# Patient Record
Sex: FEMALE | Race: ASIAN | NOT HISPANIC OR LATINO
[De-identification: names, ages, dates, MRNs, and addresses within clinical notes are randomized per-mention and may not be internally consistent; named-entity substitution may affect disease eponyms.]

---

## 2017-01-12 ENCOUNTER — APPOINTMENT (OUTPATIENT)
Dept: PULMONOLOGY | Facility: CLINIC | Age: 72
End: 2017-01-12

## 2017-01-12 VITALS
OXYGEN SATURATION: 100 % | HEART RATE: 82 BPM | DIASTOLIC BLOOD PRESSURE: 75 MMHG | SYSTOLIC BLOOD PRESSURE: 160 MMHG | WEIGHT: 173 LBS | BODY MASS INDEX: 34.94 KG/M2

## 2017-04-09 ENCOUNTER — RX RENEWAL (OUTPATIENT)
Age: 72
End: 2017-04-09

## 2017-04-17 ENCOUNTER — APPOINTMENT (OUTPATIENT)
Dept: MRI IMAGING | Facility: IMAGING CENTER | Age: 72
End: 2017-04-17

## 2017-05-11 ENCOUNTER — APPOINTMENT (OUTPATIENT)
Dept: PULMONOLOGY | Facility: CLINIC | Age: 72
End: 2017-05-11

## 2017-05-11 VITALS
WEIGHT: 170 LBS | RESPIRATION RATE: 17 BRPM | HEART RATE: 63 BPM | HEIGHT: 59 IN | BODY MASS INDEX: 34.27 KG/M2 | DIASTOLIC BLOOD PRESSURE: 80 MMHG | SYSTOLIC BLOOD PRESSURE: 110 MMHG | OXYGEN SATURATION: 98 %

## 2017-07-04 ENCOUNTER — RX RENEWAL (OUTPATIENT)
Age: 72
End: 2017-07-04

## 2017-08-10 ENCOUNTER — EMERGENCY (EMERGENCY)
Facility: HOSPITAL | Age: 72
LOS: 1 days | Discharge: HOME | End: 2017-08-10
Attending: EMERGENCY MEDICINE | Admitting: EMERGENCY MEDICINE
Payer: COMMERCIAL

## 2017-08-10 VITALS
RESPIRATION RATE: 16 BRPM | WEIGHT: 169.98 LBS | TEMPERATURE: 99 F | OXYGEN SATURATION: 95 % | DIASTOLIC BLOOD PRESSURE: 79 MMHG | HEART RATE: 82 BPM | SYSTOLIC BLOOD PRESSURE: 112 MMHG

## 2017-08-10 VITALS
SYSTOLIC BLOOD PRESSURE: 104 MMHG | OXYGEN SATURATION: 98 % | DIASTOLIC BLOOD PRESSURE: 57 MMHG | TEMPERATURE: 98 F | RESPIRATION RATE: 18 BRPM | HEART RATE: 70 BPM

## 2017-08-10 LAB
ALBUMIN SERPL ELPH-MCNC: 4.2 G/DL — SIGNIFICANT CHANGE UP (ref 3.3–5)
ALP SERPL-CCNC: 91 U/L — SIGNIFICANT CHANGE UP (ref 40–120)
ALT FLD-CCNC: 43 U/L RC — SIGNIFICANT CHANGE UP (ref 10–45)
ANION GAP SERPL CALC-SCNC: 14 MMOL/L — SIGNIFICANT CHANGE UP (ref 5–17)
APPEARANCE UR: CLEAR — SIGNIFICANT CHANGE UP
AST SERPL-CCNC: 35 U/L — SIGNIFICANT CHANGE UP (ref 10–40)
BASOPHILS # BLD AUTO: 0.1 K/UL — SIGNIFICANT CHANGE UP (ref 0–0.2)
BASOPHILS NFR BLD AUTO: 2.8 % — HIGH (ref 0–2)
BILIRUB SERPL-MCNC: 0.2 MG/DL — SIGNIFICANT CHANGE UP (ref 0.2–1.2)
BILIRUB UR-MCNC: NEGATIVE — SIGNIFICANT CHANGE UP
BUN SERPL-MCNC: 17 MG/DL — SIGNIFICANT CHANGE UP (ref 7–23)
CALCIUM SERPL-MCNC: 9.7 MG/DL — SIGNIFICANT CHANGE UP (ref 8.4–10.5)
CHLORIDE SERPL-SCNC: 99 MMOL/L — SIGNIFICANT CHANGE UP (ref 96–108)
CO2 SERPL-SCNC: 23 MMOL/L — SIGNIFICANT CHANGE UP (ref 22–31)
COLOR SPEC: YELLOW — SIGNIFICANT CHANGE UP
CREAT SERPL-MCNC: 0.92 MG/DL — SIGNIFICANT CHANGE UP (ref 0.5–1.3)
DIFF PNL FLD: NEGATIVE — SIGNIFICANT CHANGE UP
EOSINOPHIL # BLD AUTO: 0.3 K/UL — SIGNIFICANT CHANGE UP (ref 0–0.5)
EOSINOPHIL NFR BLD AUTO: 7.1 % — HIGH (ref 0–6)
GLUCOSE SERPL-MCNC: 85 MG/DL — SIGNIFICANT CHANGE UP (ref 70–99)
GLUCOSE UR QL: NEGATIVE — SIGNIFICANT CHANGE UP
HCT VFR BLD CALC: 45 % — SIGNIFICANT CHANGE UP (ref 34.5–45)
HGB BLD-MCNC: 15 G/DL — SIGNIFICANT CHANGE UP (ref 11.5–15.5)
KETONES UR-MCNC: NEGATIVE — SIGNIFICANT CHANGE UP
LEUKOCYTE ESTERASE UR-ACNC: NEGATIVE — SIGNIFICANT CHANGE UP
LYMPHOCYTES # BLD AUTO: 0.8 K/UL — LOW (ref 1–3.3)
LYMPHOCYTES # BLD AUTO: 18.3 % — SIGNIFICANT CHANGE UP (ref 13–44)
MCHC RBC-ENTMCNC: 31.6 PG — SIGNIFICANT CHANGE UP (ref 27–34)
MCHC RBC-ENTMCNC: 33.3 GM/DL — SIGNIFICANT CHANGE UP (ref 32–36)
MCV RBC AUTO: 95 FL — SIGNIFICANT CHANGE UP (ref 80–100)
MONOCYTES # BLD AUTO: 0.4 K/UL — SIGNIFICANT CHANGE UP (ref 0–0.9)
MONOCYTES NFR BLD AUTO: 8.4 % — SIGNIFICANT CHANGE UP (ref 2–14)
NEUTROPHILS # BLD AUTO: 2.8 K/UL — SIGNIFICANT CHANGE UP (ref 1.8–7.4)
NEUTROPHILS NFR BLD AUTO: 63.4 % — SIGNIFICANT CHANGE UP (ref 43–77)
NITRITE UR-MCNC: NEGATIVE — SIGNIFICANT CHANGE UP
PH UR: 6.5 — SIGNIFICANT CHANGE UP (ref 5–8)
PLATELET # BLD AUTO: 207 K/UL — SIGNIFICANT CHANGE UP (ref 150–400)
POTASSIUM SERPL-MCNC: 4.3 MMOL/L — SIGNIFICANT CHANGE UP (ref 3.5–5.3)
POTASSIUM SERPL-SCNC: 4.3 MMOL/L — SIGNIFICANT CHANGE UP (ref 3.5–5.3)
PROT SERPL-MCNC: 8.2 G/DL — SIGNIFICANT CHANGE UP (ref 6–8.3)
PROT UR-MCNC: SIGNIFICANT CHANGE UP
RBC # BLD: 4.74 M/UL — SIGNIFICANT CHANGE UP (ref 3.8–5.2)
RBC # FLD: 12.6 % — SIGNIFICANT CHANGE UP (ref 10.3–14.5)
SODIUM SERPL-SCNC: 136 MMOL/L — SIGNIFICANT CHANGE UP (ref 135–145)
SP GR SPEC: 1.02 — SIGNIFICANT CHANGE UP (ref 1.01–1.02)
UROBILINOGEN FLD QL: NEGATIVE — SIGNIFICANT CHANGE UP
WBC # BLD: 4.4 K/UL — SIGNIFICANT CHANGE UP (ref 3.8–10.5)
WBC # FLD AUTO: 4.4 K/UL — SIGNIFICANT CHANGE UP (ref 3.8–10.5)

## 2017-08-10 PROCEDURE — 96374 THER/PROPH/DIAG INJ IV PUSH: CPT

## 2017-08-10 PROCEDURE — 99284 EMERGENCY DEPT VISIT MOD MDM: CPT

## 2017-08-10 PROCEDURE — 99284 EMERGENCY DEPT VISIT MOD MDM: CPT | Mod: 25

## 2017-08-10 PROCEDURE — 85027 COMPLETE CBC AUTOMATED: CPT

## 2017-08-10 PROCEDURE — 87086 URINE CULTURE/COLONY COUNT: CPT

## 2017-08-10 PROCEDURE — 96375 TX/PRO/DX INJ NEW DRUG ADDON: CPT

## 2017-08-10 PROCEDURE — 81003 URINALYSIS AUTO W/O SCOPE: CPT

## 2017-08-10 PROCEDURE — 71046 X-RAY EXAM CHEST 2 VIEWS: CPT

## 2017-08-10 PROCEDURE — 71020: CPT | Mod: 26

## 2017-08-10 PROCEDURE — 87040 BLOOD CULTURE FOR BACTERIA: CPT

## 2017-08-10 PROCEDURE — 80053 COMPREHEN METABOLIC PANEL: CPT

## 2017-08-10 RX ORDER — CEFUROXIME AXETIL 250 MG
1 TABLET ORAL
Qty: 14 | Refills: 0
Start: 2017-08-10 | End: 2017-08-17

## 2017-08-10 RX ORDER — CEFTRIAXONE 500 MG/1
1 INJECTION, POWDER, FOR SOLUTION INTRAMUSCULAR; INTRAVENOUS ONCE
Qty: 0 | Refills: 0 | Status: COMPLETED | OUTPATIENT
Start: 2017-08-10 | End: 2017-08-10

## 2017-08-10 RX ORDER — METOCLOPRAMIDE HCL 10 MG
10 TABLET ORAL ONCE
Qty: 0 | Refills: 0 | Status: COMPLETED | OUTPATIENT
Start: 2017-08-10 | End: 2017-08-10

## 2017-08-10 RX ORDER — SODIUM CHLORIDE 9 MG/ML
1000 INJECTION INTRAMUSCULAR; INTRAVENOUS; SUBCUTANEOUS ONCE
Qty: 0 | Refills: 0 | Status: COMPLETED | OUTPATIENT
Start: 2017-08-10 | End: 2017-08-10

## 2017-08-10 RX ADMIN — CEFTRIAXONE 100 GRAM(S): 500 INJECTION, POWDER, FOR SOLUTION INTRAMUSCULAR; INTRAVENOUS at 15:26

## 2017-08-10 RX ADMIN — SODIUM CHLORIDE 1000 MILLILITER(S): 9 INJECTION INTRAMUSCULAR; INTRAVENOUS; SUBCUTANEOUS at 14:42

## 2017-08-10 RX ADMIN — Medication 10 MILLIGRAM(S): at 14:42

## 2017-08-10 NOTE — ED PROVIDER NOTE - MEDICAL DECISION MAKING DETAILS
Dysuria, chills, lower back pain, nausea, given bactrim but only took 3 doses, PE: CVA TTP bilat. Concern for pyelo. Plan: Urine, labs, abx, reassess. Dysuria, chills, lower back pain, nausea, given bactrim but only took 3 doses, PE: CVA TTP bilat. Concern for pyelo. Plan: Urine, labs, abx, reassess.  ATTD: concern for infection, will check labs, recheck urine, send culture, pain control. re jamie for dispo

## 2017-08-10 NOTE — ED PROVIDER NOTE - PMH
Asthma  h/o asthma in the past , not on meds  Depression    Diabetes  diet controlled, not on meds  Glaucoma    Lumbar Disc Disease  lumbar disc displacement  Migraine Headache    Ovarian Cancer  in 2005 had chemo  Pneumonia  h/o pneumonia in the past  Sleep Apnea  uses CPAP

## 2017-08-10 NOTE — ED ADULT NURSE NOTE - OBJECTIVE STATEMENT
Female 71 years old with history of DM came in for nausea/vomiting, headache and weakness. Pt stated  she had burning and frequency in urination a week ago, went [facility] and was started on Bactrim DS. Pt took 3 pills already and claimed that she's been vomiting, body aches and headaches after. Denies chest pain, sob. Denies blood in urine. Labs obtained. Comfort care rendered. Will monitor.

## 2017-08-10 NOTE — ED PROVIDER NOTE - PHYSICAL EXAMINATION
Gen: NAD  Eyes:  sclerae white, no icterus  ENT: Moist mucous membranes. No exudates  Neck: supple, no LAD, mass or goiter, trachea midline  CV: RRR. Audible S1 and S2. No murmurs, rubs, gallops, S3, nor S4  Pulm: Clear to auscultation bilaterally. No wheezes, rales, or rhonchi  Abd: BS+, nondistended, +CVA TTP bilaterally  Musculoskeletal:  No edema  Skin: no lesions or scars noted  Psych: mood good, affect full range and congruent with mood.  Neurologic: AAOx3

## 2017-08-10 NOTE — ED PROVIDER NOTE - ATTENDING CONTRIBUTION TO CARE
72 y/o F w/ hx of DM, HTN, asthma, presents for nausea, dysuria, and lower back pain. Last vomited last night at 11pm. Had dysuria 6 wks ago, given bactrim, only took 3 doses b/c began vomiting, now has chills and lower back pain bilaterally. Had fever 2 days ago.  No diarrhea, no rash.  primary care physician: Ade Coburn no acute distress, Non toxic   HEENT: EOMI, mmm,   Lungs: CTAB/L no C/ W /R   CVS: S1S2   Abd; Soft non tender, non distended   Ext: no edema CVA on my exam no tenderness to palpation.   Neuro AAOx3 non focal clear speech

## 2017-08-10 NOTE — ED PROVIDER NOTE - OBJECTIVE STATEMENT
72 y/o F w/ hx of DM, HTN, asthma, p/w nausea, dysuria, and lower back pain. Last vomited last night at 11pm. Had dysuria 6 wks ago, given bactrim, only took 3 doses b/c began vomiting, now has chills and lower back pain bilaterally. Had fever 2 days ago. Dysuria now improved. No diarrhea, no rash.  primary care physician: Ade

## 2017-08-11 LAB
CULTURE RESULTS: SIGNIFICANT CHANGE UP
SPECIMEN SOURCE: SIGNIFICANT CHANGE UP

## 2017-08-15 LAB
CULTURE RESULTS: SIGNIFICANT CHANGE UP
CULTURE RESULTS: SIGNIFICANT CHANGE UP
SPECIMEN SOURCE: SIGNIFICANT CHANGE UP
SPECIMEN SOURCE: SIGNIFICANT CHANGE UP

## 2017-08-25 ENCOUNTER — RX RENEWAL (OUTPATIENT)
Age: 72
End: 2017-08-25

## 2017-09-12 ENCOUNTER — APPOINTMENT (OUTPATIENT)
Dept: PULMONOLOGY | Facility: CLINIC | Age: 72
End: 2017-09-12
Payer: MEDICARE

## 2017-09-12 VITALS
BODY MASS INDEX: 34.27 KG/M2 | HEIGHT: 59 IN | DIASTOLIC BLOOD PRESSURE: 60 MMHG | OXYGEN SATURATION: 96 % | SYSTOLIC BLOOD PRESSURE: 110 MMHG | HEART RATE: 68 BPM | WEIGHT: 170 LBS | RESPIRATION RATE: 17 BRPM

## 2017-09-12 PROCEDURE — 71020: CPT

## 2017-09-12 PROCEDURE — 99214 OFFICE O/P EST MOD 30 MIN: CPT | Mod: 25

## 2017-09-12 RX ORDER — BLOOD-GLUCOSE METER
KIT MISCELLANEOUS
Qty: 1 | Refills: 0 | Status: ACTIVE | COMMUNITY
Start: 2017-03-08

## 2017-09-12 RX ORDER — BLOOD SUGAR DIAGNOSTIC
STRIP MISCELLANEOUS
Qty: 50 | Refills: 0 | Status: ACTIVE | COMMUNITY
Start: 2017-03-08

## 2017-09-15 ENCOUNTER — APPOINTMENT (OUTPATIENT)
Dept: ULTRASOUND IMAGING | Facility: IMAGING CENTER | Age: 72
End: 2017-09-15
Payer: MEDICARE

## 2017-09-15 ENCOUNTER — APPOINTMENT (OUTPATIENT)
Dept: MAMMOGRAPHY | Facility: IMAGING CENTER | Age: 72
End: 2017-09-15
Payer: MEDICARE

## 2017-09-15 ENCOUNTER — OUTPATIENT (OUTPATIENT)
Dept: OUTPATIENT SERVICES | Facility: HOSPITAL | Age: 72
LOS: 1 days | End: 2017-09-15
Payer: COMMERCIAL

## 2017-09-15 DIAGNOSIS — Z00.8 ENCOUNTER FOR OTHER GENERAL EXAMINATION: ICD-10-CM

## 2017-09-15 PROCEDURE — 77063 BREAST TOMOSYNTHESIS BI: CPT

## 2017-09-15 PROCEDURE — G0202: CPT | Mod: 26

## 2017-09-15 PROCEDURE — 77067 SCR MAMMO BI INCL CAD: CPT

## 2017-09-15 PROCEDURE — 77063 BREAST TOMOSYNTHESIS BI: CPT | Mod: 26

## 2017-10-05 ENCOUNTER — APPOINTMENT (OUTPATIENT)
Dept: NEUROLOGY | Facility: CLINIC | Age: 72
End: 2017-10-05
Payer: MEDICARE

## 2017-10-05 VITALS
HEART RATE: 69 BPM | DIASTOLIC BLOOD PRESSURE: 84 MMHG | HEIGHT: 59 IN | SYSTOLIC BLOOD PRESSURE: 166 MMHG | BODY MASS INDEX: 34.07 KG/M2 | WEIGHT: 169 LBS

## 2017-10-05 DIAGNOSIS — Z83.3 FAMILY HISTORY OF DIABETES MELLITUS: ICD-10-CM

## 2017-10-05 DIAGNOSIS — Z82.3 FAMILY HISTORY OF STROKE: ICD-10-CM

## 2017-10-05 PROCEDURE — 99205 OFFICE O/P NEW HI 60 MIN: CPT

## 2017-10-30 ENCOUNTER — APPOINTMENT (OUTPATIENT)
Dept: NEUROLOGY | Facility: CLINIC | Age: 72
End: 2017-10-30
Payer: MEDICARE

## 2017-10-30 PROCEDURE — 95886 MUSC TEST DONE W/N TEST COMP: CPT

## 2017-10-30 PROCEDURE — 95910 NRV CNDJ TEST 7-8 STUDIES: CPT

## 2017-10-30 PROCEDURE — 95937 NEUROMUSCULAR JUNCTION TEST: CPT

## 2017-11-14 ENCOUNTER — APPOINTMENT (OUTPATIENT)
Dept: NEUROSURGERY | Facility: CLINIC | Age: 72
End: 2017-11-14
Payer: MEDICARE

## 2017-11-14 VITALS
BODY MASS INDEX: 34.27 KG/M2 | TEMPERATURE: 97.8 F | SYSTOLIC BLOOD PRESSURE: 150 MMHG | DIASTOLIC BLOOD PRESSURE: 82 MMHG | WEIGHT: 170 LBS | HEART RATE: 69 BPM | OXYGEN SATURATION: 96 % | HEIGHT: 59 IN

## 2017-11-14 PROCEDURE — 99205 OFFICE O/P NEW HI 60 MIN: CPT | Mod: 57

## 2017-11-14 RX ORDER — CELECOXIB 200 MG/1
200 CAPSULE ORAL
Qty: 21 | Refills: 0 | Status: DISCONTINUED | COMMUNITY
Start: 2017-03-31 | End: 2017-11-14

## 2017-11-14 RX ORDER — OLOPATADINE HCL 1 MG/ML
0.1 SOLUTION/ DROPS OPHTHALMIC TWICE DAILY
Qty: 3 | Refills: 1 | Status: DISCONTINUED | COMMUNITY
Start: 2017-05-11 | End: 2017-11-14

## 2017-12-07 ENCOUNTER — RX RENEWAL (OUTPATIENT)
Age: 72
End: 2017-12-07

## 2018-01-10 ENCOUNTER — APPOINTMENT (OUTPATIENT)
Dept: PULMONOLOGY | Facility: CLINIC | Age: 73
End: 2018-01-10

## 2018-02-16 ENCOUNTER — RX RENEWAL (OUTPATIENT)
Age: 73
End: 2018-02-16

## 2018-03-09 ENCOUNTER — RX RENEWAL (OUTPATIENT)
Age: 73
End: 2018-03-09

## 2018-03-13 ENCOUNTER — RX RENEWAL (OUTPATIENT)
Age: 73
End: 2018-03-13

## 2018-04-09 ENCOUNTER — APPOINTMENT (OUTPATIENT)
Dept: PULMONOLOGY | Facility: CLINIC | Age: 73
End: 2018-04-09

## 2018-04-10 ENCOUNTER — APPOINTMENT (OUTPATIENT)
Dept: PULMONOLOGY | Facility: CLINIC | Age: 73
End: 2018-04-10
Payer: MEDICARE

## 2018-04-10 VITALS
OXYGEN SATURATION: 96 % | SYSTOLIC BLOOD PRESSURE: 120 MMHG | BODY MASS INDEX: 34.27 KG/M2 | DIASTOLIC BLOOD PRESSURE: 60 MMHG | HEART RATE: 71 BPM | WEIGHT: 170 LBS | HEIGHT: 59 IN

## 2018-04-10 PROCEDURE — 99214 OFFICE O/P EST MOD 30 MIN: CPT

## 2018-04-11 ENCOUNTER — MEDICATION RENEWAL (OUTPATIENT)
Age: 73
End: 2018-04-11

## 2018-04-11 RX ORDER — ALBUTEROL SULFATE 90 UG/1
108 (90 BASE) AEROSOL, METERED RESPIRATORY (INHALATION)
Qty: 1 | Refills: 3 | Status: DISCONTINUED | COMMUNITY
Start: 2018-04-10 | End: 2018-04-11

## 2018-04-19 RX ORDER — AZITHROMYCIN 250 MG/1
250 TABLET, FILM COATED ORAL
Qty: 6 | Refills: 0 | Status: COMPLETED | COMMUNITY
Start: 2017-12-22

## 2018-04-19 RX ORDER — FLUTICASONE PROPIONATE 50 UG/1
50 SPRAY, METERED NASAL
Qty: 16 | Refills: 0 | Status: COMPLETED | COMMUNITY
Start: 2018-02-14

## 2018-05-08 DIAGNOSIS — H10.10 ACUTE ATOPIC CONJUNCTIVITIS, UNSPECIFIED EYE: ICD-10-CM

## 2018-05-11 RX ORDER — OLOPATADINE HYDROCHLORIDE 2 MG/ML
0.2 SOLUTION OPHTHALMIC
Qty: 1 | Refills: 3 | Status: ACTIVE | COMMUNITY
Start: 2018-05-11 | End: 1900-01-01

## 2018-06-27 ENCOUNTER — APPOINTMENT (OUTPATIENT)
Dept: PULMONOLOGY | Facility: CLINIC | Age: 73
End: 2018-06-27
Payer: MEDICARE

## 2018-06-27 VITALS
SYSTOLIC BLOOD PRESSURE: 144 MMHG | HEIGHT: 59 IN | BODY MASS INDEX: 34.27 KG/M2 | HEART RATE: 88 BPM | WEIGHT: 170 LBS | OXYGEN SATURATION: 96 % | DIASTOLIC BLOOD PRESSURE: 74 MMHG

## 2018-06-27 PROCEDURE — 71046 X-RAY EXAM CHEST 2 VIEWS: CPT

## 2018-06-27 PROCEDURE — 99214 OFFICE O/P EST MOD 30 MIN: CPT | Mod: 25

## 2018-08-14 ENCOUNTER — APPOINTMENT (OUTPATIENT)
Dept: PULMONOLOGY | Facility: CLINIC | Age: 73
End: 2018-08-14

## 2018-08-27 ENCOUNTER — APPOINTMENT (OUTPATIENT)
Dept: NEUROLOGY | Facility: CLINIC | Age: 73
End: 2018-08-27
Payer: MEDICARE

## 2018-08-27 VITALS
DIASTOLIC BLOOD PRESSURE: 86 MMHG | HEIGHT: 59 IN | HEART RATE: 80 BPM | BODY MASS INDEX: 34.68 KG/M2 | WEIGHT: 172 LBS | SYSTOLIC BLOOD PRESSURE: 160 MMHG

## 2018-08-27 PROCEDURE — 99214 OFFICE O/P EST MOD 30 MIN: CPT

## 2018-10-25 ENCOUNTER — APPOINTMENT (OUTPATIENT)
Dept: NEUROLOGY | Facility: CLINIC | Age: 73
End: 2018-10-25

## 2018-11-07 ENCOUNTER — OUTPATIENT (OUTPATIENT)
Dept: OUTPATIENT SERVICES | Facility: HOSPITAL | Age: 73
LOS: 1 days | End: 2018-11-07
Payer: COMMERCIAL

## 2018-11-07 ENCOUNTER — APPOINTMENT (OUTPATIENT)
Dept: MAMMOGRAPHY | Facility: IMAGING CENTER | Age: 73
End: 2018-11-07
Payer: MEDICARE

## 2018-11-07 DIAGNOSIS — Z00.8 ENCOUNTER FOR OTHER GENERAL EXAMINATION: ICD-10-CM

## 2018-11-07 PROCEDURE — 77063 BREAST TOMOSYNTHESIS BI: CPT | Mod: 26

## 2018-11-07 PROCEDURE — 77067 SCR MAMMO BI INCL CAD: CPT

## 2018-11-07 PROCEDURE — 77063 BREAST TOMOSYNTHESIS BI: CPT

## 2018-11-07 PROCEDURE — 77067 SCR MAMMO BI INCL CAD: CPT | Mod: 26

## 2018-11-14 ENCOUNTER — APPOINTMENT (OUTPATIENT)
Dept: ULTRASOUND IMAGING | Facility: IMAGING CENTER | Age: 73
End: 2018-11-14
Payer: MEDICARE

## 2018-11-14 ENCOUNTER — OUTPATIENT (OUTPATIENT)
Dept: OUTPATIENT SERVICES | Facility: HOSPITAL | Age: 73
LOS: 1 days | End: 2018-11-14
Payer: COMMERCIAL

## 2018-11-14 DIAGNOSIS — Z00.8 ENCOUNTER FOR OTHER GENERAL EXAMINATION: ICD-10-CM

## 2018-11-14 PROCEDURE — 76642 ULTRASOUND BREAST LIMITED: CPT

## 2018-11-14 PROCEDURE — 76642 ULTRASOUND BREAST LIMITED: CPT | Mod: 26,LT

## 2018-11-30 ENCOUNTER — APPOINTMENT (OUTPATIENT)
Dept: ULTRASOUND IMAGING | Facility: IMAGING CENTER | Age: 73
End: 2018-11-30

## 2018-12-17 ENCOUNTER — OUTPATIENT (OUTPATIENT)
Dept: OUTPATIENT SERVICES | Facility: HOSPITAL | Age: 73
LOS: 1 days | End: 2018-12-17
Payer: COMMERCIAL

## 2018-12-17 ENCOUNTER — RESULT REVIEW (OUTPATIENT)
Age: 73
End: 2018-12-17

## 2018-12-17 ENCOUNTER — APPOINTMENT (OUTPATIENT)
Dept: ULTRASOUND IMAGING | Facility: IMAGING CENTER | Age: 73
End: 2018-12-17
Payer: MEDICARE

## 2018-12-17 DIAGNOSIS — N63.20 UNSPECIFIED LUMP IN THE LEFT BREAST, UNSPECIFIED QUADRANT: ICD-10-CM

## 2018-12-17 PROCEDURE — 88173 CYTOPATH EVAL FNA REPORT: CPT | Mod: 26

## 2018-12-17 PROCEDURE — 88173 CYTOPATH EVAL FNA REPORT: CPT

## 2018-12-17 PROCEDURE — 19083 BX BREAST 1ST LESION US IMAG: CPT

## 2018-12-17 PROCEDURE — 19083 BX BREAST 1ST LESION US IMAG: CPT | Mod: LT

## 2018-12-21 LAB — NON-GYNECOLOGICAL CYTOLOGY STUDY: SIGNIFICANT CHANGE UP

## 2019-02-28 ENCOUNTER — APPOINTMENT (OUTPATIENT)
Dept: PULMONOLOGY | Facility: CLINIC | Age: 74
End: 2019-02-28
Payer: MEDICARE

## 2019-02-28 VITALS
WEIGHT: 178 LBS | HEART RATE: 75 BPM | BODY MASS INDEX: 37.36 KG/M2 | RESPIRATION RATE: 16 BRPM | DIASTOLIC BLOOD PRESSURE: 70 MMHG | OXYGEN SATURATION: 96 % | SYSTOLIC BLOOD PRESSURE: 120 MMHG | HEIGHT: 58 IN

## 2019-02-28 PROCEDURE — 99214 OFFICE O/P EST MOD 30 MIN: CPT

## 2019-02-28 RX ORDER — AZELASTINE HYDROCHLORIDE 137 UG/1
0.1 SPRAY, METERED NASAL TWICE DAILY
Qty: 1 | Refills: 1 | Status: ACTIVE | COMMUNITY
Start: 2019-02-28 | End: 1900-01-01

## 2019-02-28 NOTE — ASSESSMENT
[FreeTextEntry1] : Ms. Gracia is in the midst of acute bronchitis and asthmatic bronchitis\par \par problem 1: asthmatic bronchitis with acute asthma flare\par -wants to avoid steroids\par -add ceftin 500 mg PO BID x 10 days\par -advised patient she will need to increase advair to BID dosing- rinse and gargle\par -add spiriva respimat- samples provided 2 puffs once daily rinse and gargle after use\par -add Albuterol nebulizer at least BID up to QiD (gargle and spit after use) \par -continue to use Singulair 10 mg before bed\par \par problem 2: allergic rhinitis with post nasal drip\par -restart azelastine nasal spray 1-2 sniff/nostril BID\par \par problem 3: GERD with flare exacerbating cough\par -continue to use omeprazole 40 mg before breakfast but add dose before dinner as well x 5-7 days\par -continue to use Zantac 300 mg before bed \par \par problem 4: OSAS\par -continue to use the CPAP machine, tolerating it well, and seeing the benefits\par \par F/U in 3 months or as scheduled with Dr. Gonzales\par She is encouraged to call with any changes, concerns, or questions

## 2019-02-28 NOTE — HISTORY OF PRESENT ILLNESS
[FreeTextEntry1] : Ms. Lemus is a 73 year old female presenting to the office for an acute visit. She has PMHx of CHF, allergic rhinitis, asthma, chronic cough, GERD, overweight, MARA and shortness of breath. Her chief complaint is cough and wheezing. \par \par She reports her cough has been going on for approx 1 month and has had waxing and waning symptoms. Over the last few days however she has become very fatigued, has had increased wheezing and has chest tightness.  Her reflux is also flaring despite being on medications. She cannot sleep at night due to cough. She admits to post nasal drip. \par \par She denies fever, chills, chest pain, sore throat, nasal congestion, headaches, rashes or muscle cramps. \par \par She has a planned stress test but is going to re-schedule. She states she had an echo and it was "normal."\par \par She is using her advair only once a daily and is using ventolin every 3-4 hours. \par She has a nebulizer but not using it. \par She wants to avoid steroids. \par

## 2019-02-28 NOTE — REVIEW OF SYSTEMS
[Fatigue] : fatigue [Ear Disturbance] : ear disturbance [Postnasal Drip] : postnasal drip [Cough] : cough [Dyspnea] : dyspnea [Chest Tightness] : chest tightness [Wheezing] : wheezing [Reflux] : reflux [Indigestion] : indigestion [As Noted in HPI] : as noted in HPI [Difficulty Initiating Sleep] : difficulty falling asleep [Difficulty Maintaining Sleep] : difficulty maintaining sleep [Snoring] : snoring [Nonrestorative Sleep] : nonrestorative sleep [Negative] : Pulmonary Hypertension [Fever] : no fever [Chills] : no chills [Poor Appetite] : normal appetite  [Dry Eyes] : no dryness of the eyes [Eye Irritation] : no ~T irritation of the eyes [Nasal Congestion] : no nasal congestion [Sore Throat] : no sore throat [Dry Mouth] : no dry mouth [Mouth Ulcers] : no mouth ulcers [Sputum] : not coughing up ~M sputum [Heartburn] : no heartburn [Dysphagia] : no dysphagia [Nausea] : no nausea [Vomiting] : no vomiting [Constipation] : no constipation [Diarrhea] : no diarrhea [Abdominal Pain] : no abdominal pain [de-identified] : due to cough

## 2019-02-28 NOTE — PHYSICAL EXAM
[General Appearance - Well Developed] : well developed [Normal Appearance] : normal appearance [Well Groomed] : well groomed [General Appearance - Well Nourished] : well nourished [No Deformities] : no deformities [General Appearance - In No Acute Distress] : no acute distress [Normal Conjunctiva] : the conjunctiva exhibited no abnormalities [Eyelids - No Xanthelasma] : the eyelids demonstrated no xanthelasmas [Normal Oropharynx] : normal oropharynx [III] : III [Neck Appearance] : the appearance of the neck was normal [Neck Cervical Mass (___cm)] : no neck mass was observed [Heart Rate And Rhythm] : heart rate and rhythm were normal [Heart Sounds] : normal S1 and S2 [Murmurs] : no murmurs present [Arterial Pulses Normal] : the arterial pulses were normal [Respiration, Rhythm And Depth] : normal respiratory rhythm and effort [Exaggerated Use Of Accessory Muscles For Inspiration] : no accessory muscle use [Abdomen Soft] : soft [Abdomen Tenderness] : non-tender [Abdomen Mass (___ Cm)] : no abdominal mass palpated [Abnormal Walk] : normal gait [Gait - Sufficient For Exercise Testing] : the gait was sufficient for exercise testing [Nail Clubbing] : no clubbing of the fingernails [Cyanosis, Localized] : no localized cyanosis [Petechial Hemorrhages (___cm)] : no petechial hemorrhages [Skin Color & Pigmentation] : normal skin color and pigmentation [] : no rash [No Venous Stasis] : no venous stasis [Skin Lesions] : no skin lesions [No Skin Ulcers] : no skin ulcer [No Xanthoma] : no  xanthoma was observed [Deep Tendon Reflexes (DTR)] : deep tendon reflexes were 2+ and symmetric [Sensation] : the sensory exam was normal to light touch and pinprick [No Focal Deficits] : no focal deficits [Oriented To Time, Place, And Person] : oriented to person, place, and time [Impaired Insight] : insight and judgment were intact [Affect] : the affect was normal [FreeTextEntry1] : scattered expiratory wheeze

## 2019-03-04 ENCOUNTER — RX CHANGE (OUTPATIENT)
Age: 74
End: 2019-03-04

## 2019-03-04 RX ORDER — CEFUROXIME AXETIL 500 MG/1
500 TABLET ORAL
Qty: 20 | Refills: 0 | Status: DISCONTINUED | COMMUNITY
Start: 2019-02-28 | End: 2019-03-04

## 2019-03-07 ENCOUNTER — APPOINTMENT (OUTPATIENT)
Dept: PULMONOLOGY | Facility: CLINIC | Age: 74
End: 2019-03-07
Payer: MEDICARE

## 2019-03-07 VITALS
SYSTOLIC BLOOD PRESSURE: 110 MMHG | OXYGEN SATURATION: 97 % | RESPIRATION RATE: 17 BRPM | HEIGHT: 58 IN | HEART RATE: 71 BPM | BODY MASS INDEX: 37.36 KG/M2 | TEMPERATURE: 97.6 F | WEIGHT: 178 LBS | DIASTOLIC BLOOD PRESSURE: 68 MMHG

## 2019-03-07 PROCEDURE — 99214 OFFICE O/P EST MOD 30 MIN: CPT

## 2019-03-07 NOTE — REVIEW OF SYSTEMS
[As Noted in HPI] : as noted in HPI [Negative] : Pulmonary Hypertension [Fever] : no fever [Chills] : no chills [Fatigue] : no fatigue [Poor Appetite] : normal appetite  [Dry Eyes] : no dryness of the eyes [Eye Irritation] : no ~T irritation of the eyes [Ear Disturbance] : no ear disturbance [Nasal Congestion] : no nasal congestion [Postnasal Drip] : no postnasal drip [Sore Throat] : no sore throat [Dry Mouth] : no dry mouth [Mouth Ulcers] : no mouth ulcers [Cough] : no cough [Sputum] : not coughing up ~M sputum [Dyspnea] : no dyspnea [Chest Tightness] : no chest tightness [Wheezing] : no wheezing [Heartburn] : no heartburn [Reflux] : no reflux [Indigestion] : no indigestion [Dysphagia] : no dysphagia [Nausea] : no nausea [Vomiting] : no vomiting [Constipation] : no constipation [Diarrhea] : no diarrhea [Abdominal Pain] : no abdominal pain [Difficulty Initiating Sleep] : no difficulty falling asleep [Difficulty Maintaining Sleep] : no difficulty maintaining sleep [Snoring] : no snoring [Nonrestorative Sleep] : restorative sleep

## 2019-03-07 NOTE — ASSESSMENT
[FreeTextEntry1] : The plan for the patient is as follows:\par \par problem 1: asthma- s/p acute asthmatic bronchitis\par -finish prednisone taper as directed \par -continue advair 250 to BID rinse and gargle\par -continue/finish sample of spiriva respimat- samples provided 2 puffs once daily rinse and gargle after use\par -continue Albuterol nebulizer at least BID for the next week\par -continue to use Singulair 10 mg before bed\par \par problem 2: allergic rhinitis with post nasal drip-resolved\par -restart azelastine nasal spray 1-2 sniff/nostril BID PRN\par \par problem 3: GERD -now stable\par -continue to use omeprazole 40 mg before breakfast \par -continue to use Zantac 300 mg before bed \par \par problem 4: OSAS\par -having issue with CPAP mask\par -discussed mask options: trial dreamwear full face mask, if not tolerated, trial dreamwear nasal mask next\par -her company is [employer]\par -continue to use the CPAP machine, tolerating it well, and seeing the benefits\par \par Problem 5: Obestity and weight gain\par -advised she needs to incorporate healthier food choices- possibly see nutritionist\par -swap out white rice for brown rice- needs more complex carbs to keep her chapman for longer\par -hydrate\par -exercise as able. \par \par F/U in 3- 4 months or as scheduled with Dr. Gonzales\par She is encouraged to call with any changes, concerns, or questions

## 2019-03-07 NOTE — HISTORY OF PRESENT ILLNESS
[FreeTextEntry1] : Ms. Lemus is a 73 year old female presenting to the office for an acute visit. She has PMHx of CHF, allergic rhinitis, asthma, chronic cough, GERD, overweight, MARA and shortness of breath. Her chief complaint is cough and wheezing. \par \par She reports her cough has been going on for approx 1 month and has had waxing and waning symptoms. Over the last few days however she has become very fatigued, has had increased wheezing and has chest tightness.  Her reflux is also flaring despite being on medications. She cannot sleep at night due to cough. She admits to post nasal drip. \par \par She denies fever, chills, chest pain, sore throat, nasal congestion, headaches, rashes or muscle cramps. \par \par She has a planned stress test but is going to re-schedule. She states she had an echo and it was "normal."\par \par She is using her Advair only once a daily and is using Ventolin every 3-4 hours. \par She has a nebulizer but not using it. \par She wants to avoid steroids.\par \par UPDATE 3/7/19:\par \par Patient in for follow up from her recent bronchitis. \par She states she is feeling much better- her cough is greatly improved- minimal cough now. She has no more SOB, wheezing or fatigue. \par \par She is compliant on meds and did not need to her cough suppressant. \par She is sleeping better. \par She has gone back to 1x a day PPI without issue. \par \par She reports her antidepressant med is causing her to eat much more and is concerned about gaining weight. She admits to a lot of carbohydrate intake including white rice. \par \par She states she dislikes her full face mask for her CPAP machine. She states its uncomfortable and ends up pulling it off nightly. She would like to discuss other choices. \par \par No new complaints.\par

## 2019-03-07 NOTE — PHYSICAL EXAM
[General Appearance - Well Developed] : well developed [Normal Appearance] : normal appearance [Well Groomed] : well groomed [General Appearance - Well Nourished] : well nourished [No Deformities] : no deformities [General Appearance - In No Acute Distress] : no acute distress [Normal Conjunctiva] : the conjunctiva exhibited no abnormalities [Eyelids - No Xanthelasma] : the eyelids demonstrated no xanthelasmas [Normal Oropharynx] : normal oropharynx [III] : III [Neck Appearance] : the appearance of the neck was normal [Neck Cervical Mass (___cm)] : no neck mass was observed [Heart Rate And Rhythm] : heart rate and rhythm were normal [Heart Sounds] : normal S1 and S2 [Murmurs] : no murmurs present [Arterial Pulses Normal] : the arterial pulses were normal [Respiration, Rhythm And Depth] : normal respiratory rhythm and effort [Exaggerated Use Of Accessory Muscles For Inspiration] : no accessory muscle use [Auscultation Breath Sounds / Voice Sounds] : lungs were clear to auscultation bilaterally [Abdomen Soft] : soft [Abdomen Tenderness] : non-tender [Abdomen Mass (___ Cm)] : no abdominal mass palpated [Abnormal Walk] : normal gait [Gait - Sufficient For Exercise Testing] : the gait was sufficient for exercise testing [Nail Clubbing] : no clubbing of the fingernails [Cyanosis, Localized] : no localized cyanosis [Petechial Hemorrhages (___cm)] : no petechial hemorrhages [Skin Color & Pigmentation] : normal skin color and pigmentation [] : no rash [No Venous Stasis] : no venous stasis [Skin Lesions] : no skin lesions [No Skin Ulcers] : no skin ulcer [No Xanthoma] : no  xanthoma was observed [Deep Tendon Reflexes (DTR)] : deep tendon reflexes were 2+ and symmetric [Sensation] : the sensory exam was normal to light touch and pinprick [No Focal Deficits] : no focal deficits [Oriented To Time, Place, And Person] : oriented to person, place, and time [Impaired Insight] : insight and judgment were intact [Affect] : the affect was normal

## 2019-03-25 ENCOUNTER — RX CHANGE (OUTPATIENT)
Age: 74
End: 2019-03-25

## 2019-03-25 ENCOUNTER — APPOINTMENT (OUTPATIENT)
Dept: CV DIAGNOSTICS | Facility: HOSPITAL | Age: 74
End: 2019-03-25

## 2019-06-20 ENCOUNTER — APPOINTMENT (OUTPATIENT)
Dept: PULMONOLOGY | Facility: CLINIC | Age: 74
End: 2019-06-20
Payer: MEDICARE

## 2019-06-20 VITALS
BODY MASS INDEX: 37.36 KG/M2 | DIASTOLIC BLOOD PRESSURE: 72 MMHG | OXYGEN SATURATION: 98 % | SYSTOLIC BLOOD PRESSURE: 140 MMHG | RESPIRATION RATE: 16 BRPM | WEIGHT: 178 LBS | HEIGHT: 58 IN | HEART RATE: 83 BPM

## 2019-06-20 PROCEDURE — 99214 OFFICE O/P EST MOD 30 MIN: CPT

## 2019-06-24 NOTE — ASSESSMENT
[FreeTextEntry1] : The plan for the patient is as follows:\par \par problem 1: asthma with exertional dyspnea\par -continue advair 250 1 puff BID rinse and gargle\par - spiriva respimat- samples provided 2 puffs once daily rinse and gargle after use (especially on days with more exertion or activity)\par - Albuterol nebulizer BID- QID PRN SOB\par -continue to use Singulair 10 mg before bed\par \par problem 2: allergic rhinitis with post nasal drip\par -azelastine nasal spray 1-2 sniff/nostril BID PRN (pt has at home)\par \par problem 3: GERD - stable\par -continue to use omeprazole 40 mg before breakfast \par -continue to use Zantac 300 mg before bed \par -monitor diet\par \par problem 4: OSAS\par -continue to use the CPAP machine, tolerating it well, and seeing the benefits\par \par Problem 5: Obestity and weight gain\par -advised she needs to incorporate healthier food choices- possibly see nutritionist\par -swap out white rice for brown rice- needs more complex carbs to keep her chapman for longer\par -hydrate\par -exercise as able. \par \par F/U in 3- 4 months or as scheduled with Dr. Gonzales with [facility]\par She is encouraged to call with any changes, concerns, or questions

## 2019-06-24 NOTE — HISTORY OF PRESENT ILLNESS
[FreeTextEntry1] : Ms. Lemus is a 73 year old female presenting to the office for a follow up. She has PMHx of CHF, allergic rhinitis, asthma, chronic cough, GERD, overweight, MARA and shortness of breath.\par \par Pt reports she has been feeling well and in her general state of health. \par She reports that despite her cough improving after her last visit, the cough returned. She was taking Lisinopril and was taken off of the drug due to the cough. She reports great improvement now. \par She reports she has leg pain (chronic) that causes her to do less activity and become more SOB when she tries to be more active.\par \par She had her meds refilled by her primary. \par She is compliant on her inhaler. She wishes she had less exertional dyspnea. She uses her rescue inhaler and it does help her. \par \par She reports her reflux has been under control and is mainly diet related. \par \par \par \par She denies fever, chills, chest pain, sore throat, nasal congestion, headaches, rashes or muscle cramps. \par

## 2019-06-24 NOTE — REVIEW OF SYSTEMS
[Dyspnea] : dyspnea [As Noted in HPI] : as noted in HPI [Negative] : Sleep Disorder [Chills] : no chills [Fatigue] : no fatigue [Fever] : no fever [Poor Appetite] : normal appetite  [Eye Irritation] : no ~T irritation of the eyes [Dry Eyes] : no dryness of the eyes [Nasal Congestion] : no nasal congestion [Ear Disturbance] : no ear disturbance [Postnasal Drip] : no postnasal drip [Dry Mouth] : no dry mouth [Sore Throat] : no sore throat [Mouth Ulcers] : no mouth ulcers [Sputum] : not coughing up ~M sputum [Cough] : no cough [Chest Tightness] : no chest tightness [Reflux] : no reflux [Wheezing] : no wheezing [Heartburn] : no heartburn [Dysphagia] : no dysphagia [Indigestion] : no indigestion [Nausea] : no nausea [Constipation] : no constipation [Vomiting] : no vomiting [Difficulty Initiating Sleep] : no difficulty falling asleep [Diarrhea] : no diarrhea [Abdominal Pain] : no abdominal pain [Nonrestorative Sleep] : restorative sleep [Snoring] : no snoring [Difficulty Maintaining Sleep] : no difficulty maintaining sleep

## 2019-06-24 NOTE — PHYSICAL EXAM
[General Appearance - Well Developed] : well developed [Well Groomed] : well groomed [Normal Appearance] : normal appearance [General Appearance - In No Acute Distress] : no acute distress [General Appearance - Well Nourished] : well nourished [No Deformities] : no deformities [Normal Conjunctiva] : the conjunctiva exhibited no abnormalities [Normal Oropharynx] : normal oropharynx [Eyelids - No Xanthelasma] : the eyelids demonstrated no xanthelasmas [III] : III [Neck Cervical Mass (___cm)] : no neck mass was observed [Neck Appearance] : the appearance of the neck was normal [Heart Rate And Rhythm] : heart rate and rhythm were normal [Murmurs] : no murmurs present [Heart Sounds] : normal S1 and S2 [Arterial Pulses Normal] : the arterial pulses were normal [Exaggerated Use Of Accessory Muscles For Inspiration] : no accessory muscle use [Respiration, Rhythm And Depth] : normal respiratory rhythm and effort [Auscultation Breath Sounds / Voice Sounds] : lungs were clear to auscultation bilaterally [Abdomen Soft] : soft [Abdomen Tenderness] : non-tender [Abdomen Mass (___ Cm)] : no abdominal mass palpated [Abnormal Walk] : normal gait [Nail Clubbing] : no clubbing of the fingernails [Gait - Sufficient For Exercise Testing] : the gait was sufficient for exercise testing [Cyanosis, Localized] : no localized cyanosis [Petechial Hemorrhages (___cm)] : no petechial hemorrhages [] : no rash [No Venous Stasis] : no venous stasis [Skin Color & Pigmentation] : normal skin color and pigmentation [Skin Lesions] : no skin lesions [No Xanthoma] : no  xanthoma was observed [No Skin Ulcers] : no skin ulcer [Sensation] : the sensory exam was normal to light touch and pinprick [Deep Tendon Reflexes (DTR)] : deep tendon reflexes were 2+ and symmetric [No Focal Deficits] : no focal deficits [Impaired Insight] : insight and judgment were intact [Affect] : the affect was normal [Oriented To Time, Place, And Person] : oriented to person, place, and time

## 2019-07-10 ENCOUNTER — APPOINTMENT (OUTPATIENT)
Dept: NEUROLOGY | Facility: CLINIC | Age: 74
End: 2019-07-10
Payer: MEDICARE

## 2019-07-10 VITALS
BODY MASS INDEX: 37.36 KG/M2 | WEIGHT: 178 LBS | HEIGHT: 58 IN | DIASTOLIC BLOOD PRESSURE: 83 MMHG | HEART RATE: 79 BPM | SYSTOLIC BLOOD PRESSURE: 155 MMHG

## 2019-07-10 DIAGNOSIS — M54.17 RADICULOPATHY, LUMBOSACRAL REGION: ICD-10-CM

## 2019-07-10 DIAGNOSIS — M21.371 FOOT DROP, RIGHT FOOT: ICD-10-CM

## 2019-07-10 DIAGNOSIS — E11.42 TYPE 2 DIABETES MELLITUS WITH DIABETIC POLYNEUROPATHY: ICD-10-CM

## 2019-07-10 DIAGNOSIS — M21.372 FOOT DROP, LEFT FOOT: ICD-10-CM

## 2019-07-10 PROCEDURE — 99214 OFFICE O/P EST MOD 30 MIN: CPT

## 2019-07-11 PROBLEM — E11.42 DIABETIC POLYNEUROPATHY ASSOCIATED WITH TYPE 2 DIABETES MELLITUS: Status: ACTIVE | Noted: 2017-10-05

## 2019-07-11 PROBLEM — M54.17 LUMBOSACRAL RADICULOPATHY AT L5: Status: ACTIVE | Noted: 2017-10-05

## 2019-07-11 PROBLEM — M21.372 FOOT DROP, LEFT: Status: ACTIVE | Noted: 2017-10-05

## 2019-07-11 NOTE — DISCUSSION/SUMMARY
[FreeTextEntry1] : Opinion\par \par The patient has 2 month history of L5 radiculopathy and I believe she has mild right foot drop secondary to L5 radiculopathy with a previous history of left foot drop which was also attributed to lumbar spine disease but she had deferred any surgical procedure. I advised her to obtain an MRI of the lumbar spine to be compared with the previous one and to return back for electrophysiologic testing as there is history of diabetes and I want to be certain that this is not common peroneal palsy . Extensive education and counseling was provided including physical therapy and a visit to her ENT physician for treatment of dizziness with vestibular therapy and fall precautions were discussed. She understands and will proceed with my advice. In the event of any sudden change she must call me immediately and she understands her responsibility. Good diabetic control was emphasized including proper nutrition and vitamin intake. Extensive education and counseling was undertaken to day.

## 2019-07-11 NOTE — REVIEW OF SYSTEMS
[Feeling Poorly] : feeling poorly [Leg Weakness] : leg weakness [Tingling] : tingling [Numbness] : numbness [As Noted in HPI] : as noted in HPI [Limping] : limping [Dizziness] : dizziness [Joint Pain] : joint pain [Negative] : Heme/Lymph

## 2019-07-11 NOTE — DATA REVIEWED
[de-identified] : I reviewed the neurosurgical consultation and evaluation by other physicians and discussed the previous MRI results including electrodiagnostic studies with the patient.

## 2019-07-11 NOTE — HISTORY OF PRESENT ILLNESS
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 and I suggested that it is most likely due to lumbar spine disease and not related to diabetes and was evaluated by  who suggested surgery but was reluctant and the patient also deferred any surgical procedure and uses a splint for the left foot drop and he is able to ambulate but that has not been any improvement.\par \par She returns back today for followup evaluation on an urgent basis and stated that she has right leg pain starting from the paralumbar gluteal area radiating to the right lateral part of the leg with mild right foot drop since approximately 2 months and was insidious and received physical therapy and no imaging studies were done.\par \par She has history of ovarian cancer and has no evidence of recurrence and never received radiation therapy to the pelvis and will be treated with chemotherapy and surgery.\par \par She is retired and her diabetes is slightly under good control with hemoglobin A1c being 6.6-7.1 and hypercholesterolemia has been treated with medications. Review of systems revealed that she has periodic dizziness and had a history of vestibular dysfunction which was treated symptomatically and had improved. Review of systems is otherwise unremarkable. She denied any headache diplopia dysarthria dysphagia or dyspnea and there is no urgency or incontinence and no proximal muscle weakness. I reviewed her medications and allergies.

## 2019-07-11 NOTE — PHYSICAL EXAM
[FreeTextEntry1] : General examination-vital signs were recorded and unremarkable. There is no carotid bruit, thyromegaly or lymphadenopathy. Neck is supple with full range of motion. Chest is clear heart sounds are normal. Straight leg raising test is slightly uncomfortable on the right at 60° and negative on the left. Pedal pulsations are normal. There are no meningeal signs. There is no spine tenderness but there is mild muscle spasm bilaterally.\par \par Neurological evaluation\par \par Many language and cognitive functions are normal. She has mild right horizontal nystagmus without ocular motor dysfunction and there is no evidence of diabetic retinopathy and visual fields are normal. Hearing is preserved and bulbar functions are intact.\par \par Upper extremity evaluation is unremarkable with normal muscle strength tone sensation and reflexes.\par \par In the lower extremity the right extensor hallucis longus, extensor digitorum longus, tibialis anterior and tibialis posterior muscles of 4/5 but as other muscles are normal and in the left lower extremity she has chronic foot drop with 1/5 strength. Her ankle reflexes are absent bilaterally knee reflexes are 2+ and there is decreased pin perception in the L5 dermatome worse on the left in comparison to the right with preserved posterior column sensations and there is no Babinski sign. She has a splint in the left foot but limps with a right foot due to minimal right foot drop. She walks with a cane.

## 2019-07-17 ENCOUNTER — FORM ENCOUNTER (OUTPATIENT)
Age: 74
End: 2019-07-17

## 2019-07-18 ENCOUNTER — APPOINTMENT (OUTPATIENT)
Dept: MRI IMAGING | Facility: CLINIC | Age: 74
End: 2019-07-18
Payer: MEDICARE

## 2019-07-18 ENCOUNTER — OUTPATIENT (OUTPATIENT)
Dept: OUTPATIENT SERVICES | Facility: HOSPITAL | Age: 74
LOS: 1 days | End: 2019-07-18
Payer: MEDICARE

## 2019-07-18 DIAGNOSIS — Z00.8 ENCOUNTER FOR OTHER GENERAL EXAMINATION: ICD-10-CM

## 2019-07-18 PROCEDURE — 72148 MRI LUMBAR SPINE W/O DYE: CPT | Mod: 26

## 2019-07-18 PROCEDURE — 72148 MRI LUMBAR SPINE W/O DYE: CPT

## 2019-09-04 ENCOUNTER — APPOINTMENT (OUTPATIENT)
Dept: NEUROLOGY | Facility: CLINIC | Age: 74
End: 2019-09-04

## 2019-09-05 ENCOUNTER — APPOINTMENT (OUTPATIENT)
Dept: NEUROSURGERY | Facility: CLINIC | Age: 74
End: 2019-09-05
Payer: MEDICARE

## 2019-09-05 VITALS
WEIGHT: 178 LBS | OXYGEN SATURATION: 94 % | RESPIRATION RATE: 17 BRPM | HEIGHT: 58 IN | TEMPERATURE: 98.1 F | HEART RATE: 73 BPM | DIASTOLIC BLOOD PRESSURE: 73 MMHG | BODY MASS INDEX: 37.36 KG/M2 | SYSTOLIC BLOOD PRESSURE: 168 MMHG

## 2019-09-05 DIAGNOSIS — M54.9 DORSALGIA, UNSPECIFIED: ICD-10-CM

## 2019-09-05 DIAGNOSIS — R29.2 ABNORMAL REFLEX: ICD-10-CM

## 2019-09-05 PROCEDURE — 99214 OFFICE O/P EST MOD 30 MIN: CPT

## 2019-09-05 RX ORDER — DILTIAZEM HCL 120 MG
120 CAPSULE, EXT RELEASE 24 HR ORAL
Refills: 0 | Status: DISCONTINUED | COMMUNITY
End: 2019-09-05

## 2019-09-05 RX ORDER — HYDROCODONE BITARTRATE AND HOMATROPINE METHYLBROMIDE 5; 1.5 MG/5ML; MG/5ML
5-1.5 SYRUP ORAL
Qty: 240 | Refills: 0 | Status: DISCONTINUED | COMMUNITY
Start: 2019-03-02 | End: 2019-09-05

## 2019-09-05 RX ORDER — AZITHROMYCIN 500 MG/1
500 TABLET, FILM COATED ORAL
Qty: 5 | Refills: 0 | Status: DISCONTINUED | COMMUNITY
Start: 2019-03-04 | End: 2019-09-05

## 2019-09-05 RX ORDER — PREDNISONE 10 MG/1
10 TABLET ORAL
Qty: 1 | Refills: 0 | Status: DISCONTINUED | COMMUNITY
Start: 2018-06-27 | End: 2019-09-05

## 2019-09-05 RX ORDER — PROMETHAZINE HYDROCHLORIDE AND CODEINE PHOSPHATE 6.25; 1 MG/5ML; MG/5ML
6.25-1 SOLUTION ORAL
Qty: 240 | Refills: 0 | Status: DISCONTINUED | COMMUNITY
Start: 2019-03-04 | End: 2019-09-05

## 2019-09-05 RX ORDER — PREDNISONE 10 MG/1
10 TABLET ORAL
Qty: 45 | Refills: 0 | Status: DISCONTINUED | COMMUNITY
Start: 2019-03-04 | End: 2019-09-05

## 2019-09-05 NOTE — ASSESSMENT
[FreeTextEntry1] : IMPRESSION:\par Primarily right sciatica and bilateral partial foot drop associated with L3-4 bilateral nerve root compression, L4-5 left disc protrusion. Previous 2 laminectomies elsewhere.\par Possible cervical myelopathy (hyperactive knee reflexes\par BMI 34 with weight gain\par Diabetes with HbA1c of 7.1\par Depression by history\par \par PLAN:\par 1. Explained that her surgery will be more complicated as she had previous surgery scar tissue and the anatomy is not normal anymore.  She also had chemotherapy about 15 years ago.\par 2. Explained that spinal fusion may be needed.\par 3. Surgical risks explained to include but not limited to CSF leak, infection, hemorrhage, and rarely even death, among others.\par 4. Plain x-rays of LS\par 5. CT lumbar spine\par 6. Cervical MRI\par 7. Discussed MRI with Armando Warner MD of [facility]\par 8. Discussed medical issues with Marcie Baca MD - her Internist\par 9. Will see her in follow-up after completion of the above to make a decision re surgery.\par \par Breezy Echeverria MD, FACS, FAANS\par Professor and \par Department of Neurosurgery\par [facility]\par [address], [address]\[place] [address]\par [phone] Clinical\par [phone] Academic\par

## 2019-09-05 NOTE — REVIEW OF SYSTEMS
[Depression] : depression [Negative] : Heme/Lymph [FreeTextEntry9] : right leg pain - wears left foot brace [de-identified] : because of limitation on activities due to foot pain [de-identified] : diabetic

## 2019-09-05 NOTE — HISTORY OF PRESENT ILLNESS
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 Pain is mostly in the right lower back and right gluteal area.   She also reports some numbness in right leg after standing for a short time.  Pt reports that right lower back pain started in October 2018. She reports that the pain is 8/10 and is relieved with Tylenol prn. She had an MEGHAN at [facility] on 7/25/19 and relief only lasted for about 2 weeks.  She had PT sessions for about 7 months prior to the MEGHAN.   She reports that the right leg weakness and pain has limited her social activities and she is scared that right leg weakness may result in a fall.  She thinks that her right foot is dropping as well.\par \par She  had a laminectomy at L4 - L5 and L5 - S1 in 2008 and 2009 by Dr. Alfonso. The details are not available, but she did well until May 2017  when the left foot problems started. She has also had surgery for ovarian cancer and chemotherapy thereafter. She is also a diabetic with most recent Hb A1C reported to be 7.1 July 2019.   She is also hypertensive with a history of recurrent bronchial infections and shortness of breath. \par \par PCP is Dr. Jamal Roman, [address] [place] [address], [id number], [phone].\par \par

## 2019-09-05 NOTE — CONSULT LETTER
[Dear  ___] : Dear  [unfilled], [Consult Letter:] : I had the pleasure of evaluating your patient, [unfilled]. [Please see my note below.] : Please see my note below. [Consult Closing:] : Thank you very much for allowing me to participate in the care of this patient.  If you have any questions, please do not hesitate to contact me. [Sincerely,] : Sincerely, [FreeTextEntry2] : Marcie Baca MD\par [email or web address] [FreeTextEntry3] : Breezy Echeverria MD, FACS, FAANS\par Professor and \par Department of Neurosurgery\par [facility]\par [address] [place] [address], [address] [place] [address]\[place] [address]\par [phone] Clinical\par [phone] Academic\par

## 2019-09-05 NOTE — DATA REVIEWED
[de-identified] : EXAM: MR SPINE LUMBAR from [facility] reviewed with Armando Warner MD. L3- shows bilateral nerve root compression, especially on the right. At L4-5 there is a left disc protrusion. Post-op changes and degenerative scoliosis make it more complicated. \par \par \par PROCEDURE DATE: 07/18/2019 \par \par \par \par INTERPRETATION: \par LUMBOSACRAL SPINE MRI \par \par CLINICAL INFORMATION: New right foot drop. Old left foot drop. Lower back \par pain radiating down the right lower extremity. History of surgery the back \par 10 years ago. \par TECHNIQUE: Multiplanar, multisequence MR imaging was obtained of the \par lumbosacral spine. \par \par COMPARISON: Lumbar spine MRI 5/5/2011. \par \par FINDINGS: \par \par DISC LEVEL EVALUATION: \par \par L1/L2: Small diffuse disc bulge resulting in indentation of the thecal sac \par and mild bilateral foraminal narrowing. New from prior exam. \par L2/L3: Diffuse disc bulge, asymmetric to the right posterior osseous ridging \par and bilateral facet arthrosis and thickening of the ligamentum flavum, \par resulting in mild spinal canal narrowing and mild left and mild to moderate \par right foraminal narrowing, similar in appearance to prior exam. \par L3/L4: Disc bulge with posterior osseous ridging and superimposed central \par protrusion and bilateral facet arthrosis and thickening of the ligamentum \par flavum, resulting in moderate to severe spinal canal narrowing and mild to \par moderate right and moderate to severe left foraminal narrowing. Disc narrows \par the bilateral lateral recesses. This is new when compared with prior exam. \par L4/L5: Likely right-sided hemilaminotomy defect. Diffuse disc bulge with \par posterior osseous ridging and bilateral facet arthrosis and thickening of \par the ligamentum flavum, resulting mild spinal canal narrowing, asymmetric to \par the left and mild right and severe left foraminal narrowing. Status post \par resection of right foraminal extruded disc. \par L5/S1: Severe bilateral facet arthrosis. No spinal canal or foraminal \par narrowing. \par \par SPINAL ALIGNMENT: Dextroconvexity of the lower lumbar spine centered at L4. \par There is preservation of the vertebral body heights. There is trace \par retrolisthesis of L3 on L4 and trace anterolisthesis of L4 and L5. There is \par multilevel loss of intravertebral disc height with endplate degenerative \par changes and osteophyte formation. There is asymmetric loss of intravertebral \par disc height on the left at L3/L4 and L4/L5. \par DISTAL CORD AND CONUS: Distal cord terminates at L1. Distal cord and conus \par are unremarkable in appearance. \par SI JOINTS: Sacroiliac joints are intact. \par MARROW: Degenerative endplate marrow edema on the left at L3/L4 and L4/L5. \par Schmorl's nodes are visualized within the endplates at L3/L4. \par PARASPINAL MUSCLE AND SOFT TISSUES: No significant paraspinal muscle atrophy. \par INTRAABDOMINAL/INTRAPELVIC SOFT TISSUES: Visualized abdominal pelvic viscera \par is unremarkable. \par \par IMPRESSION: \par Worsening multilevel lumbar spondylosis when compared from MRI from 2011, \par most notably at L3/L4, resulting in moderate to severe spinal canal \par narrowing and moderate to severe left and mild to moderate right foraminal \par narrowing. L4/L5, mild spinal canal narrowing, asymmetric to the left and \par mild right and severe left foraminal narrowing. \par \par \par \par \par \par \par \par \par KHANH SOSA M.D., ATTENDING RADIOLOGIST \par This document has been electronically signed. Jul 19 2019 2:13PM  [de-identified] : Report of Thoracic spine MRI done 8/18/17 at [facility] - no cord compression noted

## 2019-10-17 ENCOUNTER — APPOINTMENT (OUTPATIENT)
Dept: PULMONOLOGY | Facility: CLINIC | Age: 74
End: 2019-10-17

## 2019-10-22 ENCOUNTER — APPOINTMENT (OUTPATIENT)
Dept: NEUROLOGY | Facility: CLINIC | Age: 74
End: 2019-10-22

## 2020-05-20 ENCOUNTER — APPOINTMENT (OUTPATIENT)
Dept: PULMONOLOGY | Facility: CLINIC | Age: 75
End: 2020-05-20
Payer: MEDICARE

## 2020-05-20 VITALS
OXYGEN SATURATION: 97 % | HEIGHT: 58 IN | BODY MASS INDEX: 35.68 KG/M2 | RESPIRATION RATE: 17 BRPM | HEART RATE: 90 BPM | TEMPERATURE: 98.2 F | WEIGHT: 170 LBS | SYSTOLIC BLOOD PRESSURE: 150 MMHG | DIASTOLIC BLOOD PRESSURE: 80 MMHG

## 2020-05-20 DIAGNOSIS — Z78.9 OTHER SPECIFIED HEALTH STATUS: ICD-10-CM

## 2020-05-20 PROCEDURE — 99214 OFFICE O/P EST MOD 30 MIN: CPT

## 2020-05-20 NOTE — REVIEW OF SYSTEMS
[GERD] : gerd [Chronic Pain] : chronic pain [Myalgias] : myalgias [Back Pain] : back pain [Depression] : depression [TextBox_14] : persistent throat clearing [Chills] : no chills [Fever] : no fever [Fatigue] : no fatigue [Poor Appetite] : normal appetite  [Dry Eyes] : no dryness of the eyes [Eye Irritation] : no ~T irritation of the eyes [Postnasal Drip] : no postnasal drip [Ear Disturbance] : no ear disturbance [Nasal Congestion] : no nasal congestion [Sore Throat] : no sore throat [Dry Mouth] : no dry mouth [Mouth Ulcers] : no mouth ulcers [Sputum] : not coughing up ~M sputum [Cough] : no cough [Chest Tightness] : no chest tightness [Dyspnea] : dyspnea [Wheezing] : no wheezing [As Noted in HPI] : as noted in HPI [Indigestion] : no indigestion [Heartburn] : no heartburn [Reflux] : no reflux [Dysphagia] : no dysphagia [Nausea] : no nausea [Constipation] : no constipation [Vomiting] : no vomiting [Diarrhea] : no diarrhea [Abdominal Pain] : no abdominal pain [Difficulty Maintaining Sleep] : no difficulty maintaining sleep [Difficulty Initiating Sleep] : no difficulty falling asleep [Snoring] : no snoring [Nonrestorative Sleep] : restorative sleep [Negative] : Pulmonary Hypertension

## 2020-05-20 NOTE — HISTORY OF PRESENT ILLNESS
[FreeTextEntry1] : Ms. Lemus is a 74 year old female presenting to the office for a sick visit. She has PMHx of CHF, allergic rhinitis, asthma, chronic cough, GERD, overweight, MARA and shortness of breath.\par \par She was in her normal state of health until the end of April. She was given a zpak for bronchitis by her PCP. She did not improve much and via telephone consultation, I deemed steroids were necessary for her asthma exacerbation and changed her medication regimen around. \par Last week she reports feeling 100% improvement from the prior condition. \par As of yesterday- she reports after she had a stressful event and then shortly after she started to become symptomatic with cough, more wheezing and SOB. She reports the night time her symptoms worsen. She is unable to lie down due to fluid vs mucus in her throat. She has been nebulizing only 1x per day.\par She finished her pred taper yesterday.\par She admits she has not been using ranitidine for the last 4-5 days. She admits to coffee drinking at night. She admits to spicy food intake.\par She has been having lemon in her tea. \par She feels that this may be related to reflux. \par She needs another medication aside from ranitidine. \par \par Additionally she states that she has been indoors until today due to pandemic. She is taking necessary precautions to social distance and be safe. \par She denies fever, chills, chest pain, sore throat, nasal congestion, headaches, rashes or muscle cramps.\par She reports she was diagnosed with spinal stenosis and associated foot drop to both legs. She is in leg braces BL and uses a cane to ambulate. At home she uses a walker.  \par

## 2020-05-20 NOTE — PHYSICAL EXAM
[No Acute Distress] : no acute distress [Well Nourished] : well nourished [Well Groomed] : well groomed [No Deformities] : no deformities [Well Developed] : well developed [Normal Oropharynx] : normal oropharynx [Normal Appearance] : normal appearance [Supple] : supple [No Neck Mass] : no neck mass [Normal Rate/Rhythm] : normal rate/rhythm [No JVD] : no jvd [No Resp Distress] : no resp distress [Normal S1, S2] : normal s1, s2 [No Murmurs] : no murmurs [Normal Rhythm and Effort] : normal rhythm and effort [No Acc Muscle Use] : no acc muscle use [Gait - Sufficient For Exercise Testing] : gait sufficient for exercise testing [No Abnormalities] : no abnormalities [No Clubbing] : no clubbing [No Cyanosis] : no cyanosis [No Edema] : no edema [FROM] : FROM [Oriented x3] : oriented x3 [Normal Mood] : normal mood [Normal Color/ Pigmentation] : normal color/ pigmentation [Normal Affect] : normal affect [Remote Memory Normal] : remote memory normal [TextBox_68] : BL expiratory wheezes

## 2020-05-20 NOTE — REASON FOR VISIT
[Asthma] : asthma [Acute] : an acute visit [Shortness of Breath] : shortness of breath [Wheezing] : wheezing [Cough] : cough

## 2020-05-20 NOTE — ASSESSMENT
[FreeTextEntry1] : The plan for the patient is as follows:\par \par Problem 1: Cough fits/Difficulty laying down likely due to flare of GERD/LPR given recent discontinuation of H2 blocker. Will aggressively treat GERD/LPR\par -continue omeprazole 40 mg 30 min before breakfast and for 3 days- use 30 min before dinner as well. \par -add famotidine 40 mg PO QHS\par -discussed dietary modifications needed, advised to d/c coffee at night, d/c lemon, and spicy foods. \par \par problem 2: Asthma with current exacerbation\par -add prednisone 20 mg x 5-7 days, 10 mg 5-7 days (depending on response)- update next Tuesday- SE discussed\par -DuoNeb via nebulizer BID- QID PRN SOB\par -budesonide via neb .5mg BID rinse and gargle after use\par -continue Advair 250 1 puff BID rinse and gargle\par - continue Spiriva Respimat 2 puffs in am QD;  samples provided 2 puffs once daily rinse and gargle after use \par -continue to use Singulair 10 mg before bed\par \par problem 3: allergic rhinitis with post nasal drip\par -azelastine nasal spray 1-2 sniff/nostril BID PRN (pt has at home)\par \par problem 4: OSAS\par -continue to use the CPAP machine, tolerating it well, and seeing benefits however issues with mask\par -ordered a new Dream wear nasal pillows mask for patient last week\par -she was eligible for a new device from [facility] and an order was sent on Monday.\par \par Problem 5: Obesity and weight gain\par -advised she needs to incorporate healthier food choices- possibly see nutritionist\par -swap out white rice for brown rice- needs more complex carbs to keep her chapman for longer\par -hydrate\par -exercise as able\par -will help her with her balance and gait as well\par -will help with her pain/spinal stenosis\par \par F/U in 3- 4 months or as scheduled with Dr. Gonzales/myself with [facility]\par She is encouraged to call with any changes, concerns, or questions

## 2020-05-26 RX ORDER — BUDESONIDE 0.5 MG/2ML
0.5 INHALANT ORAL
Qty: 2 | Refills: 0 | Status: ACTIVE | COMMUNITY
Start: 2020-04-30 | End: 1900-01-01

## 2020-06-08 ENCOUNTER — APPOINTMENT (OUTPATIENT)
Dept: ULTRASOUND IMAGING | Facility: IMAGING CENTER | Age: 75
End: 2020-06-08
Payer: MEDICARE

## 2020-06-08 ENCOUNTER — OUTPATIENT (OUTPATIENT)
Dept: OUTPATIENT SERVICES | Facility: HOSPITAL | Age: 75
LOS: 1 days | End: 2020-06-08
Payer: MEDICARE

## 2020-06-08 DIAGNOSIS — Z00.8 ENCOUNTER FOR OTHER GENERAL EXAMINATION: ICD-10-CM

## 2020-06-08 PROCEDURE — 93970 EXTREMITY STUDY: CPT

## 2020-06-08 PROCEDURE — 93970 EXTREMITY STUDY: CPT | Mod: 26

## 2020-07-21 ENCOUNTER — APPOINTMENT (OUTPATIENT)
Dept: PULMONOLOGY | Facility: CLINIC | Age: 75
End: 2020-07-21
Payer: MEDICARE

## 2020-07-21 VITALS
RESPIRATION RATE: 16 BRPM | OXYGEN SATURATION: 96 % | HEIGHT: 58 IN | DIASTOLIC BLOOD PRESSURE: 80 MMHG | BODY MASS INDEX: 37.36 KG/M2 | WEIGHT: 178 LBS | TEMPERATURE: 97.3 F | HEART RATE: 88 BPM | SYSTOLIC BLOOD PRESSURE: 130 MMHG

## 2020-07-21 DIAGNOSIS — Z01.812 ENCOUNTER FOR PREPROCEDURAL LABORATORY EXAMINATION: ICD-10-CM

## 2020-07-21 DIAGNOSIS — Z71.89 OTHER SPECIFIED COUNSELING: ICD-10-CM

## 2020-07-21 DIAGNOSIS — R05 COUGH: ICD-10-CM

## 2020-07-21 PROCEDURE — 99215 OFFICE O/P EST HI 40 MIN: CPT

## 2020-07-21 PROCEDURE — 71046 X-RAY EXAM CHEST 2 VIEWS: CPT

## 2020-07-21 NOTE — HISTORY OF PRESENT ILLNESS
[FreeTextEntry1] : VISIT MAY 20 2020:\par Ms. Lemus is a 74 year old female presenting to the office for a sick visit. She has PMHx of CHF, allergic rhinitis, asthma, chronic cough, GERD, overweight, MARA and shortness of breath.\par \par She was in her normal state of health until the end of April. She was given a zpak for bronchitis by her PCP. She did not improve much and via telephone consultation, I deemed steroids were necessary for her asthma exacerbation and changed her medication regimen around. \par Last week she reports feeling 100% improvement from the prior condition. \par As of yesterday- she reports after she had a stressful event and then shortly after she started to become symptomatic with cough, more wheezing and SOB. She reports the night time her symptoms worsen. She is unable to lie down due to fluid vs mucus in her throat. She has been nebulizing only 1x per day.\par She finished her pred taper yesterday.\par She admits she has not been using ranitidine for the last 4-5 days. She admits to coffee drinking at night. She admits to spicy food intake.\par She has been having lemon in her tea. \par She feels that this may be related to reflux. \par She needs another medication aside from ranitidine. \par \par Additionally she states that she has been indoors until today due to pandemic. She is taking necessary precautions to social distance and be safe. \par She denies fever, chills, chest pain, sore throat, nasal congestion, headaches, rashes or muscle cramps.\par She reports she was diagnosed with spinal stenosis and associated foot drop to both legs. She is in leg braces BL and uses a cane to ambulate. At home she uses a walker.  \par \par VISIT 7/21/2020:\par \par Pt in for complaints of cough. \par She reports that although her cough is not constant, it never seems to resolve. \par She reports cough daily especially in the morning until 12 in the afternoon. \par It hurts her chest to cough as hard as she is. She also wakes up to cough.\par She admits she eats a lot of thomas and spicy foods that may be contributing. \par She states the cough is a dry cough- no mucus. She has no fever, chills, sore throat, ear pain or sinus issues. \par She notes severe allergies. She used to be on allergy shots but even started getting allergic to the shots- now she is only on Claritin (D) as directed by her allergist. \par She notes continued/chronic exertional SOB. \par She was previously diagnosed with spinal stenosis and associated foot drop to both legs but that is improving with some therapy and "prayer."\par She is compliant on her meds except for spiriva- she will restart.\par She feels her GERD is stable and does not feel symptomatic. \par She is using her PAP device nightly and likes the new mask.

## 2020-07-21 NOTE — ASSESSMENT
[FreeTextEntry1] : The plan for the patient is as follows:\par \par Problem 1: Cough fits\par -reports GERD under control \par -advised her it may be diet related, certain spices/foods and overeating triggering cough\par -CXR was WNL\par -Dynamic CT scan for completeness to assess for TBM\par -sensory neuropathic cough?- discussed amitriptyline in detail including SE, pt wants to hold off. \par -reports tessalon perles are not effective\par \par Problem 2: GERD/LPRD\par -continue omeprazole 40 mg 30 min before breakfast and for 3 days- use 30 min before dinner as well. \par -add famotidine 40 mg PO QHS\par -discussed dietary modifications needed, advised to d/c coffee at night, d/c lemon, and spicy foods.\par -consider formal GI eval\par \par problem 3: Asthma- severe persistent with cough and wheezing\par -multiple rounds of steroids\par -add bloodwork on to see if candidate for biologic\par -DuoNeb via nebulizer BID- QID PRN SOB\par -budesonide via neb .5mg BID rinse and gargle after use\par -continue Advair 250 1 puff BID rinse and gargle\par - restart Spiriva Respimat 2 puffs in am QD\par -continue to use Singulair 10 mg before bed\par -add Combivent as a rescue 1 puff upto QID\par \par problem 4: allergic rhinitis with hx of severe allergies\par -azelastine nasal spray 1-2 sniff/nostril BID PRN (pt has at home)\par -add samples of Ryclor and Ryvent- pt advised to use one or the other- went over with patient in detail the difference and that Ryvent can be very drowsing. \par  \par problem 5: OSAS\par -continue to use the CPAP machine, tolerating it well, and seeing benefits- doing well with new machine and mask\par \par Problem 6: Obesity and weight gain\par -advised she needs to incorporate healthier food choices- possibly see nutritionist\par -swap out white rice for brown rice- needs more complex carbs to keep her chapman for longer\par -hydrate\par -exercise as able\par -will help her with her balance and gait as well\par -will help with her pain/spinal stenosis\par \par Problem 7: LOUIS- multifactorial\par -needs to follow up with cardiologist, needs stress test\par \par F/U in 6-8 weeks with FULL PFTs and COVID19 swab prior\par She is encouraged to call with any changes, concerns, or questions

## 2020-07-21 NOTE — PROCEDURE
[FreeTextEntry1] : CXR: Interpreted by Dr. Gonzales\par -lungs clear\par -mild Cardiomegaly\par -scoliosis

## 2020-07-21 NOTE — PHYSICAL EXAM
[Well Nourished] : well nourished [No Acute Distress] : no acute distress [Well Groomed] : well groomed [No Deformities] : no deformities [Well Developed] : well developed [Normal Oropharynx] : normal oropharynx [Supple] : supple [Normal Appearance] : normal appearance [No Neck Mass] : no neck mass [No JVD] : no jvd [Normal S1, S2] : normal s1, s2 [Normal Rate/Rhythm] : normal rate/rhythm [No Murmurs] : no murmurs [No Resp Distress] : no resp distress [No Acc Muscle Use] : no acc muscle use [Normal Rhythm and Effort] : normal rhythm and effort [No Abnormalities] : no abnormalities [Gait - Sufficient For Exercise Testing] : gait sufficient for exercise testing [No Clubbing] : no clubbing [No Cyanosis] : no cyanosis [FROM] : FROM [Non-Pitting] : non-pitting [Normal Color/ Pigmentation] : normal color/ pigmentation [Normal Mood] : normal mood [Oriented x3] : oriented x3 [Normal Affect] : normal affect [Remote Memory Normal] : remote memory normal [TextBox_68] : faint BL expiratory wheezes

## 2020-07-21 NOTE — REVIEW OF SYSTEMS
[Back Pain] : back pain [Myalgias] : myalgias [Depression] : depression [Chronic Pain] : chronic pain [Dyspnea] : dyspnea [As Noted in HPI] : as noted in HPI [Negative] : Sleep Disorder [GERD] : no gerd [TextBox_14] : throat sensation causing cough [Fever] : no fever [Fatigue] : no fatigue [Chills] : no chills [Poor Appetite] : normal appetite  [Dry Eyes] : no dryness of the eyes [Eye Irritation] : no ~T irritation of the eyes [Nasal Congestion] : no nasal congestion [Ear Disturbance] : no ear disturbance [Sore Throat] : no sore throat [Postnasal Drip] : no postnasal drip [Dry Mouth] : no dry mouth [Mouth Ulcers] : no mouth ulcers [Cough] : no cough [Sputum] : not coughing up ~M sputum [Chest Tightness] : no chest tightness [Wheezing] : no wheezing [Heartburn] : no heartburn [Reflux] : no reflux [Indigestion] : no indigestion [Dysphagia] : no dysphagia [Vomiting] : no vomiting [Nausea] : no nausea [Constipation] : no constipation [Diarrhea] : no diarrhea [Abdominal Pain] : no abdominal pain [Difficulty Initiating Sleep] : no difficulty falling asleep [Snoring] : no snoring [Difficulty Maintaining Sleep] : no difficulty maintaining sleep [Nonrestorative Sleep] : restorative sleep

## 2020-07-21 NOTE — REASON FOR VISIT
[Acute] : an acute visit [Asthma] : asthma [Shortness of Breath] : shortness of breath [Wheezing] : wheezing [Cough] : cough

## 2020-07-22 ENCOUNTER — LABORATORY RESULT (OUTPATIENT)
Age: 75
End: 2020-07-22

## 2020-07-23 ENCOUNTER — INPATIENT (INPATIENT)
Facility: HOSPITAL | Age: 75
LOS: 3 days | Discharge: HOME | DRG: 197 | End: 2020-07-27
Attending: INTERNAL MEDICINE | Admitting: INTERNAL MEDICINE
Payer: MEDICARE

## 2020-07-23 VITALS
HEART RATE: 73 BPM | SYSTOLIC BLOOD PRESSURE: 144 MMHG | WEIGHT: 177.91 LBS | TEMPERATURE: 98 F | DIASTOLIC BLOOD PRESSURE: 89 MMHG | RESPIRATION RATE: 22 BRPM | HEIGHT: 59 IN | OXYGEN SATURATION: 96 %

## 2020-07-23 LAB
ALBUMIN SERPL ELPH-MCNC: 4.3 G/DL — SIGNIFICANT CHANGE UP (ref 3.3–5)
ALP SERPL-CCNC: 78 U/L — SIGNIFICANT CHANGE UP (ref 40–120)
ALT FLD-CCNC: 20 U/L — SIGNIFICANT CHANGE UP (ref 10–45)
ANION GAP SERPL CALC-SCNC: 8 MMOL/L — SIGNIFICANT CHANGE UP (ref 5–17)
AST SERPL-CCNC: 22 U/L — SIGNIFICANT CHANGE UP (ref 10–40)
BASOPHILS # BLD AUTO: 0.06 K/UL
BASOPHILS # BLD AUTO: 0.06 K/UL — SIGNIFICANT CHANGE UP (ref 0–0.2)
BASOPHILS NFR BLD AUTO: 0.8 %
BASOPHILS NFR BLD AUTO: 1 % — SIGNIFICANT CHANGE UP (ref 0–2)
BILIRUB SERPL-MCNC: 0.2 MG/DL — SIGNIFICANT CHANGE UP (ref 0.2–1.2)
BUN SERPL-MCNC: 18 MG/DL — SIGNIFICANT CHANGE UP (ref 7–23)
CALCIUM SERPL-MCNC: 9.7 MG/DL — SIGNIFICANT CHANGE UP (ref 8.4–10.5)
CHLORIDE SERPL-SCNC: 103 MMOL/L — SIGNIFICANT CHANGE UP (ref 96–108)
CO2 SERPL-SCNC: 27 MMOL/L — SIGNIFICANT CHANGE UP (ref 22–31)
CREAT SERPL-MCNC: 0.85 MG/DL — SIGNIFICANT CHANGE UP (ref 0.5–1.3)
EOSINOPHIL # BLD AUTO: 0.58 K/UL — HIGH (ref 0–0.5)
EOSINOPHIL # BLD AUTO: 0.68 K/UL
EOSINOPHIL NFR BLD AUTO: 9 %
EOSINOPHIL NFR BLD AUTO: 9.4 % — HIGH (ref 0–6)
GLUCOSE SERPL-MCNC: 132 MG/DL — HIGH (ref 70–99)
HCT VFR BLD CALC: 36.8 % — SIGNIFICANT CHANGE UP (ref 34.5–45)
HCT VFR BLD CALC: 38.2 %
HGB BLD-MCNC: 11.6 G/DL — SIGNIFICANT CHANGE UP (ref 11.5–15.5)
HGB BLD-MCNC: 12.1 G/DL
IMM GRANULOCYTES NFR BLD AUTO: 0.3 %
IMM GRANULOCYTES NFR BLD AUTO: 0.3 % — SIGNIFICANT CHANGE UP (ref 0–1.5)
LYMPHOCYTES # BLD AUTO: 2.15 K/UL — SIGNIFICANT CHANGE UP (ref 1–3.3)
LYMPHOCYTES # BLD AUTO: 2.56 K/UL
LYMPHOCYTES # BLD AUTO: 34.8 % — SIGNIFICANT CHANGE UP (ref 13–44)
LYMPHOCYTES NFR BLD AUTO: 34 %
MAN DIFF?: NORMAL
MCHC RBC-ENTMCNC: 29.5 PG — SIGNIFICANT CHANGE UP (ref 27–34)
MCHC RBC-ENTMCNC: 29.7 PG
MCHC RBC-ENTMCNC: 31.5 GM/DL — LOW (ref 32–36)
MCHC RBC-ENTMCNC: 31.7 GM/DL
MCV RBC AUTO: 93.6 FL — SIGNIFICANT CHANGE UP (ref 80–100)
MCV RBC AUTO: 93.9 FL
MONOCYTES # BLD AUTO: 0.51 K/UL — SIGNIFICANT CHANGE UP (ref 0–0.9)
MONOCYTES # BLD AUTO: 0.65 K/UL
MONOCYTES NFR BLD AUTO: 8.3 % — SIGNIFICANT CHANGE UP (ref 2–14)
MONOCYTES NFR BLD AUTO: 8.6 %
NEUTROPHILS # BLD AUTO: 2.85 K/UL — SIGNIFICANT CHANGE UP (ref 1.8–7.4)
NEUTROPHILS # BLD AUTO: 3.56 K/UL
NEUTROPHILS NFR BLD AUTO: 46.2 % — SIGNIFICANT CHANGE UP (ref 43–77)
NEUTROPHILS NFR BLD AUTO: 47.3 %
NRBC # BLD: 0 /100 WBCS — SIGNIFICANT CHANGE UP (ref 0–0)
PLATELET # BLD AUTO: 219 K/UL — SIGNIFICANT CHANGE UP (ref 150–400)
PLATELET # BLD AUTO: 226 K/UL
POTASSIUM SERPL-MCNC: 4.2 MMOL/L — SIGNIFICANT CHANGE UP (ref 3.5–5.3)
POTASSIUM SERPL-SCNC: 4.2 MMOL/L — SIGNIFICANT CHANGE UP (ref 3.5–5.3)
PROT SERPL-MCNC: 7.4 G/DL — SIGNIFICANT CHANGE UP (ref 6–8.3)
RBC # BLD: 3.93 M/UL — SIGNIFICANT CHANGE UP (ref 3.8–5.2)
RBC # BLD: 4.07 M/UL
RBC # FLD: 13.2 %
RBC # FLD: 13.2 % — SIGNIFICANT CHANGE UP (ref 10.3–14.5)
SARS-COV-2 RNA SPEC QL NAA+PROBE: SIGNIFICANT CHANGE UP
SODIUM SERPL-SCNC: 138 MMOL/L — SIGNIFICANT CHANGE UP (ref 135–145)
WBC # BLD: 6.17 K/UL — SIGNIFICANT CHANGE UP (ref 3.8–10.5)
WBC # FLD AUTO: 6.17 K/UL — SIGNIFICANT CHANGE UP (ref 3.8–10.5)
WBC # FLD AUTO: 7.53 K/UL

## 2020-07-23 PROCEDURE — 93010 ELECTROCARDIOGRAM REPORT: CPT

## 2020-07-23 PROCEDURE — 71046 X-RAY EXAM CHEST 2 VIEWS: CPT | Mod: 26

## 2020-07-23 PROCEDURE — 99220: CPT

## 2020-07-23 RX ORDER — IPRATROPIUM/ALBUTEROL SULFATE 18-103MCG
3 AEROSOL WITH ADAPTER (GRAM) INHALATION ONCE
Refills: 0 | Status: COMPLETED | OUTPATIENT
Start: 2020-07-23 | End: 2020-07-23

## 2020-07-23 RX ORDER — IPRATROPIUM/ALBUTEROL SULFATE 18-103MCG
3 AEROSOL WITH ADAPTER (GRAM) INHALATION
Refills: 0 | Status: DISCONTINUED | OUTPATIENT
Start: 2020-07-23 | End: 2020-07-23

## 2020-07-23 RX ADMIN — Medication 40 MILLIGRAM(S): at 18:25

## 2020-07-23 RX ADMIN — Medication 3 MILLILITER(S): at 18:26

## 2020-07-23 RX ADMIN — Medication 3 MILLILITER(S): at 18:25

## 2020-07-23 NOTE — ED PROVIDER NOTE - ATTENDING CONTRIBUTION TO CARE
------------ATTENDING NOTE------------   pt c/o several days of increased sob, unproductive cough, wheezing, no fevers, has copd, only mild constant central chest ache w/ coughing, overall very well appearing, awaiting imaging and close reassessments -->  - Dave Chapman MD   ---------------------------------------------- ------------ATTENDING NOTE------------   pt c/o several days of increased sob, unproductive cough, wheezing, no fevers, has copd, only mild constant central chest ache w/ coughing, overall very well appearing, awaiting imaging and close reassessments --> breathing improved, continued nml VS, tolerating PO, CXR clear, benign repeat exams -->  - Dave Chapman MD   ----------------------------------------------

## 2020-07-23 NOTE — ED PROVIDER NOTE - NS ED ROS FT
Gen: No fever, normal appetite  Eyes: No eye irritation or discharge  ENT: No ear pain, congestion, sore throat  Resp: + cough and trouble breathing  Cardiovascular: No chest pain or palpitation  Gastroenteric: No nausea/vomiting, diarrhea, constipation  :  No change in urine output; no dysuria  MS: No joint or muscle pain  Skin: No rashes  Neuro: No headache; no abnormal movements  Remainder negative, except as per the HPI

## 2020-07-23 NOTE — ED ADULT NURSE NOTE - NSIMPLEMENTINTERV_GEN_ALL_ED
Implemented All Fall Risk Interventions:  [place] to call system. Call bell, personal items and telephone within reach. Instruct patient to call for assistance. Room bathroom lighting operational. Non-slip footwear when patient is off stretcher. Physically safe environment: no spills, clutter or unnecessary equipment. Stretcher in lowest position, wheels locked, appropriate side rails in place. Provide visual cue, wrist band, yellow gown, etc. Monitor gait and stability. Monitor for mental status changes and reorient to person, place, and time. Review medications for side effects contributing to fall risk. Reinforce activity limits and safety measures with patient and family.

## 2020-07-23 NOTE — ED ADULT NURSE NOTE - NS ED NURSE REPORT GIVEN TO FT
"   10/10/19 3708   Quick Adds   Type of Visit Initial PT Evaluation   Living Environment   Lives With alone   Living Arrangements other (see comments)  (\"twin home\" all on one level)   Home Accessibility other (see comments)   Transportation Anticipated family or friend will provide;public transportation   Living Environment Comment small ramp in/out of home, walk-in shower w/ grab bars, has stool but does not use- \"leans\" in corner. Pt lives in a twin home in [place] with a ramp to enter home as pt uses U Step walker around her home. Pt reporting no stairs within the home and everything is on one level. Pt reporting consistent falls within the home due to Parkinson's. Pt shared video of two falls within home during session on Ipad. Pt reports her bathroom is right next to bedroom and that she has a walk in shower with grab bars in shower. Pt reports she also has grab bars next to toilet. Pt has raised toilet in bathroom. Pt has a queen sized bed and gets out of the bed on the right side.    Self-Care   Usual Activity Tolerance good   Current Activity Tolerance good   Regular Exercise Yes   Activity/Exercise Type walking   Exercise Amount/Frequency daily   Equipment Currently Used at Home walker, rolling   Activity/Exercise/Self-Care Comment walks 2 miles daily, was about to restart OP PT/OT/SLP at [facility]. [facility]sissubha programs online- [employer]   Functional Level Prior   Ambulation 1-->assistive equipment   Transferring 1-->assistive equipment   Toileting 1-->assistive equipment   Bathing 1-->assistive equipment   Communication 0-->understands/communicates without difficulty   Swallowing 0-->swallows foods/liquids without difficulty   Cognition 0 - no cognition issues reported   Fall history within last six months yes   Number of times patient has fallen within last six months 24  (about once a week per pt)   Which of the above functional risks had a recent onset or change? " ambulation;transferring;toileting;bathing;dressing;fall history   Prior Functional Level Comment uses U-step walker in home, 4WW in community   General Information   Onset of Illness/Injury or Date of Surgery - Date 10/05/19   Referring Physician Indira Hendrickson CNP (Dr. 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 Pleasant and agreeable to PT eval   Cognitive Status Examination   Level of Consciousness alert   Follows Commands and Answers Questions 100% of the time   Personal Safety and Judgment impaired   Cognitive Comment despite frequent falls with significant injury, continues to walk mod I in home & community   Posture    Posture Forward head position;Protracted shoulders   Range of Motion (ROM)   ROM Comment BLE WFL   Strength   Strength Comments BLE 5/5 via screen, gait with intermittent scissor pattern suggesting weak hip ABD   ARC Assessment Only   Acute Rehab FIM See FIM scores for Mobility/ADL Assessment   Balance   Balance Comments as observed with videos of pt fall, balance reaction ABSENT   Sensory Examination   Sensory Perception Comments P c/o numb L forearm. L hand & foot intact protective, proprioception, hot/cold   General Therapy Interventions   Planned Therapy Interventions ADL retraining;balance training;gait training;groups;stretching;transfer training;wheelchair management/propulsion training;risk factor  education;home program guidelines;progressive activity/exercise   Clinical Impression   Criteria for Skilled Therapeutic Intervention yes, treatment indicated   PT Diagnosis impaired balance posing significant safety concern   Influenced by the following impairments balance, postural control   Functional limitations due to impairments independence with functional mobility, household and community mobility   Clinical Presentation Evolving/Changing   Clinical Presentation Rationale pt with < 4 personal factors limiting functional mobility, near baseline   Clinical Decision Making (Complexity) Moderate complexity   Therapy Frequency Other (see comments)  ( minutes daily)   Predicted Duration of Therapy Intervention (days/wks) 6 days   Anticipated Equipment Needs at Discharge wheelchair   Anticipated Discharge Disposition Home with Assist;Home with Outpatient Therapy   Risk & Benefits of therapy have been explained Yes   Patient, Family & other staff in agreement with plan of care Yes   Clinical Impression Comments of note- pt with significant falls history with seriuos injury. strong recommendation that pt be wc based for mobility d/t safety concerns. also, please see care plan note for details   Total Evaluation Time   Total Evaluation Time (Minutes) 20      joey liu

## 2020-07-23 NOTE — ED PROVIDER NOTE - OBJECTIVE STATEMENT
74F chronic bronchial asthma c/o worsening sob x 2 weeks and chest tightness during dry coughs .Used 2 duonebs today no improvement. Saw pulmonologist outpatient (Dr. Gonzales) recently who did cxr (neg) and scheduled her for CT chest. Patient states she feels breathing problems due to humidity and felt better in past when treated with steroids. On albuterol and Spriva. Recently placed on budesonide inhaler. Non smoker. No chest pain when not coughing. No n/v/d. No fever chills.

## 2020-07-23 NOTE — CHART NOTE - NSCHARTNOTEFT_GEN_A_CORE
Pulmonary    72 y/o F w/ hx of DM, HTN, asthma, MARA on CPAP and ovarian CA in remission sent to ER for cough and SOB.   Pt reports 2 weeks of dry cough. No sputum. No fever. Having dyspnea and occ wheezing despite using BD tx.  Pt just transferred from triage. Pending ER w/u and will f/u covid pcr, cxr and blood work.  Will likely need BD tx, abx, cpap depending on initial w/u.  Will be followed by Dr. Gonzales in AM

## 2020-07-23 NOTE — ED PROVIDER NOTE - PSH
mediport insertion in     S/P  Section  in ,   S/P Lumbar Microdiscectomy  left side in   S/P KEVIN-BSO (Total Abdominal Hysterectomy and Bilateral Salpingo-Oophorectomy)  in  for ovarian cancer

## 2020-07-23 NOTE — ED PROVIDER NOTE - PHYSICAL EXAMINATION
Physical Exam:    I have reviewed the triage vital signs.    Gen: NAD, AOx3, non-toxic appearing, able to ambulate without assistance  Head and Neck: NCAT, Neck supple without meningismus   HEENT: EOMI, PEERLA, normal conjunctiva, tongue midline, oral mucosa moist  Lung: wheezing on right side, no respiratory distress, no rhonchi/rales B/L, speaking in short sentences  CV: RRR, no murmurs, rubs or gallops  Abd: soft, NT, ND, no guarding, no rigidity, no rebound tenderness, no CVA tenderness, no masses.   MSK: no gross deformities, ROM normal in UE/LE, no back tenderness  Neuro: CNs II-XII grossly intact. No focal sensory or motor deficits  Skin: Warm, well perfused, no rash, no leg swelling  Psych: Appropriate mood and affect

## 2020-07-23 NOTE — ED PROVIDER NOTE - NSFOLLOWUPINSTRUCTIONS_ED_ALL_ED_FT
See your Primary Doctor this week for follow up -- call to discuss.    Use Albuterol 4 puffs every 4 hours and Prednisone once a day as directed -- see medication warnings.    Continue all current medications / treatments as previously directed.    See COPD information and return instructions given to you.    Seek immediate medical care for new/worsening symptoms/concerns.

## 2020-07-23 NOTE — ED ADULT NURSE REASSESSMENT NOTE - NS ED NURSE REASSESS COMMENT FT1
Report received from KATALINA Salinas. pt in no acute distress, respirations even and unlabored. pt satting at 95%-97% on RA. Nasal cannula removed. water provided to pt . pt ambulating to bathroom with no assist. pt requesting to be admitted and moved to bed upstairs. MD made aware
Received pt from KATALINA Quijano , received pt alert and responsive, oriented x4, denies any respiratory distress, SOB, or difficulty breathing. Pt transferred to CDU for SOB. Pt place don o2 monitoring, WNL. Pending IV steroids and neb treatments.  IV in place, patent and free of signs of infiltration,  pt denies chest pain or palpitations, V/S stable, pt afebrile, pt denies pain at this time. Pt educated on unit and unit rules, instructed patient to notify RN of any needed assistance, Pt verbalizes understanding, Call bell placed within reach. Safety maintained. Will continue to monitor.

## 2020-07-23 NOTE — ED ADULT NURSE REASSESSMENT NOTE - COMFORT CARE
po fluids offered/repositioned/warm blanket provided/ambulated to bathroom/darkened lights/plan of care explained

## 2020-07-24 DIAGNOSIS — R06.02 SHORTNESS OF BREATH: ICD-10-CM

## 2020-07-24 DIAGNOSIS — G47.33 OBSTRUCTIVE SLEEP APNEA (ADULT) (PEDIATRIC): ICD-10-CM

## 2020-07-24 DIAGNOSIS — K21.9 GASTRO-ESOPHAGEAL REFLUX DISEASE WITHOUT ESOPHAGITIS: ICD-10-CM

## 2020-07-24 DIAGNOSIS — E11.9 TYPE 2 DIABETES MELLITUS WITHOUT COMPLICATIONS: ICD-10-CM

## 2020-07-24 DIAGNOSIS — Z29.9 ENCOUNTER FOR PROPHYLACTIC MEASURES, UNSPECIFIED: ICD-10-CM

## 2020-07-24 DIAGNOSIS — J30.9 ALLERGIC RHINITIS, UNSPECIFIED: ICD-10-CM

## 2020-07-24 DIAGNOSIS — J82 PULMONARY EOSINOPHILIA, NOT ELSEWHERE CLASSIFIED: ICD-10-CM

## 2020-07-24 DIAGNOSIS — J45.901 UNSPECIFIED ASTHMA WITH (ACUTE) EXACERBATION: ICD-10-CM

## 2020-07-24 DIAGNOSIS — J44.1 CHRONIC OBSTRUCTIVE PULMONARY DISEASE WITH (ACUTE) EXACERBATION: ICD-10-CM

## 2020-07-24 LAB
GLUCOSE BLDC GLUCOMTR-MCNC: 166 MG/DL — HIGH (ref 70–99)
NT-PROBNP SERPL-SCNC: 146 PG/ML — SIGNIFICANT CHANGE UP (ref 0–300)
TROPONIN T, HIGH SENSITIVITY RESULT: 12 NG/L — SIGNIFICANT CHANGE UP (ref 0–51)

## 2020-07-24 PROCEDURE — 71250 CT THORAX DX C-: CPT | Mod: 26

## 2020-07-24 PROCEDURE — 99217: CPT

## 2020-07-24 RX ORDER — DEXTROSE 50 % IN WATER 50 %
15 SYRINGE (ML) INTRAVENOUS ONCE
Refills: 0 | Status: DISCONTINUED | OUTPATIENT
Start: 2020-07-24 | End: 2020-07-27

## 2020-07-24 RX ORDER — DEXTROSE 50 % IN WATER 50 %
25 SYRINGE (ML) INTRAVENOUS ONCE
Refills: 0 | Status: DISCONTINUED | OUTPATIENT
Start: 2020-07-24 | End: 2020-07-27

## 2020-07-24 RX ORDER — INSULIN LISPRO 100/ML
VIAL (ML) SUBCUTANEOUS AT BEDTIME
Refills: 0 | Status: DISCONTINUED | OUTPATIENT
Start: 2020-07-24 | End: 2020-07-27

## 2020-07-24 RX ORDER — FUROSEMIDE 40 MG
20 TABLET ORAL ONCE
Refills: 0 | Status: COMPLETED | OUTPATIENT
Start: 2020-07-24 | End: 2020-07-24

## 2020-07-24 RX ORDER — GLUCAGON INJECTION, SOLUTION 0.5 MG/.1ML
1 INJECTION, SOLUTION SUBCUTANEOUS ONCE
Refills: 0 | Status: DISCONTINUED | OUTPATIENT
Start: 2020-07-24 | End: 2020-07-27

## 2020-07-24 RX ORDER — INSULIN LISPRO 100/ML
VIAL (ML) SUBCUTANEOUS
Refills: 0 | Status: DISCONTINUED | OUTPATIENT
Start: 2020-07-24 | End: 2020-07-27

## 2020-07-24 RX ORDER — MONTELUKAST 4 MG/1
10 TABLET, CHEWABLE ORAL ONCE
Refills: 0 | Status: COMPLETED | OUTPATIENT
Start: 2020-07-24 | End: 2020-07-24

## 2020-07-24 RX ORDER — SODIUM CHLORIDE 9 MG/ML
1000 INJECTION, SOLUTION INTRAVENOUS
Refills: 0 | Status: DISCONTINUED | OUTPATIENT
Start: 2020-07-24 | End: 2020-07-27

## 2020-07-24 RX ORDER — BUDESONIDE AND FORMOTEROL FUMARATE DIHYDRATE 160; 4.5 UG/1; UG/1
2 AEROSOL RESPIRATORY (INHALATION)
Refills: 0 | Status: DISCONTINUED | OUTPATIENT
Start: 2020-07-24 | End: 2020-07-27

## 2020-07-24 RX ORDER — ACETAMINOPHEN 500 MG
650 TABLET ORAL EVERY 6 HOURS
Refills: 0 | Status: DISCONTINUED | OUTPATIENT
Start: 2020-07-24 | End: 2020-07-27

## 2020-07-24 RX ORDER — RANITIDINE HYDROCHLORIDE 150 MG/1
0 TABLET, FILM COATED ORAL
Qty: 0 | Refills: 0 | DISCHARGE

## 2020-07-24 RX ORDER — IPRATROPIUM/ALBUTEROL SULFATE 18-103MCG
3 AEROSOL WITH ADAPTER (GRAM) INHALATION EVERY 6 HOURS
Refills: 0 | Status: DISCONTINUED | OUTPATIENT
Start: 2020-07-24 | End: 2020-07-27

## 2020-07-24 RX ORDER — TIOTROPIUM BROMIDE 18 UG/1
1 CAPSULE ORAL; RESPIRATORY (INHALATION) DAILY
Refills: 0 | Status: DISCONTINUED | OUTPATIENT
Start: 2020-07-24 | End: 2020-07-27

## 2020-07-24 RX ORDER — HEPARIN SODIUM 5000 [USP'U]/ML
5000 INJECTION INTRAVENOUS; SUBCUTANEOUS EVERY 12 HOURS
Refills: 0 | Status: DISCONTINUED | OUTPATIENT
Start: 2020-07-24 | End: 2020-07-27

## 2020-07-24 RX ORDER — IPRATROPIUM/ALBUTEROL SULFATE 18-103MCG
3 AEROSOL WITH ADAPTER (GRAM) INHALATION EVERY 4 HOURS
Refills: 0 | Status: DISCONTINUED | OUTPATIENT
Start: 2020-07-24 | End: 2020-07-25

## 2020-07-24 RX ORDER — DEXTROSE 50 % IN WATER 50 %
12.5 SYRINGE (ML) INTRAVENOUS ONCE
Refills: 0 | Status: DISCONTINUED | OUTPATIENT
Start: 2020-07-24 | End: 2020-07-27

## 2020-07-24 RX ADMIN — Medication 3 MILLILITER(S): at 18:58

## 2020-07-24 RX ADMIN — Medication 40 MILLIGRAM(S): at 06:12

## 2020-07-24 RX ADMIN — TIOTROPIUM BROMIDE 1 CAPSULE(S): 18 CAPSULE ORAL; RESPIRATORY (INHALATION) at 21:24

## 2020-07-24 RX ADMIN — HEPARIN SODIUM 5000 UNIT(S): 5000 INJECTION INTRAVENOUS; SUBCUTANEOUS at 18:59

## 2020-07-24 RX ADMIN — Medication 2: at 17:11

## 2020-07-24 RX ADMIN — BUDESONIDE AND FORMOTEROL FUMARATE DIHYDRATE 2 PUFF(S): 160; 4.5 AEROSOL RESPIRATORY (INHALATION) at 00:25

## 2020-07-24 RX ADMIN — BUDESONIDE AND FORMOTEROL FUMARATE DIHYDRATE 2 PUFF(S): 160; 4.5 AEROSOL RESPIRATORY (INHALATION) at 21:24

## 2020-07-24 RX ADMIN — Medication 20 MILLIGRAM(S): at 14:56

## 2020-07-24 RX ADMIN — MONTELUKAST 10 MILLIGRAM(S): 4 TABLET, CHEWABLE ORAL at 00:25

## 2020-07-24 NOTE — H&P ADULT - NSHPREVIEWOFSYSTEMS_GEN_ALL_CORE
REVIEW OF SYSTEMS:    CONSTITUTIONAL: No weakness, fevers or chills  EYES/ENT: No visual changes;  No vertigo or throat pain   NECK: No pain or stiffness  RESPIRATORY: No cough, wheezing, hemoptysis; + shortness of breath  CARDIOVASCULAR: No chest pain or palpitations + chest tightness  GASTROINTESTINAL: No abdominal or epigastric pain. No nausea, vomiting, or hematemesis; No diarrhea or constipation. No melena or hematochezia.  GENITOURINARY: No dysuria, frequency or hematuria  NEUROLOGICAL: No numbness or weakness  SKIN: No itching, burning, rashes, or lesions   All other review of systems is negative unless indicated above.

## 2020-07-24 NOTE — ED CDU PROVIDER DISPOSITION NOTE - ATTENDING CONTRIBUTION TO CARE
Patient in CDU for dyspnea/COPD exacerbation, reporting feeling unimproved this morning.  Her pulmonlogist contacted her cardiologist Dr. Abel, given no improvement in symptoms will admit for further treatment and workup with agreement of her specialists.  Angel Toussaint M.D.

## 2020-07-24 NOTE — ED CDU PROVIDER INITIAL DAY NOTE - OBJECTIVE STATEMENT
75 y/o F PMHx of chronic bronchial asthma c/o worsening sob x 2 weeks and chest tightness during dry coughs .Used 2 duonebs today no improvement. Saw pulmonologist outpatient (Dr. Gonzales) recently who did cxr (neg) and scheduled her for CT chest. Patient states she feels breathing problems due to humidity and felt better in past when treated with steroids. On albuterol and Spriva. Recently placed on budesonide inhaler. Non smoker. No chest pain when not coughing. No n/v/d. No fever chills. Pt denies recent illness, abd pain, melena, hematochezia, leg pain/swelling, hemoptysis.  ED Course: Labs non-actionable. VSS. CXR without acute pathology, no PNA. Pt sent to CDU for continued management of COPD exacerbation and frequent reevaluation. ED team d/w pulmonology, will see pt in CDU.

## 2020-07-24 NOTE — H&P ADULT - NSICDXPASTSURGICALHX_GEN_ALL_CORE_FT
PAST SURGICAL HISTORY:  mediport insertion in      S/P  Section in 1975,     S/P Lumbar Microdiscectomy left side in     S/P KEVIN-BSO (Total Abdominal Hysterectomy and Bilateral Salpingo-Oophorectomy) in  for ovarian cancer

## 2020-07-24 NOTE — CONSULT NOTE ADULT - PROBLEM SELECTOR RECOMMENDATION 2
prednisone 40mg per day-do not taper for 5 days  symbicort 160 2 bid, spiriva 1 q day, singulair 10 qhs, duoneb via n q6  acapella, incentive spirometry

## 2020-07-24 NOTE — CONSULT NOTE ADULT - ATTENDING COMMENTS
Patient seen and examined, agree with the above assessment and plan by MALLY Joyner.  75 yo h/o [place], asthma, allergy, gerd, migraines ovarian CA, DM, prior PNA presenting with SOB/LOUIS likely asthma exacerbation, and intermittent chest pain.  mild LE edema on exam  lasix x 1  ECHO  Nuclear stress  Pulm followup
as above-  multifactorial dyspnea--severe persistent asthma-flair, eosinophillic asthma, obesity, ?CAD--RA sat above 90%  asthmatic bronchitis-no need for ABX; prednisone 40mg per day-do not taper for 5 dayssymbicort 160 2 bid, spiriva 1 q day, singulair 10 qhs,                     duoneb via hhn q6      acapella, incentive spirometry                    ? TBM-doubtful--out pt dynamic CT to be considered  eosinophillic asthma-will consider out pt nucala/fasenra/dupixent                    DM-bs control  osas-cpap o/n                   Cards-Page et al-check BNP      GI-protonix am pepcid 40 qhs  DVT prophylaxis                   Would consider monitoring for another 24 hrs  Aldo Gonzales MD-Pulmonary   [phone]

## 2020-07-24 NOTE — H&P ADULT - NSICDXPASTMEDICALHX_GEN_ALL_CORE_FT
PAST MEDICAL HISTORY:  Asthma h/o asthma in the past , not on meds    Depression     Diabetes diet controlled, not on meds    Glaucoma     Lumbar Disc Disease lumbar disc displacement    Migraine Headache     Ovarian Cancer in 2005 had chemo    Pneumonia h/o pneumonia in the past    Sleep Apnea uses CPAP

## 2020-07-24 NOTE — ED CDU PROVIDER DISPOSITION NOTE - NSFOLLOWUPINSTRUCTIONS_ED_ALL_ED_FT
1) Follow-up with your PCP in 2-3 days.     Follow-up with your pulmonologist as directed.    2) Continue taking all medications as prescribed. Take prednisone as prescribed for COPD exacerbation. Use albuterol inhaler OR nebulizer every 4-6 hours for shortness of breath or wheezing.    3) Return to the ER for any new or worsening symptoms including but not limited to chest pain, worsening cough, chest pain, shortness of breath or wheezing despite using albuterol, leg pain or swelling, fevers, chills, or any other concerns.

## 2020-07-24 NOTE — ED CDU PROVIDER DISPOSITION NOTE - CLINICAL COURSE
75 y/o F PMHx of chronic bronchial asthma c/o worsening sob x 2 weeks and chest tightness during dry coughs .Used 2 duonebs today no improvement. Saw pulmonologist outpatient (Dr. Gonzales) recently who did cxr (neg) and scheduled her for CT chest. Patient states she feels breathing problems due to humidity and felt better in past when treated with steroids. On albuterol and Spriva. Recently placed on budesonide inhaler. Non smoker. No chest pain when not coughing. No n/v/d. No fever chills. Pt denies recent illness, abd pain, melena, hematochezia, leg pain/swelling, hemoptysis.  ED Course: Labs non-actionable. VSS. CXR without acute pathology, no PNA. Pt sent to CDU for continued management of COPD exacerbation and frequent reevaluation. ED team d/w pulmonology, will see pt in CDU.  CDU Course: Pulmonology recommended___. 75 y/o F PMHx of chronic bronchial asthma c/o worsening sob x 2 weeks and chest tightness during dry coughs .Used 2 duonebs today no improvement. Saw pulmonologist outpatient (Dr. Gonzales) recently who did cxr (neg) and scheduled her for CT chest. Patient states she feels breathing problems due to humidity and felt better in past when treated with steroids. On albuterol and Spriva. Recently placed on budesonide inhaler. Non smoker. No chest pain when not coughing. No n/v/d. No fever chills. Pt denies recent illness, abd pain, melena, hematochezia, leg pain/swelling, hemoptysis.  ED Course: Labs non-actionable. VSS. CXR without acute pathology, no PNA. Pt sent to CDU for continued management of COPD exacerbation and frequent reevaluation. Pt seen by pulmonary and cardiology. pts symptoms persisted. pt was admitted for further work up and management.

## 2020-07-24 NOTE — H&P ADULT - NSHPPHYSICALEXAM_GEN_ALL_CORE
PHYSICAL EXAMINATION:  Vital Signs Last 24 Hrs  T(C): 36.6 (24 Jul 2020 12:16), Max: 36.8 (23 Jul 2020 15:24)  T(F): 97.9 (24 Jul 2020 12:16), Max: 98.3 (23 Jul 2020 15:24)  HR: 90 (24 Jul 2020 15:02) (64 - 99)  BP: 147/77 (24 Jul 2020 15:02) (114/93 - 164/81)  BP(mean): --  RR: 17 (24 Jul 2020 15:02) (17 - 22)  SpO2: 95% (24 Jul 2020 15:02) (95% - 99%)  CAPILLARY BLOOD GLUCOSE          GENERAL: NAD, well-groomed, well-developed  HEAD:  atraumatic, normocephalic  EYES: sclera anicteric  ENMT: mucous membranes moist  NECK: supple, No JVD  CHEST/LUNG: clear to auscultation bilaterally; no rales, rhonchi, or wheezing b/l  HEART: normal S1, S2  ABDOMEN: BS+, soft, ND, NT   EXTREMITIES:  pulses palpable; no clubbing, cyanosis, or edema b/l LEs  NEURO: awake, alert, interactive; moves all extremities  SKIN: no rashes or lesions

## 2020-07-24 NOTE — H&P ADULT - NSHPLABSRESULTS_GEN_ALL_CORE
11.6   6.17  )-----------( 219      ( 23 Jul 2020 18:35 )             36.8       07-23    138  |  103  |  18  ----------------------------<  132<H>  4.2   |  27  |  0.85    Ca    9.7      23 Jul 2020 18:35    TPro  7.4  /  Alb  4.3  /  TBili  0.2  /  DBili  x   /  AST  22  /  ALT  20  /  AlkPhos  78  07-23              EKG SR NSST/T    < from: Xray Chest 2 Views PA/Lat (07.23.20 @ 18:39) >      IMPRESSION:    Clear lungs.    < end of copied text >

## 2020-07-24 NOTE — ED CDU PROVIDER SUBSEQUENT DAY NOTE - PROGRESS NOTE DETAILS
Pt sleeping. VS stable from last reading. SpO2 96% on RA. Will continue to monitor. Pt comfortable at rest.  still c/o SOB on exertion. Vital signs stable. Seen by pulmonary team, medication recommendations made. will call pts cardiologist Dr. Abel. discussed with Dr. Toussaint. -Mariah Cox PA-C Seen by cardiology, recommend stress test. spoke to pts pmd Dr. Horne, will admit pt for further work up and management. discussed with Dr. Toussaint. -Mariah Cox PA-C

## 2020-07-24 NOTE — H&P ADULT - ASSESSMENT
74 f with    COPD exacerbation  - nebs  - steroids  - CT chest  - Pulmonary evaluation    LOUIS/ r/o Cardiac etiology  - Echo  - Stress test  - gentle diuresis  - Cardiology evaluation    Diabetes Mellitus  - BS control  - ADA diet     Depression/ Anxiety  - stable    Glaucoma  - continue gtt    Further action as per clinical course     Cy Horne MD pager [phone]

## 2020-07-24 NOTE — CONSULT NOTE ADULT - SUBJECTIVE AND OBJECTIVE BOX
HPI: 75 yo h/o osas, asthma, allergy, gerd, migrains, ovarian CA, DM, prior PNA--    PAST MEDICAL & SURGICAL HISTORY:  Sleep Apnea: uses CPAP  Glaucoma  Depression  Ovarian Cancer: in  had chemo  Diabetes: diet controlled, not on meds  Migraine Headache  Pneumonia: h/o pneumonia in the past  Asthma: h/o asthma in the past , not on meds  Lumbar Disc Disease: lumbar disc displacement  mediport insertion in   S/P Lumbar Microdiscectomy: left side in   S/P  Section: in ,   S/P KEVIN-BSO (Total Abdominal Hysterectomy and Bilateral Salpingo-Oophorectomy): in  for ovarian cancer      FAMILY HISTORY:  Family history of asthma (Mother)/ F-CAD      SOCIAL HISTORY:  Smoking: neg  EtOH Use: neg  Marital Status: M- 2 kids  Occupation: retired  Exposures: neg  Recent Travel: neg    Allergies    aspirin (Swelling)  Ceftin (Angioedema)  cephalexin (Hives; Rash)  ciprofloxacin (Rash)  fresh friuts cause swelling; salads and carrots cause swelling a cough (Other)  Motrin (Swelling)    Intolerances        HOME MEDICATIONS:    REVIEW OF SYSTEMS:  Constitutional: No fevers or chills. No weight loss. No fatigue or generalized malaise.  Eyes: No itching or discharge from the eyes  ENT: No ear pain. No ear discharge. No nasal congestion. No post nasal drip. No epistaxis. No throat pain. No sore throat. No difficulty swallowing.   CV: No chest pain. No palpitations. No lightheadedness or dizziness.   Resp: No dyspnea at rest. No dyspnea on exertion. No orthopnea. No wheezing. No cough. No stridor. No sputum production. No chest pain with respiration.  GI: No nausea. No vomiting. No diarrhea.  MSK: No joint pain or pain in any extremities  Integumentary: No skin lesions. No pedal edema.  Neurological: No gross motor weakness. No sensory changes.    [ ] All other systems negative  [ ] Unable to assess ROS because ________    OBJECTIVE:  ICU Vital Signs Last 24 Hrs  T(C): 36.4 (2020 04:27), Max: 36.8 (2020 15:24)  T(F): 97.6 (2020 04:27), Max: 98.3 (2020 15:24)  HR: 99 (2020 04:27) (64 - 99)  BP: 122/77 (2020 04:27) (114/93 - 155/57)  BP(mean): --  ABP: --  ABP(mean): --  RR: 18 (2020 04:27) (18 - 22)  SpO2: 99% (2020 04:27) (95% - 99%)        CAPILLARY BLOOD GLUCOSE          PHYSICAL EXAM:  General: Awake, alert, oriented X 3.   HEENT: Atraumatic, normocephalic.                 Mallampatti Grade                 No nasal congestion.                No tonsillar or pharyngeal exudates.  Lymph Nodes: No palpable lymphadenopathy  Neck: No JVD. No carotid bruit.   Respiratory: Normal chest expansion                         Normal percussion                         Normal and equal air entry                         No wheeze, rhonchi or rales.  Cardiovascular: S1 S2 normal. No murmurs, rubs or gallops.   Abdomen: Soft, non-tender, non-distended. No organomegaly. Normoactive bowel sounds.  Extremities: Warm to touch. Peripheral pulse palpable. No pedal edema.   Skin: No rashes or skin lesions  Neurological: Motor and sensory examination equal and normal in all four extremities.  Psychiatry: Appropriate mood and affect.    HOSPITAL MEDICATIONS:  MEDICATIONS  (STANDING):  budesonide 160 MICROgram(s)/formoterol 4.5 MICROgram(s) Inhaler 2 Puff(s) Inhalation two times a day  predniSONE   Tablet 40 milliGRAM(s) Oral once    MEDICATIONS  (PRN):  albuterol/ipratropium for Nebulization 3 milliLiter(s) Nebulizer every 4 hours PRN Shortness of Breath and/or Wheezing      LABS:                        11.6   6.17  )-----------( 219      ( 2020 18:35 )             36.8     07-23    138  |  103  |  18  ----------------------------<  132<H>  4.2   |  27  |  0.85    Ca    9.7      2020 18:35    TPro  7.4  /  Alb  4.3  /  TBili  0.2  /  DBili  x   /  AST  22  /  ALT  20  /  AlkPhos  78  07-23              MICROBIOLOGY:     RADIOLOGY:  [ ] Reviewed and interpreted by me    Point of Care Ultrasound Findings;    PFT:    EKG: HPI: 75 yo h/o osas, asthma, allergy, gerd, migrains, ovarian CA, DM, prior PNA--recently multiple bouts of asthma--under stress recently--sob present at all times -no fever or chills or sweats but wheezing and coughing--these sx have been going on for 2 months. Have been using cpap o/n, mild sinus sx, and HB as well. No swallowing issues    PAST MEDICAL & SURGICAL HISTORY:  Sleep Apnea: uses CPAP  Glaucoma  Depression  Ovarian Cancer: in  had chemo  Diabetes: diet controlled, not on meds  Migraine Headache  Pneumonia: h/o pneumonia in the past  Asthma: h/o asthma in the past , not on meds  Lumbar Disc Disease: lumbar disc displacement  mediport insertion in   S/P Lumbar Microdiscectomy: left side in   S/P  Section: in ,   S/P KEVIN-BSO (Total Abdominal Hysterectomy and Bilateral Salpingo-Oophorectomy): in  for ovarian cancer      FAMILY HISTORY:  Family history of asthma (Mother)/ F-CAD      SOCIAL HISTORY:  Smoking: neg  EtOH Use: neg  Marital Status: M- 2 kids  Occupation: retired  Exposures: neg  Recent Travel: neg    Allergies    aspirin (Swelling)  Ceftin (Angioedema)  cephalexin (Hives; Rash)  ciprofloxacin (Rash)  fresh friuts cause swelling; salads and carrots cause swelling a cough (Other)  Motrin (Swelling)    Intolerances        HOME MEDICATIONS: Home Medications:  bactrim DS:    (10 Aug 2017 12:13)  Cardizem:  orally  (10 Aug 2017 12:13)  glympride:    (10 Aug 2017 12:13)  PriLOSEC:  orally  (10 Aug 2017 12:13)  zantac:    (10 Aug 2017 12:13)      REVIEW OF SYSTEMS:  Constitutional: No fevers or chills. No weight loss. + fatigue or generalized malaise.  Eyes: No itching or discharge from the eyes  ENT: No ear pain. No ear discharge. No nasal congestion. No post nasal drip. No epistaxis. No throat pain. No sore throat. No difficulty swallowing.   CV: No chest pain. No palpitations. No lightheadedness or dizziness.   Resp: No dyspnea at rest. + dyspnea on exertion. No orthopnea. + wheezing. + cough. No stridor. + sputum production. No chest pain with respiration.  GI: No nausea. No vomiting. No diarrhea.  MSK: No joint pain or pain in any extremities  Integumentary: No skin lesions. No pedal edema.  Neurological: No gross motor weakness. No sensory changes.    [+ ] All other systems negative  [ ] Unable to assess ROS because ________    OBJECTIVE:  ICU Vital Signs Last 24 Hrs  T(C): 36.4 (2020 04:27), Max: 36.8 (2020 15:24)  T(F): 97.6 (2020 04:27), Max: 98.3 (2020 15:24)  HR: 99 (2020 04:27) (64 - 99)  BP: 122/77 (2020 04:27) (114/93 - 155/57)  BP(mean): --  ABP: --  ABP(mean): --  RR: 18 (2020 04:27) (18 - 22)  SpO2: 99% (2020 04:27) (95% - 99%)        CAPILLARY BLOOD GLUCOSE          PHYSICAL EXAM: NAD in bed on RA  General: Awake, alert, oriented X 3.   HEENT: Atraumatic, normocephalic.                 Mallampatti Grade 3                No nasal congestion.                No tonsillar or pharyngeal exudates.  Lymph Nodes: No palpable lymphadenopathy  Neck: No JVD. No carotid bruit.   Respiratory: Normal chest expansion                         Normal percussion                         Normal and equal air entry                         + exp wheeze, no rhonchi or rales.  Cardiovascular: S1 S2 normal. No murmurs, rubs or gallops.   Abdomen: Soft, non-tender, non-distended. No organomegaly. Normoactive bowel sounds.  Extremities: Warm to touch. Peripheral pulse palpable. No pedal edema.   Skin: No rashes or skin lesions  Neurological: Motor and sensory examination equal and normal in all four extremities.  Psychiatry: Appropriate mood and affect.    HOSPITAL MEDICATIONS:  MEDICATIONS  (STANDING):  budesonide 160 MICROgram(s)/formoterol 4.5 MICROgram(s) Inhaler 2 Puff(s) Inhalation two times a day  predniSONE   Tablet 40 milliGRAM(s) Oral once    MEDICATIONS  (PRN):  albuterol/ipratropium for Nebulization 3 milliLiter(s) Nebulizer every 4 hours PRN Shortness of Breath and/or Wheezing      LABS:                        11.6   6.17  )-----------( 219      ( 2020 18:35 )             36.8     07-    138  |  103  |  18  ----------------------------<  132<H>  4.2   |  27  |  0.85    Ca    9.7      2020 18:35    TPro  7.4  /  Alb  4.3  /  TBili  0.2  /  DBili  x   /  AST  22  /  ALT  20  /  AlkPhos  78                MICROBIOLOGY:     RADIOLOGY:  [ ] Reviewed and interpreted by me    Point of Care Ultrasound Findings;    PFT:    EKG:

## 2020-07-24 NOTE — CONSULT NOTE ADULT - PROBLEM SELECTOR RECOMMENDATION 9
multifactorial-severe persistent asthma-flair, eosinophillic asthma, obesity, ?CAD--RA sat above 90%

## 2020-07-24 NOTE — CONSULT NOTE ADULT - SUBJECTIVE AND OBJECTIVE BOX
CARDIOLOGY CONSULT - Dr. Abel     CHIEF COMPLAINT: louis    HPI: 73 yo h/o savanna, asthma, allergy, gerd, migraines, ovarian CA, DM, prior PNA presenting with SOB/LOUIS x 2 months. Pt. has had multiple bouts of asthma recently, under stress recently. no fever, chills, +dry cough. Pt. states she has intermittent chest pain described as a pinching sensation. Was supposed to have a stress test in our office but was postponed due to other medical issues. Pt. was scheduled for f/u apt. in our office today to reschedule. Pt. currently resting comfortably. Also states she has some b/l le edema.       PAST MEDICAL & SURGICAL HISTORY:  Sleep Apnea: uses CPAP  Glaucoma  Depression  Ovarian Cancer: in  had chemo  Diabetes: diet controlled, not on meds  Migraine Headache  Pneumonia: h/o pneumonia in the past  Asthma: h/o asthma in the past , not on meds  Lumbar Disc Disease: lumbar disc displacement  mediport insertion in   S/P Lumbar Microdiscectomy: left side in   S/P  Section: in ,   S/P KEVIN-BSO (Total Abdominal Hysterectomy and Bilateral Salpingo-Oophorectomy): in  for ovarian cancer          PREVIOUS DIAGNOSTIC TESTING:    [ ] Echocardiogram: < from: Transthoracic Echocardiogram (13 @ 15:48) >  Conclusions:  1. Normal mitral valve. Minimal mitral regurgitation.  2. Normal trileaflet aortic valve. No aortic valve  regurgitation seen.  3. Normal left atrium.  LA volume index = 26 cc/m2.  4. Normal left ventricular internal dimensions and wall  thickness.  5. Hyperdynamic left ventricle.  6. Normal right atrium.  7. Normal right ventricular size and function.  8. Estimated right ventricular systolic pressure equals 30  mm Hg, assuming right atrial pressure equals 10 mm Hg,  consistent with normal pulmonary pressures.  9. Normal tricuspid valve. Mild tricuspid regurgitation.    < end of copied text >    [ ]  Catheterization:   [ ] Stress Test:  	< from: Nuclear Stress Test-Exercise (13 @ 00:00) >  IMPRESSIONS:Normal Study  * Exercise capacity: 10 METS, Excellent for age and  gender.  * Symptom: No Symptom.  * HR Response: Appropriate.  * BP Response: Hypertensive response (excessive increase  in systolic/diastolic BP).  * Heart Rhythm: Normal Sinus Rhythm - 69 BPM.  * ECG Abnormalities: None.  * Arrhythmia: Frequent VPD's.  * Dela Cruz Treadmill Score of 9 (low probability of cardiac  events)  * The left ventricle was normal in size. Normal myocardial  perfusion scan, with no evidence of infarction or  inducible ischemia.Very small, mild distal infero-lateral  perfusion defect that corrects with prone imaging and with  normal wall motion in the region suggestive of  diaphragmatic attenuation artifact.  * Post-stress gated wall motion analysis was performed  (LVEF > 70%;LVEDV = 50 ml.), revealing normal LV function.  * Compared with Nuclear/Stress test of 2012, no  significant changes noted.    < end of copied text >      MEDICATIONS:  MEDICATIONS  (STANDING):  budesonide 160 MICROgram(s)/formoterol 4.5 MICROgram(s) Inhaler 2 Puff(s) Inhalation two times a day  furosemide   Injectable 20 milliGRAM(s) IV Push once  tiotropium 18 MICROgram(s) Capsule 1 Capsule(s) Inhalation daily      FAMILY HISTORY:  Family history of asthma (Mother)      SOCIAL HISTORY:    [ x] Non-smoker  [ ] Smoker  [ ] Alcohol    Allergies    aspirin (Swelling)  Ceftin (Angioedema)  cephalexin (Hives; Rash)  ciprofloxacin (Rash)  fresh friuts cause swelling; salads and carrots cause swelling a cough (Other)  Motrin (Swelling)    Intolerances    	    REVIEW OF SYSTEMS:  CONSTITUTIONAL: No fever, weight loss, or fatigue  EYES: No eye pain, visual disturbances, or discharge  ENMT:  No difficulty hearing, tinnitus, vertigo; No sinus or throat pain  NECK: No pain or stiffness  RESPIRATORY: No cough, wheezing, chills or hemoptysis; +Shortness of Breath  CARDIOVASCULAR: No chest pain, palpitations, passing out, dizziness, or leg swelling  GASTROINTESTINAL: No abdominal or epigastric pain. No nausea, vomiting, or hematemesis; No diarrhea or constipation. No melena or hematochezia.  GENITOURINARY: No dysuria, frequency, hematuria, or incontinence  NEUROLOGICAL: No headaches, memory loss, loss of strength, numbness, or tremors  SKIN: No itching, burning, rashes, or lesions   	    [ x] All others negative	  [ ] Unable to obtain    PHYSICAL EXAM:  T(C): 36.3 (20 @ 07:22), Max: 36.8 (20 @ 15:24)  HR: 75 (20 @ 07:22) (64 - 99)  BP: 164/81 (20 @ 07:22) (114/93 - 164/81)  RR: 17 (20 @ 07:22) (17 - 22)  SpO2: 95% (20 @ 07:22) (95% - 99%)  Wt(kg): --  I&O's Summary      Appearance: Normal	  Psychiatry: A & O x 3, Mood & affect appropriate  HEENT:   Normal oral mucosa, PERRL, EOMI	  Lymphatic: No lymphadenopathy  Cardiovascular: Normal S1 S2,RRR, No JVD, No murmurs  Respiratory: +crackles   Gastrointestinal:  Soft, Non-tender, + BS	  Skin: No rashes, No ecchymoses, No cyanosis	  Neurologic: Non-focal  Extremities: Normal range of motion, No clubbing, +b/l le edema   Vascular: Peripheral pulses palpable 2+ bilaterally    TELEMETRY: NSR 	    ECG:  nsr w PACs 65bpm   RADIOLOGY:   < from: Xray Chest 2 Views PA/Lat (20 @ 18:39) >  IMPRESSION:    Clear lungs.        < end of copied text >  OTHER: 	  	  LABS:	 	    CARDIAC MARKERS:                                  11.6   6.17  )-----------( 219      ( 2020 18:35 )             36.8     07-    138  |  103  |  18  ----------------------------<  132<H>  4.2   |  27  |  0.85    Ca    9.7      2020 18:35    TPro  7.4  /  Alb  4.3  /  TBili  0.2  /  DBili  x   /  AST  22  /  ALT  20  /  AlkPhos  78  07-      proBNP:   Lipid Profile:   HgA1c:   TSH:

## 2020-07-24 NOTE — PATIENT PROFILE ADULT - PATIENT REPRESENTATIVE: ( YOU CAN CHOOSE ANY PERSON THAT CAN ASSIST YOU WITH YOUR HEALTH CARE PREFERENCES, DOES NOT HAVE TO BE A SPOUSE, IMMEDIATE FAMILY OR SIGNIFICANT OTHER/PARTNER)
12/20/18 0829   Giancarlo Fall Risk   Mobility 1.0   Mobility Interventions Patient to call before getting OOB   Mentation 1   Mentation Interventions Door open when patient unattended;Bed/chair exit alarm   Medication 1   Medication Interventions Patient to call before getting OOB   Elimination 1.0   Elimination Interventions Call light in reach   Prior Fall History 1   History of Falls Interventions Door open when patient unattended   Total Score 5   Standard Fall Precautions Yes   High Fall Risk Yes     Due to high fall risk, we won't be able to perform six minute walk test, please coordinate with PT. declines

## 2020-07-24 NOTE — CONSULT NOTE ADULT - ASSESSMENT
73 yo h/o savanna, asthma, allergy, gerd, migraines ovarian CA, DM, prior PNA presenting with SOB/LOUIS likely asthma exacerbation, and intermittent chest pain.     1. SOB/LOUIS   multifactorial, asthma exacerbation, ?CAD   also w/ some intermittent chest pain   cv stable, HST neg x 1, EKG with no acute ischemia   pulm eval noted - asthma management per pulm  +b/l le edema on exam - give lasix 20mg IVP x 1   r/o cardiac etiology - check echo to eval lvef, valve function  check stress test to r/o ischemia     2. Chest pain  cv stable, HST neg x 1, EKG with no acute ischemia  pt. w/ CAD risk factors   plan as above     3. Asthma exacerbation  pulm f/u     dvt ppx
73 yo h/o osas, asthma, allergy, gerd, migrains, ovarian CA, DM, prior PNA--recently multiple bouts of asthma- now here w/ exacerbation of asthma-likely eosinophillic in nature.

## 2020-07-24 NOTE — ED CDU PROVIDER INITIAL DAY NOTE - PHYSICAL EXAMINATION
GEN: Pt in NAD, A&O x3.  PSYCH: Affect appropriate.  EYES: Sclera white w/o injection.   ENT: Head NCAT. MMM. Neck supple FROM.  RESP: Pt speaking in full sentences, no evidence of respiratory distress, SpO2 97% on RA. Scattered b/l end expiratory wheeze, prolonged expiratory phase. No rales or rhonchi.   CARDIAC: RRR, clear distinct S1, S2, no appreciable murmurs.  ABD: Abdomen soft, non-tender. No CVAT b/l.  VASC: 2+ radial and dorsalis pedis pulses b/l. No edema or calf tenderness.  SKIN: No rashes on the trunk.

## 2020-07-24 NOTE — ED CDU PROVIDER SUBSEQUENT DAY NOTE - HISTORY
Pt sleeping. VS stable from last reading. SpO2 96% on room air, no obvious signs of increased WOB. Will continue to monitor.

## 2020-07-25 LAB
A1C WITH ESTIMATED AVERAGE GLUCOSE RESULT: 7.3 % — HIGH (ref 4–5.6)
ESTIMATED AVERAGE GLUCOSE: 163 MG/DL — HIGH (ref 68–114)
GLUCOSE BLDC GLUCOMTR-MCNC: 152 MG/DL — HIGH (ref 70–99)
GLUCOSE BLDC GLUCOMTR-MCNC: 153 MG/DL — HIGH (ref 70–99)
GLUCOSE BLDC GLUCOMTR-MCNC: 163 MG/DL — HIGH (ref 70–99)
GLUCOSE BLDC GLUCOMTR-MCNC: 251 MG/DL — HIGH (ref 70–99)
HCV AB S/CO SERPL IA: 0.07 S/CO — SIGNIFICANT CHANGE UP (ref 0–0.99)
HCV AB SERPL-IMP: SIGNIFICANT CHANGE UP

## 2020-07-25 PROCEDURE — 99222 1ST HOSP IP/OBS MODERATE 55: CPT | Mod: GC

## 2020-07-25 RX ORDER — CHLORHEXIDINE GLUCONATE 213 G/1000ML
1 SOLUTION TOPICAL DAILY
Refills: 0 | Status: DISCONTINUED | OUTPATIENT
Start: 2020-07-25 | End: 2020-07-27

## 2020-07-25 RX ORDER — METOPROLOL TARTRATE 50 MG
12.5 TABLET ORAL EVERY 12 HOURS
Refills: 0 | Status: DISCONTINUED | OUTPATIENT
Start: 2020-07-25 | End: 2020-07-27

## 2020-07-25 RX ADMIN — Medication 12.5 MILLIGRAM(S): at 17:33

## 2020-07-25 RX ADMIN — HEPARIN SODIUM 5000 UNIT(S): 5000 INJECTION INTRAVENOUS; SUBCUTANEOUS at 17:33

## 2020-07-25 RX ADMIN — Medication 3 MILLILITER(S): at 17:33

## 2020-07-25 RX ADMIN — BUDESONIDE AND FORMOTEROL FUMARATE DIHYDRATE 2 PUFF(S): 160; 4.5 AEROSOL RESPIRATORY (INHALATION) at 17:34

## 2020-07-25 RX ADMIN — Medication 2: at 17:33

## 2020-07-25 RX ADMIN — Medication 2: at 22:04

## 2020-07-25 RX ADMIN — HEPARIN SODIUM 5000 UNIT(S): 5000 INJECTION INTRAVENOUS; SUBCUTANEOUS at 05:06

## 2020-07-25 RX ADMIN — Medication 650 MILLIGRAM(S): at 13:30

## 2020-07-25 RX ADMIN — Medication 2: at 13:04

## 2020-07-25 RX ADMIN — Medication 3 MILLILITER(S): at 11:16

## 2020-07-25 RX ADMIN — Medication 650 MILLIGRAM(S): at 12:43

## 2020-07-25 RX ADMIN — TIOTROPIUM BROMIDE 1 CAPSULE(S): 18 CAPSULE ORAL; RESPIRATORY (INHALATION) at 11:16

## 2020-07-25 RX ADMIN — Medication 2: at 08:51

## 2020-07-25 RX ADMIN — Medication 40 MILLIGRAM(S): at 17:33

## 2020-07-25 RX ADMIN — BUDESONIDE AND FORMOTEROL FUMARATE DIHYDRATE 2 PUFF(S): 160; 4.5 AEROSOL RESPIRATORY (INHALATION) at 05:07

## 2020-07-25 RX ADMIN — CHLORHEXIDINE GLUCONATE 1 APPLICATION(S): 213 SOLUTION TOPICAL at 11:16

## 2020-07-25 RX ADMIN — Medication 3 MILLILITER(S): at 05:06

## 2020-07-25 NOTE — PROGRESS NOTE ADULT - SUBJECTIVE AND OBJECTIVE BOX
CC: no events    TELEMETRY:     PHYSICAL EXAM:    T(C): 36.5 (07-25-20 @ 08:44), Max: 36.9 (07-24-20 @ 16:19)  HR: 53 (07-25-20 @ 08:44) (53 - 97)  BP: 106/57 (07-25-20 @ 08:44) (106/57 - 155/76)  RR: 18 (07-25-20 @ 08:44) (17 - 21)  SpO2: 98% (07-25-20 @ 08:44) (95% - 98%)  Wt(kg): --  I&O's Summary      Appearance: Normal	  Cardiovascular: Normal S1 S2,RRR, No JVD, No murmurs  Respiratory: Lungs clear to auscultation	  Gastrointestinal:  Soft, Non-tender, + BS	  Extremities: Normal range of motion, No clubbing, cyanosis or edema  Vascular: Peripheral pulses palpable 2+ bilaterally     LABS:	 	                          11.6   6.17  )-----------( 219      ( 23 Jul 2020 18:35 )             36.8     07-23    138  |  103  |  18  ----------------------------<  132<H>  4.2   |  27  |  0.85    Ca    9.7      23 Jul 2020 18:35    TPro  7.4  /  Alb  4.3  /  TBili  0.2  /  DBili  x   /  AST  22  /  ALT  20  /  AlkPhos  78  07-23          CARDIAC MARKERS:

## 2020-07-25 NOTE — PROGRESS NOTE ADULT - ASSESSMENT
74 f with    COPD exacerbation  - nebs  - steroids  - Pulmonary follow    LOUIS/ r/o Cardiac etiology  - Echo  - Stress test  - Cardiology follow    Diabetes Mellitus  - BS control  - ADA diet     Depression/ Anxiety  - stable    Glaucoma  - continue gtt    Rash on foot  - Lotrimin cr.    Cy Horne MD pager [phone]

## 2020-07-25 NOTE — PROGRESS NOTE ADULT - ASSESSMENT
73 yo h/o savanna, asthma, allergy, gerd, migraines ovarian CA, DM, prior PNA presenting with SOB/LOUIS likely asthma exacerbation, and intermittent chest pain.     1. SOB/LOUIS   CV stable no ACS  ECHO reveals small hyperdynamic LV  no further diuretic  if hypotensive give some gentle IVF  start low dose BB   pulm eval noted - asthma management per pulm  stress test    2. Chest pain  cv stable, HST neg x 1, EKG with no acute ischemia  pt. w/ CAD risk factors   plan as above     3. Asthma exacerbation  pulm f/u     dvt ppx

## 2020-07-25 NOTE — PROGRESS NOTE ADULT - ASSESSMENT
75 yo h/o osas, asthma, allergy, gerd, migrains, ovarian CA, DM, prior PNA--recently multiple bouts of asthma- now here w/ exacerbation of asthma-likely eosinophillic in nature.    # Asthma exacerbation  - Wheezing, improved with steroids and nebs, elevated eosinophils  - Please reorder steroids, prednisone 40mg oral for 4 more days  - C/W duonbes, symbicort BID, would add montelukast 10mg as she takes at home.   - Currently on RA  - Will need follow up with Dr. Gonzales on discharge.     Thank you for this consultation. Feel free to contact us with questions or concerns.     Dae Hyeon Kim MD-[id number]  Pulmonary Critical Care Fellow  Pager [phone] 73 yo h/o osas, asthma, allergy, gerd, migrains, ovarian CA, DM, prior PNA--recently multiple bouts of asthma- now here w/ exacerbation of asthma-likely eosinophillic in nature.    # Asthma exacerbation  - Wheezing, improved with steroids and nebs, elevated eosinophils  - Please reorder steroids, prednisone 40mg oral for 4 more days  - C/W duonbes, symbicort BID, would add montelukast 10mg as she takes at home.   - Currently on RA  - OOB, incentive spirometry acapella for airway clearance   - Will need follow up with Dr. Gonzales on discharge.     Thank you for this consultation. Feel free to contact us with questions or concerns.     Dae Hyeon Kim MD-PGY5  Pulmonary Critical Care Fellow  Pager [phone]

## 2020-07-25 NOTE — PROGRESS NOTE ADULT - SUBJECTIVE AND OBJECTIVE BOX
Interval Events: Patient was seen and examined at bedside. No overnight events.    REVIEW OF SYSTEMS:  Constitutional: [ ] fevers [ ] chills [ ] weight loss [ ] weight gain  CV: [ ] chest pain [ ] orthopnea [ ] palpitations [ ] murmur  Resp: [ ] cough [ ] shortness of breath [ ] dyspnea [ ] wheezing [ ] sputum [ ] hemoptysis  [ ] All other systems negative  [ ] Unable to assess ROS because ________    OBJECTIVE:  ICU Vital Signs Last 24 Hrs  T(C): 36.5 (25 Jul 2020 08:44), Max: 36.9 (24 Jul 2020 16:19)  T(F): 97.7 (25 Jul 2020 08:44), Max: 98.4 (24 Jul 2020 16:19)  HR: 53 (25 Jul 2020 08:44) (53 - 97)  BP: 106/57 (25 Jul 2020 08:44) (106/57 - 155/76)  BP(mean): --  ABP: --  ABP(mean): --  RR: 18 (25 Jul 2020 08:44) (17 - 21)  SpO2: 98% (25 Jul 2020 08:44) (95% - 98%)        CAPILLARY BLOOD GLUCOSE      POCT Blood Glucose.: 153 mg/dL (25 Jul 2020 08:42)      PHYSICAL EXAM:        HOSPITAL MEDICATIONS:  MEDICATIONS  (STANDING):  albuterol/ipratropium for Nebulization 3 milliLiter(s) Nebulizer every 6 hours  budesonide 160 MICROgram(s)/formoterol 4.5 MICROgram(s) Inhaler 2 Puff(s) Inhalation two times a day  chlorhexidine 2% Cloths 1 Application(s) Topical daily  dextrose 5%. 1000 milliLiter(s) (50 mL/Hr) IV Continuous <Continuous>  dextrose 50% Injectable 12.5 Gram(s) IV Push once  dextrose 50% Injectable 25 Gram(s) IV Push once  dextrose 50% Injectable 25 Gram(s) IV Push once  heparin   Injectable 5000 Unit(s) SubCutaneous every 12 hours  insulin lispro (HumaLOG) corrective regimen sliding scale   SubCutaneous three times a day before meals  insulin lispro (HumaLOG) corrective regimen sliding scale   SubCutaneous at bedtime  tiotropium 18 MICROgram(s) Capsule 1 Capsule(s) Inhalation daily    MEDICATIONS  (PRN):  acetaminophen   Tablet .. 650 milliGRAM(s) Oral every 6 hours PRN Temp greater or equal to 38.5C (101.3F), Mild Pain (1 - 3)  dextrose 40% Gel 15 Gram(s) Oral once PRN Blood Glucose LESS THAN 70 milliGRAM(s)/deciliter  glucagon  Injectable 1 milliGRAM(s) IntraMuscular once PRN Glucose LESS THAN 70 milligrams/deciliter      LABS:                        11.6   6.17  )-----------( 219      ( 23 Jul 2020 18:35 )             36.8     Hgb Trend: 11.6<--  07-23    138  |  103  |  18  ----------------------------<  132<H>  4.2   |  27  |  0.85    Ca    9.7      23 Jul 2020 18:35    TPro  7.4  /  Alb  4.3  /  TBili  0.2  /  DBili  x   /  AST  22  /  ALT  20  /  AlkPhos  78  07-23    Creatinine Trend: 0.85<--          MICROBIOLOGY:     RADIOLOGY:  [ ] Reviewed and interpreted by me Interval Events: Patient was seen and examined at bedside. No overnight events.    Patient is improving with steroids and nebs. Still mild SOB with ambulation but improved.     REVIEW OF SYSTEMS:  Constitutional: [ ] fevers [ ] chills [ ] weight loss [ ] weight gain  CV: [ ] chest pain [ ] orthopnea [ ] palpitations [ ] murmur  Resp: [ ] cough [ x] shortness of breath [ x] dyspnea [x ] wheezing [ ] sputum [ ] hemoptysis  [x ] All other systems negative  [ ] Unable to assess ROS because ________    OBJECTIVE:  ICU Vital Signs Last 24 Hrs  T(C): 36.5 (25 Jul 2020 08:44), Max: 36.9 (24 Jul 2020 16:19)  T(F): 97.7 (25 Jul 2020 08:44), Max: 98.4 (24 Jul 2020 16:19)  HR: 53 (25 Jul 2020 08:44) (53 - 97)  BP: 106/57 (25 Jul 2020 08:44) (106/57 - 155/76)  BP(mean): --  ABP: --  ABP(mean): --  RR: 18 (25 Jul 2020 08:44) (17 - 21)  SpO2: 98% (25 Jul 2020 08:44) (95% - 98%)        CAPILLARY BLOOD GLUCOSE      POCT Blood Glucose.: 153 mg/dL (25 Jul 2020 08:42)      PHYSICAL EXAM:    Constitutional: well-developed; well-groomed; well-nourished; no distress,  Eyes: PERRL; EOMI  ENMT: Normal oropharnxy, no oral lesions, no erythema, no exudates  Neck:  Supple; no JVD; normal thyroid gland  MSK/Back: normal shape; ROM intact; strength intact.   Respiratory: Mild end-expiratory wheezing.  Cardiovascular: regular rate and rhythm  no rub , no murmur, no gallops.   Gastrointestinal: soft; no distention, normal BS, no TTP, no organomegaly, no ascites.  Extremities: no clubbing; no cyanosis; no pedal edema, non-tender to palpation,  Neurological: alert and oriented x 3; responds to pain; responds to verbal commands; sensation intact: CN nerves grossly intact.   Skin: warm and dry; color normal: no rash  Psychiatric: Calm, no SI/HI            HOSPITAL MEDICATIONS:  MEDICATIONS  (STANDING):  albuterol/ipratropium for Nebulization 3 milliLiter(s) Nebulizer every 6 hours  budesonide 160 MICROgram(s)/formoterol 4.5 MICROgram(s) Inhaler 2 Puff(s) Inhalation two times a day  chlorhexidine 2% Cloths 1 Application(s) Topical daily  dextrose 5%. 1000 milliLiter(s) (50 mL/Hr) IV Continuous <Continuous>  dextrose 50% Injectable 12.5 Gram(s) IV Push once  dextrose 50% Injectable 25 Gram(s) IV Push once  dextrose 50% Injectable 25 Gram(s) IV Push once  heparin   Injectable 5000 Unit(s) SubCutaneous every 12 hours  insulin lispro (HumaLOG) corrective regimen sliding scale   SubCutaneous three times a day before meals  insulin lispro (HumaLOG) corrective regimen sliding scale   SubCutaneous at bedtime  tiotropium 18 MICROgram(s) Capsule 1 Capsule(s) Inhalation daily    MEDICATIONS  (PRN):  acetaminophen   Tablet .. 650 milliGRAM(s) Oral every 6 hours PRN Temp greater or equal to 38.5C (101.3F), Mild Pain (1 - 3)  dextrose 40% Gel 15 Gram(s) Oral once PRN Blood Glucose LESS THAN 70 milliGRAM(s)/deciliter  glucagon  Injectable 1 milliGRAM(s) IntraMuscular once PRN Glucose LESS THAN 70 milligrams/deciliter      LABS:                        11.6   6.17  )-----------( 219      ( 23 Jul 2020 18:35 )             36.8     Hgb Trend: 11.6<--  07-23    138  |  103  |  18  ----------------------------<  132<H>  4.2   |  27  |  0.85    Ca    9.7      23 Jul 2020 18:35    TPro  7.4  /  Alb  4.3  /  TBili  0.2  /  DBili  x   /  AST  22  /  ALT  20  /  AlkPhos  78  07-23    Creatinine Trend: 0.85<--          MICROBIOLOGY:     RADIOLOGY:  [ ] Reviewed and interpreted by me

## 2020-07-25 NOTE — PROGRESS NOTE ADULT - SUBJECTIVE AND OBJECTIVE BOX
Patient is a 74y old  Female who presents with a chief complaint of SOB, wheezing (25 Jul 2020 10:29)      SUBJECTIVE / OVERNIGHT EVENTS: No new complaints. Feels better. c/o L foot rash.  Review of Systems  chest pain no  palpitations no  sob no  nausea no  headache no    MEDICATIONS  (STANDING):  albuterol/ipratropium for Nebulization 3 milliLiter(s) Nebulizer every 6 hours  budesonide 160 MICROgram(s)/formoterol 4.5 MICROgram(s) Inhaler 2 Puff(s) Inhalation two times a day  chlorhexidine 2% Cloths 1 Application(s) Topical daily  dextrose 5%. 1000 milliLiter(s) (50 mL/Hr) IV Continuous <Continuous>  dextrose 50% Injectable 12.5 Gram(s) IV Push once  dextrose 50% Injectable 25 Gram(s) IV Push once  dextrose 50% Injectable 25 Gram(s) IV Push once  heparin   Injectable 5000 Unit(s) SubCutaneous every 12 hours  insulin lispro (HumaLOG) corrective regimen sliding scale   SubCutaneous three times a day before meals  insulin lispro (HumaLOG) corrective regimen sliding scale   SubCutaneous at bedtime  metoprolol tartrate 12.5 milliGRAM(s) Oral every 12 hours  predniSONE   Tablet 40 milliGRAM(s) Oral daily  tiotropium 18 MICROgram(s) Capsule 1 Capsule(s) Inhalation daily    MEDICATIONS  (PRN):  acetaminophen   Tablet .. 650 milliGRAM(s) Oral every 6 hours PRN Temp greater or equal to 38.5C (101.3F), Mild Pain (1 - 3)  dextrose 40% Gel 15 Gram(s) Oral once PRN Blood Glucose LESS THAN 70 milliGRAM(s)/deciliter  glucagon  Injectable 1 milliGRAM(s) IntraMuscular once PRN Glucose LESS THAN 70 milligrams/deciliter      Vital Signs Last 24 Hrs  T(C): 36.8 (25 Jul 2020 16:26), Max: 36.8 (25 Jul 2020 16:26)  T(F): 98.2 (25 Jul 2020 16:26), Max: 98.2 (25 Jul 2020 16:26)  HR: 63 (25 Jul 2020 16:26) (53 - 78)  BP: 122/63 (25 Jul 2020 16:26) (106/57 - 122/74)  BP(mean): --  RR: 18 (25 Jul 2020 16:26) (18 - 18)  SpO2: 95% (25 Jul 2020 16:26) (95% - 98%)    PHYSICAL EXAM:  GENERAL: NAD, well-developed  HEAD:  Atraumatic, Normocephalic  EYES: EOMI, PERRLA, conjunctiva and sclera clear  NECK: Supple, No JVD  CHEST/LUNG: Clear to auscultation bilaterally; No wheeze  HEART: Regular rate and rhythm; No murmurs, rubs, or gallops  ABDOMEN: Soft, Nontender, Nondistended; Bowel sounds present  EXTREMITIES:  2+ Peripheral Pulses, No clubbing, cyanosis, or edema  PSYCH: AAOx3  NEUROLOGY: non-focal  SKIN: No rashes or lesions    LABS:                      RADIOLOGY & ADDITIONAL TESTS:    Imaging Personally Reviewed:  < from: CT Chest No Cont (07.24.20 @ 17:41) >  IMPRESSION:     Minimal linear atelectasis within the right middle lobe and lingula. The lungs are otherwise clear    < end of copied text >    Consultant(s) Notes Reviewed:      Care Discussed with Consultants/Other Providers:

## 2020-07-26 LAB
ANION GAP SERPL CALC-SCNC: 13 MMOL/L — SIGNIFICANT CHANGE UP (ref 5–17)
BUN SERPL-MCNC: 27 MG/DL — HIGH (ref 7–23)
CALCIUM SERPL-MCNC: 9.8 MG/DL — SIGNIFICANT CHANGE UP (ref 8.4–10.5)
CHLORIDE SERPL-SCNC: 100 MMOL/L — SIGNIFICANT CHANGE UP (ref 96–108)
CO2 SERPL-SCNC: 24 MMOL/L — SIGNIFICANT CHANGE UP (ref 22–31)
CREAT SERPL-MCNC: 0.7 MG/DL — SIGNIFICANT CHANGE UP (ref 0.5–1.3)
GLUCOSE BLDC GLUCOMTR-MCNC: 173 MG/DL — HIGH (ref 70–99)
GLUCOSE BLDC GLUCOMTR-MCNC: 178 MG/DL — HIGH (ref 70–99)
GLUCOSE BLDC GLUCOMTR-MCNC: 227 MG/DL — HIGH (ref 70–99)
GLUCOSE BLDC GLUCOMTR-MCNC: 241 MG/DL — HIGH (ref 70–99)
GLUCOSE SERPL-MCNC: 180 MG/DL — HIGH (ref 70–99)
HCT VFR BLD CALC: 36.3 % — SIGNIFICANT CHANGE UP (ref 34.5–45)
HGB BLD-MCNC: 11.8 G/DL — SIGNIFICANT CHANGE UP (ref 11.5–15.5)
MCHC RBC-ENTMCNC: 29.9 PG — SIGNIFICANT CHANGE UP (ref 27–34)
MCHC RBC-ENTMCNC: 32.5 GM/DL — SIGNIFICANT CHANGE UP (ref 32–36)
MCV RBC AUTO: 91.9 FL — SIGNIFICANT CHANGE UP (ref 80–100)
NRBC # BLD: 0 /100 WBCS — SIGNIFICANT CHANGE UP (ref 0–0)
PLATELET # BLD AUTO: 246 K/UL — SIGNIFICANT CHANGE UP (ref 150–400)
POTASSIUM SERPL-MCNC: 4.4 MMOL/L — SIGNIFICANT CHANGE UP (ref 3.5–5.3)
POTASSIUM SERPL-SCNC: 4.4 MMOL/L — SIGNIFICANT CHANGE UP (ref 3.5–5.3)
RBC # BLD: 3.95 M/UL — SIGNIFICANT CHANGE UP (ref 3.8–5.2)
RBC # FLD: 13.1 % — SIGNIFICANT CHANGE UP (ref 10.3–14.5)
SODIUM SERPL-SCNC: 137 MMOL/L — SIGNIFICANT CHANGE UP (ref 135–145)
WBC # BLD: 8.71 K/UL — SIGNIFICANT CHANGE UP (ref 3.8–10.5)
WBC # FLD AUTO: 8.71 K/UL — SIGNIFICANT CHANGE UP (ref 3.8–10.5)

## 2020-07-26 RX ADMIN — Medication 3 MILLILITER(S): at 00:04

## 2020-07-26 RX ADMIN — BUDESONIDE AND FORMOTEROL FUMARATE DIHYDRATE 2 PUFF(S): 160; 4.5 AEROSOL RESPIRATORY (INHALATION) at 17:27

## 2020-07-26 RX ADMIN — Medication 3 MILLILITER(S): at 11:54

## 2020-07-26 RX ADMIN — Medication 3 MILLILITER(S): at 23:42

## 2020-07-26 RX ADMIN — HEPARIN SODIUM 5000 UNIT(S): 5000 INJECTION INTRAVENOUS; SUBCUTANEOUS at 05:55

## 2020-07-26 RX ADMIN — HEPARIN SODIUM 5000 UNIT(S): 5000 INJECTION INTRAVENOUS; SUBCUTANEOUS at 17:26

## 2020-07-26 RX ADMIN — Medication 12.5 MILLIGRAM(S): at 05:55

## 2020-07-26 RX ADMIN — CHLORHEXIDINE GLUCONATE 1 APPLICATION(S): 213 SOLUTION TOPICAL at 11:54

## 2020-07-26 RX ADMIN — Medication 2: at 17:42

## 2020-07-26 RX ADMIN — Medication 1 APPLICATION(S): at 17:42

## 2020-07-26 RX ADMIN — Medication 1 APPLICATION(S): at 05:59

## 2020-07-26 RX ADMIN — Medication 4: at 12:36

## 2020-07-26 RX ADMIN — BUDESONIDE AND FORMOTEROL FUMARATE DIHYDRATE 2 PUFF(S): 160; 4.5 AEROSOL RESPIRATORY (INHALATION) at 05:56

## 2020-07-26 RX ADMIN — Medication 2: at 08:47

## 2020-07-26 RX ADMIN — Medication 3 MILLILITER(S): at 05:55

## 2020-07-26 RX ADMIN — Medication 12.5 MILLIGRAM(S): at 17:26

## 2020-07-26 RX ADMIN — TIOTROPIUM BROMIDE 1 CAPSULE(S): 18 CAPSULE ORAL; RESPIRATORY (INHALATION) at 11:53

## 2020-07-26 RX ADMIN — Medication 40 MILLIGRAM(S): at 05:55

## 2020-07-26 RX ADMIN — Medication 3 MILLILITER(S): at 17:26

## 2020-07-26 NOTE — PHYSICAL THERAPY INITIAL EVALUATION ADULT - PERTINENT HX OF CURRENT PROBLEM, REHAB EVAL
74F chronic bronchial asthma c/o worsening sob x 2 weeks and chest tightness during dry coughs .Used 2 duonebs today no improvement. Saw pulmonologist outpatient (Dr. Gonzales) recently who did cxr (neg) and scheduled her for CT chest. Patient states she feels breathing problems due to humidity and felt better in past when treated with steroids.

## 2020-07-26 NOTE — PROGRESS NOTE ADULT - ASSESSMENT
73 yo h/o savanna, asthma, allergy, gerd, migraines ovarian CA, DM, prior PNA presenting with SOB/LOUIS likely asthma exacerbation, and intermittent chest pain.     1. SOB/LOUIS   CV stable no ACS  ECHO reveals small hyperdynamic LV  no further diuretic  cont BB   pulm eval noted - asthma management per pulm  stress test    2. Chest pain  cv stable, HST neg x 1, EKG with no acute ischemia  pt. w/ CAD risk factors   plan as above     3. Asthma exacerbation  pulm f/u     dvt ppx

## 2020-07-26 NOTE — PROGRESS NOTE ADULT - SUBJECTIVE AND OBJECTIVE BOX
Patient is a 74y old  Female who presents with a chief complaint of SOB, wheezing (25 Jul 2020 10:29)      SUBJECTIVE / OVERNIGHT EVENTS: Comfortable without new complaints.   Review of Systems  chest pain no  palpitations no  sob no  nausea no  headache no    MEDICATIONS  (STANDING):  albuterol/ipratropium for Nebulization 3 milliLiter(s) Nebulizer every 6 hours  budesonide 160 MICROgram(s)/formoterol 4.5 MICROgram(s) Inhaler 2 Puff(s) Inhalation two times a day  chlorhexidine 2% Cloths 1 Application(s) Topical daily  clotrimazole 1% Cream 1 Application(s) Topical two times a day  dextrose 5%. 1000 milliLiter(s) (50 mL/Hr) IV Continuous <Continuous>  dextrose 50% Injectable 12.5 Gram(s) IV Push once  dextrose 50% Injectable 25 Gram(s) IV Push once  dextrose 50% Injectable 25 Gram(s) IV Push once  heparin   Injectable 5000 Unit(s) SubCutaneous every 12 hours  insulin lispro (HumaLOG) corrective regimen sliding scale   SubCutaneous three times a day before meals  insulin lispro (HumaLOG) corrective regimen sliding scale   SubCutaneous at bedtime  metoprolol tartrate 12.5 milliGRAM(s) Oral every 12 hours  predniSONE   Tablet 40 milliGRAM(s) Oral daily  tiotropium 18 MICROgram(s) Capsule 1 Capsule(s) Inhalation daily    MEDICATIONS  (PRN):  acetaminophen   Tablet .. 650 milliGRAM(s) Oral every 6 hours PRN Temp greater or equal to 38.5C (101.3F), Mild Pain (1 - 3)  dextrose 40% Gel 15 Gram(s) Oral once PRN Blood Glucose LESS THAN 70 milliGRAM(s)/deciliter  glucagon  Injectable 1 milliGRAM(s) IntraMuscular once PRN Glucose LESS THAN 70 milligrams/deciliter      Vital Signs Last 24 Hrs  T(C): 36.9 (26 Jul 2020 08:39), Max: 36.9 (26 Jul 2020 00:23)  T(F): 98.4 (26 Jul 2020 08:39), Max: 98.4 (26 Jul 2020 00:23)  HR: 74 (26 Jul 2020 12:21) (63 - 74)  BP: 145/85 (26 Jul 2020 12:21) (122/57 - 175/79)  BP(mean): --  RR: 18 (26 Jul 2020 12:21) (18 - 18)  SpO2: 96% (26 Jul 2020 12:21) (95% - 96%)    PHYSICAL EXAM:  GENERAL: NAD, well-developed  HEAD:  Atraumatic, Normocephalic  EYES: EOMI, PERRLA, conjunctiva and sclera clear  NECK: Supple, No JVD  CHEST/LUNG: Clear to auscultation bilaterally; No wheeze  HEART: Regular rate and rhythm; No murmurs, rubs, or gallops  ABDOMEN: Soft, Nontender, Nondistended; Bowel sounds present  EXTREMITIES:  2+ Peripheral Pulses, No clubbing, cyanosis, or edema  PSYCH: AAOx3   NEUROLOGY: non-focal  SKIN: No rashes or lesions    LABS:                        11.8   8.71  )-----------( 246      ( 26 Jul 2020 07:12 )             36.3     07-26    137  |  100  |  27<H>  ----------------------------<  180<H>  4.4   |  24  |  0.70    Ca    9.8      26 Jul 2020 07:10                  RADIOLOGY & ADDITIONAL TESTS:    Imaging Personally Reviewed:    Consultant(s) Notes Reviewed:      Care Discussed with Consultants/Other Providers:

## 2020-07-26 NOTE — PROGRESS NOTE ADULT - SUBJECTIVE AND OBJECTIVE BOX
CC: no overnight events, breathing improved on nebs/steroids    TELEMETRY:     PHYSICAL EXAM:    T(C): 36.9 (07-26-20 @ 08:39), Max: 36.9 (07-26-20 @ 00:23)  HR: 64 (07-26-20 @ 08:39) (63 - 66)  BP: 175/79 (07-26-20 @ 08:39) (122/57 - 175/79)  RR: 18 (07-26-20 @ 08:39) (18 - 18)  SpO2: 96% (07-26-20 @ 08:39) (95% - 96%)  Wt(kg): --  I&O's Summary    25 Jul 2020 07:01  -  26 Jul 2020 07:00  --------------------------------------------------------  IN: 120 mL / OUT: 0 mL / NET: 120 mL        Appearance: Normal	  Cardiovascular: Normal S1 S2,RRR, No JVD, No murmurs  Respiratory: Lungs clear to auscultation	  Gastrointestinal:  Soft, Non-tender, + BS	  Extremities: Normal range of motion, No clubbing, cyanosis or edema  Vascular: Peripheral pulses palpable 2+ bilaterally     LABS:	 	                          11.8   8.71  )-----------( 246      ( 26 Jul 2020 07:12 )             36.3     07-26    137  |  100  |  27<H>  ----------------------------<  180<H>  4.4   |  24  |  0.70    Ca    9.8      26 Jul 2020 07:10            CARDIAC MARKERS:      MEDICATIONS  (STANDING):  albuterol/ipratropium for Nebulization 3 milliLiter(s) Nebulizer every 6 hours  budesonide 160 MICROgram(s)/formoterol 4.5 MICROgram(s) Inhaler 2 Puff(s) Inhalation two times a day  chlorhexidine 2% Cloths 1 Application(s) Topical daily  clotrimazole 1% Cream 1 Application(s) Topical two times a day  dextrose 5%. 1000 milliLiter(s) (50 mL/Hr) IV Continuous <Continuous>  dextrose 50% Injectable 12.5 Gram(s) IV Push once  dextrose 50% Injectable 25 Gram(s) IV Push once  dextrose 50% Injectable 25 Gram(s) IV Push once  heparin   Injectable 5000 Unit(s) SubCutaneous every 12 hours  insulin lispro (HumaLOG) corrective regimen sliding scale   SubCutaneous three times a day before meals  insulin lispro (HumaLOG) corrective regimen sliding scale   SubCutaneous at bedtime  metoprolol tartrate 12.5 milliGRAM(s) Oral every 12 hours  predniSONE   Tablet 40 milliGRAM(s) Oral daily  tiotropium 18 MICROgram(s) Capsule 1 Capsule(s) Inhalation daily

## 2020-07-26 NOTE — PROGRESS NOTE ADULT - ASSESSMENT
74 f with    COPD exacerbation  - nebs  - steroids  - Pulmonary follow    LOUIS/ r/o Cardiac etiology  - Echo  - Stress test not done 2 to wheezing today.  - Cardiology follow    Diabetes Mellitus  - BS control  - ADA diet     Depression/ Anxiety  - stable    Glaucoma  - continue gtt    Rash on foot  - Lotrimin cr.    Cy Horne MD pager [phone]

## 2020-07-26 NOTE — PHARMACOTHERAPY INTERVENTION NOTE - COMMENTS
Spoke with patient at length about discharge medications. Patient was taking losartan and experienced persistent cough. Patient will not continue losartan upon discharge and was informed that this is a common side effect. Patient was unclear about how to take famotidine and was taking it after meals; clarified misconception and advised patient to take famotidine 30 minutes prior to meals. Counseled patient on new medications (metoprolol, Symbicort, Spiriva). Counseled patient on inhaler technique for Symbicort and Spiriva. Clarified the difference between rescue and maintenance inhalers. Spoke with patient about medications that could affect blood sugar. Spoke with patient at length about discharge medications. Patient was taking losartan and experienced persistent cough. Patient will not continue losartan upon discharge and was informed that this is a known side effect with ARBs. Patient was unclear about how to take famotidine and was taking it after meals; clarified misconception and advised patient to take famotidine 30 minutes prior to meals. Counseled patient on new medications (metoprolol, Symbicort, Spiriva). Counseled patient on inhaler technique for Symbicort and Spiriva. Clarified the difference between rescue and maintenance inhalers. Spoke with patient about medications that could affect blood sugar.    Da Paris, PharmD  PGY-1 Pharmacy Resident

## 2020-07-26 NOTE — PHYSICAL THERAPY INITIAL EVALUATION ADULT - ADDITIONAL COMMENTS
Patient lives in pvt house with , 2 steps to enter. Patient ambulated with straight cane independent. Pt. has footdrop R since 4 yrs, receiving OPT. Uses R  AFO, when walking outside. Does not use brace in house. Patient lives in pvt house with , 2 steps to enter. Able to function on one level. Patient ambulated with straight cane independent. Pt. has footdrop R since 4 yrs, receiving OPT. Uses R  AFO, when walking outside. Does not use brace in house. pt. owns shower chair and standard cane at home.

## 2020-07-27 ENCOUNTER — TRANSCRIPTION ENCOUNTER (OUTPATIENT)
Age: 75
End: 2020-07-27

## 2020-07-27 VITALS
DIASTOLIC BLOOD PRESSURE: 71 MMHG | OXYGEN SATURATION: 96 % | SYSTOLIC BLOOD PRESSURE: 143 MMHG | TEMPERATURE: 98 F | RESPIRATION RATE: 18 BRPM | HEART RATE: 68 BPM

## 2020-07-27 LAB
A ALTERNATA IGE QN: <0.1 KUA/L
A FUMIGATUS IGE QN: <0.1 KUA/L
C ALBICANS IGE QN: <0.1 KUA/L
C HERBARUM IGE QN: <0.1 KUA/L
CAT DANDER IGE QN: NORMAL
CLAM IGE QN: NORMAL
CODFISH IGE QN: <0.1 KUA/L
COMMON RAGWEED IGE QN: NORMAL
CORN IGE QN: <0.1 KUA/L
COW MILK IGE QN: 0.13 KUA/L
D FARINAE IGE QN: <0.1 KUA/L
D PTERONYSS IGE QN: <0.1 KUA/L
DEPRECATED A ALTERNATA IGE RAST QL: 0
DEPRECATED A FUMIGATUS IGE RAST QL: 0
DEPRECATED C ALBICANS IGE RAST QL: 0
DEPRECATED C HERBARUM IGE RAST QL: 0
DEPRECATED CAT DANDER IGE RAST QL: NORMAL
DEPRECATED CLAM IGE RAST QL: NORMAL
DEPRECATED CODFISH IGE RAST QL: 0
DEPRECATED COMMON RAGWEED IGE RAST QL: NORMAL
DEPRECATED CORN IGE RAST QL: 0
DEPRECATED COW MILK IGE RAST QL: NORMAL
DEPRECATED D FARINAE IGE RAST QL: 0
DEPRECATED D PTERONYSS IGE RAST QL: 0
DEPRECATED DOG DANDER IGE RAST QL: 0
DEPRECATED EGG WHITE IGE RAST QL: 0
DEPRECATED M RACEMOSUS IGE RAST QL: 0
DEPRECATED PEANUT IGE RAST QL: NORMAL
DEPRECATED ROACH IGE RAST QL: NORMAL
DEPRECATED SCALLOP IGE RAST QL: <0.1 KUA/L
DEPRECATED SESAME SEED IGE RAST QL: 0
DEPRECATED SHRIMP IGE RAST QL: 0
DEPRECATED SOYBEAN IGE RAST QL: 0
DEPRECATED TIMOTHY IGE RAST QL: 0
DEPRECATED WALNUT IGE RAST QL: 0
DEPRECATED WHEAT IGE RAST QL: 0
DEPRECATED WHITE OAK IGE RAST QL: 3
DOG DANDER IGE QN: <0.1 KUA/L
EGG WHITE IGE QN: <0.1 KUA/L
GLUCOSE BLDC GLUCOMTR-MCNC: 149 MG/DL — HIGH (ref 70–99)
GLUCOSE BLDC GLUCOMTR-MCNC: 167 MG/DL — HIGH (ref 70–99)
GLUCOSE BLDC GLUCOMTR-MCNC: 193 MG/DL — HIGH (ref 70–99)
GLUCOSE BLDC GLUCOMTR-MCNC: 322 MG/DL — HIGH (ref 70–99)
M RACEMOSUS IGE QN: <0.1 KUA/L
PEANUT IGE QN: 0.12 KUA/L
ROACH IGE QN: NORMAL
SCALLOP IGE QN: 0
SCALLOP IGE QN: <0.1 KUA/L
SESAME SEED IGE QN: <0.1 KUA/L
SOYBEAN IGE QN: <0.1 KUA/L
TIMOTHY IGE QN: <0.1 KUA/L
TOTAL IGE SMQN RAST: 74 KU/L
WALNUT IGE QN: <0.1 KUA/L
WHEAT IGE QN: <0.1 KUA/L
WHITE OAK IGE QN: 8.36 KUA/L

## 2020-07-27 PROCEDURE — 99285 EMERGENCY DEPT VISIT HI MDM: CPT

## 2020-07-27 PROCEDURE — 83036 HEMOGLOBIN GLYCOSYLATED A1C: CPT

## 2020-07-27 PROCEDURE — 84484 ASSAY OF TROPONIN QUANT: CPT

## 2020-07-27 PROCEDURE — 85027 COMPLETE CBC AUTOMATED: CPT

## 2020-07-27 PROCEDURE — 94640 AIRWAY INHALATION TREATMENT: CPT

## 2020-07-27 PROCEDURE — 80053 COMPREHEN METABOLIC PANEL: CPT

## 2020-07-27 PROCEDURE — 97161 PT EVAL LOW COMPLEX 20 MIN: CPT

## 2020-07-27 PROCEDURE — 71250 CT THORAX DX C-: CPT

## 2020-07-27 PROCEDURE — 80048 BASIC METABOLIC PNL TOTAL CA: CPT

## 2020-07-27 PROCEDURE — 83880 ASSAY OF NATRIURETIC PEPTIDE: CPT

## 2020-07-27 PROCEDURE — 71046 X-RAY EXAM CHEST 2 VIEWS: CPT

## 2020-07-27 PROCEDURE — 86803 HEPATITIS C AB TEST: CPT

## 2020-07-27 PROCEDURE — G0378: CPT

## 2020-07-27 PROCEDURE — 82962 GLUCOSE BLOOD TEST: CPT

## 2020-07-27 PROCEDURE — 99232 SBSQ HOSP IP/OBS MODERATE 35: CPT

## 2020-07-27 RX ORDER — IPRATROPIUM/ALBUTEROL SULFATE 18-103MCG
3 AEROSOL WITH ADAPTER (GRAM) INHALATION
Qty: 0 | Refills: 0 | DISCHARGE

## 2020-07-27 RX ORDER — FUROSEMIDE 40 MG
1 TABLET ORAL
Qty: 0 | Refills: 0 | DISCHARGE

## 2020-07-27 RX ORDER — IPRATROPIUM/ALBUTEROL SULFATE 18-103MCG
3 AEROSOL WITH ADAPTER (GRAM) INHALATION
Qty: 0 | Refills: 0 | DISCHARGE
Start: 2020-07-27

## 2020-07-27 RX ORDER — METOPROLOL TARTRATE 50 MG
0.5 TABLET ORAL
Qty: 30 | Refills: 0
Start: 2020-07-27 | End: 2020-08-25

## 2020-07-27 RX ORDER — MONTELUKAST 4 MG/1
1 TABLET, CHEWABLE ORAL
Qty: 0 | Refills: 0 | DISCHARGE

## 2020-07-27 RX ORDER — IPRATROPIUM/ALBUTEROL SULFATE 18-103MCG
1 AEROSOL WITH ADAPTER (GRAM) INHALATION
Qty: 1 | Refills: 0
Start: 2020-07-27

## 2020-07-27 RX ORDER — LOSARTAN POTASSIUM 100 MG/1
1 TABLET, FILM COATED ORAL
Qty: 0 | Refills: 0 | DISCHARGE

## 2020-07-27 RX ORDER — FLUTICASONE PROPIONATE AND SALMETEROL 50; 250 UG/1; UG/1
2 POWDER ORAL; RESPIRATORY (INHALATION)
Qty: 0 | Refills: 0 | DISCHARGE

## 2020-07-27 RX ORDER — BUDESONIDE, MICRONIZED 100 %
2 POWDER (GRAM) MISCELLANEOUS
Qty: 0 | Refills: 0 | DISCHARGE

## 2020-07-27 RX ORDER — TIOTROPIUM BROMIDE 18 UG/1
1 CAPSULE ORAL; RESPIRATORY (INHALATION)
Qty: 1 | Refills: 0
Start: 2020-07-27

## 2020-07-27 RX ORDER — TIOTROPIUM BROMIDE 18 UG/1
1 CAPSULE ORAL; RESPIRATORY (INHALATION)
Qty: 0 | Refills: 0 | DISCHARGE
Start: 2020-07-27

## 2020-07-27 RX ORDER — IPRATROPIUM/ALBUTEROL SULFATE 18-103MCG
1 AEROSOL WITH ADAPTER (GRAM) INHALATION
Qty: 0 | Refills: 0 | DISCHARGE

## 2020-07-27 RX ORDER — BUDESONIDE AND FORMOTEROL FUMARATE DIHYDRATE 160; 4.5 UG/1; UG/1
2 AEROSOL RESPIRATORY (INHALATION)
Qty: 1 | Refills: 0
Start: 2020-07-27

## 2020-07-27 RX ORDER — BUDESONIDE AND FORMOTEROL FUMARATE DIHYDRATE 160; 4.5 UG/1; UG/1
2 AEROSOL RESPIRATORY (INHALATION)
Qty: 0 | Refills: 0 | DISCHARGE
Start: 2020-07-27

## 2020-07-27 RX ADMIN — HEPARIN SODIUM 5000 UNIT(S): 5000 INJECTION INTRAVENOUS; SUBCUTANEOUS at 17:30

## 2020-07-27 RX ADMIN — CHLORHEXIDINE GLUCONATE 1 APPLICATION(S): 213 SOLUTION TOPICAL at 11:08

## 2020-07-27 RX ADMIN — BUDESONIDE AND FORMOTEROL FUMARATE DIHYDRATE 2 PUFF(S): 160; 4.5 AEROSOL RESPIRATORY (INHALATION) at 05:27

## 2020-07-27 RX ADMIN — HEPARIN SODIUM 5000 UNIT(S): 5000 INJECTION INTRAVENOUS; SUBCUTANEOUS at 05:23

## 2020-07-27 RX ADMIN — Medication 40 MILLIGRAM(S): at 05:23

## 2020-07-27 RX ADMIN — Medication 12.5 MILLIGRAM(S): at 17:29

## 2020-07-27 RX ADMIN — Medication 3 MILLILITER(S): at 17:29

## 2020-07-27 RX ADMIN — TIOTROPIUM BROMIDE 1 CAPSULE(S): 18 CAPSULE ORAL; RESPIRATORY (INHALATION) at 11:05

## 2020-07-27 RX ADMIN — Medication 3 MILLILITER(S): at 05:22

## 2020-07-27 RX ADMIN — BUDESONIDE AND FORMOTEROL FUMARATE DIHYDRATE 2 PUFF(S): 160; 4.5 AEROSOL RESPIRATORY (INHALATION) at 17:29

## 2020-07-27 RX ADMIN — Medication 1 APPLICATION(S): at 05:24

## 2020-07-27 RX ADMIN — Medication 2: at 08:35

## 2020-07-27 RX ADMIN — Medication 8: at 12:20

## 2020-07-27 RX ADMIN — Medication 1 APPLICATION(S): at 17:28

## 2020-07-27 RX ADMIN — Medication 12.5 MILLIGRAM(S): at 05:23

## 2020-07-27 RX ADMIN — Medication 3 MILLILITER(S): at 11:07

## 2020-07-27 NOTE — PROGRESS NOTE ADULT - ASSESSMENT
73 yo h/o savanna, asthma, allergy, gerd, migraines ovarian CA, DM, prior PNA presenting with SOB/LOUIS likely asthma exacerbation, and intermittent chest pain.     1. SOB/LOUIS   CV stable no ACS  ECHO reveals small hyperdynamic LV   no further diuretics  cont BB   pulm eval noted - asthma management per pulm  f/u stress test    2. Chest pain  cv stable, HST neg x 1, EKG with no acute ischemia  pt. w/ CAD risk factors   plan as above     3. Asthma exacerbation  pulm f/u     dvt ppx 73 yo h/o savanna, asthma, allergy, gerd, migraines ovarian CA, DM, prior PNA presenting with SOB/LOUIS likely asthma exacerbation, and intermittent chest pain.     1. SOB/LOUIS   CV stable no ACS  ECHO reveals small hyperdynamic LV   no further diuretics  cont BB   pulm eval noted - asthma management per pulm  no wheeze on exam, pt. feels well, will re-attempt nuclear stress tomorrow  - please document pulm clearance.     2. Chest pain  cv stable, HST neg x 1, EKG with no acute ischemia  pt. w/ CAD risk factors   plan as above     3. Asthma exacerbation  pulm f/u     dvt ppx 73 yo h/o savanna, asthma, allergy, gerd, migraines ovarian CA, DM, prior PNA presenting with SOB/LOUIS likely asthma exacerbation, and intermittent chest pain.     1. SOB/LOUIS   CV stable no ACS  ECHO reveals small hyperdynamic LV   no further diuretics  cont BB   pulm eval noted - asthma management per pulm  no wheeze on exam, pt. feels well, will re-attempt nuclear stress tomorrow  - please document pulm clearance.     2. Chest pain  cv stable, HST neg x 1, EKG with no acute ischemia  pt. w/ CAD risk factors   plan as above     3. Asthma exacerbation  pulm f/u     dvt ppx     Addendum : pt. cleared for pulm for EST, pt. refusing EST because of Right leg weakness. pt. clinically stable without chest pain, SOB/LOUIS/wheeze improved  will plan on outpt. ischemic w/u   Outpt. f/u w. Dr Dave Abel next Tuesday 8/4

## 2020-07-27 NOTE — DISCHARGE NOTE PROVIDER - HOSPITAL COURSE
75 yo h/o osas, asthma, allergy, gerd, migrains, ovarian CA, DM, prior PNA--recently multiple bouts of asthma- now here w/ exacerbation of asthma-likely eosinophillic in nature.    ************    7/27-better but ? did not receive her pred for 2 days???; for EST-no adenosine         Problem/Plan - 1:    ·  Problem: SOB (shortness of breath).  Plan: multifactorial-severe persistent asthma-flair, eosinophillic asthma, obesity, ?CAD--RA sat above 90%.          Problem/Plan - 2:    ·  Problem: Asthmatic bronchitis with acute exacerbation.  Plan: prednisone 40mg per day-do not taper for 3 more days    symbicort 160 2 bid, spiriva 1 q day, singulair 10 qhs, duoneb via hhn q6    acapella, incentive spirometry.          Problem/Plan - 3:    ·  Problem: Eosinophilic asthma.  Plan: out pt biologic RX-fasenra/nucala.          Problem/Plan - 4:    ·  Problem: Allergic rhinitis.  Plan: flonase/claritin.          Problem/Plan - 5:    ·  Problem: MARA (obstructive sleep apnea).  Plan: cpap set up 10 cm.          Problem/Plan - 6:    Problem: GERD (gastroesophageal reflux disease). Plan: PPI am and pepcid 40 q hs.         Problem/Plan - 7:    ·  Problem: Diabetes.  Plan: BS control.          Problem/Plan - 8:    ·  Problem: Prophylactic measure.  Plan: DVT prophylaxis, Cards f/up Marvin--for EST-no adenosine in this pt.         Attending Attestation:     as above-better-can be DC from pulm perspective-awaiting Cards evaln    multifactorial dyspnea--severe persistent asthma-flair, eosinophillic asthma, obesity, ?CAD--RA sat above 90%    asthmatic bronchitis-no need for ABX; prednisone 40mg per day-do not taper for 3 days, symbicort 160 2 bid, spiriva 1 q day, singulair 10 qhs,      duoneb via hhn q6      acapella, incentive spirometry           *****Pred taper by q 3 days reduction 10mg             ? TBM-doubtful--out pt dynamic CT to be considered    eosinophillic asthma-will consider out pt nucala/fasenra/dupixent                    DM-bs control    osas-cpap o/n                   Cards-Fuschetto et al-for EST (no adenosine)      GI-protonix am pepcid 40 qhs    DVT prophylaxis                  DC planning-can be home from pulm perspective- w/follow up in 2 wks    Aldo Gonzales MD-Pulmonary     [phone] . 75 yo h/o osas, asthma, allergy, gerd, migrains, ovarian CA, DM, prior PNA--recently multiple bouts of asthma- now here w/ exacerbation of asthma-likely eosinophillic in nature.     Improved with steroid. Patient had an episode of chest pain. CE X 1 negative. EKG WNL. Seen by cardiology. recommended exercise     stress test. Patient declined, saying unable to do exercise stress test due L leg weakness.  Pulmonary and cardiology cleared for discharge    Patient to follow up with cardiology on 8/5 . Disch/dispo. med rec DW Dr Horne.

## 2020-07-27 NOTE — DISCHARGE NOTE PROVIDER - NSDCFUSCHEDAPPT_GEN_ALL_CORE_FT
MATILDE CHENG ; 09/22/2020 ; [facility] [place] [address] [facility]  MATILDE CHENG ; 09/22/2020 ; [facility] [place] [address] [facility] MATILDE CHENG ; 09/22/2020 ; \[facility] [place] [facility]\"" [place] [address] [facility]  MATILDE CHENG ; 09/22/2020 ; \[facility] [place] [facility]\"" [place] [address] [facility] MATILDE CHENG ; 09/22/2020 ; [facility] [place] [address] [facility]  MATILDE CHENG ; 09/22/2020 ; [facility] [place] [address] [facility] MATILDE CHENG ; 09/22/2020 ; [facility] [address] [address] [facility]  MATILDE CHENG ; 09/22/2020 ; [facility] [address] [address] [facility] MATILDE CHENG ; 09/22/2020 ; [facility] [address] [address] [facility]  MATILDE CHENG ; 09/22/2020 ; [facility] [address] [address] [facility]

## 2020-07-27 NOTE — PROGRESS NOTE ADULT - ATTENDING COMMENTS
as above-  multifactorial dyspnea--severe persistent asthma-flair, eosinophillic asthma, obesity, ?CAD--RA sat above 90%  asthmatic bronchitis-no need for ABX; prednisone 40mg per day-do not taper for 5 dayssymbicort 160 2 bid, spiriva 1 q day, singulair 10 qhs,                     duoneb via hhn q6      acapella, incentive spirometry                    ? TBM-doubtful--out pt dynamic CT to be considered  eosinophillic asthma-will consider out pt nucala/fasenra/dupixent                    DM-bs control  osas-cpap o/n                   Cards-Page et al-check BNP      GI-protonix am pepcid 40 qhs  DVT prophylaxis                   Would consider monitoring for another 24 hrs  Aldo Gonzales MD-Pulmonary   [phone] as above-better-can be DC from pulm perspective-awaiting Cards evaln  multifactorial dyspnea--severe persistent asthma-flair, eosinophillic asthma, obesity, ?CAD--RA sat above 90%  asthmatic bronchitis-no need for ABX; prednisone 40mg per day-do not taper for 3 days, symbicort 160 2 bid, spiriva 1 q day, singulair 10 qhs,      duoneb via hhn q6      acapella, incentive spirometry           *****Pred taper by q 3 days reduction 10mg           ? TBM-doubtful--out pt dynamic CT to be considered  eosinophillic asthma-will consider out pt nucala/fasenra/dupixent                    DM-bs control  osas-cpap o/n                   Cards-Page et al-for EST (no adenosine)      GI-protonix am pepcid 40 qhs  DVT prophylaxis                  DC planning-can be home from pulm perspective- w/follow up in 2 wks  Aldo Gonzales MD-Pulmonary   [phone]

## 2020-07-27 NOTE — DISCHARGE NOTE PROVIDER - CARE PROVIDER_API CALL
Aldo Gonzales  INTERNAL MEDICINE  [address] [facility]  [place] [address]  Phone: [phone]  Fax: [phone]  Follow Up Time:     Cy Horne  INTERNAL MEDICINE  [address] [place] [address]  Phone: [phone]  Fax: [phone]  Follow Up Time:     Dave Abel  CARDIOVASCULAR DISEASE  [address] [address]  [place] [address]  Phone: [phone]  Fax: [phone]  Follow Up Time:

## 2020-07-27 NOTE — CHART NOTE - NSCHARTNOTEFT_GEN_A_CORE
asked for pulmonary clearance for EST--pt is pulmonary cleared for EST now-but not for an adenosine EST.

## 2020-07-27 NOTE — PROGRESS NOTE ADULT - SUBJECTIVE AND OBJECTIVE BOX
Patient is a 74y old  Female who presents with a chief complaint of sob (27 Jul 2020 04:47)      SUBJECTIVE / OVERNIGHT EVENTS: Comfortable without new complaints.   Review of Systems  chest pain no  palpitations no  sob no  nausea no  headache no    MEDICATIONS  (STANDING):  albuterol/ipratropium for Nebulization 3 milliLiter(s) Nebulizer every 6 hours  budesonide 160 MICROgram(s)/formoterol 4.5 MICROgram(s) Inhaler 2 Puff(s) Inhalation two times a day  chlorhexidine 2% Cloths 1 Application(s) Topical daily  clotrimazole 1% Cream 1 Application(s) Topical two times a day  dextrose 5%. 1000 milliLiter(s) (50 mL/Hr) IV Continuous <Continuous>  dextrose 50% Injectable 12.5 Gram(s) IV Push once  dextrose 50% Injectable 25 Gram(s) IV Push once  dextrose 50% Injectable 25 Gram(s) IV Push once  heparin   Injectable 5000 Unit(s) SubCutaneous every 12 hours  insulin lispro (HumaLOG) corrective regimen sliding scale   SubCutaneous three times a day before meals  insulin lispro (HumaLOG) corrective regimen sliding scale   SubCutaneous at bedtime  metoprolol tartrate 12.5 milliGRAM(s) Oral every 12 hours  predniSONE   Tablet 40 milliGRAM(s) Oral daily  tiotropium 18 MICROgram(s) Capsule 1 Capsule(s) Inhalation daily    MEDICATIONS  (PRN):  acetaminophen   Tablet .. 650 milliGRAM(s) Oral every 6 hours PRN Temp greater or equal to 38.5C (101.3F), Mild Pain (1 - 3)  dextrose 40% Gel 15 Gram(s) Oral once PRN Blood Glucose LESS THAN 70 milliGRAM(s)/deciliter  glucagon  Injectable 1 milliGRAM(s) IntraMuscular once PRN Glucose LESS THAN 70 milligrams/deciliter      Vital Signs Last 24 Hrs  T(C): 36.8 (27 Jul 2020 15:49), Max: 36.8 (27 Jul 2020 07:46)  T(F): 98.2 (27 Jul 2020 15:49), Max: 98.3 (27 Jul 2020 07:46)  HR: 68 (27 Jul 2020 15:49) (55 - 72)  BP: 143/71 (27 Jul 2020 15:49) (108/71 - 150/94)  BP(mean): --  RR: 18 (27 Jul 2020 15:49) (18 - 20)  SpO2: 96% (27 Jul 2020 15:49) (96% - 100%)    PHYSICAL EXAM:  GENERAL: NAD, well-developed  HEAD:  Atraumatic, Normocephalic  EYES: EOMI, PERRLA, conjunctiva and sclera clear  NECK: Supple, No JVD  CHEST/LUNG: Clear to auscultation bilaterally; No wheeze  HEART: Regular rate and rhythm; No murmurs, rubs, or gallops  ABDOMEN: Soft, Nontender, Nondistended; Bowel sounds present  EXTREMITIES:  2+ Peripheral Pulses, No clubbing, cyanosis, or edema  PSYCH: AAOx3  NEUROLOGY: non-focal  SKIN: No rashes or lesions    LABS:                        11.8   8.71  )-----------( 246      ( 26 Jul 2020 07:12 )             36.3     07-26    137  |  100  |  27<H>  ----------------------------<  180<H>  4.4   |  24  |  0.70    Ca    9.8      26 Jul 2020 07:10                  RADIOLOGY & ADDITIONAL TESTS:    Imaging Personally Reviewed:    Consultant(s) Notes Reviewed:      Care Discussed with Consultants/Other Providers:

## 2020-07-27 NOTE — PROGRESS NOTE ADULT - ASSESSMENT
74 f with    COPD exacerbation  - nebs  - steroids  - Pulmonary follow. Cleared for DC.    LOUIS/ r/o Cardiac etiology  - Echo< from: Transthoracic Echocardiogram (07.24.20 @ 12:56) >  Conclusions:  Technically difficult study. Suboptimal parasternal  windows.  Small, hyperdynamic left ventricle.    < end of copied text >    - Stress test not done 2 to wheezing and patient unable to exercise. Not cleared by Pulmonary for Adenosine.   - Cardiology follow. Cleared for DC.    Diabetes Mellitus  - BS control  - ADA diet     Depression/ Anxiety  - stable    Glaucoma  - continue gtt    Rash on foot  - Lotrimin cr.    DC home. Follow with PMD/ Cardiology and Pulmonary in 3-4 days. QA    Cy Horne MD pager [phone]

## 2020-07-27 NOTE — PROGRESS NOTE ADULT - ATTENDING COMMENTS
Patient seen and examined.  Agree with above NP note.  cv stable and improved  no current wheezing on exam  await stress testing   exercise nuclear testing if can tolerate  if not hopeful pharm nuclear dahiana if ok with pulmo  cont current tx

## 2020-07-27 NOTE — PROGRESS NOTE ADULT - SUBJECTIVE AND OBJECTIVE BOX
CARDIOLOGY FOLLOW UP - Dr. Abel    CC no cp/sob  breathing improved       PHYSICAL EXAM:  T(C): 36.8 (07-27-20 @ 07:46), Max: 36.8 (07-26-20 @ 16:43)  HR: 56 (07-27-20 @ 07:46) (55 - 74)  BP: 108/71 (07-27-20 @ 07:46) (108/71 - 195/85)  RR: 18 (07-27-20 @ 07:46) (18 - 20)  SpO2: 97% (07-27-20 @ 07:46) (95% - 100%)  Wt(kg): --  I&O's Summary      Appearance: Normal	  Cardiovascular: Normal S1 S2,RRR, No JVD, No murmurs  Respiratory: Lungs clear to auscultation	  Gastrointestinal:  Soft, Non-tender, + BS	  Extremities: Normal range of motion, No clubbing, cyanosis or edema        MEDICATIONS  (STANDING):  albuterol/ipratropium for Nebulization 3 milliLiter(s) Nebulizer every 6 hours  budesonide 160 MICROgram(s)/formoterol 4.5 MICROgram(s) Inhaler 2 Puff(s) Inhalation two times a day  chlorhexidine 2% Cloths 1 Application(s) Topical daily  clotrimazole 1% Cream 1 Application(s) Topical two times a day  dextrose 5%. 1000 milliLiter(s) (50 mL/Hr) IV Continuous <Continuous>  dextrose 50% Injectable 12.5 Gram(s) IV Push once  dextrose 50% Injectable 25 Gram(s) IV Push once  dextrose 50% Injectable 25 Gram(s) IV Push once  heparin   Injectable 5000 Unit(s) SubCutaneous every 12 hours  insulin lispro (HumaLOG) corrective regimen sliding scale   SubCutaneous three times a day before meals  insulin lispro (HumaLOG) corrective regimen sliding scale   SubCutaneous at bedtime  metoprolol tartrate 12.5 milliGRAM(s) Oral every 12 hours  predniSONE   Tablet 40 milliGRAM(s) Oral daily  tiotropium 18 MICROgram(s) Capsule 1 Capsule(s) Inhalation daily      TELEMETRY: 	    ECG:  	  RADIOLOGY:   DIAGNOSTIC TESTING:  [ ] Echocardiogram:  [ ]  Catheterization:  [ ] Stress Test:    OTHER: 	    LABS:	 	                                11.8   8.71  )-----------( 246      ( 26 Jul 2020 07:12 )             36.3     07-26    137  |  100  |  27<H>  ----------------------------<  180<H>  4.4   |  24  |  0.70    Ca    9.8      26 Jul 2020 07:10 CARDIOLOGY FOLLOW UP - Dr. Abel    CC no cp/sob  breathing improved - no wheeze on exam !!!   plan for stress test today    PHYSICAL EXAM:  T(C): 36.8 (07-27-20 @ 07:46), Max: 36.8 (07-26-20 @ 16:43)  HR: 56 (07-27-20 @ 07:46) (55 - 74)  BP: 108/71 (07-27-20 @ 07:46) (108/71 - 195/85)  RR: 18 (07-27-20 @ 07:46) (18 - 20)  SpO2: 97% (07-27-20 @ 07:46) (95% - 100%)  Wt(kg): --  I&O's Summary      Appearance: Normal	  Cardiovascular: Normal S1 S2,RRR, No JVD, No murmurs  Respiratory: Lungs clear to auscultation	  Gastrointestinal:  Soft, Non-tender, + BS	  Extremities: Normal range of motion, No clubbing, cyanosis or edema        MEDICATIONS  (STANDING):  albuterol/ipratropium for Nebulization 3 milliLiter(s) Nebulizer every 6 hours  budesonide 160 MICROgram(s)/formoterol 4.5 MICROgram(s) Inhaler 2 Puff(s) Inhalation two times a day  chlorhexidine 2% Cloths 1 Application(s) Topical daily  clotrimazole 1% Cream 1 Application(s) Topical two times a day  dextrose 5%. 1000 milliLiter(s) (50 mL/Hr) IV Continuous <Continuous>  dextrose 50% Injectable 12.5 Gram(s) IV Push once  dextrose 50% Injectable 25 Gram(s) IV Push once  dextrose 50% Injectable 25 Gram(s) IV Push once  heparin   Injectable 5000 Unit(s) SubCutaneous every 12 hours  insulin lispro (HumaLOG) corrective regimen sliding scale   SubCutaneous three times a day before meals  insulin lispro (HumaLOG) corrective regimen sliding scale   SubCutaneous at bedtime  metoprolol tartrate 12.5 milliGRAM(s) Oral every 12 hours  predniSONE   Tablet 40 milliGRAM(s) Oral daily  tiotropium 18 MICROgram(s) Capsule 1 Capsule(s) Inhalation daily      TELEMETRY: 	    ECG:  	  RADIOLOGY:   DIAGNOSTIC TESTING:  [ ] Echocardiogram:  [ ]  Catheterization:  [ ] Stress Test:    OTHER: 	    LABS:	 	                                11.8   8.71  )-----------( 246      ( 26 Jul 2020 07:12 )             36.3     07-26    137  |  100  |  27<H>  ----------------------------<  180<H>  4.4   |  24  |  0.70    Ca    9.8      26 Jul 2020 07:10 CARDIOLOGY FOLLOW UP - Dr. Abel    CC no cp/sob  breathing improved - no wheeze on exam !!!     PHYSICAL EXAM:  T(C): 36.8 (07-27-20 @ 07:46), Max: 36.8 (07-26-20 @ 16:43)  HR: 56 (07-27-20 @ 07:46) (55 - 74)  BP: 108/71 (07-27-20 @ 07:46) (108/71 - 195/85)  RR: 18 (07-27-20 @ 07:46) (18 - 20)  SpO2: 97% (07-27-20 @ 07:46) (95% - 100%)  Wt(kg): --  I&O's Summary      Appearance: Normal	  Cardiovascular: Normal S1 S2,RRR, No JVD, No murmurs  Respiratory: Lungs clear to auscultation	  Gastrointestinal:  Soft, Non-tender, + BS	  Extremities: Normal range of motion, No clubbing, cyanosis or edema        MEDICATIONS  (STANDING):  albuterol/ipratropium for Nebulization 3 milliLiter(s) Nebulizer every 6 hours  budesonide 160 MICROgram(s)/formoterol 4.5 MICROgram(s) Inhaler 2 Puff(s) Inhalation two times a day  chlorhexidine 2% Cloths 1 Application(s) Topical daily  clotrimazole 1% Cream 1 Application(s) Topical two times a day  dextrose 5%. 1000 milliLiter(s) (50 mL/Hr) IV Continuous <Continuous>  dextrose 50% Injectable 12.5 Gram(s) IV Push once  dextrose 50% Injectable 25 Gram(s) IV Push once  dextrose 50% Injectable 25 Gram(s) IV Push once  heparin   Injectable 5000 Unit(s) SubCutaneous every 12 hours  insulin lispro (HumaLOG) corrective regimen sliding scale   SubCutaneous three times a day before meals  insulin lispro (HumaLOG) corrective regimen sliding scale   SubCutaneous at bedtime  metoprolol tartrate 12.5 milliGRAM(s) Oral every 12 hours  predniSONE   Tablet 40 milliGRAM(s) Oral daily  tiotropium 18 MICROgram(s) Capsule 1 Capsule(s) Inhalation daily      TELEMETRY: 	    ECG:  	  RADIOLOGY:   DIAGNOSTIC TESTING:  [ ] Echocardiogram:  [ ]  Catheterization:  [ ] Stress Test:    OTHER: 	    LABS:	 	                                11.8   8.71  )-----------( 246      ( 26 Jul 2020 07:12 )             36.3     07-26    137  |  100  |  27<H>  ----------------------------<  180<H>  4.4   |  24  |  0.70    Ca    9.8      26 Jul 2020 07:10

## 2020-07-27 NOTE — DISCHARGE NOTE PROVIDER - NSDCCPCAREPLAN_GEN_ALL_CORE_FT
PRINCIPAL DISCHARGE DIAGNOSIS  Diagnosis: Asthmatic bronchitis with acute exacerbation  Assessment and Plan of Treatment: Asthmatic bronchitis with acute exacerbation      SECONDARY DISCHARGE DIAGNOSES  Diagnosis: MARA (obstructive sleep apnea)  Assessment and Plan of Treatment: MARA (obstructive sleep apnea)    Diagnosis: Diabetes  Assessment and Plan of Treatment: Diabetes    Diagnosis: Chest pain  Assessment and Plan of Treatment: PRINCIPAL DISCHARGE DIAGNOSIS  Diagnosis: Asthmatic bronchitis with acute exacerbation  Assessment and Plan of Treatment: multifactorial dyspnea--severe persistent asthma-flair, eosinophillic asthma, obesity, CAD?  asthmatic bronchitis-no need for ABX; prednisone tapering dose  eosinophillic asthma-will consider out pt nucala/fasenra/dupixent       follow up in 2 wks Janene Gonzales MD-Pulmonary         SECONDARY DISCHARGE DIAGNOSES  Diagnosis: MARA (obstructive sleep apnea)  Assessment and Plan of Treatment: Continue C pap    Diagnosis: Diabetes  Assessment and Plan of Treatment: HgA1C this admission.  Make sure you get your HgA1c checked every three months.  If you take oral diabetes medications, check your blood glucose two times a day.  If you take insulin, check your blood glucose before meals and at bedtime.  It's important not to skip any meals.  Keep a log of your blood glucose results and always take it with you to your doctor appointments.  Keep a list of your current medications including injectables and over the counter medications and bring this medication list with you to all your doctor appointments.  If you have not seen your ophthalmologist this year call for appointment.  Check your feet daily for redness, sores, or openings. Do not self treat. If no improvement in two days call your primary care physician for an appointment.  Low blood sugar (hypoglycemia) is a blood sugar below 70mg/dl. Check your blood sugar if you feel signs/symptoms of hypoglycemia. If your blood sugar is below 70 take 15 grams of carbohydrates (ex 4 oz of apple juice, 3-4 glucose tablets, or 4-6 oz of regular soda) wait 15 minutes and repeat blood sugar to make sure it comes up above 70.  If your blood sugar is above 70 and you are due for a meal, have a meal.  If you are not due for a meal have a snack.  This snack helps keeps your blood sugar at a safe range.      Diagnosis: Chest pain  Assessment and Plan of Treatment: stable, no acute coronary syndrome  ECHO reveals small hyperdynamic Lventricle  no further diuretics  continue  Betablocker  Cardiac enzyme neg x 1, EKG with no acute ischemia  pt. w/ CAD risk factors    pt. cleared for pulm for Excercise stress test(EST), pt. refusing EST because of Right leg weakness. pt. clinically stable without chest pain, SOB/LOUIS/wheeze improved  will plan on outpt. ischemic w/u   Outpt. f/u w. Dr Dave Abel next Tuesday 8/4 PRINCIPAL DISCHARGE DIAGNOSIS  Diagnosis: Asthmatic bronchitis with acute exacerbation  Assessment and Plan of Treatment: multifactorial dyspnea--severe persistent asthma-flair, eosinophillic asthma, obesity, CAD?  asthmatic bronchitis-no need for ABX; prednisone tapering dose  eosinophillic asthma-will consider out pt nucala/fasenra/dupixent       follow up in 2 wks Janene Gonzales MD-Pulmonary         SECONDARY DISCHARGE DIAGNOSES  Diagnosis: MARA (obstructive sleep apnea)  Assessment and Plan of Treatment: Continue C pap    Diagnosis: Diabetes  Assessment and Plan of Treatment: HgA1C this admission 7.3  Make sure you get your HgA1c checked every three months.  If you take oral diabetes medications, check your blood glucose two times a day.  If you take insulin, check your blood glucose before meals and at bedtime.  It's important not to skip any meals.  Keep a log of your blood glucose results and always take it with you to your doctor appointments.  Keep a list of your current medications including injectables and over the counter medications and bring this medication list with you to all your doctor appointments.  If you have not seen your ophthalmologist this year call for appointment.  Check your feet daily for redness, sores, or openings. Do not self treat. If no improvement in two days call your primary care physician for an appointment.  Low blood sugar (hypoglycemia) is a blood sugar below 70mg/dl. Check your blood sugar if you feel signs/symptoms of hypoglycemia. If your blood sugar is below 70 take 15 grams of carbohydrates (ex 4 oz of apple juice, 3-4 glucose tablets, or 4-6 oz of regular soda) wait 15 minutes and repeat blood sugar to make sure it comes up above 70.  If your blood sugar is above 70 and you are due for a meal, have a meal.  If you are not due for a meal have a snack.  This snack helps keeps your blood sugar at a safe range.      Diagnosis: Chest pain  Assessment and Plan of Treatment: stable, no acute coronary syndrome  ECHO reveals small hyperdynamic Lventricle  no further diuretics  continue  Betablocker  Cardiac enzyme neg x 1, EKG with no acute ischemia  pt. w/ CAD risk factors    pt. cleared for pulm for Excercise stress test(EST), pt. refusing EST because of Right leg weakness. pt. clinically stable without chest pain, SOB/LOUIS/wheeze improved  will plan on outpt. ischemic w/u   Outpt. f/u w. Dr Dvae Abel next Tuesday 8/4

## 2020-07-27 NOTE — PROGRESS NOTE ADULT - SUBJECTIVE AND OBJECTIVE BOX
CHIEF COMPLAINT: f/up sob, asthmatic bronchitis, osas, allergy, gerd--    Interval Events:    REVIEW OF SYSTEMS:  Constitutional: No fevers or chills. No weight loss. No fatigue or generalized malaise.  Eyes: No itching or discharge from the eyes  ENT: No ear pain. No ear discharge. No nasal congestion. No post nasal drip. No epistaxis. No throat pain. No sore throat. No difficulty swallowing.   CV: No chest pain. No palpitations. No lightheadedness or dizziness.   Resp: No dyspnea at rest. No dyspnea on exertion. No orthopnea. No wheezing. No cough. No stridor. No sputum production. No chest pain with respiration.  GI: No nausea. No vomiting. No diarrhea.  MSK: No joint pain or pain in any extremities  Integumentary: No skin lesions. No pedal edema.  Neurological: No gross motor weakness. No sensory changes.  [ ] All other systems negative  [ ] Unable to assess ROS because ________    OBJECTIVE:  ICU Vital Signs Last 24 Hrs  T(C): 36.6 (26 Jul 2020 23:48), Max: 36.9 (26 Jul 2020 08:39)  T(F): 97.8 (26 Jul 2020 23:48), Max: 98.4 (26 Jul 2020 08:39)  HR: 62 (26 Jul 2020 23:48) (62 - 74)  BP: 134/87 (26 Jul 2020 23:48) (129/69 - 195/85)  BP(mean): --  ABP: --  ABP(mean): --  RR: 18 (26 Jul 2020 23:48) (18 - 18)  SpO2: 100% (26 Jul 2020 23:48) (95% - 100%)        07-25 @ 07:01  -  07-26 @ 07:00  --------------------------------------------------------  IN: 120 mL / OUT: 0 mL / NET: 120 mL      CAPILLARY BLOOD GLUCOSE      POCT Blood Glucose.: 241 mg/dL (26 Jul 2020 22:07)      PHYSICAL EXAM:  General: Awake, alert, oriented X 3.   HEENT: Atraumatic, normocephalic.                 Mallampatti Grade                 No nasal congestion.                No tonsillar or pharyngeal exudates.  Lymph Nodes: No palpable lymphadenopathy  Neck: No JVD. No carotid bruit.   Respiratory: Normal chest expansion                         Normal percussion                         Normal and equal air entry                         No wheeze, rhonchi or rales.  Cardiovascular: S1 S2 normal. No murmurs, rubs or gallops.   Abdomen: Soft, non-tender, non-distended. No organomegaly. Normoactive bowel sounds.  Extremities: Warm to touch. Peripheral pulse palpable. No pedal edema.   Skin: No rashes or skin lesions  Neurological: Motor and sensory examination equal and normal in all four extremities.  Psychiatry: Appropriate mood and affect.    HOSPITAL MEDICATIONS:  MEDICATIONS  (STANDING):  albuterol/ipratropium for Nebulization 3 milliLiter(s) Nebulizer every 6 hours  budesonide 160 MICROgram(s)/formoterol 4.5 MICROgram(s) Inhaler 2 Puff(s) Inhalation two times a day  chlorhexidine 2% Cloths 1 Application(s) Topical daily  clotrimazole 1% Cream 1 Application(s) Topical two times a day  dextrose 5%. 1000 milliLiter(s) (50 mL/Hr) IV Continuous <Continuous>  dextrose 50% Injectable 12.5 Gram(s) IV Push once  dextrose 50% Injectable 25 Gram(s) IV Push once  dextrose 50% Injectable 25 Gram(s) IV Push once  heparin   Injectable 5000 Unit(s) SubCutaneous every 12 hours  insulin lispro (HumaLOG) corrective regimen sliding scale   SubCutaneous three times a day before meals  insulin lispro (HumaLOG) corrective regimen sliding scale   SubCutaneous at bedtime  metoprolol tartrate 12.5 milliGRAM(s) Oral every 12 hours  predniSONE   Tablet 40 milliGRAM(s) Oral daily  tiotropium 18 MICROgram(s) Capsule 1 Capsule(s) Inhalation daily    MEDICATIONS  (PRN):  acetaminophen   Tablet .. 650 milliGRAM(s) Oral every 6 hours PRN Temp greater or equal to 38.5C (101.3F), Mild Pain (1 - 3)  dextrose 40% Gel 15 Gram(s) Oral once PRN Blood Glucose LESS THAN 70 milliGRAM(s)/deciliter  glucagon  Injectable 1 milliGRAM(s) IntraMuscular once PRN Glucose LESS THAN 70 milligrams/deciliter      LABS:                        11.8   8.71  )-----------( 246      ( 26 Jul 2020 07:12 )             36.3     07-26    137  |  100  |  27<H>  ----------------------------<  180<H>  4.4   |  24  |  0.70    Ca    9.8      26 Jul 2020 07:10                MICROBIOLOGY:     RADIOLOGY:  [ ] Reviewed and interpreted by me    Point of Care Ultrasound Findings:    PFT:    EKG: CHIEF COMPLAINT: f/up sob, asthmatic bronchitis, osas, allergy, gerd--better but some wheeze and episode of CP    Interval Events: for card est    REVIEW OF SYSTEMS:  Constitutional: No fevers or chills. No weight loss. + fatigue or generalized malaise.  Eyes: No itching or discharge from the eyes  ENT: No ear pain. No ear discharge. No nasal congestion. No post nasal drip. No epistaxis. No throat pain. No sore throat. No difficulty swallowing.   CV: No chest pain. No palpitations. No lightheadedness or dizziness.   Resp: No dyspnea at rest. + dyspnea on exertion. No orthopnea. + wheezing. No cough. No stridor. No sputum production. No chest pain with respiration.  GI: No nausea. No vomiting. No diarrhea.  MSK: No joint pain or pain in any extremities  Integumentary: No skin lesions. No pedal edema.  Neurological: No gross motor weakness. No sensory changes.  [+ ] All other systems negative  [ ] Unable to assess ROS because ________    OBJECTIVE:  ICU Vital Signs Last 24 Hrs  T(C): 36.6 (26 Jul 2020 23:48), Max: 36.9 (26 Jul 2020 08:39)  T(F): 97.8 (26 Jul 2020 23:48), Max: 98.4 (26 Jul 2020 08:39)  HR: 62 (26 Jul 2020 23:48) (62 - 74)  BP: 134/87 (26 Jul 2020 23:48) (129/69 - 195/85)  BP(mean): --  ABP: --  ABP(mean): --  RR: 18 (26 Jul 2020 23:48) (18 - 18)  SpO2: 100% (26 Jul 2020 23:48) (95% - 100%)        07-25 @ 07:01  -  07-26 @ 07:00  --------------------------------------------------------  IN: 120 mL / OUT: 0 mL / NET: 120 mL      CAPILLARY BLOOD GLUCOSE      POCT Blood Glucose.: 241 mg/dL (26 Jul 2020 22:07)      PHYSICAL EXAM: NAD in bed on RA  General: Awake, alert, oriented X 3.   HEENT: Atraumatic, normocephalic.                 Mallampatti Grade 3                No nasal congestion.                No tonsillar or pharyngeal exudates.  Lymph Nodes: No palpable lymphadenopathy  Neck: No JVD. No carotid bruit.   Respiratory: Normal chest expansion                         Normal percussion                         Normal and equal air entry                         rare forced exp wheeze, no rhonchi or rales.  Cardiovascular: S1 S2 normal. No murmurs, rubs or gallops.   Abdomen: Soft, non-tender, non-distended. No organomegaly. Normoactive bowel sounds.  Extremities: Warm to touch. Peripheral pulse palpable. No pedal edema.   Skin: No rashes or skin lesions  Neurological: Motor and sensory examination equal and normal in all four extremities.  Psychiatry: Appropriate mood and affect.    HOSPITAL MEDICATIONS:  MEDICATIONS  (STANDING):  albuterol/ipratropium for Nebulization 3 milliLiter(s) Nebulizer every 6 hours  budesonide 160 MICROgram(s)/formoterol 4.5 MICROgram(s) Inhaler 2 Puff(s) Inhalation two times a day  chlorhexidine 2% Cloths 1 Application(s) Topical daily  clotrimazole 1% Cream 1 Application(s) Topical two times a day  dextrose 5%. 1000 milliLiter(s) (50 mL/Hr) IV Continuous <Continuous>  dextrose 50% Injectable 12.5 Gram(s) IV Push once  dextrose 50% Injectable 25 Gram(s) IV Push once  dextrose 50% Injectable 25 Gram(s) IV Push once  heparin   Injectable 5000 Unit(s) SubCutaneous every 12 hours  insulin lispro (HumaLOG) corrective regimen sliding scale   SubCutaneous three times a day before meals  insulin lispro (HumaLOG) corrective regimen sliding scale   SubCutaneous at bedtime  metoprolol tartrate 12.5 milliGRAM(s) Oral every 12 hours  predniSONE   Tablet 40 milliGRAM(s) Oral daily  tiotropium 18 MICROgram(s) Capsule 1 Capsule(s) Inhalation daily    MEDICATIONS  (PRN):  acetaminophen   Tablet .. 650 milliGRAM(s) Oral every 6 hours PRN Temp greater or equal to 38.5C (101.3F), Mild Pain (1 - 3)  dextrose 40% Gel 15 Gram(s) Oral once PRN Blood Glucose LESS THAN 70 milliGRAM(s)/deciliter  glucagon  Injectable 1 milliGRAM(s) IntraMuscular once PRN Glucose LESS THAN 70 milligrams/deciliter      LABS:                        11.8   8.71  )-----------( 246      ( 26 Jul 2020 07:12 )             36.3     07-26    137  |  100  |  27<H>  ----------------------------<  180<H>  4.4   |  24  |  0.70    Ca    9.8      26 Jul 2020 07:10                MICROBIOLOGY:     RADIOLOGY:  [ ] Reviewed and interpreted by me    Point of Care Ultrasound Findings:    PFT:    EKG:

## 2020-07-27 NOTE — PROGRESS NOTE ADULT - ASSESSMENT
75 yo h/o osas, asthma, allergy, gerd, migrains, ovarian CA, DM, prior PNA--recently multiple bouts of asthma- now here w/ exacerbation of asthma-likely eosinophillic in nature. 73 yo h/o osas, asthma, allergy, gerd, migrains, ovarian CA, DM, prior PNA--recently multiple bouts of asthma- now here w/ exacerbation of asthma-likely eosinophillic in nature.  ************  7/27-better but ? did not receive her pred for 2 days???; for EST-no adenosine

## 2020-07-27 NOTE — DISCHARGE NOTE PROVIDER - NSDCMRMEDTOKEN_GEN_ALL_CORE_FT
albuterol 90 mcg/inh inhalation aerosol: 1 puff(s) inhaled , As Needed  budesonide 0.5 mg/2 mL inhalation suspension: 2 milliliter(s) inhaled 2 times a day    Note:last filled in april  calcium: 1 tab(s) orally once a day  clotrimazole 1% topical cream: 1 application topically 2 times a day  Combivent Respimat 20 mcg-100 mcg/inh inhalation aerosol: 1 puff(s) inhaled 2 times a day    Note:Patient has not startd yet and didn&#x27;t  from pharmacy.  DuoNeb 0.5 mg-2.5 mg/3 mL inhalation solution: 3 milliliter(s) inhaled 2 to 3 times a day  glimepiride 2 mg oral tablet: 1 tab(s) orally once a day  Lasix 20 mg oral tablet: 1 tab(s) orally , As Needed  Lipitor 10 mg oral tablet: 1 tab(s) orally once a day  losartan 100 mg oral tablet: 1 tab(s) orally once a day  omeprazole 40 mg oral delayed release capsule: 1 cap(s) orally once a day    Note:Last filled in feb for 90 day supply  Pepcid 40 mg oral tablet: 1 tab(s) orally once a day (at bedtime)  Singulair 10 mg oral tablet: 1 tab(s) orally once a day    Note:Patient gets at [facility] office  Trintellix 20 mg oral tablet: 1 tab(s) orally once a day  Vitamin B-12: 1 tab(s) orally once a day  Vitamin D3: 1 tab(s) orally once a day  Wixela Inhub 100 mcg-50 mcg inhalation powder: 2 inhaler(s) inhaled 1 to 2 times a day    Note:Patient getd from [facility] office  Xalatan 0.005% ophthalmic solution: 1 drop(s) to each affected eye once a day (in the evening) albuterol 90 mcg/inh inhalation aerosol: 1 puff(s) inhaled , As Needed  budesonide-formoterol 160 mcg-4.5 mcg/inh inhalation aerosol: 2 puff(s) inhaled 2 times a day  calcium: 1 tab(s) orally once a day  clotrimazole 1% topical cream: 1 application topically 2 times a day  Combivent Respimat 20 mcg-100 mcg/inh inhalation aerosol: 1 puff(s) inhaled 2 times a day    Note:Patient has not startd yet and didn&#x27;t  from pharmacy.  glimepiride 2 mg oral tablet: 1 tab(s) orally once a day  ipratropium-albuterol 0.5 mg-2.5 mg/3 mLinhalation solution: 3 milliliter(s) inhaled every 6 hours  Lipitor 10 mg oral tablet: 1 tab(s) orally once a day  metoprolol tartrate 25 mg oral tablet: 0.5 tab(s) orally 2 times a day  omeprazole 40 mg oral delayed release capsule: 1 cap(s) orally once a day    Note:Last filled in feb for 90 day supply  Pepcid 40 mg oral tablet: 1 tab(s) orally once a day (at bedtime)  predniSONE 10 mg oral tablet: 3 tab(s) orally once a day x 3 days starting 7/28   then 2 tabs x 3 days           1 tab x 3 days  Singulair 10 mg oral tablet: 1 tab(s) orally once a day      tiotropium 18 mcg inhalation capsule: 1 cap(s) inhaled once a day  Trintellix 20 mg oral tablet: 1 tab(s) orally once a day  Vitamin B-12: 1 tab(s) orally once a day  Vitamin D3: 1 tab(s) orally once a day  Xalatan 0.005% ophthalmic solution: 1 drop(s) to each affected eye once a day (in the evening) albuterol 90 mcg/inh inhalation aerosol: 1 puff(s) inhaled , As Needed  budesonide-formoterol 160 mcg-4.5 mcg/inh inhalation aerosol: 2 puff(s) inhaled 2 times a day  calcium: 1 tab(s) orally once a day  clotrimazole 1% topical cream: 1 application topically 2 times a day  glimepiride 2 mg oral tablet: 1 tab(s) orally once a day  ipratropium-albuterol 0.5 mg-2.5 mg/3 mLinhalation solution: 3 milliliter(s) inhaled every 6 hours  Lipitor 10 mg oral tablet: 1 tab(s) orally once a day  metoprolol tartrate 25 mg oral tablet: 0.5 tab(s) orally 2 times a day  omeprazole 40 mg oral delayed release capsule: 1 cap(s) orally once a day    Note:Last filled in feb for 90 day supply  Pepcid 40 mg oral tablet: 1 tab(s) orally once a day (at bedtime)  predniSONE 10 mg oral tablet: 3 tab(s) orally once a day x 3 days starting 7/28   then 2 tabs x 3 days           1 tab x 3 days  Singulair 10 mg oral tablet: 1 tab(s) orally once a day      tiotropium 18 mcg inhalation capsule: 1 cap(s) inhaled once a day  Trintellix 20 mg oral tablet: 1 tab(s) orally once a day  Vitamin B-12: 1 tab(s) orally once a day  Vitamin D3: 1 tab(s) orally once a day  Xalatan 0.005% ophthalmic solution: 1 drop(s) to each affected eye once a day (in the evening)

## 2020-07-27 NOTE — PROGRESS NOTE ADULT - PROBLEM SELECTOR PLAN 2
prednisone 40mg per day-do not taper for 3 more days  symbicort 160 2 bid, spiriva 1 q day, singulair 10 qhs, duoneb via hhn q6  acapella, incentive spirometry.

## 2020-07-27 NOTE — DISCHARGE NOTE NURSING/CASE MANAGEMENT/SOCIAL WORK - PATIENT PORTAL LINK FT
You can access the FollowMyHealth Patient Portal offered by [facility] by registering at the following website: [email or web address]. By joining [employer]’s FollowMyHealth portal, you will also be able to view your health information using other applications (apps) compatible with our system.

## 2020-08-06 ENCOUNTER — APPOINTMENT (OUTPATIENT)
Dept: PULMONOLOGY | Facility: CLINIC | Age: 75
End: 2020-08-06

## 2020-08-18 ENCOUNTER — APPOINTMENT (OUTPATIENT)
Dept: PULMONOLOGY | Facility: CLINIC | Age: 75
End: 2020-08-18
Payer: MEDICARE

## 2020-08-18 VITALS
SYSTOLIC BLOOD PRESSURE: 140 MMHG | BODY MASS INDEX: 37.79 KG/M2 | WEIGHT: 180 LBS | HEART RATE: 67 BPM | DIASTOLIC BLOOD PRESSURE: 80 MMHG | OXYGEN SATURATION: 93 % | HEIGHT: 58 IN | TEMPERATURE: 97 F | RESPIRATION RATE: 16 BRPM

## 2020-08-18 PROCEDURE — 99214 OFFICE O/P EST MOD 30 MIN: CPT | Mod: 25

## 2020-08-18 PROCEDURE — 95012 NITRIC OXIDE EXP GAS DETER: CPT

## 2020-08-18 NOTE — ASSESSMENT
[FreeTextEntry1] : Ms. CHENG is a 74 year old female with a history of eosinophilic asthma, severe persistent asthma, allergies,  DM, OSAS, GERD, who comes into the office today for pulmonary evaluation s/p flair - still symptomatic \par \par problem 1: severe persistent asthma (poorly controlled)\par Prednisone taper, 10 mg a day until Fasenra is started \par Information sheet given to the patient to be reviewed, this medication is never to be used without consulting the prescribing physician. Proper dietary restraint is necessary specifically salt containing foods, if any reaction may occur should be reported. \par \par -add Albuterol nebulizer at least BID up to QiD (gargle and spit after use) \par -continue to use Singulair 10 mg before bed\par - continue Symbicort (160) 2 inhalations BID / Tudorza at 1 inhalation BID\par \par -Asthma is  believed to be caused by inherited (genetic) and environmental factor, but its exact cause is unknown. Asthma may be triggered by allergens, lung infections, or irritants in the air. Asthma triggers are different for each person\par -Inhaler technique reviewed as well as oral hygiene techniques reviewed with patient. Avoidance of cold air, extremes of temperature, rescue inhaler should be used before exercise. Order of medication reviewed with patient. Recommended use of a cool mist humidifier in the bedroom.\par \par Problem 1A: Eosinophilic asthma\par - set up fasenra\par \par \par problem 2: allergic rhinitis\par -continue to use Astelin 1 sniff/nostril BID (.15)\par -continue to use Xyzal 5 mg before bed\par -continue to use Claritin 10 mg QAM\par -she is being added Pataday eye drops \par \par -Environmental measures for allergies were encouraged including mattress and pillow cover, air purifier, and environmental controls.\par \par problem 3: GERD\par -continue to use omeprazole 40 mg before breakfast\par -continue to use Pepcid 40 mg QHS \par \par -Rule of 2s: avoid eating too much, eating too late, eating too spicy, eating two hours before bed\par -Things to avoid including overeating, spicy foods, tight clothing, eating within three hours of bed, this list is not all inclusive. \par -For treatment of reflux, possible options discussed including diet control, H2 blockers, PPIs, as well as coating motility agents discussed as treatment options. Timing of meals and proximity of last meal to sleep were discussed. If symptoms persist, a formal gastrointestinal evaluation is needed.\par \par problem 4: OSAS\par -continue to use the CPAP machine, tolerating it well, and seeing the benefits\par \par -Sleep apnea is associated with adverse clinical consequences which an affect most organ systems.  Cardiovascular disease risk includes arrhythmias, atrial fibrillation, hypertension, coronary artery disease, and stroke. Metabolic disorders include diabetes type 2, non-alcoholic fatty liver disease. Mood disorder especially depression; and cognitive decline especially in the elderly. Associations with  chronic reflux/Vicente’s esophagus some but not all inclusive. \par -Reasons to assess this include arousal consistent with hypopnea; respiratory events most prominent in REM sleep or supine position; therefore sleep staging and body position are important for accurate diagnosis and estimation of AHI.\par \par problem 5: obesity \par -Weight loss, exercise, and diet control were discussed and are highly encouraged. Treatment options were given such as, aqua therapy, and contacting a nutritionist. Recommended to use the elliptical, stationary bike, less use of treadmill.  Obesity is associated with worsening asthma, shortness of breath, and potential for cardiac disease, diabetes, and other underlying medical conditions.\par \par problem 6: low immunity \par -recommended early intervention \par \par problem 7: muscle cramps/ back pain \par -recommended to use Myocalm PM at bed\par -Topricin cream PRN \par \par Problem 8: Health Maintenance/COVID19 Precautions:\par - Clean your hands often. Wash your hands often with soap and water for at least 20 seconds, especially after blowing your nose, coughing, or sneezing, or having been in a public place.\par - If soap and water are not available, use a hand  that contains at least 60% alcohol.\par - To the extent possible, avoid touching high-touch surfaces in public places - elevator buttons, door handles, handrails, handshaking with people, etc. Use a tissue or your sleeve to cover your hand or finger if you must touch something.\par - Wash your hands after touching surfaces in public places.\par - Avoid touching your face, nose, eyes, etc.\par - Clean and disinfect your home to remove germs: practice routine cleaning of frequently touched surfaces (for example: tables, doorknobs, light switches, handles, desks, toilets, faucets, sinks & cell phones)\par - Avoid crowds, especially in poorly ventilated spaces. Your risk of exposure to respiratory viruses like COVID-19 may increase in crowded, closed-in settings with little air circulation if there are people in the crowd who are sick. All patients are recommended to practice social distancing and stay at least 6 feet away from others.\par - Avoid all non-essential travel including plane trips, and especially avoid embarking on cruise ships.\par -If COVID-19 is spreading in your community, take extra measures to put distance between yourself and other people to further reduce your risk of being exposed to this new virus.\par -Stay home as much as possible.\par - Consider ways of getting food brought to your house through family, social, or commercial networks\par -Be aware that the virus has been known to live in the air up to 3 hours post exposure, cardboard up to 24 hours post exposure, copper up to 4 hours post exposure, steel and plastic up to 2-3 days post exposure. Risk of transmission from these surfaces are affected by many variables.\par Immune Support Recommendations:\par -OTC Vitamin C 500mg BID \par -OTC Quercetin 250-500mg BID \par -OTC Zinc 75-100mg per day \par -OTC Melatonin 1 or 2 mg a night \par -OTC Vitamin D 1-4000mg per day \par -OTC Tonic Water 8oz per day\par Asthma and COVID19:\par You need to make sure your asthma is under control. This often requires the use of inhaled corticosteroids (and sometimes oral corticosteroids). Inhaled corticosteroids do not likely reduce your immune system’s ability to fight infections, but oral corticosteroids may. It is important to use the steps above to protect yourself to limit your exposure to any respiratory virus.\par \par Problem 9 : health maintenance\par -s/p influenza vaccine\par -recommended strep pneumonia vaccines after age 65: Prevnar-13 vaccine, followed by Pneumo vaccine 23 one year following\par -recommended early intervention for URIs\par -recommended regular osteoporosis evaluations\par -recommended early dermatological evaluations\par -recommended after the age of 50 to the age of 70, colonoscopy every 5 years \par \par F/U in 3 months\par She is encouraged to call with any changes, concerns, or questions

## 2020-08-18 NOTE — REASON FOR VISIT
[Follow-Up] : a follow-up visit [FreeTextEntry1] :  allergic rhinitis,Eosinophilic asthma, severe persistent asthma, chronic cough, GERD, overweight, MARA and shortness of breath

## 2020-08-18 NOTE — PHYSICAL EXAM
[General Appearance - Well Developed] : well developed [Normal Appearance] : normal appearance [Well Groomed] : well groomed [General Appearance - In No Acute Distress] : no acute distress [General Appearance - Well Nourished] : well nourished [No Deformities] : no deformities [Normal Oropharynx] : normal oropharynx [Eyelids - No Xanthelasma] : the eyelids demonstrated no xanthelasmas [Normal Conjunctiva] : the conjunctiva exhibited no abnormalities [Neck Cervical Mass (___cm)] : no neck mass was observed [Neck Appearance] : the appearance of the neck was normal [III] : III [Thyroid Nodule] : there were no palpable thyroid nodules [Thyroid Diffuse Enlargement] : the thyroid was not enlarged [Jugular Venous Distention Increased] : there was no jugular-venous distention [Heart Rate And Rhythm] : heart rate and rhythm were normal [Heart Sounds] : normal S1 and S2 [Murmurs] : no murmurs present [Respiration, Rhythm And Depth] : normal respiratory rhythm and effort [Exaggerated Use Of Accessory Muscles For Inspiration] : no accessory muscle use [Abdomen Mass (___ Cm)] : no abdominal mass palpated [Abdomen Tenderness] : non-tender [Abdomen Soft] : soft [Abnormal Walk] : normal gait [Gait - Sufficient For Exercise Testing] : the gait was sufficient for exercise testing [Nail Clubbing] : no clubbing of the fingernails [Petechial Hemorrhages (___cm)] : no petechial hemorrhages [Cyanosis, Localized] : no localized cyanosis [No Venous Stasis] : no venous stasis [Skin Color & Pigmentation] : normal skin color and pigmentation [] : no rash [No Skin Ulcers] : no skin ulcer [Skin Lesions] : no skin lesions [No Xanthoma] : no  xanthoma was observed [Sensation] : the sensory exam was normal to light touch and pinprick [Deep Tendon Reflexes (DTR)] : deep tendon reflexes were 2+ and symmetric [No Focal Deficits] : no focal deficits [Impaired Insight] : insight and judgment were intact [Affect] : the affect was normal [Oriented To Time, Place, And Person] : oriented to person, place, and time [FreeTextEntry1] : I:E ratio 1:3; expiratory wheezes b/l [FreeTextEntry2] : 1+ Right LE edema

## 2020-08-18 NOTE — HISTORY OF PRESENT ILLNESS
[FreeTextEntry1] : Ms. Lemus is a 72 year old female presenting to the office for a follow up visit for acute CHF, allergic rhinitis, asthma, chronic cough, GERD, overweight, MARA and shortness of breath. Her chief complaint is\par - she is s/p hospital visit due to her exacerbation of asthma. She also had a cardio work up / stress test which was negative. \par -she notes the weather has been making her more uncomfortable. \par - Yesterday she woke up coughing a lot. She has been taking a lot of cough drops. \par - she has been taking pepcid and omeprazole for her acid reflux. \par - her blood sugar is: 131\par - her sleep is good. She has a new CPAP machine. \par - She notes she's taking Symbicort \par - she stopped Losartan \par -she denies any headaches, nausea, vomiting, fever, chills, sweats, chest pain, chest pressure, diarrhea, constipation, dysphagia, dizziness, sour taste in the mouth, leg swelling, leg pain, itchy eyes, itchy ears.

## 2020-08-18 NOTE — ADDENDUM
[FreeTextEntry1] : Documented by Beverly Hilario acting as a scribe for Dr. Aldo Gonzales on 08/18/2020.\par \par All medical record entries made by the Scribe were at my, Dr. Aldo Gonzales's, direction and personally dictated by me on 08/18/2020. I have reviewed the chart and agree that the record accurately reflects my personal performance of the history, physical exam, assessment and plan. I have also personally directed, reviewed, and agree with the discharge instructions.

## 2020-09-11 ENCOUNTER — NON-APPOINTMENT (OUTPATIENT)
Age: 75
End: 2020-09-11

## 2020-09-22 ENCOUNTER — APPOINTMENT (OUTPATIENT)
Dept: PULMONOLOGY | Facility: CLINIC | Age: 75
End: 2020-09-22

## 2020-10-07 ENCOUNTER — RX RENEWAL (OUTPATIENT)
Age: 75
End: 2020-10-07

## 2020-11-02 ENCOUNTER — LABORATORY RESULT (OUTPATIENT)
Age: 75
End: 2020-11-02

## 2020-11-06 ENCOUNTER — APPOINTMENT (OUTPATIENT)
Dept: PULMONOLOGY | Facility: CLINIC | Age: 75
End: 2020-11-06
Payer: MEDICARE

## 2020-11-06 VITALS
WEIGHT: 182 LBS | HEART RATE: 74 BPM | DIASTOLIC BLOOD PRESSURE: 65 MMHG | SYSTOLIC BLOOD PRESSURE: 125 MMHG | OXYGEN SATURATION: 95 % | TEMPERATURE: 97.2 F | RESPIRATION RATE: 16 BRPM | HEIGHT: 58 IN | BODY MASS INDEX: 38.2 KG/M2

## 2020-11-06 DIAGNOSIS — J45.901 UNSPECIFIED ASTHMA WITH (ACUTE) EXACERBATION: ICD-10-CM

## 2020-11-06 DIAGNOSIS — Z23 ENCOUNTER FOR IMMUNIZATION: ICD-10-CM

## 2020-11-06 PROCEDURE — G0008: CPT

## 2020-11-06 PROCEDURE — 90662 IIV NO PRSV INCREASED AG IM: CPT

## 2020-11-06 PROCEDURE — 94010 BREATHING CAPACITY TEST: CPT

## 2020-11-06 PROCEDURE — 99072 ADDL SUPL MATRL&STAF TM PHE: CPT

## 2020-11-06 PROCEDURE — 94729 DIFFUSING CAPACITY: CPT

## 2020-11-06 PROCEDURE — 99214 OFFICE O/P EST MOD 30 MIN: CPT | Mod: 25

## 2020-11-06 PROCEDURE — 94727 GAS DIL/WSHOT DETER LNG VOL: CPT

## 2020-11-06 RX ORDER — PREDNISONE 10 MG/1
10 TABLET ORAL
Qty: 100 | Refills: 0 | Status: DISCONTINUED | COMMUNITY
Start: 2020-08-18 | End: 2020-11-06

## 2020-11-06 RX ORDER — RANITIDINE HYDROCHLORIDE 300 MG/1
300 CAPSULE ORAL
Qty: 1 | Refills: 1 | Status: DISCONTINUED | COMMUNITY
Start: 2018-06-27 | End: 2020-11-06

## 2020-11-06 RX ORDER — PREDNISONE 10 MG/1
10 TABLET ORAL
Qty: 21 | Refills: 0 | Status: DISCONTINUED | COMMUNITY
Start: 2020-05-20 | End: 2020-11-06

## 2020-11-06 RX ORDER — PREDNISONE 10 MG/1
10 TABLET ORAL
Qty: 45 | Refills: 0 | Status: DISCONTINUED | COMMUNITY
Start: 2020-05-04 | End: 2020-11-06

## 2020-11-06 NOTE — PROCEDURE
[FreeTextEntry1] : \par FENO: pt unable to do the test\par \par FULL PFTs reveals mild restrictive flows; FEV1 was   1.02L which is   53% of predicted; mild lung volumes; mild diffusion of  12.2, which is  62% of predicted; normal flow volume loop\par \par \par 6 minute walk test reveals a low saturation of 94% with no evidence of dyspnea or fatigue; walked 97.8 meters; pt stopped before 6 minutes due to feeling fatigued. \par

## 2020-11-06 NOTE — ADDENDUM
[FreeTextEntry1] : Documented by Beverly Hilario acting as a scribe for Dr. Aldo Gonzales on 11/06/2020 \par \par All medical record entries made by the Scribe were at my, Dr. Aldo Gonzales's, direction and personally dictated by me on 11/06/2020 . I have reviewed the chart and agree that the record accurately reflects my personal performance of the history, physical exam, assessment and plan. I have also personally directed, reviewed, and agree with the discharge instructions.

## 2020-11-06 NOTE — HISTORY OF PRESENT ILLNESS
[FreeTextEntry1] : Ms. Lemus is a 74 year old female presenting to the office for a follow up visit for acute CHF, allergic rhinitis, asthma, chronic cough, GERD, overweight, MARA and shortness of breath. Her chief complaint is\par - she notes she is s/p 2 Fasenra injections, this month she willl have her 3rd dose. \par - she has been feeling much better and in control \par - her sinuses have not been bothering her\par - no chest pain / pressure \par - she has dizziness once in awhile\par - no swallowing issues\par - she has been using her CPAP machine, tolerating it well. She has been using the nasal insert rather than the full mask and she likes it much better. \par - weight has been stable\par - eating well\par - no ankle / leg swelling\par - she has some pain in her leg and lower back. \par - she has been exercising, she goes for PT as well. \par - her heart burn has been controlled\par - bowels are regular\par - sense of smell and taste has been regular\par - her appetite is well \par - she notes she has been trying to lose some weight \par - she would like to have a flu shot. \par - her blood sugar has been good \par - she saw her cardiologist, had a stress test and that was wnl. \par - She  denies any visual issues, headaches, nausea, vomiting, fever, chills, sweats, chest pains, chest pressure, diarrhea, constipation, dysphagia, myalgia, dizziness, leg swelling, leg pain, itchy eyes, itchy ears, heartburn, reflux, or sour taste in the mouth.

## 2020-11-06 NOTE — ASSESSMENT
[FreeTextEntry1] : Ms. CHENG is a 74 year old female with a history of eosinophilic asthma, severe persistent asthma, allergies,  DM, HTN, OSAS, GERD, who comes into the office today for pulmonary evaluation on fasenra (controlled)\par \par problem 1: severe persistent asthma (controlled)\par -  Fasenra is started since 9/2020\par -continue Albuterol nebulizer at least BID up to QiD (gargle and spit after use) \par -continue to use Singulair 10 mg before bed\par - continue Symbicort (160) 2 inhalations BID / Tudorza at 1 inhalation BID\par \par -Asthma is  believed to be caused by inherited (genetic) and environmental factor, but its exact cause is unknown. Asthma may be triggered by allergens, lung infections, or irritants in the air. Asthma triggers are different for each person\par -Inhaler technique reviewed as well as oral hygiene techniques reviewed with patient. Avoidance of cold air, extremes of temperature, rescue inhaler should be used before exercise. Order of medication reviewed with patient. Recommended use of a cool mist humidifier in the bedroom.\par \par Problem 1A: Eosinophilic asthma\par - on Fasenra (2) since 9/2020\par \par \par problem 2: allergic rhinitis\par -continue to use Astelin 1 sniff/nostril BID (.15)\par -continue to use Xyzal 5 mg before bed\par -continue to use Claritin 10 mg QAM\par -she is being added Pataday eye drops \par \par -Environmental measures for allergies were encouraged including mattress and pillow cover, air purifier, and environmental controls.\par \par problem 3: GERD\par -continue to use omeprazole 40 mg before breakfast\par -continue to use Pepcid 40 mg QHS \par \par -Rule of 2s: avoid eating too much, eating too late, eating too spicy, eating two hours before bed\par -Things to avoid including overeating, spicy foods, tight clothing, eating within three hours of bed, this list is not all inclusive. \par -For treatment of reflux, possible options discussed including diet control, H2 blockers, PPIs, as well as coating motility agents discussed as treatment options. Timing of meals and proximity of last meal to sleep were discussed. If symptoms persist, a formal gastrointestinal evaluation is needed.\par \par problem 4: OSAS\par -continue to use the CPAP machine, tolerating it well, and seeing the benefits\par \par -Sleep apnea is associated with adverse clinical consequences which an affect most organ systems.  Cardiovascular disease risk includes arrhythmias, atrial fibrillation, hypertension, coronary artery disease, and stroke. Metabolic disorders include diabetes type 2, non-alcoholic fatty liver disease. Mood disorder especially depression; and cognitive decline especially in the elderly. Associations with  chronic reflux/Vicente’s esophagus some but not all inclusive. \par -Reasons to assess this include arousal consistent with hypopnea; respiratory events most prominent in REM sleep or supine position; therefore sleep staging and body position are important for accurate diagnosis and estimation of AHI.\par \par problem 5: obesity \par -Weight loss, exercise, and diet control were discussed and are highly encouraged. Treatment options were given such as, aqua therapy, and contacting a nutritionist. Recommended to use the elliptical, stationary bike, less use of treadmill.  Obesity is associated with worsening asthma, shortness of breath, and potential for cardiac disease, diabetes, and other underlying medical conditions.\par \par problem 6: low immunity \par -recommended early intervention \par \par problem 7: muscle cramps/ back pain \par -recommended to use Myocalm PM at bed\par -Topricin cream PRN \par \par Problem 8: Health Maintenance/COVID19 Precautions:\par - Clean your hands often. Wash your hands often with soap and water for at least 20 seconds, especially after blowing your nose, coughing, or sneezing, or having been in a public place.\par - If soap and water are not available, use a hand  that contains at least 60% alcohol.\par - To the extent possible, avoid touching high-touch surfaces in public places - elevator buttons, door handles, handrails, handshaking with people, etc. Use a tissue or your sleeve to cover your hand or finger if you must touch something.\par - Wash your hands after touching surfaces in public places.\par - Avoid touching your face, nose, eyes, etc.\par - Clean and disinfect your home to remove germs: practice routine cleaning of frequently touched surfaces (for example: tables, doorknobs, light switches, handles, desks, toilets, faucets, sinks & cell phones)\par - Avoid crowds, especially in poorly ventilated spaces. Your risk of exposure to respiratory viruses like COVID-19 may increase in crowded, closed-in settings with little air circulation if there are people in the crowd who are sick. All patients are recommended to practice social distancing and stay at least 6 feet away from others.\par - Avoid all non-essential travel including plane trips, and especially avoid embarking on cruise ships.\par -If COVID-19 is spreading in your community, take extra measures to put distance between yourself and other people to further reduce your risk of being exposed to this new virus.\par -Stay home as much as possible.\par - Consider ways of getting food brought to your house through family, social, or commercial networks\par -Be aware that the virus has been known to live in the air up to 3 hours post exposure, cardboard up to 24 hours post exposure, copper up to 4 hours post exposure, steel and plastic up to 2-3 days post exposure. Risk of transmission from these surfaces are affected by many variables.\par Immune Support Recommendations:\par -OTC Vitamin C 500mg BID \par -OTC Quercetin 250-500mg BID \par -OTC Zinc 75-100mg per day \par -OTC Melatonin 1 or 2 mg a night \par -OTC Vitamin D 1-4000mg per day \par -OTC Tonic Water 8oz per day\par Asthma and COVID19:\par You need to make sure your asthma is under control. This often requires the use of inhaled corticosteroids (and sometimes oral corticosteroids). Inhaled corticosteroids do not likely reduce your immune system’s ability to fight infections, but oral corticosteroids may. It is important to use the steps above to protect yourself to limit your exposure to any respiratory virus.\par \par Problem 9 : health maintenance\par -s/p influenza vaccine - 2020\par -recommended strep pneumonia vaccines after age 65: Prevnar-13 vaccine, followed by Pneumo vaccine 23 one year following\par -recommended early intervention for URIs\par -recommended regular osteoporosis evaluations\par -recommended early dermatological evaluations\par -recommended after the age of 50 to the age of 70, colonoscopy every 5 years \par \par F/U in 3 months\par She is encouraged to call with any changes, concerns, or questions

## 2020-11-06 NOTE — PHYSICAL EXAM

## 2020-12-01 ENCOUNTER — RX RENEWAL (OUTPATIENT)
Age: 75
End: 2020-12-01

## 2020-12-02 ENCOUNTER — NON-APPOINTMENT (OUTPATIENT)
Age: 75
End: 2020-12-02

## 2021-01-05 ENCOUNTER — APPOINTMENT (OUTPATIENT)
Dept: ULTRASOUND IMAGING | Facility: IMAGING CENTER | Age: 76
End: 2021-01-05

## 2021-01-05 ENCOUNTER — APPOINTMENT (OUTPATIENT)
Dept: MAMMOGRAPHY | Facility: IMAGING CENTER | Age: 76
End: 2021-01-05

## 2021-02-08 ENCOUNTER — LABORATORY RESULT (OUTPATIENT)
Age: 76
End: 2021-02-08

## 2021-02-12 ENCOUNTER — APPOINTMENT (OUTPATIENT)
Dept: PULMONOLOGY | Facility: CLINIC | Age: 76
End: 2021-02-12
Payer: MEDICARE

## 2021-02-12 ENCOUNTER — NON-APPOINTMENT (OUTPATIENT)
Age: 76
End: 2021-02-12

## 2021-02-12 VITALS
DIASTOLIC BLOOD PRESSURE: 90 MMHG | SYSTOLIC BLOOD PRESSURE: 160 MMHG | OXYGEN SATURATION: 98 % | TEMPERATURE: 97.3 F | HEART RATE: 65 BPM | HEIGHT: 58 IN | WEIGHT: 171 LBS | BODY MASS INDEX: 35.89 KG/M2 | RESPIRATION RATE: 16 BRPM

## 2021-02-12 PROCEDURE — 99214 OFFICE O/P EST MOD 30 MIN: CPT | Mod: 25

## 2021-02-12 PROCEDURE — 99072 ADDL SUPL MATRL&STAF TM PHE: CPT

## 2021-02-12 PROCEDURE — 94010 BREATHING CAPACITY TEST: CPT

## 2021-02-12 NOTE — HISTORY OF PRESENT ILLNESS
[FreeTextEntry1] : Ms. Lemus is a 75 year old female presenting to the office for a follow up visit for acute CHF, allergic rhinitis, asthma, chronic cough, GERD, overweight, MARA and shortness of breath. Her chief complaint is\par -She notes recently gaining weight, which she attributes to taking antidepressant medication\par -She notes her reflux is controlled\par -She notes her energy level is 7/10 \par -She notes getting covid 19 vaccine x2 (Pfizer)\par -She notes her vision is good\par -She notes her sense of smell and taste are good \par -She notes getting blood work \par -\par \par - denies any headaches, nausea, vomiting, fever, chills, sweats, chest pain, chest pressure, diarrhea, constipation, dysphagia, dizziness, leg swelling, leg pain, itchy eyes, itchy ears, heartburn, or sour taste in the mouth.

## 2021-02-12 NOTE — PROCEDURE
[FreeTextEntry1] : PFT revealedmild to moderate restrictive dysfunction, with a FEV1 of 1.06L, which is 83% of predicted, with a normal flow volume loop, poor inspiratory limb \par \par Blood work(1/2021) WBC 6.17, HGB 13.2, HCT 40.8 , 0% eosinophils, CMP WNL, iron studies WNL, Vitamin D WNL, CRP WNL, negative covid 19 antibodies\par \par FENO was unable to complete  ; a normal value being less than 25\par Fractional exhaled nitric oxide (FENO) is regarded as a simple, noninvasive method for assessing eosinophilic airway inflammation. Produced by a variety of cells within the lung, nitric oxide (NO) concentrations are generally low in healthy individuals. However, high concentrations of NO appear to be involved in nonspecific host defense mechanisms and chronic inflammatory diseases such as asthma. The American Thoracic Society (ATS) therefore has recommended using FENO to aid in the diagnosis and monitoring of eosinophilic airway inflammation and asthma, and for identifying steroid responsive individuals whose chronic respiratory symptoms may be caused by airway inflammation.

## 2021-02-12 NOTE — PHYSICAL EXAM

## 2021-02-12 NOTE — ADDENDUM
[FreeTextEntry1] : Documented by Godwin Cha acting as a scribe for Dr. Aldo Gonzales on (02/12/2021).\par \par All medical record entries made by the Scribe were at my, Dr. Aldo Gonzales's, direction and personally dictated by me on (01/21/2021). I have reviewed the chart and agree that the record accurately reflects my personal performance of the history, physical exam, assessment and plan. I have also personally directed, reviewed, and agree with the discharge instructions. \par

## 2021-02-12 NOTE — ASSESSMENT
[FreeTextEntry1] : Ms. CHENG is a 75 year old female with a history of eosinophilic asthma, severe persistent asthma, allergies,  DM, HTN, OSAS, GERD, who comes into the office today for pulmonary evaluation on fasenra (controlled) her #1 issue is weight \par \par problem 1: severe persistent asthma (controlled)\par -  Fasenra is started since 9/2020\par -continue Albuterol nebulizer at least BID up to QiD (gargle and spit after use) \par -continue to use Singulair 10 mg before bed\par - continue Symbicort (160) 2 inhalations BID / Tudorza at 1 inhalation BID\par \par -Asthma is  believed to be caused by inherited (genetic) and environmental factor, but its exact cause is unknown. Asthma may be triggered by allergens, lung infections, or irritants in the air. Asthma triggers are different for each person\par -Inhaler technique reviewed as well as oral hygiene techniques reviewed with patient. Avoidance of cold air, extremes of temperature, rescue inhaler should be used before exercise. Order of medication reviewed with patient. Recommended use of a cool mist humidifier in the bedroom.\par \par Problem 1A: Eosinophilic asthma\par - on Fasenra (2) since 9/2020\par \par \par problem 2: allergic rhinitis\par -continue to use Astelin 1 sniff/nostril BID (.15)\par -continue to use Xyzal 5 mg before bed\par -continue to use Claritin 10 mg QAM\par -she is being added Pataday eye drops \par \par -Environmental measures for allergies were encouraged including mattress and pillow cover, air purifier, and environmental controls.\par \par problem 3: GERD\par -continue to use omeprazole 40 mg before breakfast\par -continue to use Pepcid 40 mg QHS \par \par -Rule of 2s: avoid eating too much, eating too late, eating too spicy, eating two hours before bed\par -Things to avoid including overeating, spicy foods, tight clothing, eating within three hours of bed, this list is not all inclusive. \par -For treatment of reflux, possible options discussed including diet control, H2 blockers, PPIs, as well as coating motility agents discussed as treatment options. Timing of meals and proximity of last meal to sleep were discussed. If symptoms persist, a formal gastrointestinal evaluation is needed.\par \par problem 4: OSAS\par -continue to use the CPAP machine, tolerating it well, and seeing the benefits\par \par -Sleep apnea is associated with adverse clinical consequences which an affect most organ systems.  Cardiovascular disease risk includes arrhythmias, atrial fibrillation, hypertension, coronary artery disease, and stroke. Metabolic disorders include diabetes type 2, non-alcoholic fatty liver disease. Mood disorder especially depression; and cognitive decline especially in the elderly. Associations with  chronic reflux/Vicente’s esophagus some but not all inclusive. \par -Reasons to assess this include arousal consistent with hypopnea; respiratory events most prominent in REM sleep or supine position; therefore sleep staging and body position are important for accurate diagnosis and estimation of AHI.\par \par problem 5: obesity (Zach)\par -Weight loss, exercise, and diet control were discussed and are highly encouraged. Treatment options were given such as, aqua therapy, and contacting a nutritionist. Recommended to use the elliptical, stationary bike, less use of treadmill.  Obesity is associated with worsening asthma, shortness of breath, and potential for cardiac disease, diabetes, and other underlying medical conditions.\par \par problem 6: low immunity \par -recommended early intervention \par \par problem 7: muscle cramps/ back pain \par -recommended to use Myocalm PM at bed\par -Topricin cream PRN \par \par Problem 8: Health Maintenance/COVID19 Precautions:\par - Clean your hands often. Wash your hands often with soap and water for at least 20 seconds, especially after blowing your nose, coughing, or sneezing, or having been in a public place.\par - If soap and water are not available, use a hand  that contains at least 60% alcohol.\par - To the extent possible, avoid touching high-touch surfaces in public places - elevator buttons, door handles, handrails, handshaking with people, etc. Use a tissue or your sleeve to cover your hand or finger if you must touch something.\par - Wash your hands after touching surfaces in public places.\par - Avoid touching your face, nose, eyes, etc.\par - Clean and disinfect your home to remove germs: practice routine cleaning of frequently touched surfaces (for example: tables, doorknobs, light switches, handles, desks, toilets, faucets, sinks & cell phones)\par - Avoid crowds, especially in poorly ventilated spaces. Your risk of exposure to respiratory viruses like COVID-19 may increase in crowded, closed-in settings with little air circulation if there are people in the crowd who are sick. All patients are recommended to practice social distancing and stay at least 6 feet away from others.\par - Avoid all non-essential travel including plane trips, and especially avoid embarking on cruise ships.\par -If COVID-19 is spreading in your community, take extra measures to put distance between yourself and other people to further reduce your risk of being exposed to this new virus.\par -Stay home as much as possible.\par - Consider ways of getting food brought to your house through family, social, or commercial networks\par -Be aware that the virus has been known to live in the air up to 3 hours post exposure, cardboard up to 24 hours post exposure, copper up to 4 hours post exposure, steel and plastic up to 2-3 days post exposure. Risk of transmission from these surfaces are affected by many variables.\par Immune Support Recommendations:\par -OTC Vitamin C 500mg BID \par -OTC Quercetin 250-500mg BID \par -OTC Zinc 75-100mg per day \par -OTC Melatonin 1 or 2 mg a night \par -OTC Vitamin D 1-4000mg per day \par -OTC Tonic Water 8oz per day\par Asthma and COVID19:\par You need to make sure your asthma is under control. This often requires the use of inhaled corticosteroids (and sometimes oral corticosteroids). Inhaled corticosteroids do not likely reduce your immune system’s ability to fight infections, but oral corticosteroids may. It is important to use the steps above to protect yourself to limit your exposure to any respiratory virus.\par \par Problem 9 : health maintenance\par -s/p influenza vaccine - 2020\par -recommended strep pneumonia vaccines after age 65: Prevnar-13 vaccine, followed by Pneumo vaccine 23 one year following\par -recommended early intervention for URIs\par -recommended regular osteoporosis evaluations\par -recommended early dermatological evaluations\par -recommended after the age of 50 to the age of 70, colonoscopy every 5 years \par \par F/U in 4 months\par She is encouraged to call with any changes, concerns, or questions

## 2021-05-19 ENCOUNTER — RX RENEWAL (OUTPATIENT)
Age: 76
End: 2021-05-19

## 2021-06-11 ENCOUNTER — LABORATORY RESULT (OUTPATIENT)
Age: 76
End: 2021-06-11

## 2021-06-15 ENCOUNTER — APPOINTMENT (OUTPATIENT)
Dept: PULMONOLOGY | Facility: CLINIC | Age: 76
End: 2021-06-15
Payer: MEDICARE

## 2021-06-15 VITALS
SYSTOLIC BLOOD PRESSURE: 124 MMHG | HEIGHT: 58 IN | OXYGEN SATURATION: 97 % | DIASTOLIC BLOOD PRESSURE: 76 MMHG | WEIGHT: 168 LBS | TEMPERATURE: 97 F | RESPIRATION RATE: 16 BRPM | HEART RATE: 51 BPM | BODY MASS INDEX: 35.26 KG/M2

## 2021-06-15 PROCEDURE — 94729 DIFFUSING CAPACITY: CPT

## 2021-06-15 PROCEDURE — ZZZZZ: CPT

## 2021-06-15 PROCEDURE — 94010 BREATHING CAPACITY TEST: CPT

## 2021-06-15 PROCEDURE — 99214 OFFICE O/P EST MOD 30 MIN: CPT | Mod: 25

## 2021-06-15 PROCEDURE — 99072 ADDL SUPL MATRL&STAF TM PHE: CPT

## 2021-06-15 NOTE — HISTORY OF PRESENT ILLNESS
[FreeTextEntry1] : Ms. Lemus is a 75 year old female presenting to the office for a follow up visit for acute CHF, allergic rhinitis, asthma, chronic cough, GERD, overweight, MARA and shortness of breath. Her chief complaint is orthopedic issues, bilateral foot drop. \par \par -she notes generally feeling well\par -she notes itchy eyes controlled with eye drops\par -she denies ankle swelling\par -she notes getting enough sleep\par -she note sleeping 7.5 hours a night on average\par - She  notes nightly compliance with CPAP, tolerating well, and benefitting from therapy \par -she notes weight improved with new diet, intentionally loss of weight\par -she notes mild intermittent cough\par -she notes bracing feet following neurologist evaluation for foot drop, with physical therapy pending\par -she denies regular exercise\par \par \par -denies any chest pain, chest pressure, diarrhea, constipation, dysphagia, sour taste in the mouth, dizziness, leg swelling, leg pain, myalgias, arthralgias, itchy eyes, itchy ears, heartburn, or reflux.\par \par

## 2021-06-15 NOTE — PROCEDURE
[FreeTextEntry1] : FENO was pt. unable to complete ; a normal value being less than 25\par Fractional exhaled nitric oxide (FENO) is regarded as a simple, noninvasive method for assessing eosinophilic airway inflammation. Produced by a variety of cells within the lung, nitric oxide (NO) concentrations are generally low in healthy individuals. However, high concentrations of NO appear to be involved in nonspecific host defense mechanisms and chronic inflammatory diseases such as asthma. The American Thoracic Society (ATS) therefore has recommended using FENO to aid in the diagnosis and monitoring of eosinophilic airway inflammation and asthma, and for identifying steroid responsive individuals whose chronic respiratory symptoms may be caused by airway inflammation.\par \par Full PFT revealed moderate restrictive and moderate obstructive dysfunction, with a FEV1 of 0.96 L, which is 50 % of predicted, normal lung volumes, and a moderately reduced diffusion of 10.2 , which is 55% of predicted that corrects for volume, with a normal flow volume loop.

## 2021-06-15 NOTE — ASSESSMENT
[FreeTextEntry1] : Ms. CHENG is a 75 year old female with a history of eosinophilic asthma, severe persistent asthma, allergies,  DM, HTN, OSAS, GERD, who comes into the office today for pulmonary evaluation on fasenra (controlled) her #1 issue is weight / #2 orthopedic (legs/ foot drop)\par \par Problem 1: severe persistent asthma (controlled)\par - Fasenra is started since 9/2020\par -continue Albuterol nebulizer at least BID up to QiD (gargle and spit after use) \par -continue to use Singulair 10 mg before bed\par - continue Symbicort (160) 2 inhalations BID / Tudorza at 1 inhalation BID\par \par -Asthma is  believed to be caused by inherited (genetic) and environmental factor, but its exact cause is unknown. Asthma may be triggered by allergens, lung infections, or irritants in the air. Asthma triggers are different for each person\par -Inhaler technique reviewed as well as oral hygiene techniques reviewed with patient. Avoidance of cold air, extremes of temperature, rescue inhaler should be used before exercise. Order of medication reviewed with patient. Recommended use of a cool mist humidifier in the bedroom.\par \par Problem 1A: Eosinophilic asthma\par - on Fasenra (2) since 9/2020\par -The safety and efficacy of Nucala was established in three double-blind, randomized, placebo-controlled trials in patients with severe asthma. Compared to a placebo, patients with severe asthma receiving Nucala had fewer exacerbation requiring hospitalization and/or emergency department visits, and a longer time to first exacerbation. In addition, patients with severe asthma receiving Nucala or Fasenra experienced greater reductions in their daily maintenance oral corticosteroid dose, while maintaining asthma control compared with patients receiving placebo. Treatment with Nucala did not result in a significant improvement in lung function, as measured by the volume of air exhaled by patients in one second. The most common side effects include: headache, injection site reactions, back pain, weakness, and fatigue; hypersensitivity reactions can occur within hours or days including swelling of the face, mouth, and tongue, fainting, dizziness, hives, breathing problems, and rash; herpes zoster infections have occurred. The drug is a monoclonal antibody that inhibits interleukin-5 which helps regular eosinophils, a type of white blood cell that contributes to asthma. The over-production of eosinophils can cause inflammation in the lungs, increasing the frequency of asthma attacks. Patients must also take other medications, including high dose inhaled corticosteroids and at least one additional asthma drug.\par \par problem 2: allergic rhinitis\par -continue to use Astelin 1 sniff/nostril BID (.15)\par -continue to use Xyzal 5 mg before bed\par -continue to use Claritin 10 mg QAM\par -continue Pataday eye drops \par -Environmental measures for allergies were encouraged including mattress and pillow cover, air purifier, and environmental controls.\par \par problem 3: GERD\par -continue to use omeprazole 40 mg before breakfast\par -continue to use Pepcid 40 mg QHS \par -Rule of 2s: avoid eating too much, eating too late, eating too spicy, eating two hours before bed\par -Things to avoid including overeating, spicy foods, tight clothing, eating within three hours of bed, this list is not all inclusive. \par -For treatment of reflux, possible options discussed including diet control, H2 blockers, PPIs, as well as coating motility agents discussed as treatment options. Timing of meals and proximity of last meal to sleep were discussed. If symptoms persist, a formal gastrointestinal evaluation is needed.\par \par problem 4: OSAS\par -continue to use the CPAP machine, tolerating it well, and seeing the benefits\par \par -Sleep apnea is associated with adverse clinical consequences which an affect most organ systems.  Cardiovascular disease risk includes arrhythmias, atrial fibrillation, hypertension, coronary artery disease, and stroke. Metabolic disorders include diabetes type 2, non-alcoholic fatty liver disease. Mood disorder especially depression; and cognitive decline especially in the elderly. Associations with  chronic reflux/Vicente’s esophagus some but not all inclusive. \par -Reasons to assess this include arousal consistent with hypopnea; respiratory events most prominent in REM sleep or supine position; therefore sleep staging and body position are important for accurate diagnosis and estimation of AHI.\par \par problem 5: obesity (Zach)\par -Weight loss, exercise, and diet control were discussed and are highly encouraged. Treatment options were given such as, aqua therapy, and contacting a nutritionist. Recommended to use the elliptical, stationary bike, less use of treadmill.  Obesity is associated with worsening asthma, shortness of breath, and potential for cardiac disease, diabetes, and other underlying medical conditions.\par \par problem 6: low immunity \par -recommended early intervention \par \par problem 7: muscle cramps/ back pain \par -recommended to use Myocalm PM at bed\par -Topricin cream PRN \par \par Problem 8: Health Maintenance/COVID19 Precautions:\par -s/p Pfizer COVID 19 vaccine x 2\par - Clean your hands often. Wash your hands often with soap and water for at least 20 seconds, especially after blowing your nose, coughing, or sneezing, or having been in a public place.\par - If soap and water are not available, use a hand  that contains at least 60% alcohol.\par - To the extent possible, avoid touching high-touch surfaces in public places - elevator buttons, door handles, handrails, handshaking with people, etc. Use a tissue or your sleeve to cover your hand or finger if you must touch something.\par - Wash your hands after touching surfaces in public places.\par - Avoid touching your face, nose, eyes, etc.\par - Clean and disinfect your home to remove germs: practice routine cleaning of frequently touched surfaces (for example: tables, doorknobs, light switches, handles, desks, toilets, faucets, sinks & cell phones)\par - Avoid crowds, especially in poorly ventilated spaces. Your risk of exposure to respiratory viruses like COVID-19 may increase in crowded, closed-in settings with little air circulation if there are people in the crowd who are sick. All patients are recommended to practice social distancing and stay at least 6 feet away from others.\par - Avoid all non-essential travel including plane trips, and especially avoid embarking on cruise ships.\par -If COVID-19 is spreading in your community, take extra measures to put distance between yourself and other people to further reduce your risk of being exposed to this new virus.\par -Stay home as much as possible.\par - Consider ways of getting food brought to your house through family, social, or commercial networks\par -Be aware that the virus has been known to live in the air up to 3 hours post exposure, cardboard up to 24 hours post exposure, copper up to 4 hours post exposure, steel and plastic up to 2-3 days post exposure. Risk of transmission from these surfaces are affected by many variables.\par Immune Support Recommendations:\par -OTC Vitamin C 500mg BID \par -OTC Quercetin 250-500mg BID \par -OTC Zinc 75-100mg per day \par -OTC Melatonin 1 or 2 mg a night \par -OTC Vitamin D 1-4000mg per day \par -OTC Tonic Water 8oz per day\par Asthma and COVID19:\par You need to make sure your asthma is under control. This often requires the use of inhaled corticosteroids (and sometimes oral corticosteroids). Inhaled corticosteroids do not likely reduce your immune system’s ability to fight infections, but oral corticosteroids may. It is important to use the steps above to protect yourself to limit your exposure to any respiratory virus.\par \par Problem 9 : health maintenance\par -s/p influenza vaccine - 2020\par -recommended strep pneumonia vaccines after age 65: Prevnar-13 vaccine, followed by Pneumo vaccine 23 one year following\par -recommended early intervention for URIs\par -recommended regular osteoporosis evaluations\par -recommended early dermatological evaluations\par -recommended after the age of 50 to the age of 70, colonoscopy every 5 years \par \par F/U in 4 months\par She is encouraged to call with any changes, concerns, or questions

## 2021-06-15 NOTE — PHYSICAL EXAM

## 2021-06-15 NOTE — ADDENDUM
[FreeTextEntry1] : Documented by Alejandro Alan acting as a scribe for Dr. Aldo Gonzales on 06/15/2021.\par \par All medical record entries made by the Scribe were at my, Dr. Aldo Gonzales's, direction and personally dictated by me on 06/15/2021 . I have reviewed the chart and agree that the record accurately reflects my personal performance of the history, physical exam, assessment and plan. I have also personally directed, reviewed, and agree with the discharge instructions. \par

## 2021-06-16 ENCOUNTER — APPOINTMENT (OUTPATIENT)
Dept: ULTRASOUND IMAGING | Facility: IMAGING CENTER | Age: 76
End: 2021-06-16
Payer: MEDICARE

## 2021-06-16 ENCOUNTER — APPOINTMENT (OUTPATIENT)
Dept: MAMMOGRAPHY | Facility: IMAGING CENTER | Age: 76
End: 2021-06-16
Payer: MEDICARE

## 2021-06-16 ENCOUNTER — OUTPATIENT (OUTPATIENT)
Dept: OUTPATIENT SERVICES | Facility: HOSPITAL | Age: 76
LOS: 1 days | End: 2021-06-16
Payer: MEDICARE

## 2021-06-16 ENCOUNTER — APPOINTMENT (OUTPATIENT)
Dept: RADIOLOGY | Facility: IMAGING CENTER | Age: 76
End: 2021-06-16
Payer: MEDICARE

## 2021-06-16 DIAGNOSIS — Z00.8 ENCOUNTER FOR OTHER GENERAL EXAMINATION: ICD-10-CM

## 2021-06-16 PROCEDURE — 77080 DXA BONE DENSITY AXIAL: CPT | Mod: 26

## 2021-06-16 PROCEDURE — 77067 SCR MAMMO BI INCL CAD: CPT | Mod: 26

## 2021-06-16 PROCEDURE — 77063 BREAST TOMOSYNTHESIS BI: CPT | Mod: 26

## 2021-06-16 PROCEDURE — 77067 SCR MAMMO BI INCL CAD: CPT

## 2021-06-16 PROCEDURE — 76641 ULTRASOUND BREAST COMPLETE: CPT | Mod: 26,50

## 2021-06-16 PROCEDURE — 77080 DXA BONE DENSITY AXIAL: CPT

## 2021-06-16 PROCEDURE — 76641 ULTRASOUND BREAST COMPLETE: CPT

## 2021-06-16 PROCEDURE — 77063 BREAST TOMOSYNTHESIS BI: CPT

## 2021-10-19 ENCOUNTER — APPOINTMENT (OUTPATIENT)
Dept: PULMONOLOGY | Facility: CLINIC | Age: 76
End: 2021-10-19
Payer: MEDICARE

## 2021-10-19 VITALS
OXYGEN SATURATION: 97 % | BODY MASS INDEX: 34.52 KG/M2 | RESPIRATION RATE: 16 BRPM | WEIGHT: 160 LBS | TEMPERATURE: 97.4 F | HEART RATE: 58 BPM | HEIGHT: 57 IN | SYSTOLIC BLOOD PRESSURE: 140 MMHG | DIASTOLIC BLOOD PRESSURE: 80 MMHG

## 2021-10-19 PROCEDURE — 99214 OFFICE O/P EST MOD 30 MIN: CPT | Mod: 25

## 2021-10-19 PROCEDURE — G0008: CPT

## 2021-10-19 PROCEDURE — 90662 IIV NO PRSV INCREASED AG IM: CPT

## 2021-10-19 NOTE — PHYSICAL EXAM
[No Acute Distress] : no acute distress [Normal Oropharynx] : normal oropharynx [III] : Mallampati Class: III [Normal Appearance] : normal appearance [No Neck Mass] : no neck mass [Normal Rate/Rhythm] : normal rate/rhythm [Normal S1, S2] : normal s1, s2 [No Murmurs] : no murmurs [No Resp Distress] : no resp distress [Clear to Auscultation Bilaterally] : clear to auscultation bilaterally [No Abnormalities] : no abnormalities [Benign] : benign [Normal Gait] : normal gait [No Clubbing] : no clubbing [No Cyanosis] : no cyanosis [No Edema] : no edema [FROM] : FROM [Normal Color/ Pigmentation] : normal color/ pigmentation [No Focal Deficits] : no focal deficits [Oriented x3] : oriented x3 [Normal Affect] : normal affect [TextBox_2] : OW, braces on legs [TextBox_68] : I:E 1:3; Clear  [TextBox_105] : b

## 2021-10-19 NOTE — ADDENDUM
[FreeTextEntry1] : Documented by Jared Pacheco acting as a scribe for Dr. Aldo Gonzales on (10/19/2021).\par \par All medical record entries made by the Scribe were at my, Dr. Aldo Gonzales's, direction and personally dictated by me on (10/19/2021). I have reviewed the chart and agree that the record accurately reflects my personal performance of the history, physical exam, assessment and plan. I have also personally directed, reviewed, and agree with the discharge instructions.\par

## 2021-10-19 NOTE — ASSESSMENT
[FreeTextEntry1] : Ms. CHENG is a 75 year old female with a history of eosinophilic asthma, severe persistent asthma, allergies,  DM, HTN, OSAS, GERD, who comes into the office today for pulmonary evaluation on fasenra 9/2020 (controlled) her #1 issue is weight / #2 orthopedic (legs/ foot drop) (still)\par \par Problem 1: severe persistent asthma (controlled)\par - Fasenra is started since 9/2020\par -continue Albuterol nebulizer at least BID up to QiD (gargle and spit after use) \par -continue to use Singulair 10 mg before bed\par - continue Symbicort (160) 2 inhalations BID / Tudorza at 1 inhalation BID\par \par -Asthma is  believed to be caused by inherited (genetic) and environmental factor, but its exact cause is unknown. Asthma may be triggered by allergens, lung infections, or irritants in the air. Asthma triggers are different for each person\par -Inhaler technique reviewed as well as oral hygiene techniques reviewed with patient. Avoidance of cold air, extremes of temperature, rescue inhaler should be used before exercise. Order of medication reviewed with patient. Recommended use of a cool mist humidifier in the bedroom.\par \par Problem 1A: Eosinophilic asthma\par - on Fasenra (2) since 9/2020\par -The safety and efficacy of Nucala was established in three double-blind, randomized, placebo-controlled trials in patients with severe asthma. Compared to a placebo, patients with severe asthma receiving Nucala had fewer exacerbation requiring hospitalization and/or emergency department visits, and a longer time to first exacerbation. In addition, patients with severe asthma receiving Nucala or Fasenra experienced greater reductions in their daily maintenance oral corticosteroid dose, while maintaining asthma control compared with patients receiving placebo. Treatment with Nucala did not result in a significant improvement in lung function, as measured by the volume of air exhaled by patients in one second. The most common side effects include: headache, injection site reactions, back pain, weakness, and fatigue; hypersensitivity reactions can occur within hours or days including swelling of the face, mouth, and tongue, fainting, dizziness, hives, breathing problems, and rash; herpes zoster infections have occurred. The drug is a monoclonal antibody that inhibits interleukin-5 which helps regular eosinophils, a type of white blood cell that contributes to asthma. The over-production of eosinophils can cause inflammation in the lungs, increasing the frequency of asthma attacks. Patients must also take other medications, including high dose inhaled corticosteroids and at least one additional asthma drug.\par \par problem 2: allergic rhinitis\par -continue to use Astelin 1 sniff/nostril BID (.15)\par -continue to use Xyzal 5 mg before bed\par -continue to use Claritin 10 mg QAM\par -continue Pataday eye drops \par -Environmental measures for allergies were encouraged including mattress and pillow cover, air purifier, and environmental controls.\par \par problem 3: GERD\par -continue to use omeprazole 40 mg before breakfast\par -continue to use Pepcid 40 mg QHS \par -Rule of 2s: avoid eating too much, eating too late, eating too spicy, eating two hours before bed\par -Things to avoid including overeating, spicy foods, tight clothing, eating within three hours of bed, this list is not all inclusive. \par -For treatment of reflux, possible options discussed including diet control, H2 blockers, PPIs, as well as coating motility agents discussed as treatment options. Timing of meals and proximity of last meal to sleep were discussed. If symptoms persist, a formal gastrointestinal evaluation is needed.\par \par problem 4: OSAS\par -continue to use the CPAP machine, tolerating it well, and seeing the benefits\par \par -Sleep apnea is associated with adverse clinical consequences which an affect most organ systems.  Cardiovascular disease risk includes arrhythmias, atrial fibrillation, hypertension, coronary artery disease, and stroke. Metabolic disorders include diabetes type 2, non-alcoholic fatty liver disease. Mood disorder especially depression; and cognitive decline especially in the elderly. Associations with  chronic reflux/Vicente’s esophagus some but not all inclusive. \par -Reasons to assess this include arousal consistent with hypopnea; respiratory events most prominent in REM sleep or supine position; therefore sleep staging and body position are important for accurate diagnosis and estimation of AHI.\par \par problem 5: obesity (Zach)\par -Recommend "Muniq" OTC Supplement \par -Weight loss, exercise, and diet control were discussed and are highly encouraged. Treatment options were given such as, aqua therapy, and contacting a nutritionist. Recommended to use the elliptical, stationary bike, less use of treadmill.  Obesity is associated with worsening asthma, shortness of breath, and potential for cardiac disease, diabetes, and other underlying medical conditions.\par \par problem 6: low immunity \par -recommended early intervention \par \par problem 7: muscle cramps/ back pain \par -recommended to use Myocalm PM at bed\par -Topricin cream PRN \par \par Problem 8: Health Maintenance/COVID19 Precautions:\par -s/p Pfizer COVID 19 vaccine x 2\par - Clean your hands often. Wash your hands often with soap and water for at least 20 seconds, especially after blowing your nose, coughing, or sneezing, or having been in a public place.\par - If soap and water are not available, use a hand  that contains at least 60% alcohol.\par - To the extent possible, avoid touching high-touch surfaces in public places - elevator buttons, door handles, handrails, handshaking with people, etc. Use a tissue or your sleeve to cover your hand or finger if you must touch something.\par - Wash your hands after touching surfaces in public places.\par - Avoid touching your face, nose, eyes, etc.\par - Clean and disinfect your home to remove germs: practice routine cleaning of frequently touched surfaces (for example: tables, doorknobs, light switches, handles, desks, toilets, faucets, sinks & cell phones)\par - Avoid crowds, especially in poorly ventilated spaces. Your risk of exposure to respiratory viruses like COVID-19 may increase in crowded, closed-in settings with little air circulation if there are people in the crowd who are sick. All patients are recommended to practice social distancing and stay at least 6 feet away from others.\par - Avoid all non-essential travel including plane trips, and especially avoid embarking on cruise ships.\par -If COVID-19 is spreading in your community, take extra measures to put distance between yourself and other people to further reduce your risk of being exposed to this new virus.\par -Stay home as much as possible.\par - Consider ways of getting food brought to your house through family, social, or commercial networks\par -Be aware that the virus has been known to live in the air up to 3 hours post exposure, cardboard up to 24 hours post exposure, copper up to 4 hours post exposure, steel and plastic up to 2-3 days post exposure. Risk of transmission from these surfaces are affected by many variables.\par Immune Support Recommendations:\par -OTC Vitamin C 500mg BID \par -OTC Quercetin 250-500mg BID \par -OTC Zinc 75-100mg per day \par -OTC Melatonin 1 or 2 mg a night \par -OTC Vitamin D 1-4000mg per day \par -OTC Tonic Water 8oz per day\par Asthma and COVID19:\par You need to make sure your asthma is under control. This often requires the use of inhaled corticosteroids (and sometimes oral corticosteroids). Inhaled corticosteroids do not likely reduce your immune system’s ability to fight infections, but oral corticosteroids may. It is important to use the steps above to protect yourself to limit your exposure to any respiratory virus.\par \par Problem 9 : health maintenance\par -s/p influenza vaccine - 2021\par -recommended strep pneumonia vaccines after age 65: Prevnar-13 vaccine, followed by Pneumo vaccine 23 one year following\par -recommended early intervention for URIs\par -recommended regular osteoporosis evaluations\par -recommended early dermatological evaluations\par -recommended after the age of 50 to the age of 70, colonoscopy every 5 years \par \par F/U in 4 months\par She is encouraged to call with any changes, concerns, or questions

## 2021-10-19 NOTE — HISTORY OF PRESENT ILLNESS
[FreeTextEntry1] : Ms. Lemus is a 75 year old female presenting to the office for a follow up visit for acute CHF, allergic rhinitis, asthma, chronic cough, GERD, overweight, MARA and shortness of breath. Her chief complaint is orthopedic issues, bilateral foot drop. \par -she notes her energy level is fair\par -she notes some issues with her legs\par -she notes dizziness once in a while\par -she notes upcoming cataract surgery\par -she notes getting 7 hours of sleep per night\par -she notes using her CPAP\par -she notes she is receiving her Fasenra injections\par -she notes using Singulair every night\par -no new medications, vitamins, or supplements \par -she notes losing some weight recently by cutting down on carbs\par \par patient denies any headaches, nausea, vomiting, fever, chills, sweats, chest pain, chest pressure, palpitations, coughing, wheezing, fatigue, diarrhea, constipation, dysphagia, myalgias, leg swelling, leg pain, itchy eyes, itchy ears, heartburn, reflux or sour taste in the mouth

## 2022-02-07 ENCOUNTER — RX RENEWAL (OUTPATIENT)
Age: 77
End: 2022-02-07

## 2022-03-21 ENCOUNTER — APPOINTMENT (OUTPATIENT)
Dept: PULMONOLOGY | Facility: CLINIC | Age: 77
End: 2022-03-21
Payer: MEDICARE

## 2022-03-21 VITALS
HEART RATE: 58 BPM | WEIGHT: 158 LBS | RESPIRATION RATE: 16 BRPM | DIASTOLIC BLOOD PRESSURE: 70 MMHG | HEIGHT: 57 IN | SYSTOLIC BLOOD PRESSURE: 130 MMHG | OXYGEN SATURATION: 95 % | BODY MASS INDEX: 34.09 KG/M2 | TEMPERATURE: 97.6 F

## 2022-03-21 PROCEDURE — 99213 OFFICE O/P EST LOW 20 MIN: CPT | Mod: 25

## 2022-03-21 PROCEDURE — 71046 X-RAY EXAM CHEST 2 VIEWS: CPT

## 2022-03-21 NOTE — PHYSICAL EXAM
[Normal Oropharynx] : normal oropharynx [No Acute Distress] : no acute distress [III] : Mallampati Class: III [Normal Appearance] : normal appearance [No Neck Mass] : no neck mass [Normal Rate/Rhythm] : normal rate/rhythm [Normal S1, S2] : normal s1, s2 [No Murmurs] : no murmurs [No Resp Distress] : no resp distress [Clear to Auscultation Bilaterally] : clear to auscultation bilaterally [No Abnormalities] : no abnormalities [Benign] : benign [Normal Gait] : normal gait [No Clubbing] : no clubbing [No Cyanosis] : no cyanosis [No Edema] : no edema [FROM] : FROM [Normal Color/ Pigmentation] : normal color/ pigmentation [No Focal Deficits] : no focal deficits [Oriented x3] : oriented x3 [Normal Affect] : normal affect [TextBox_2] : OW, braces on legs [TextBox_68] : I:E 1:3; Clear

## 2022-03-21 NOTE — ASSESSMENT
[FreeTextEntry1] : Ms. CHENG is a 76 year old female with a history of eosinophilic asthma, severe persistent asthma, allergies,  DM, HTN, OSAS, GERD, who comes into the office today for pulmonary evaluation on fasenra 9/2020 (controlled) her #1 issue is weight / #2 orthopedic (legs/ foot drop) (still); followed by R chest pain s/p fall 3/2022\par \par Problem 1B R chest pain c/w contusion \par -recommend salon pas\par -recommend Topercin cream\par -continue Tynelol PM \par \par Problem 1: severe persistent asthma (controlled)\par - Fasenra is started since 9/2020\par -continue Albuterol nebulizer at least BID up to QiD (gargle and spit after use) \par -continue to use Singulair 10 mg before bed\par - continue Symbicort (160) 2 inhalations BID / Tudorza at 1 inhalation BID\par \par -Asthma is  believed to be caused by inherited (genetic) and environmental factor, but its exact cause is unknown. Asthma may be triggered by allergens, lung infections, or irritants in the air. Asthma triggers are different for each person\par -Inhaler technique reviewed as well as oral hygiene techniques reviewed with patient. Avoidance of cold air, extremes of temperature, rescue inhaler should be used before exercise. Order of medication reviewed with patient. Recommended use of a cool mist humidifier in the bedroom.\par \par Problem 1A: Eosinophilic asthma\par - on Fasenra (2) since 9/2020\par -The safety and efficacy of Nucala was established in three double-blind, randomized, placebo-controlled trials in patients with severe asthma. Compared to a placebo, patients with severe asthma receiving Nucala had fewer exacerbation requiring hospitalization and/or emergency department visits, and a longer time to first exacerbation. In addition, patients with severe asthma receiving Nucala or Fasenra experienced greater reductions in their daily maintenance oral corticosteroid dose, while maintaining asthma control compared with patients receiving placebo. Treatment with Nucala did not result in a significant improvement in lung function, as measured by the volume of air exhaled by patients in one second. The most common side effects include: headache, injection site reactions, back pain, weakness, and fatigue; hypersensitivity reactions can occur within hours or days including swelling of the face, mouth, and tongue, fainting, dizziness, hives, breathing problems, and rash; herpes zoster infections have occurred. The drug is a monoclonal antibody that inhibits interleukin-5 which helps regular eosinophils, a type of white blood cell that contributes to asthma. The over-production of eosinophils can cause inflammation in the lungs, increasing the frequency of asthma attacks. Patients must also take other medications, including high dose inhaled corticosteroids and at least one additional asthma drug.\par \par problem 2: allergic rhinitis\par -continue to use Astelin 1 sniff/nostril BID (.15)\par -continue to use Xyzal 5 mg before bed\par -continue to use Claritin 10 mg QAM\par -continue Pataday eye drops \par -Environmental measures for allergies were encouraged including mattress and pillow cover, air purifier, and environmental controls.\par \par problem 3: GERD\par -continue to use omeprazole 40 mg before breakfast\par -continue to use Pepcid 40 mg QHS \par -Rule of 2s: avoid eating too much, eating too late, eating too spicy, eating two hours before bed\par -Things to avoid including overeating, spicy foods, tight clothing, eating within three hours of bed, this list is not all inclusive. \par -For treatment of reflux, possible options discussed including diet control, H2 blockers, PPIs, as well as coating motility agents discussed as treatment options. Timing of meals and proximity of last meal to sleep were discussed. If symptoms persist, a formal gastrointestinal evaluation is needed.\par \par problem 4: OSAS\par -continue to use the CPAP machine, tolerating it well, and seeing the benefits- Williams Alanis\par \par -Sleep apnea is associated with adverse clinical consequences which an affect most organ systems.  Cardiovascular disease risk includes arrhythmias, atrial fibrillation, hypertension, coronary artery disease, and stroke. Metabolic disorders include diabetes type 2, non-alcoholic fatty liver disease. Mood disorder especially depression; and cognitive decline especially in the elderly. Associations with  chronic reflux/Vicente’s esophagus some but not all inclusive. \par -Reasons to assess this include arousal consistent with hypopnea; respiratory events most prominent in REM sleep or supine position; therefore sleep staging and body position are important for accurate diagnosis and estimation of AHI.\par \par problem 5: obesity (Zach)\par -Recommend "Muniq" OTC Supplement \par -Weight loss, exercise, and diet control were discussed and are highly encouraged. Treatment options were given such as, aqua therapy, and contacting a nutritionist. Recommended to use the elliptical, stationary bike, less use of treadmill.  Obesity is associated with worsening asthma, shortness of breath, and potential for cardiac disease, diabetes, and other underlying medical conditions.\par \par problem 6: low immunity \par -recommended early intervention \par \par problem 7: muscle cramps/ back pain \par -recommended to use Myocalm PM at bed\par -Topricin cream PRN \par \par Problem 8: Health Maintenance/COVID19 Precautions:\par -s/p Pfizer COVID 19 vaccine x 2\par - Clean your hands often. Wash your hands often with soap and water for at least 20 seconds, especially after blowing your nose, coughing, or sneezing, or having been in a public place.\par - If soap and water are not available, use a hand  that contains at least 60% alcohol.\par - To the extent possible, avoid touching high-touch surfaces in public places - elevator buttons, door handles, handrails, handshaking with people, etc. Use a tissue or your sleeve to cover your hand or finger if you must touch something.\par - Wash your hands after touching surfaces in public places.\par - Avoid touching your face, nose, eyes, etc.\par - Clean and disinfect your home to remove germs: practice routine cleaning of frequently touched surfaces (for example: tables, doorknobs, light switches, handles, desks, toilets, faucets, sinks & cell phones)\par - Avoid crowds, especially in poorly ventilated spaces. Your risk of exposure to respiratory viruses like COVID-19 may increase in crowded, closed-in settings with little air circulation if there are people in the crowd who are sick. All patients are recommended to practice social distancing and stay at least 6 feet away from others.\par - Avoid all non-essential travel including plane trips, and especially avoid embarking on cruise ships.\par -If COVID-19 is spreading in your community, take extra measures to put distance between yourself and other people to further reduce your risk of being exposed to this new virus.\par -Stay home as much as possible.\par - Consider ways of getting food brought to your house through family, social, or commercial networks\par -Be aware that the virus has been known to live in the air up to 3 hours post exposure, cardboard up to 24 hours post exposure, copper up to 4 hours post exposure, steel and plastic up to 2-3 days post exposure. Risk of transmission from these surfaces are affected by many variables.\par Immune Support Recommendations:\par -OTC Vitamin C 500mg BID \par -OTC Quercetin 250-500mg BID \par -OTC Zinc 75-100mg per day \par -OTC Melatonin 1 or 2 mg a night \par -OTC Vitamin D 1-4000mg per day \par -OTC Tonic Water 8oz per day\par Asthma and COVID19:\par You need to make sure your asthma is under control. This often requires the use of inhaled corticosteroids (and sometimes oral corticosteroids). Inhaled corticosteroids do not likely reduce your immune system’s ability to fight infections, but oral corticosteroids may. It is important to use the steps above to protect yourself to limit your exposure to any respiratory virus.\par \par Problem 9 : health maintenance\par -s/p influenza vaccine - 2021\par -recommended strep pneumonia vaccines after age 65: Prevnar-13 vaccine, followed by Pneumo vaccine 23 one year following\par -recommended early intervention for URIs\par -recommended regular osteoporosis evaluations\par -recommended early dermatological evaluations\par -recommended after the age of 50 to the age of 70, colonoscopy every 5 years \par \par F/U in 4 months\par She is encouraged to call with any changes, concerns, or questions

## 2022-03-21 NOTE — HISTORY OF PRESENT ILLNESS
[FreeTextEntry1] : Ms. Lemus is a 75 year old female presenting to the office for a follow up visit for acute CHF, allergic rhinitis, asthma, chronic cough, GERD, overweight, MARA and shortness of breath. Her chief complaint is orthopedic issues, bilateral foot drop. \par \par -she notes reproducible right chest discomfort \par -s/p right sided trauma due to fall \par -she notes right sided pain onset 1 day \par -she notes energy level is fair\par -She notes that bowels are regular \par -she notes use of CPAP with no limitations\par -he notes mild walking for exercise \par -he notes allergies have improved \par -he notes cough has improved but pain when coughing is persistent \par -she notes a1c is stable and controlled \par -she notes asthma has been stable and quiet \par -she notes use of Tylenol for right sided abdominal pain \par \par -denies any visual issues, headaches, nausea, vomiting, fever, chills, sweats, chest pain, chest pressure, diarrhea, constipation, dysphagia, dizziness, leg swelling, leg pain, itchy eyes, itchy ears, heartburn, reflux, or sour taste in the mouth.

## 2022-03-21 NOTE — PROCEDURE
[FreeTextEntry1] : CXR reveals mild cardiomegaly ; no evidence of infiltrate or effusion or rib fracture

## 2022-03-21 NOTE — ADDENDUM
[FreeTextEntry1] : Documented by Ayde Coleman acting as a scribe for Dr. Aldo Gonzales on 03/21/2022 \par \par All medical record entries made by the Scribe were at my, Dr. Aldo Gonzales's, direction and personally dictated by me on 03/21/2022 . I have reviewed the chart and agree that the record accurately reflects my personal performance of the history, physical exam, assessment and plan. I have also personally directed, reviewed, and agree with the discharge instructions \par

## 2022-04-02 ENCOUNTER — APPOINTMENT (OUTPATIENT)
Dept: RADIOLOGY | Facility: IMAGING CENTER | Age: 77
End: 2022-04-02
Payer: MEDICARE

## 2022-04-02 ENCOUNTER — APPOINTMENT (OUTPATIENT)
Dept: ULTRASOUND IMAGING | Facility: IMAGING CENTER | Age: 77
End: 2022-04-02
Payer: MEDICARE

## 2022-04-02 ENCOUNTER — OUTPATIENT (OUTPATIENT)
Dept: OUTPATIENT SERVICES | Facility: HOSPITAL | Age: 77
LOS: 1 days | End: 2022-04-02
Payer: MEDICARE

## 2022-04-02 DIAGNOSIS — Z00.8 ENCOUNTER FOR OTHER GENERAL EXAMINATION: ICD-10-CM

## 2022-04-02 PROCEDURE — 76700 US EXAM ABDOM COMPLETE: CPT | Mod: 26

## 2022-04-02 PROCEDURE — 71110 X-RAY EXAM RIBS BIL 3 VIEWS: CPT | Mod: 26

## 2022-04-02 PROCEDURE — 71110 X-RAY EXAM RIBS BIL 3 VIEWS: CPT

## 2022-04-02 PROCEDURE — 76700 US EXAM ABDOM COMPLETE: CPT

## 2022-08-03 ENCOUNTER — APPOINTMENT (OUTPATIENT)
Dept: PULMONOLOGY | Facility: CLINIC | Age: 77
End: 2022-08-03

## 2022-08-22 ENCOUNTER — NON-APPOINTMENT (OUTPATIENT)
Age: 77
End: 2022-08-22

## 2022-11-10 ENCOUNTER — APPOINTMENT (OUTPATIENT)
Dept: ORTHOPEDIC SURGERY | Facility: CLINIC | Age: 77
End: 2022-11-10

## 2022-11-10 VITALS — WEIGHT: 155 LBS | HEIGHT: 58 IN | BODY MASS INDEX: 32.54 KG/M2

## 2022-11-10 DIAGNOSIS — S92.902A UNSPECIFIED FRACTURE OF LEFT FOOT, INITIAL ENCOUNTER FOR CLOSED FRACTURE: ICD-10-CM

## 2022-11-10 PROCEDURE — 99203 OFFICE O/P NEW LOW 30 MIN: CPT

## 2022-11-10 NOTE — DISCUSSION/SUMMARY
[de-identified] : The underlying pathophysiology was reviewed with the patient. XR films were reviewed with the patient. Discussed at length the nature of the patient’s condition. The right foot symptoms appear secondary to fifth metatarsal fracture.\par \par At this time, given the nature of the injury, I have recommended nonoperative treatment. She will continue with use of a post-op rigid soled shoe. SHe may WBAT with the assistance of a walker. I recommended elevation of the lower extremity to reduce edema.\par \par All questions answered, understanding verbalized. Patient in agreement with plan of care. Follow up in 6 weeks for repeat xrays.

## 2022-11-10 NOTE — HISTORY OF PRESENT ILLNESS
[de-identified] : Pt is a 77 y/o female with right foot injury.  She was standing in her kitchen 2 days ago on 11/8/22, and she had a spasm in her right leg which caused her to invert her foot.  She fell to the floor and had severe pain.  She went to [facility] where xrays revealed a fifth metatarsal avulsion fracture.  The foot was wrapped and she was given a walking shoe.  It is difficult for her to walk because she has a history of bilateral foot drop.

## 2022-11-10 NOTE — PHYSICAL EXAM
[de-identified] : Patient is WDWN, alert, and in no acute distress. Breathing is unlabored. She is grossly oriented to person, place, and time.\par \par She is accompanied by her daughter today.\par \par Right Foot:\par She presents to the office, WBAT with the assistance of a walker.\par Post-op shoe in place.\par There is tenderness at the base of the fifth metatarsal in the region of the fracture.\par Edema noted.\par She has full sensation noted to the toes.  [de-identified] : Imaging (3 views) from [facility] of the RIGHT foot obtained on 11/8/22. The imaging demonstrated a fracture at the distal aspect of the fifth metatarsal.

## 2022-11-10 NOTE — DISCUSSION/SUMMARY
[de-identified] : The underlying pathophysiology was reviewed with the patient. XR films were reviewed with the patient. Discussed at length the nature of the patient’s condition. The right foot symptoms appear secondary to fifth metatarsal fracture.\par \par At this time, given the nature of the injury, I have recommended nonoperative treatment. She will continue with use of a post-op rigid soled shoe. SHe may WBAT with the assistance of a walker. I recommended elevation of the lower extremity to reduce edema.\par \par All questions answered, understanding verbalized. Patient in agreement with plan of care. Follow up in 6 weeks for repeat xrays.

## 2022-11-10 NOTE — END OF VISIT
[FreeTextEntry3] : All medical record entries made by the Scribe were at my,  Dr. Berto Chandra MD., direction and personally dictated by me on 11/10/2022. I have personally reviewed the chart and agree that the record accurately reflects my personal performance of the history, physical exam, assessment and plan.

## 2022-11-10 NOTE — HISTORY OF PRESENT ILLNESS
[de-identified] : Pt is a 75 y/o female with right foot injury.  She was standing in her kitchen 2 days ago on 11/8/22, and she had a spasm in her right leg which caused her to invert her foot.  She fell to the floor and had severe pain.  She went to [facility] where xrays revealed a fifth metatarsal avulsion fracture.  The foot was wrapped and she was given a walking shoe.  It is difficult for her to walk because she has a history of bilateral foot drop.

## 2022-11-10 NOTE — ADDENDUM
[FreeTextEntry1] : I, Diana Gonsalez wrote this note acting as a scribe for Dr. Berto Chandra on Nov 10, 2022.

## 2022-11-10 NOTE — PHYSICAL EXAM
[de-identified] : Patient is WDWN, alert, and in no acute distress. Breathing is unlabored. She is grossly oriented to person, place, and time.\par \par She is accompanied by her daughter today.\par \par Right Foot:\par She presents to the office, WBAT with the assistance of a walker.\par Post-op shoe in place.\par There is tenderness at the base of the fifth metatarsal in the region of the fracture.\par Edema noted.\par She has full sensation noted to the toes.  [de-identified] : Imaging (3 views) from [facility] of the RIGHT foot obtained on 11/8/22. The imaging demonstrated a fracture at the distal aspect of the fifth metatarsal.

## 2022-11-28 ENCOUNTER — APPOINTMENT (OUTPATIENT)
Dept: PULMONOLOGY | Facility: CLINIC | Age: 77
End: 2022-11-28

## 2022-11-28 VITALS
SYSTOLIC BLOOD PRESSURE: 124 MMHG | TEMPERATURE: 97.2 F | HEART RATE: 53 BPM | HEIGHT: 58 IN | WEIGHT: 160 LBS | RESPIRATION RATE: 16 BRPM | OXYGEN SATURATION: 97 % | BODY MASS INDEX: 33.58 KG/M2 | DIASTOLIC BLOOD PRESSURE: 80 MMHG

## 2022-11-28 DIAGNOSIS — U07.1 COVID-19: ICD-10-CM

## 2022-11-28 PROCEDURE — 94729 DIFFUSING CAPACITY: CPT

## 2022-11-28 PROCEDURE — 99214 OFFICE O/P EST MOD 30 MIN: CPT | Mod: 25

## 2022-11-28 PROCEDURE — 94727 GAS DIL/WSHOT DETER LNG VOL: CPT

## 2022-11-28 PROCEDURE — 94010 BREATHING CAPACITY TEST: CPT

## 2022-11-28 RX ORDER — ATORVASTATIN CALCIUM 10 MG/1
10 TABLET, FILM COATED ORAL
Qty: 90 | Refills: 0 | Status: ACTIVE | COMMUNITY
Start: 2022-07-01

## 2022-11-28 NOTE — ADDENDUM
[FreeTextEntry1] : Documented by Hood Ross acting as a scribe for Dr. Aldo Gonzales on 11/28/2022 .\par \par All medical record entries made by the Scribe were at my, Dr. Aldo Gonzales's, direction and personally dictated by me on. I have reviewed the chart and agree that the record accurately reflects my personal performance of the history, physical exam, assessment and plan. I have also personally directed, reviewed, and agree with the discharge instructions.

## 2022-11-28 NOTE — PROCEDURE
[FreeTextEntry1] : Full PFT revealed mild restrictive and moderate obstructive dysfunction,  normal flows, with a FEV1 of 1.02L,which is 56% of predicted,  normal lung volumes, and a mild decreased diffusion of 11, which is 63% of predicted with a normal flow volume loop \par \par FENO was   unable to perform    ; a normal value being less than 25\par Fractional exhaled nitric oxide (FENO) is regarded as a simple, noninvasive method for assessing eosinophilic airway inflammation. Produced by a variety of cells within the lung, nitric oxide (NO) concentrations are generally low in healthy individuals. However, high concentrations of NO appear to be involved in nonspecific host defense mechanisms and chronic inflammatory diseases such as asthma. The American Thoracic Society (ATS) therefore has recommended using FENO to aid in the diagnosis and monitoring of eosinophilic airway inflammation and asthma, and for identifying steroid responsive individuals whose chronic respiratory symptoms may be caused by airway inflammation.

## 2022-11-28 NOTE — HISTORY OF PRESENT ILLNESS
[FreeTextEntry1] : Ms. Lemus is a 76 year old female presenting to the office for a follow up visit for acute CHF, allergic rhinitis, asthma, chronic cough, GERD, overweight, MARA and shortness of breath. Her chief complaint is orthopedic issues, bilateral foot drop. \par \par -she notes having covid-19 8/2022 and had paxlovid \par -she notes slight cough for 2-3 weeks following covid-19 and is now better \par -she notes sleeping 6 1/2 hours \par -she notes stable vision \par -she notes possibly fracturing her finger \par -she notes foot went into a spasm and inverted and folded in on itself \par patient denies any headaches, nausea, vomiting, fever, chills, sweats, chest pain, chest pressure, palpitations, coughing, wheezing, fatigue, diarrhea, constipation, dysphagia, myalgias, dizziness, leg swelling, leg pain, itchy eyes, itchy ears, heartburn, reflux or sour taste in the mouth

## 2022-11-28 NOTE — ASSESSMENT
[FreeTextEntry1] : Ms. CHENG is a 76 year old female with a history of eosinophilic asthma, severe persistent asthma, allergies,  DM, HTN, OSAS, GERD, who comes into the office today for pulmonary evaluation on fasenra 9/2020 (controlled) her #1 issue is weight / #2 orthopedic (legs/ foot drop) (still); s/p by R chest pain s/p fall 3/2022 c/w rib fx- s/p covid-19 8/2022- ortho issues \par \par Problem 1B R chest pain c/w fx 3/2022 \par -recommend salon pas\par -recommend Topercin cream\par -continue Tynelol PM \par \par Problem 1: severe persistent asthma (controlled)\par - Fasenra is started since 9/2020\par -continue Albuterol nebulizer at least BID up to QiD (gargle and spit after use) \par -continue to use Singulair 10 mg before bed\par - continue Symbicort (160) 2 inhalations BID / Tudorza at 1 inhalation BID\par \par -Asthma is  believed to be caused by inherited (genetic) and environmental factor, but its exact cause is unknown. Asthma may be triggered by allergens, lung infections, or irritants in the air. Asthma triggers are different for each person\par -Inhaler technique reviewed as well as oral hygiene techniques reviewed with patient. Avoidance of cold air, extremes of temperature, rescue inhaler should be used before exercise. Order of medication reviewed with patient. Recommended use of a cool mist humidifier in the bedroom.\par \par Problem 1A: Eosinophilic asthma\par - on Fasenra (2) since 9/2020\par -The safety and efficacy of Nucala was established in three double-blind, randomized, placebo-controlled trials in patients with severe asthma. Compared to a placebo, patients with severe asthma receiving Nucala had fewer exacerbation requiring hospitalization and/or emergency department visits, and a longer time to first exacerbation. In addition, patients with severe asthma receiving Nucala or Fasenra experienced greater reductions in their daily maintenance oral corticosteroid dose, while maintaining asthma control compared with patients receiving placebo. Treatment with Nucala did not result in a significant improvement in lung function, as measured by the volume of air exhaled by patients in one second. The most common side effects include: headache, injection site reactions, back pain, weakness, and fatigue; hypersensitivity reactions can occur within hours or days including swelling of the face, mouth, and tongue, fainting, dizziness, hives, breathing problems, and rash; herpes zoster infections have occurred. The drug is a monoclonal antibody that inhibits interleukin-5 which helps regular eosinophils, a type of white blood cell that contributes to asthma. The over-production of eosinophils can cause inflammation in the lungs, increasing the frequency of asthma attacks. Patients must also take other medications, including high dose inhaled corticosteroids and at least one additional asthma drug.\par \par problem 2: allergic rhinitis\par -continue to use Astelin 1 sniff/nostril BID (.15)\par -continue to use Xyzal 5 mg before bed\par -continue to use Claritin 10 mg QAM\par -continue Pataday eye drops \par -Environmental measures for allergies were encouraged including mattress and pillow cover, air purifier, and environmental controls.\par \par problem 3: GERD\par -continue to use omeprazole 40 mg before breakfast\par -continue to use Pepcid 40 mg QHS \par -Rule of 2s: avoid eating too much, eating too late, eating too spicy, eating two hours before bed\par -Things to avoid including overeating, spicy foods, tight clothing, eating within three hours of bed, this list is not all inclusive. \par -For treatment of reflux, possible options discussed including diet control, H2 blockers, PPIs, as well as coating motility agents discussed as treatment options. Timing of meals and proximity of last meal to sleep were discussed. If symptoms persist, a formal gastrointestinal evaluation is needed.\par \par problem 4: OSAS\par -Repeat HSS \par -continue to use the CPAP machine, tolerating it well, and seeing the benefits- Williams Alanis\par \par -Sleep apnea is associated with adverse clinical consequences which an affect most organ systems.  Cardiovascular disease risk includes arrhythmias, atrial fibrillation, hypertension, coronary artery disease, and stroke. Metabolic disorders include diabetes type 2, non-alcoholic fatty liver disease. Mood disorder especially depression; and cognitive decline especially in the elderly. Associations with  chronic reflux/Vicente’s esophagus some but not all inclusive. \par -Reasons to assess this include arousal consistent with hypopnea; respiratory events most prominent in REM sleep or supine position; therefore sleep staging and body position are important for accurate diagnosis and estimation of AHI.\par \par problem 5: obesity (Zach)\par -Recommend "Muniq" OTC Supplement \par -Weight loss, exercise, and diet control were discussed and are highly encouraged. Treatment options were given such as, aqua therapy, and contacting a nutritionist. Recommended to use the elliptical, stationary bike, less use of treadmill.  Obesity is associated with worsening asthma, shortness of breath, and potential for cardiac disease, diabetes, and other underlying medical conditions.\par \par problem 6: low immunity \par -recommended early intervention \par \par problem 7: muscle cramps/ back pain \par -recommended to use Myocalm PM at bed\par -Topricin cream PRN \par \par Problem 8: Health Maintenance/COVID19 Precautions:\par -Covid-19 8/2022 \par -s/p Pfizer COVID 19 vaccine x 3\par - Clean your hands often. Wash your hands often with soap and water for at least 20 seconds, especially after blowing your nose, coughing, or sneezing, or having been in a public place.\par - If soap and water are not available, use a hand  that contains at least 60% alcohol.\par - To the extent possible, avoid touching high-touch surfaces in public places - elevator buttons, door handles, handrails, handshaking with people, etc. Use a tissue or your sleeve to cover your hand or finger if you must touch something.\par - Wash your hands after touching surfaces in public places.\par - Avoid touching your face, nose, eyes, etc.\par - Clean and disinfect your home to remove germs: practice routine cleaning of frequently touched surfaces (for example: tables, doorknobs, light switches, handles, desks, toilets, faucets, sinks & cell phones)\par - Avoid crowds, especially in poorly ventilated spaces. Your risk of exposure to respiratory viruses like COVID-19 may increase in crowded, closed-in settings with little air circulation if there are people in the crowd who are sick. All patients are recommended to practice social distancing and stay at least 6 feet away from others.\par - Avoid all non-essential travel including plane trips, and especially avoid embarking on cruise ships.\par -If COVID-19 is spreading in your community, take extra measures to put distance between yourself and other people to further reduce your risk of being exposed to this new virus.\par -Stay home as much as possible.\par - Consider ways of getting food brought to your house through family, social, or commercial networks\par -Be aware that the virus has been known to live in the air up to 3 hours post exposure, cardboard up to 24 hours post exposure, copper up to 4 hours post exposure, steel and plastic up to 2-3 days post exposure. Risk of transmission from these surfaces are affected by many variables.\par Immune Support Recommendations:\par -OTC Vitamin C 500mg BID \par -OTC Quercetin 250-500mg BID \par -OTC Zinc 75-100mg per day \par -OTC Melatonin 1 or 2 mg a night \par -OTC Vitamin D 1-4000mg per day \par -OTC Tonic Water 8oz per day\par Asthma and COVID19:\par You need to make sure your asthma is under control. This often requires the use of inhaled corticosteroids (and sometimes oral corticosteroids). Inhaled corticosteroids do not likely reduce your immune system’s ability to fight infections, but oral corticosteroids may. It is important to use the steps above to protect yourself to limit your exposure to any respiratory virus.\par \par Problem 9 : health maintenance\par -s/p influenza vaccine - 2022\par -recommended strep pneumonia vaccines after age 65: Prevnar-13 vaccine, followed by Pneumo vaccine 23 one year following\par -recommended early intervention for URIs\par -recommended regular osteoporosis evaluations\par -recommended early dermatological evaluations\par -recommended after the age of 50 to the age of 70, colonoscopy every 5 years \par \par F/U in 4 months\par She is encouraged to call with any changes, concerns, or questions

## 2022-11-28 NOTE — PHYSICAL EXAM
[No Acute Distress] : no acute distress [Normal Oropharynx] : normal oropharynx [III] : Mallampati Class: III [Normal Appearance] : normal appearance [No Neck Mass] : no neck mass [Normal Rate/Rhythm] : normal rate/rhythm [Normal S1, S2] : normal s1, s2 [No Murmurs] : no murmurs [No Resp Distress] : no resp distress [Clear to Auscultation Bilaterally] : clear to auscultation bilaterally [No Abnormalities] : no abnormalities [Benign] : benign [Normal Gait] : normal gait [No Clubbing] : no clubbing [No Cyanosis] : no cyanosis [No Edema] : no edema [FROM] : FROM [Normal Color/ Pigmentation] : normal color/ pigmentation [No Focal Deficits] : no focal deficits [Oriented x3] : oriented x3 [Normal Affect] : normal affect [TextBox_2] : OW,  [TextBox_68] : I:E 1:3; Clear

## 2022-12-29 ENCOUNTER — APPOINTMENT (OUTPATIENT)
Dept: ORTHOPEDIC SURGERY | Facility: CLINIC | Age: 77
End: 2022-12-29

## 2022-12-29 VITALS — HEIGHT: 58 IN | BODY MASS INDEX: 33.58 KG/M2 | WEIGHT: 160 LBS

## 2022-12-29 DIAGNOSIS — S92.901A UNSPECIFIED FRACTURE OF RIGHT FOOT, INITIAL ENCOUNTER FOR CLOSED FRACTURE: ICD-10-CM

## 2022-12-29 PROCEDURE — 99213 OFFICE O/P EST LOW 20 MIN: CPT

## 2022-12-29 PROCEDURE — 73630 X-RAY EXAM OF FOOT: CPT | Mod: RT

## 2022-12-29 NOTE — HISTORY OF PRESENT ILLNESS
[de-identified] : Pt is a 77 y/o female with right foot injury.  She was standing in her kitchen on 11/8/22, and she had a spasm in her right leg which caused her to invert her foot.  She fell to the floor and had severe pain.  She went to [facility] where xrays revealed a fifth metatarsal avulsion fracture.  The foot was wrapped and she was given a walking shoe.  It is difficult for her to walk because she has a history of bilateral foot drop. She was initially seen in the office on 11/10/22 at which time I recommended nonoperative treatment given the nature of the injury. She was advised she may WBAT. She returns for repeat xrays on 12/29/22 and is improving with time. She does note residual pain when walking.

## 2022-12-29 NOTE — PHYSICAL EXAM
[de-identified] : Patient is WDWN, alert, and in no acute distress. Breathing is unlabored. She is grossly oriented to person, place, and time.\par \par She is accompanied by her daughter today.\par \par Right Foot:\par She presents to the office, WBAT with the assistance of a walker. \par She notes residual pain when ambulating.\par Post-op shoe in place.\par There is residual tenderness at the base of the fifth metatarsal in the region of the fracture.\par Mild residual edema noted.\par She has full sensation noted to the toes.  [de-identified] : AP, lateral and oblique views of the RIGHT foot were obtained today and revealed an avulsion fracture at the distal aspect of the fifth metatarsal. The fracture is not yet healed. \par \par ------------------------------------------------------------------------------------------------------------------------------------------------------------------------------------ \par \par Imaging (3 views) from [facility] of the RIGHT foot obtained on 11/8/22. The imaging demonstrated a fracture at the distal aspect of the fifth metatarsal.

## 2022-12-29 NOTE — ADDENDUM
[FreeTextEntry1] : I, Diana Gonsalez wrote this note acting as a scribe for Dr. Berto Chandra on Dec 29, 2022.

## 2022-12-29 NOTE — END OF VISIT
[FreeTextEntry3] : All medical record entries made by the Scribe were at my,  Dr. Berto Chandra MD., direction and personally dictated by me on 12/29/2022. I have personally reviewed the chart and agree that the record accurately reflects my personal performance of the history, physical exam, assessment and plan.

## 2022-12-29 NOTE — DISCUSSION/SUMMARY
[de-identified] : The underlying pathophysiology was reviewed with the patient. XR films were reviewed with the patient. Discussed at length the nature of the patient’s condition. The right foot symptoms appear secondary to fifth metatarsal fracture.\par \par I discussed with her and her daughter that the fracture she sustained is an avulsion fracture and at times avulsion fractures may not fully heal and reattach to the rest of the bone given that it is such a small fragment. I told them at times scar tissue can form and the avulsed fragment will become well rounded and may be seen on future xrays but it will ultimately be inconsequential, especially as long as she does not have pain. I recommended she continue wrapping the foot with an ACE bandage for support as needed and use a post-op shoe for support. Finally, I told her ultimately the best therapy for regaining function is low impact exercises such as walking.\par \par All questions answered, understanding verbalized. Patient in agreement with plan of care. Follow up in 6 weeks for repeat xrays, if needed.

## 2023-01-16 ENCOUNTER — EMERGENCY (EMERGENCY)
Facility: HOSPITAL | Age: 78
LOS: 1 days | Discharge: HOME | End: 2023-01-16
Attending: EMERGENCY MEDICINE
Payer: MEDICARE

## 2023-01-16 VITALS
RESPIRATION RATE: 19 BRPM | HEART RATE: 68 BPM | OXYGEN SATURATION: 96 % | SYSTOLIC BLOOD PRESSURE: 157 MMHG | DIASTOLIC BLOOD PRESSURE: 89 MMHG

## 2023-01-16 VITALS
HEART RATE: 74 BPM | DIASTOLIC BLOOD PRESSURE: 76 MMHG | SYSTOLIC BLOOD PRESSURE: 193 MMHG | WEIGHT: 154.98 LBS | RESPIRATION RATE: 20 BRPM | TEMPERATURE: 99 F | HEIGHT: 58 IN | OXYGEN SATURATION: 95 %

## 2023-01-16 LAB
ALBUMIN SERPL ELPH-MCNC: 4.7 G/DL — SIGNIFICANT CHANGE UP (ref 3.3–5)
ALP SERPL-CCNC: 102 U/L — SIGNIFICANT CHANGE UP (ref 40–120)
ALT FLD-CCNC: 19 U/L — SIGNIFICANT CHANGE UP (ref 10–45)
ANION GAP SERPL CALC-SCNC: 13 MMOL/L — SIGNIFICANT CHANGE UP (ref 5–17)
AST SERPL-CCNC: 20 U/L — SIGNIFICANT CHANGE UP (ref 10–40)
BASE EXCESS BLDV CALC-SCNC: -0.5 MMOL/L — SIGNIFICANT CHANGE UP (ref -2–3)
BASOPHILS # BLD AUTO: 0 K/UL — SIGNIFICANT CHANGE UP (ref 0–0.2)
BASOPHILS NFR BLD AUTO: 0 % — SIGNIFICANT CHANGE UP (ref 0–2)
BILIRUB SERPL-MCNC: 0.3 MG/DL — SIGNIFICANT CHANGE UP (ref 0.2–1.2)
BLOOD GAS VENOUS - CREATININE: SIGNIFICANT CHANGE UP MG/DL (ref 0.5–1.3)
BUN SERPL-MCNC: 15 MG/DL — SIGNIFICANT CHANGE UP (ref 7–23)
CA-I SERPL-SCNC: 1.28 MMOL/L — SIGNIFICANT CHANGE UP (ref 1.15–1.33)
CALCIUM SERPL-MCNC: 10.2 MG/DL — SIGNIFICANT CHANGE UP (ref 8.4–10.5)
CHLORIDE BLDV-SCNC: 98 MMOL/L — SIGNIFICANT CHANGE UP (ref 96–108)
CHLORIDE SERPL-SCNC: 97 MMOL/L — SIGNIFICANT CHANGE UP (ref 96–108)
CO2 BLDV-SCNC: 26 MMOL/L — SIGNIFICANT CHANGE UP (ref 22–26)
CO2 SERPL-SCNC: 22 MMOL/L — SIGNIFICANT CHANGE UP (ref 22–31)
CREAT SERPL-MCNC: 0.54 MG/DL — SIGNIFICANT CHANGE UP (ref 0.5–1.3)
EGFR: 96 ML/MIN/1.73M2 — SIGNIFICANT CHANGE UP
EOSINOPHIL # BLD AUTO: 0 K/UL — SIGNIFICANT CHANGE UP (ref 0–0.5)
EOSINOPHIL NFR BLD AUTO: 0 % — SIGNIFICANT CHANGE UP (ref 0–6)
GAS PNL BLDV: 131 MMOL/L — LOW (ref 136–145)
GAS PNL BLDV: SIGNIFICANT CHANGE UP
GAS PNL BLDV: SIGNIFICANT CHANGE UP
GLUCOSE BLDV-MCNC: 159 MG/DL — HIGH (ref 70–99)
GLUCOSE SERPL-MCNC: 157 MG/DL — HIGH (ref 70–99)
HCO3 BLDV-SCNC: 25 MMOL/L — SIGNIFICANT CHANGE UP (ref 22–29)
HCT VFR BLD CALC: 41.7 % — SIGNIFICANT CHANGE UP (ref 34.5–45)
HCT VFR BLDA CALC: 44 % — SIGNIFICANT CHANGE UP (ref 34.5–46.5)
HGB BLD CALC-MCNC: 14.5 G/DL — SIGNIFICANT CHANGE UP (ref 11.7–16.1)
HGB BLD-MCNC: 14 G/DL — SIGNIFICANT CHANGE UP (ref 11.5–15.5)
HMPV RNA SPEC QL NAA+PROBE: DETECTED
IMM GRANULOCYTES NFR BLD AUTO: 0.2 % — SIGNIFICANT CHANGE UP (ref 0–0.9)
LACTATE BLDV-MCNC: 1.9 MMOL/L — SIGNIFICANT CHANGE UP (ref 0.5–2)
LYMPHOCYTES # BLD AUTO: 0.87 K/UL — LOW (ref 1–3.3)
LYMPHOCYTES # BLD AUTO: 21.4 % — SIGNIFICANT CHANGE UP (ref 13–44)
MCHC RBC-ENTMCNC: 29.5 PG — SIGNIFICANT CHANGE UP (ref 27–34)
MCHC RBC-ENTMCNC: 33.6 GM/DL — SIGNIFICANT CHANGE UP (ref 32–36)
MCV RBC AUTO: 88 FL — SIGNIFICANT CHANGE UP (ref 80–100)
MONOCYTES # BLD AUTO: 0.17 K/UL — SIGNIFICANT CHANGE UP (ref 0–0.9)
MONOCYTES NFR BLD AUTO: 4.2 % — SIGNIFICANT CHANGE UP (ref 2–14)
NEUTROPHILS # BLD AUTO: 3.02 K/UL — SIGNIFICANT CHANGE UP (ref 1.8–7.4)
NEUTROPHILS NFR BLD AUTO: 74.2 % — SIGNIFICANT CHANGE UP (ref 43–77)
NRBC # BLD: 0 /100 WBCS — SIGNIFICANT CHANGE UP (ref 0–0)
PCO2 BLDV: 42 MMHG — SIGNIFICANT CHANGE UP (ref 39–42)
PH BLDV: 7.38 — SIGNIFICANT CHANGE UP (ref 7.32–7.43)
PLATELET # BLD AUTO: 228 K/UL — SIGNIFICANT CHANGE UP (ref 150–400)
PO2 BLDV: 46 MMHG — HIGH (ref 25–45)
POTASSIUM BLDV-SCNC: 4.9 MMOL/L — SIGNIFICANT CHANGE UP (ref 3.5–5.1)
POTASSIUM SERPL-MCNC: 4.3 MMOL/L — SIGNIFICANT CHANGE UP (ref 3.5–5.3)
POTASSIUM SERPL-SCNC: 4.3 MMOL/L — SIGNIFICANT CHANGE UP (ref 3.5–5.3)
PROT SERPL-MCNC: 9.1 G/DL — HIGH (ref 6–8.3)
RAPID RVP RESULT: DETECTED
RBC # BLD: 4.74 M/UL — SIGNIFICANT CHANGE UP (ref 3.8–5.2)
RBC # FLD: 13.2 % — SIGNIFICANT CHANGE UP (ref 10.3–14.5)
SAO2 % BLDV: 74.3 % — SIGNIFICANT CHANGE UP (ref 67–88)
SARS-COV-2 RNA SPEC QL NAA+PROBE: SIGNIFICANT CHANGE UP
SODIUM SERPL-SCNC: 132 MMOL/L — LOW (ref 135–145)
WBC # BLD: 4.07 K/UL — SIGNIFICANT CHANGE UP (ref 3.8–10.5)
WBC # FLD AUTO: 4.07 K/UL — SIGNIFICANT CHANGE UP (ref 3.8–10.5)

## 2023-01-16 PROCEDURE — 85025 COMPLETE CBC W/AUTO DIFF WBC: CPT

## 2023-01-16 PROCEDURE — 82803 BLOOD GASES ANY COMBINATION: CPT

## 2023-01-16 PROCEDURE — 0225U NFCT DS DNA&RNA 21 SARSCOV2: CPT

## 2023-01-16 PROCEDURE — 84295 ASSAY OF SERUM SODIUM: CPT

## 2023-01-16 PROCEDURE — 85014 HEMATOCRIT: CPT

## 2023-01-16 PROCEDURE — 71046 X-RAY EXAM CHEST 2 VIEWS: CPT

## 2023-01-16 PROCEDURE — 82435 ASSAY OF BLOOD CHLORIDE: CPT

## 2023-01-16 PROCEDURE — 99285 EMERGENCY DEPT VISIT HI MDM: CPT

## 2023-01-16 PROCEDURE — 94640 AIRWAY INHALATION TREATMENT: CPT

## 2023-01-16 PROCEDURE — 82947 ASSAY GLUCOSE BLOOD QUANT: CPT

## 2023-01-16 PROCEDURE — 82330 ASSAY OF CALCIUM: CPT

## 2023-01-16 PROCEDURE — 36415 COLL VENOUS BLD VENIPUNCTURE: CPT

## 2023-01-16 PROCEDURE — 82565 ASSAY OF CREATININE: CPT

## 2023-01-16 PROCEDURE — 99284 EMERGENCY DEPT VISIT MOD MDM: CPT | Mod: 25

## 2023-01-16 PROCEDURE — 80053 COMPREHEN METABOLIC PANEL: CPT

## 2023-01-16 PROCEDURE — 84132 ASSAY OF SERUM POTASSIUM: CPT

## 2023-01-16 PROCEDURE — 96374 THER/PROPH/DIAG INJ IV PUSH: CPT

## 2023-01-16 PROCEDURE — 85018 HEMOGLOBIN: CPT

## 2023-01-16 PROCEDURE — 71046 X-RAY EXAM CHEST 2 VIEWS: CPT | Mod: 26

## 2023-01-16 PROCEDURE — 83605 ASSAY OF LACTIC ACID: CPT

## 2023-01-16 RX ORDER — IPRATROPIUM/ALBUTEROL SULFATE 18-103MCG
3 AEROSOL WITH ADAPTER (GRAM) INHALATION ONCE
Refills: 0 | Status: COMPLETED | OUTPATIENT
Start: 2023-01-16 | End: 2023-01-16

## 2023-01-16 RX ADMIN — Medication 3 MILLILITER(S): at 09:17

## 2023-01-16 RX ADMIN — Medication 60 MILLIGRAM(S): at 10:08

## 2023-01-16 NOTE — ED PROVIDER NOTE - OBJECTIVE STATEMENT
MATILDE CHENG is a 77 YEAR OLD FEMALE PMH Ovarian CA (s/p Surgical Management in 2005), Asthma, DM, HTN, HLD, Depression, Glaucoma, GERD, who presents to ER for CC of Shortness of Breath.  Onset: 1 Week Ago  Began having URI s/sx including cough, congestion, rhinorrhea  Seen by PMD on 1/11/2023 and completed Z Pack Yesterday  Also advised to use Albuterol; also eRx for Prednisone, but only started yesterday (took one dose)  Reporting some pain w/ deep breaths in the left lower chest  + sick contact -  w/ cough and cold initially  Denies toxic appearance, lethargy, fevers, chills, sore throat, abdominal pain, vomiting, diarrhea, rashes, swelling, CoVID Positive Contacts or PUI  Denies apnea, cyanosis, tachypnea, retractions, stridor  Denies chest pain at rest, LOUIS, palpitations, edema, syncope, history of heart failure  PMH: Above  Meds: Fasenra, Singulair, Metoprolol, Amlodipine, Prozac, Glimepride, Atorvastatin, Latanoprost, Albuterol, Famotidine, Omeprazole  PSH: Ovarian CA (Surgery in 2005), C/S x 2, Laser Back Surgery for Sciatica  Allergies: Listed in Note  IUTD MATILDE CHENG is a 77 YEAR OLD FEMALE PMH Ovarian CA (s/p Surgical Management in 2005), Asthma, DM, HTN, HLD, Depression, Glaucoma, GERD, who presents to ER for CC of Shortness of Breath  Onset: 1 Week Ago  Began having URI s/sx including cough, congestion, rhinorrhea  Seen by PMD on 1/11/2023 and completed Z Pack Yesterday  Also advised to use Albuterol; also eRx for Prednisone, but only started yesterday (took one dose 24mg pred)  Reporting some pain w/ deep breaths in the left lower chest posteriorly  + sick contact -  w/ cough and cold initially  Denies toxic appearance, lethargy, fevers, chills, sore throat, abdominal pain, vomiting, diarrhea, rashes, swelling, CoVID Positive Contacts or PUI  Denies apnea, cyanosis, tachypnea, retractions, stridor  Denies chest pain at rest, LOUIS, palpitations, edema, syncope, history of heart failure  PMH: Above  Meds: Fasenra, Singulair, Metoprolol, Amlodipine, Prozac, Glimepride, Atorvastatin, Latanoprost, Albuterol, Famotidine, Omeprazole  PSH: Ovarian CA (Surgery in 2005), C/S x 2, Laser Back Surgery for Sciatica  Allergies: Listed in Note  IUTD

## 2023-01-16 NOTE — ED PROVIDER NOTE - NSFOLLOWUPINSTRUCTIONS_ED_ALL_ED_FT
MATILDE was seen in the ER and diagnosed with Human Metapneumovirus.    She was also experienced a slight exacerbation of her Asthma.    She received Albuterol/Ipratropium and IV Methylprednisolone with improvement in symptoms.    She had no increased work of breathing and no hypoxia.    Please start Albuterol Metered Dose Inhaler 6 Puffs every 4 Hours x 48 Hours duration, then 6 Puffs every 6 Hours x 24 Hours, then 6 puffs every 8 Hours x 24 Hours, then DC.    Continue your other Asthma medications as prescribed.    Follow up with Pulmonology - Dr. Gonzales - call them tomorrow.    Follow up with your PMD.    Review instructions below:                        Asthma, Adult    Outline of a person's upper body showing the lungs, with close-ups of two airways, one normal and one narrow.   Asthma is a long-term (chronic) condition that causes recurrent episodes in which the lower airways in the lungs become tight and narrow. The narrowing is caused by inflammation and tightening of the smooth muscle around the lower airways.    Asthma episodes, also called asthma attacks or asthma flares, may cause coughing, making high-pitched whistling sounds when you breathe, most often when you breathe out (wheezing), shortness of breath, and chest pain. The airways may produce extra mucus caused by the inflammation and irritation. During an attack, it can be difficult to breathe. Asthma attacks can range from minor to life-threatening.    Asthma cannot be cured, but medicines and lifestyle changes can help control it and treat acute attacks. It is important to keep your asthma well controlled so the condition does not interfere with your daily life.      What are the causes?    This condition is believed to be caused by inherited (genetic) and environmental factors, but its exact cause is not known.    What can trigger an asthma attack?     Many things can bring on an asthma attack or make symptoms worse. These triggers are different for every person. Common triggers include:  •Allergens and irritants like mold, dust, pet dander, cockroaches, pollen, air pollution, and chemical odors.      •Cigarette smoke.      •Weather changes and cold air.      •Stress and strong emotional responses such as crying or laughing hard.      •Certain medications such as aspirin or beta blockers.      •Infections and inflammatory conditions, such as the flu, a cold, pneumonia, or inflammation of the nasal membranes (rhinitis).      •Gastroesophageal reflux disease (GERD).        What are the signs or symptoms?    Symptoms may occur right after exposure to an asthma trigger or hours later and can vary by person. Common signs and symptoms include:  •Wheezing.      •Trouble breathing (shortness of breath).      •Excessive nighttime or early morning coughing.      •Chest tightness.      •Tiredness (fatigue) with minimal activity.      •Difficulty talking in complete sentences.      •Poor exercise tolerance.        How is this diagnosed?    This condition is diagnosed based on:  •A physical exam and your medical history.    •Tests, which may include:  •Lung function studies to evaluate the flow of air in your lungs.      •Allergy tests.      •Imaging tests, such as X-rays.          How is this treated?    There is no cure, but symptoms can be controlled with proper treatment. Treatment usually involves:  •Identifying and avoiding your asthma triggers.    •Inhaled medicines. Two types are commonly used to treat asthma, depending on severity:  •Controller medicines. These help prevent asthma symptoms from occurring. They are taken every day.      •Fast-acting reliever or rescue medicines. These quickly relieve asthma symptoms. They are used as needed and provide short-term relief.      •Using other medicines, such as:  •Allergy medicines, such as antihistamines, if your asthma attacks are triggered by allergens.      •Immune medicines (immunomodulators). These are medicines that help control the immune system.        •Using supplemental oxygen. This is only needed during a severe episode.    •Creating an asthma action plan. An asthma action plan is a written plan for managing and treating your asthma attacks. This plan includes:  •A list of your asthma triggers and how to avoid them.      •Information about when medicines should be taken and when their dosage should be changed.      •Instructions about using a device called a peak flow meter. A peak flow meter measures how well the lungs are working and the severity of your asthma. It helps you monitor your condition.          Follow these instructions at home:    •Take over-the-counter and prescription medicines only as told by your health care provider.      •Stay up to date on all vaccinations as recommended by your healthcare provider, including vaccines for the flu and pneumonia.      •Use a peak flow meter and keep track of your peak flow readings.      •Understand and use your asthma action plan to address any asthma flares.      • Do not smoke or allow anyone to smoke in your home.        Contact a health care provider if:    •You have wheezing, shortness of breath, or a cough that is not responding to medicines.      •Your medicines are causing side effects, such as a rash, itching, swelling, or trouble breathing.      •You need to use a reliever medicine more than 2–3 times a week.      •Your peak flow reading is still at 50–79% of your personal best after following your action plan for 1 hour.      •You have a fever and shortness of breath.        Get help right away if:    •You are getting worse and do not respond to treatment during an asthma attack.      •You are short of breath when at rest or when doing very little physical activity.      •You have difficulty eating, drinking, or talking.      •You have chest pain or tightness.      •You develop a fast heartbeat or palpitations.      •You have a bluish color to your lips or fingernails.      •You are light-headed or dizzy, or you faint.      •Your peak flow reading is less than 50% of your personal best.      •You feel too tired to breathe normally.      These symptoms may be an emergency. Get help right away. Call 911.   • Do not wait to see if the symptoms will go away.       • Do not drive yourself to the hospital.         Summary    •Asthma is a long-term (chronic) condition that causes recurrent episodes in which the airways become tight and narrow. Asthma episodes, also called asthma attacks or asthma flares, can cause coughing, wheezing, shortness of breath, and chest pain.      •Asthma cannot be cured, but medicines and lifestyle changes can help keep it well controlled and prevent asthma flares.      •Make sure you understand how to avoid triggers and how and when to use your medicines.      •Asthma attacks can range from minor to life-threatening. Get help right away if you have an asthma attack and do not respond to treatment with your usual rescue medicines.      This information is not intended to replace advice given to you by your health care provider. Make sure you discuss any questions you have with your health care provider.

## 2023-01-16 NOTE — ED PROVIDER NOTE - PROGRESS NOTE DETAILS
CXR w/ clear lungs  Labs non-actionable  On re-assessment, patient w/ some improvement in wheezing  No inc. WOB, O2 Sats WNL  Pulm C/S    Malik Linares PA-C CXR w/ clear lungs  Labs non-actionable  On re-assessment, patient w/ some improvement in wheezing  No inc. WOB, O2 Sats WNL  Pulm Recs  Malik Linares PA-C Attempted to call Dr. Gonzales to review ER visit for THRESIAMMA today.  We have waited at least 45mins and possibly 1h to provide update w/ no call back  Patient w/ no inc. WOB, wheezing has improved, feels better  + hMPV explaining findings  Will DC w/ instructions to F/U w/ Dr. Gonzales tomorrow - call to discuss and make appt  Should continue Albuterol at home and other Asthma meds as prescribed  Review strict ER return precautions    Patient is stable, in no apparent distress, non-toxic appearing, tolerating PO, no neurologic deficits, and is cleared for discharge to home. Malik Linares PA-C

## 2023-01-16 NOTE — ED ADULT NURSE NOTE - OBJECTIVE STATEMENT
Received pt in assigned area a&ox3, c/o dry cough x 1 week, completed Zpak yesterday, pt states overnight she began feeling sob & coughing when lying down, denies any chest pain, fever, dizziness, or sore throat, pt took 1 pill of Prednisone & used Albuterol inhaler at 1am, placed on cardiac monitor, o2 sat on room air 95%, pt states she feels  comfortable on 2l via NC, o2 97%, skin intact, IVL placed labs drawn,  pt in nad, MD at bedside

## 2023-01-16 NOTE — ED PROVIDER NOTE - NS ED ATTENDING STATEMENT MOD
This was a shared visit with the THOMPSON. I reviewed and verified the documentation and independently performed the documented:

## 2023-01-16 NOTE — ED PROVIDER NOTE - CLINICAL SUMMARY MEDICAL DECISION MAKING FREE TEXT BOX
MATILDE CHENG is a 77 YEAR OLD FEMALE PMH Ovarian CA (s/p Surgical Management in 2005), Asthma, DM, HTN, HLD, Depression, Glaucoma, GERD, who presents to ER for CC of Shortness of Breath since 1 week ago in setting of symptoms of cough, congestion, rhinorrhea and + sick contact (). Seen by PMD recently and completed Z Pack. Advised Albuterol prn and Prednisone (but only started yesterday). Also w/ some pleuritic chest pain. VSS. PE above w/ wheezing bilaterally. Will place IV, obtain CBC, CMP, VBG w/ Lytes, and CXR. Give 3 B2Bs and re-assess. Malik Linares PA-C MATILDE CHENG is a 77 YEAR OLD FEMALE PMH Ovarian CA (s/p Surgical Management in 2005), Asthma, DM, HTN, HLD, Depression, Glaucoma, GERD, who presents to ER for CC of Shortness of Breath since 1 week ago in setting of symptoms of cough, congestion, rhinorrhea and + sick contact (). Seen by PMD recently and completed Z Pack. Advised Albuterol prn and Prednisone (but only started yesterday). Also w/ some pleuritic chest pain over L posterior inferior lung field. VSS. PE above w/ wheezing throughout bilaterally. Will place IV, obtain CBC, CMP, VBG w/ Lytes, and CXR r/o PNA. Send RVP. Give 3 B2Bs and re-assess. GIven hx of asthma on injection tx every 2 months via her pulm w persistent resp sx will advise c/w steroids. Not in any resp distress, speaking in full sent, no retractions, no hypoxia, no O2 req. No calf swelling or TTP, no hx of VTE, unlikley PE. Aries ROLON on steroids to fu w her pulm Dr Gonzales.

## 2023-01-16 NOTE — ED PROVIDER NOTE - PATIENT PORTAL LINK FT
You can access the FollowMyHealth Patient Portal offered by [facility] by registering at the following website: [email or web address]. By joining [email or web address]’s FollowMyHealth portal, you will also be able to view your health information using other applications (apps) compatible with our system.

## 2023-01-17 ENCOUNTER — APPOINTMENT (OUTPATIENT)
Dept: PULMONOLOGY | Facility: CLINIC | Age: 78
End: 2023-01-17
Payer: MEDICARE

## 2023-01-17 VITALS
RESPIRATION RATE: 16 BRPM | DIASTOLIC BLOOD PRESSURE: 80 MMHG | SYSTOLIC BLOOD PRESSURE: 128 MMHG | BODY MASS INDEX: 33.58 KG/M2 | HEART RATE: 70 BPM | WEIGHT: 160 LBS | OXYGEN SATURATION: 92 % | HEIGHT: 58 IN | TEMPERATURE: 97.2 F

## 2023-01-17 PROCEDURE — 99214 OFFICE O/P EST MOD 30 MIN: CPT

## 2023-01-17 NOTE — ADDENDUM
[FreeTextEntry1] : Documented by PANKAJ Jimenez acting as a scribe for Dr. Aldo Gonzales on 01/17/2023 .\par \par All medical record entries made by the Scribe were at my, Dr. Aldo Gonzales's, direction and personally dictated by me on 01/17/2023. I have reviewed the chart and agree that the record accurately reflects my personal performance of the history, physical exam, assessment and plan. I have also personally directed, reviewed, and agree with the discharge instructions.

## 2023-01-17 NOTE — HISTORY OF PRESENT ILLNESS
[FreeTextEntry1] : Ms. Lemus is a 77 year old female presenting to the office for a follow up visit for acute CHF, allergic rhinitis, asthma, chronic cough, GERD, overweight, MARA and shortness of breath. Her chief complaint is \par \par -s/p [facility] hospitalization\par -she notes she was discharged with human meta pneumovirus\par -she notes illness onset last Monday (mild cough, chest congestion)\par -she notes PCP prescribed zithromax which finished Sunday\par -she notes wheezing\par -she notes vision is stable \par -she denies dysphonia \par -she notes feeling very fatigued last Friday\par -she notes sinus congestion improved\par -she denies nebulizer use\par \par -she denies any headaches, nausea, emesis, fever, chills, sweats, chest pain, chest pressure, palpitations, constipation, diarrhea, vertigo, dysphagia, heartburn, reflux, itchy eyes, itchy ears, leg swelling, leg pain, arthralgias, myalgias, or sour taste in the mouth.

## 2023-01-17 NOTE — ASSESSMENT
[FreeTextEntry1] : Ms. CHENG is a 77 year old female with a history of eosinophilic asthma, severe persistent asthma, allergies,  DM, HTN, OSAS, GERD, who comes into the office today for pulmonary evaluation on fasenra 9/2020 (controlled) s/p by R chest pain s/p fall 3/2022 c/w rib fx- s/p covid-19 8/2022; now w/ human meta pneumovirus induced asthma\par \par Problem 1B R chest pain c/w fx 3/2022 \par -recommend salon pas\par -recommend Topercin cream\par -continue Tynelol PM \par \par Problem 1: severe persistent asthma (active)\par - Fasenra is started since 9/2020\par -continue Albuterol nebulizer at least BID up to QiD (gargle and spit after use) \par -continue to use Singulair 10 mg before bed\par - continue Symbicort (160) 2 inhalations BID / Tudorza at 1 inhalation BID\par \par -Asthma is  believed to be caused by inherited (genetic) and environmental factor, but its exact cause is unknown. Asthma may be triggered by allergens, lung infections, or irritants in the air. Asthma triggers are different for each person\par -Inhaler technique reviewed as well as oral hygiene techniques reviewed with patient. Avoidance of cold air, extremes of temperature, rescue inhaler should be used before exercise. Order of medication reviewed with patient. Recommended use of a cool mist humidifier in the bedroom.\par \par Problem 1A: Eosinophilic asthma\par - on Fasenra (2) since 9/2020\par -The safety and efficacy of Nucala was established in three double-blind, randomized, placebo-controlled trials in patients with severe asthma. Compared to a placebo, patients with severe asthma receiving Nucala had fewer exacerbation requiring hospitalization and/or emergency department visits, and a longer time to first exacerbation. In addition, patients with severe asthma receiving Nucala or Fasenra experienced greater reductions in their daily maintenance oral corticosteroid dose, while maintaining asthma control compared with patients receiving placebo. Treatment with Nucala did not result in a significant improvement in lung function, as measured by the volume of air exhaled by patients in one second. The most common side effects include: headache, injection site reactions, back pain, weakness, and fatigue; hypersensitivity reactions can occur within hours or days including swelling of the face, mouth, and tongue, fainting, dizziness, hives, breathing problems, and rash; herpes zoster infections have occurred. The drug is a monoclonal antibody that inhibits interleukin-5 which helps regular eosinophils, a type of white blood cell that contributes to asthma. The over-production of eosinophils can cause inflammation in the lungs, increasing the frequency of asthma attacks. Patients must also take other medications, including high dose inhaled corticosteroids and at least one additional asthma drug.\par \par Problem 1B: human metapneumovirus\par -add Prednisone 20 mg x 7 days, 10 mg x 7 days \par \par problem 2: allergic rhinitis\par -continue to use Astelin 1 sniff/nostril BID (.15)\par -continue to use Xyzal 5 mg before bed\par -continue to use Claritin 10 mg QAM\par -continue Pataday eye drops \par -Environmental measures for allergies were encouraged including mattress and pillow cover, air purifier, and environmental controls.\par \par problem 3: GERD\par -continue to use omeprazole 40 mg before breakfast\par -continue to use Pepcid 40 mg QHS \par -Rule of 2s: avoid eating too much, eating too late, eating too spicy, eating two hours before bed\par -Things to avoid including overeating, spicy foods, tight clothing, eating within three hours of bed, this list is not all inclusive. \par -For treatment of reflux, possible options discussed including diet control, H2 blockers, PPIs, as well as coating motility agents discussed as treatment options. Timing of meals and proximity of last meal to sleep were discussed. If symptoms persist, a formal gastrointestinal evaluation is needed.\par \par problem 4: OSAS\par -Repeat HSS \par -continue to use the CPAP machine, tolerating it well, and seeing the benefits- Williams Alanis\par \par -Sleep apnea is associated with adverse clinical consequences which an affect most organ systems.  Cardiovascular disease risk includes arrhythmias, atrial fibrillation, hypertension, coronary artery disease, and stroke. Metabolic disorders include diabetes type 2, non-alcoholic fatty liver disease. Mood disorder especially depression; and cognitive decline especially in the elderly. Associations with  chronic reflux/Vicente’s esophagus some but not all inclusive. \par -Reasons to assess this include arousal consistent with hypopnea; respiratory events most prominent in REM sleep or supine position; therefore sleep staging and body position are important for accurate diagnosis and estimation of AHI.\par \par problem 5: obesity (Zach)\par -Recommend "Muniq" OTC Supplement \par -Weight loss, exercise, and diet control were discussed and are highly encouraged. Treatment options were given such as, aqua therapy, and contacting a nutritionist. Recommended to use the elliptical, stationary bike, less use of treadmill.  Obesity is associated with worsening asthma, shortness of breath, and potential for cardiac disease, diabetes, and other underlying medical conditions.\par \par problem 6: low immunity \par -recommended early intervention \par \par problem 7: muscle cramps/ back pain \par -recommended to use Myocalm PM at bed\par -Topricin cream PRN \par \par Problem 8: Health Maintenance/COVID19 Precautions:\par -Covid-19 8/2022 \par -s/p Pfizer COVID 19 vaccine x 3\par - Clean your hands often. Wash your hands often with soap and water for at least 20 seconds, especially after blowing your nose, coughing, or sneezing, or having been in a public place.\par - If soap and water are not available, use a hand  that contains at least 60% alcohol.\par - To the extent possible, avoid touching high-touch surfaces in public places - elevator buttons, door handles, handrails, handshaking with people, etc. Use a tissue or your sleeve to cover your hand or finger if you must touch something.\par - Wash your hands after touching surfaces in public places.\par - Avoid touching your face, nose, eyes, etc.\par - Clean and disinfect your home to remove germs: practice routine cleaning of frequently touched surfaces (for example: tables, doorknobs, light switches, handles, desks, toilets, faucets, sinks & cell phones)\par - Avoid crowds, especially in poorly ventilated spaces. Your risk of exposure to respiratory viruses like COVID-19 may increase in crowded, closed-in settings with little air circulation if there are people in the crowd who are sick. All patients are recommended to practice social distancing and stay at least 6 feet away from others.\par - Avoid all non-essential travel including plane trips, and especially avoid embarking on cruise ships.\par -If COVID-19 is spreading in your community, take extra measures to put distance between yourself and other people to further reduce your risk of being exposed to this new virus.\par -Stay home as much as possible.\par - Consider ways of getting food brought to your house through family, social, or commercial networks\par -Be aware that the virus has been known to live in the air up to 3 hours post exposure, cardboard up to 24 hours post exposure, copper up to 4 hours post exposure, steel and plastic up to 2-3 days post exposure. Risk of transmission from these surfaces are affected by many variables.\par Immune Support Recommendations:\par -OTC Vitamin C 500mg BID \par -OTC Quercetin 250-500mg BID \par -OTC Zinc 75-100mg per day \par -OTC Melatonin 1 or 2 mg a night \par -OTC Vitamin D 1-4000mg per day \par -OTC Tonic Water 8oz per day\par Asthma and COVID19:\par You need to make sure your asthma is under control. This often requires the use of inhaled corticosteroids (and sometimes oral corticosteroids). Inhaled corticosteroids do not likely reduce your immune system’s ability to fight infections, but oral corticosteroids may. It is important to use the steps above to protect yourself to limit your exposure to any respiratory virus.\par \par Problem 9 : health maintenance\par -s/p influenza vaccine - 2022\par -recommended strep pneumonia vaccines after age 65: Prevnar-13 vaccine, followed by Pneumo vaccine 23 one year following\par -recommended early intervention for URIs\par -recommended regular osteoporosis evaluations\par -recommended early dermatological evaluations\par -recommended after the age of 50 to the age of 70, colonoscopy every 5 years \par \par F/U in 4 months\par She is encouraged to call with any changes, concerns, or questions

## 2023-02-06 ENCOUNTER — INPATIENT (INPATIENT)
Facility: HOSPITAL | Age: 78
LOS: 1 days | Discharge: HOME | DRG: 202 | End: 2023-02-08
Attending: INTERNAL MEDICINE | Admitting: INTERNAL MEDICINE
Payer: MEDICARE

## 2023-02-06 VITALS
HEART RATE: 85 BPM | RESPIRATION RATE: 18 BRPM | WEIGHT: 154.98 LBS | TEMPERATURE: 98 F | OXYGEN SATURATION: 96 % | SYSTOLIC BLOOD PRESSURE: 124 MMHG | DIASTOLIC BLOOD PRESSURE: 73 MMHG | HEIGHT: 58 IN

## 2023-02-06 DIAGNOSIS — J45.901 UNSPECIFIED ASTHMA WITH (ACUTE) EXACERBATION: ICD-10-CM

## 2023-02-06 LAB
ALBUMIN SERPL ELPH-MCNC: 4.3 G/DL — SIGNIFICANT CHANGE UP (ref 3.3–5)
ALP SERPL-CCNC: 83 U/L — SIGNIFICANT CHANGE UP (ref 40–120)
ALT FLD-CCNC: 23 U/L — SIGNIFICANT CHANGE UP (ref 10–45)
ANION GAP SERPL CALC-SCNC: 13 MMOL/L — SIGNIFICANT CHANGE UP (ref 5–17)
AST SERPL-CCNC: 20 U/L — SIGNIFICANT CHANGE UP (ref 10–40)
BASOPHILS # BLD AUTO: 0.01 K/UL — SIGNIFICANT CHANGE UP (ref 0–0.2)
BASOPHILS NFR BLD AUTO: 0.1 % — SIGNIFICANT CHANGE UP (ref 0–2)
BILIRUB SERPL-MCNC: 0.4 MG/DL — SIGNIFICANT CHANGE UP (ref 0.2–1.2)
BUN SERPL-MCNC: 23 MG/DL — SIGNIFICANT CHANGE UP (ref 7–23)
CALCIUM SERPL-MCNC: 9.8 MG/DL — SIGNIFICANT CHANGE UP (ref 8.4–10.5)
CHLORIDE SERPL-SCNC: 95 MMOL/L — LOW (ref 96–108)
CO2 SERPL-SCNC: 27 MMOL/L — SIGNIFICANT CHANGE UP (ref 22–31)
CREAT SERPL-MCNC: 0.69 MG/DL — SIGNIFICANT CHANGE UP (ref 0.5–1.3)
EGFR: 90 ML/MIN/1.73M2 — SIGNIFICANT CHANGE UP
EOSINOPHIL # BLD AUTO: 0 K/UL — SIGNIFICANT CHANGE UP (ref 0–0.5)
EOSINOPHIL NFR BLD AUTO: 0 % — SIGNIFICANT CHANGE UP (ref 0–6)
GAS PNL BLDV: SIGNIFICANT CHANGE UP
GLUCOSE BLDC GLUCOMTR-MCNC: 269 MG/DL — HIGH (ref 70–99)
GLUCOSE SERPL-MCNC: 148 MG/DL — HIGH (ref 70–99)
HCT VFR BLD CALC: 40.7 % — SIGNIFICANT CHANGE UP (ref 34.5–45)
HGB BLD-MCNC: 13 G/DL — SIGNIFICANT CHANGE UP (ref 11.5–15.5)
IMM GRANULOCYTES NFR BLD AUTO: 0.3 % — SIGNIFICANT CHANGE UP (ref 0–0.9)
LYMPHOCYTES # BLD AUTO: 2.16 K/UL — SIGNIFICANT CHANGE UP (ref 1–3.3)
LYMPHOCYTES # BLD AUTO: 31.1 % — SIGNIFICANT CHANGE UP (ref 13–44)
MCHC RBC-ENTMCNC: 29 PG — SIGNIFICANT CHANGE UP (ref 27–34)
MCHC RBC-ENTMCNC: 31.9 GM/DL — LOW (ref 32–36)
MCV RBC AUTO: 90.8 FL — SIGNIFICANT CHANGE UP (ref 80–100)
MONOCYTES # BLD AUTO: 0.76 K/UL — SIGNIFICANT CHANGE UP (ref 0–0.9)
MONOCYTES NFR BLD AUTO: 10.9 % — SIGNIFICANT CHANGE UP (ref 2–14)
NEUTROPHILS # BLD AUTO: 4 K/UL — SIGNIFICANT CHANGE UP (ref 1.8–7.4)
NEUTROPHILS NFR BLD AUTO: 57.6 % — SIGNIFICANT CHANGE UP (ref 43–77)
NRBC # BLD: 0 /100 WBCS — SIGNIFICANT CHANGE UP (ref 0–0)
NT-PROBNP SERPL-SCNC: 324 PG/ML — HIGH (ref 0–300)
PLATELET # BLD AUTO: 251 K/UL — SIGNIFICANT CHANGE UP (ref 150–400)
POTASSIUM SERPL-MCNC: 3.6 MMOL/L — SIGNIFICANT CHANGE UP (ref 3.5–5.3)
POTASSIUM SERPL-SCNC: 3.6 MMOL/L — SIGNIFICANT CHANGE UP (ref 3.5–5.3)
PROT SERPL-MCNC: 8.1 G/DL — SIGNIFICANT CHANGE UP (ref 6–8.3)
RAPID RVP RESULT: SIGNIFICANT CHANGE UP
RBC # BLD: 4.48 M/UL — SIGNIFICANT CHANGE UP (ref 3.8–5.2)
RBC # FLD: 14.1 % — SIGNIFICANT CHANGE UP (ref 10.3–14.5)
SARS-COV-2 RNA SPEC QL NAA+PROBE: SIGNIFICANT CHANGE UP
SODIUM SERPL-SCNC: 135 MMOL/L — SIGNIFICANT CHANGE UP (ref 135–145)
TROPONIN T, HIGH SENSITIVITY RESULT: 15 NG/L — SIGNIFICANT CHANGE UP (ref 0–51)
WBC # BLD: 6.95 K/UL — SIGNIFICANT CHANGE UP (ref 3.8–10.5)
WBC # FLD AUTO: 6.95 K/UL — SIGNIFICANT CHANGE UP (ref 3.8–10.5)

## 2023-02-06 PROCEDURE — 93356 MYOCRD STRAIN IMG SPCKL TRCK: CPT

## 2023-02-06 PROCEDURE — 99223 1ST HOSP IP/OBS HIGH 75: CPT

## 2023-02-06 PROCEDURE — 93306 TTE W/DOPPLER COMPLETE: CPT | Mod: 26

## 2023-02-06 PROCEDURE — 71045 X-RAY EXAM CHEST 1 VIEW: CPT | Mod: 26

## 2023-02-06 PROCEDURE — 93971 EXTREMITY STUDY: CPT | Mod: 26,RT

## 2023-02-06 RX ORDER — PREGABALIN 225 MG/1
1 CAPSULE ORAL
Qty: 0 | Refills: 0 | DISCHARGE

## 2023-02-06 RX ORDER — ACETAMINOPHEN 500 MG
650 TABLET ORAL EVERY 6 HOURS
Refills: 0 | Status: DISCONTINUED | OUTPATIENT
Start: 2023-02-06 | End: 2023-02-08

## 2023-02-06 RX ORDER — IPRATROPIUM/ALBUTEROL SULFATE 18-103MCG
3 AEROSOL WITH ADAPTER (GRAM) INHALATION ONCE
Refills: 0 | Status: COMPLETED | OUTPATIENT
Start: 2023-02-06 | End: 2023-02-06

## 2023-02-06 RX ORDER — ALBUTEROL 90 UG/1
1 AEROSOL, METERED ORAL
Qty: 0 | Refills: 0 | DISCHARGE

## 2023-02-06 RX ORDER — FUROSEMIDE 40 MG
80 TABLET ORAL ONCE
Refills: 0 | Status: COMPLETED | OUTPATIENT
Start: 2023-02-06 | End: 2023-02-06

## 2023-02-06 RX ORDER — IPRATROPIUM/ALBUTEROL SULFATE 18-103MCG
3 AEROSOL WITH ADAPTER (GRAM) INHALATION EVERY 6 HOURS
Refills: 0 | Status: DISCONTINUED | OUTPATIENT
Start: 2023-02-06 | End: 2023-02-08

## 2023-02-06 RX ORDER — BUDESONIDE, MICRONIZED 100 %
0.5 POWDER (GRAM) MISCELLANEOUS ONCE
Refills: 0 | Status: COMPLETED | OUTPATIENT
Start: 2023-02-06 | End: 2023-02-06

## 2023-02-06 RX ORDER — VORTIOXETINE 5 MG/1
1 TABLET, FILM COATED ORAL
Qty: 0 | Refills: 0 | DISCHARGE

## 2023-02-06 RX ORDER — ATORVASTATIN CALCIUM 80 MG/1
1 TABLET, FILM COATED ORAL
Qty: 0 | Refills: 0 | DISCHARGE

## 2023-02-06 RX ORDER — CHOLECALCIFEROL (VITAMIN D3) 125 MCG
1 CAPSULE ORAL
Qty: 0 | Refills: 0 | DISCHARGE

## 2023-02-06 RX ORDER — PANTOPRAZOLE SODIUM 20 MG/1
40 TABLET, DELAYED RELEASE ORAL
Refills: 0 | Status: DISCONTINUED | OUTPATIENT
Start: 2023-02-06 | End: 2023-02-08

## 2023-02-06 RX ORDER — ENOXAPARIN SODIUM 100 MG/ML
40 INJECTION SUBCUTANEOUS EVERY 24 HOURS
Refills: 0 | Status: DISCONTINUED | OUTPATIENT
Start: 2023-02-06 | End: 2023-02-08

## 2023-02-06 RX ADMIN — Medication 80 MILLIGRAM(S): at 12:29

## 2023-02-06 RX ADMIN — Medication 3 MILLILITER(S): at 12:14

## 2023-02-06 RX ADMIN — Medication 3 MILLILITER(S): at 18:10

## 2023-02-06 RX ADMIN — Medication 125 MILLIGRAM(S): at 08:55

## 2023-02-06 RX ADMIN — Medication 3 MILLILITER(S): at 22:57

## 2023-02-06 RX ADMIN — Medication 0.5 MILLIGRAM(S): at 08:55

## 2023-02-06 RX ADMIN — Medication 3 MILLILITER(S): at 08:58

## 2023-02-06 RX ADMIN — Medication 40 MILLIGRAM(S): at 22:57

## 2023-02-06 RX ADMIN — Medication 3 MILLILITER(S): at 10:06

## 2023-02-06 NOTE — H&P ADULT - NSHPSOCIALHISTORY_GEN_ALL_CORE
Social History:    Marital Status:  (  x )    (   ) Single    (   )    (  )   Occupation:   Lives with: (  ) alone  (  ) children   (x  ) spouse   (  ) parents  (  ) other    Substance Use (street drugs): ( x ) never used  (  ) other:  Tobacco Usage:  (  x ) never smoked   (   ) former smoker   (   ) current smoker  (     ) pack years  (        ) last cigarette date  Alcohol Usage: no     (     ) Advanced Directives: (     ) None    (      ) DNR    (     ) DNI    (     ) Health Care Proxy:

## 2023-02-06 NOTE — H&P ADULT - NSHPLABSRESULTS_GEN_ALL_CORE
13.0   6.95  )-----------( 251      ( 06 Feb 2023 08:42 )             40.7       02-06    135  |  95<L>  |  23  ----------------------------<  148<H>  3.6   |  27  |  0.69    Ca    9.8      06 Feb 2023 08:42    TPro  8.1  /  Alb  4.3  /  TBili  0.4  /  DBili  x   /  AST  20  /  ALT  23  /  AlkPhos  83  02-06              POCT Blood Glucose.: 214 mg/dL (06 Feb 2023 12:35)      < from: Xray Chest 1 View- PORTABLE-Urgent (02.06.23 @ 10:57) >    IMPRESSION:  Bibasilar linear atelectasis    < end of copied text >    < from: VA Duplex Lower Ext Vein Scan, Right (02.06.23 @ 09:47) >    IMPRESSION:  No evidence of right lower extremity deep venous thrombosis.    < end of copied text >    EKG SR

## 2023-02-06 NOTE — ED ADULT NURSE NOTE - OBJECTIVE STATEMENT
First encounter with the pt, pt received from triage with complaints right lower leg pain with swelling, chest pain, and sob. Pt [id number] that she has history of asthma and not getting better with home treatments. Pt has painful palpation of the right leg. Pt is a/ox4 and verbal. Speech is clear and able to speak in full sentences without distress. Airway is patent with no obstruction nor blocking secretions. Breathing is even and unlabored on room air. Pt has strong pulses palpated bilaterally. Neuro check is wnl. Full rom. Pt denies chest pain, n/v and sob. No acute distress noted. Pt has call light within the reach of the pt at the bedside. Will continue to monitor the pt.

## 2023-02-06 NOTE — ED CDU PROVIDER DISPOSITION NOTE - NSFOLLOWUPINSTRUCTIONS_ED_ALL_ED_FT
You were seen and evaluated in the ED for an asthma exacerbation.   Please make sure to follow up with your primary care doctor within 1-2 days and with the PULMONOLOGY specialist. The information for follow up can be found below. Bring a copy of all of your results with you to your follow up appointments.   Return to the ER as discussed if you develop any new or worsening symptoms.

## 2023-02-06 NOTE — ED CDU PROVIDER INITIAL DAY NOTE - OBJECTIVE STATEMENT
76 yo F with a PMH of asthma, DM, migraines, PNA, ovarian ca, pneumonia p/w shortness of breath x couple of days worsening today. Has also been wheezing, reports cough, congestion and feeling a gurgling in her chest. Sxs comparable to prior asthma flares. Pt was seen in ED on 01/16 for asthma exacerbation was tx with nebs and steroids was feeling better until sxs reoccurred. At that time pt had tested positive for hmpv. Followed up with her Pulmonologist Dr. Gonzales and PMD who prescribed her lasix 20mg daily for BLE edema. Denies nausea, vomiting, fever, chills, chest pain, palpitations, abd pain, diarrhea, headache, dizziness.   ED team discussed the case with Dr. Gonzales, pts Pulmonologist (c [phone]).  He thinks that this is related to her asthma. He would like to be informed on the patient's disposition and recommends prednisone 40 upon discharge.

## 2023-02-06 NOTE — H&P ADULT - ASSESSMENT
77 f with    COPD exacerbation  - nebs  - steroids  - O2 prn  - Pulmonary evaluation    Depression   - continue Rx    Diabetes   - ADA diet  - BS control    Ovarian Cancer in 2005 had chemo  - stable    DVT prophylaxis    Further action as per clinical course     Cy Horne MD phone [phone]

## 2023-02-06 NOTE — ED CDU PROVIDER INITIAL DAY NOTE - ATTENDING APP SHARED VISIT CONTRIBUTION OF CARE
ATTENDING, Jeet VIEIRA: I have personally performed a face to face diagnostic evaluation on this patient.  I have reviewed the ACP note and agree with the history, exam, and plan of care, except as noted here. Progress notes and further evaluation to be reviewed by observation and discharging attending.

## 2023-02-06 NOTE — H&P ADULT - NSHPPHYSICALEXAM_GEN_ALL_CORE
PHYSICAL EXAMINATION:  Vital Signs Last 24 Hrs  T(C): 36.9 (06 Feb 2023 17:18), Max: 36.9 (06 Feb 2023 07:47)  T(F): 98.4 (06 Feb 2023 17:18), Max: 98.4 (06 Feb 2023 07:47)  HR: 72 (06 Feb 2023 17:18) (72 - 92)  BP: 144/88 (06 Feb 2023 17:18) (118/45 - 169/88)  BP(mean): --  RR: 20 (06 Feb 2023 17:18) (17 - 20)  SpO2: 98% (06 Feb 2023 17:18) (96% - 100%)    Parameters below as of 06 Feb 2023 17:18  Patient On (Oxygen Delivery Method): room air      CAPILLARY BLOOD GLUCOSE      POCT Blood Glucose.: 214 mg/dL (06 Feb 2023 12:35)      GENERAL: NAD, well-groomed, well-developed  HEAD:  atraumatic, normocephalic  EYES: sclera anicteric  ENMT: mucous membranes moist  NECK: supple, No JVD  CHEST/LUNG: few rhonchi and wheezes to auscultation bilaterally  HEART: normal S1, S2  ABDOMEN: BS+, soft, ND, NT   EXTREMITIES:  1+ edema b/l LEs  NEURO: awake, alert, interactive; moves all extremities  SKIN: no rashes or lesions

## 2023-02-06 NOTE — ED PROVIDER NOTE - MUSCULOSKELETAL, MLM
Left lower extremity is in a splint and the right lower extremity is larger than the left lower extremity.  There is 2+ dorsalis pedis pulse.  The foot is warm and well-perfused.

## 2023-02-06 NOTE — ED ADULT NURSE NOTE - NSICDXPASTSURGICALHX_GEN_ALL_CORE_FT
Unknown if ever smoked PAST SURGICAL HISTORY:  mediport insertion in      S/P  Section in 1975,     S/P Lumbar Microdiscectomy left side in     S/P KEVIN-BSO (Total Abdominal Hysterectomy and Bilateral Salpingo-Oophorectomy) in  for ovarian cancer

## 2023-02-06 NOTE — H&P ADULT - NSHPREVIEWOFSYSTEMS_GEN_ALL_CORE
REVIEW OF SYSTEMS:    CONSTITUTIONAL: + weakness, no fevers or chills  EYES/ENT: No visual changes;  No vertigo or throat pain   NECK: No pain or stiffness  RESPIRATORY: + cough, + wheezing, no hemoptysis; + shortness of breath  CARDIOVASCULAR: No chest pain or palpitations  GASTROINTESTINAL: No abdominal or epigastric pain. No nausea, vomiting, or hematemesis; No diarrhea or constipation. No melena or hematochezia.  GENITOURINARY: No dysuria, frequency or hematuria  NEUROLOGICAL: No numbness or weakness  SKIN: No itching, burning, rashes, or lesions   All other review of systems is negative unless indicated above.

## 2023-02-06 NOTE — ED ADULT TRIAGE NOTE - WEIGHT IN KG
Xray shows degenerative changes and arthritis.   Do physical therapy.   If fails physical therapy then will need referral to orthopedics.
70.3

## 2023-02-06 NOTE — ED CDU PROVIDER DISPOSITION NOTE - CLINICAL COURSE
76 yo F with a PMH of asthma, DM, migraines, PNA, ovarian ca, pneumonia p/w shortness of breath x couple of days worsening today. Has also been wheezing, reports cough, congestion and feeling a gurgling in her chest. Sxs comparable to prior asthma flares. Pt was seen in ED on 01/16 for asthma exacerbation was tx with nebs and steroids was feeling better until sxs reoccurred. At that time pt had tested positive for hmpv. Followed up with her Pulmonologist Dr. Gonzales and PMD who prescribed her lasix 20mg daily for BLE edema. Denies nausea, vomiting, fever, chills, chest pain, palpitations, abd pain, diarrhea, headache, dizziness.   ED team discussed the case with Dr. Gonzales, pts Pulmonologist (c [phone]).  He thinks that this is related to her asthma. He would like to be informed on the patient's disposition and recommends prednisone 40 upon discharge.  In the ED, pt with stable VS. O2 sat 100% on RA. Labs non actionable, EKG without ischemic changes. CXR revealed bibasilar linear atelectasis. RVP negative. RLE duplex w/o evidence of PE. Will place in CDU for continued tx for presumed asthma exacerbation.  In the CDU****

## 2023-02-06 NOTE — ED PROVIDER NOTE - RESPIRATORY, MLM
There is minimal basilar lung sounds with scattered crackles.  There is expiratory wheezing bilaterally.  There is slightly poor movement of the air.

## 2023-02-06 NOTE — ED PROVIDER NOTE - OBJECTIVE STATEMENT
This is a 77-year-old female who is coming in with shortness of breath.  She has a past history of ovarian cancer that is not an active treatment, asthma, diabetes, hypertension, hyperlipidemia, depression, glaucoma, GERD.  She was recently seen on January 16 at this emergency department and she got high-dose steroids and was discharged.  She went to see Dr. Gonzales and she still does not feel wonderful.  She went to her primary care doctor who started her on furosemide 20 mg because her legs were swelling and she feels like her lungs are congested.  No fevers or chills.  No chest pain.  No nausea or vomiting.  She feels like there is "gurgling in my chest".  She has URI symptoms as well.

## 2023-02-06 NOTE — ED ADULT NURSE NOTE - ISOLATION TYPE:
This visual field clearly demonstrated a minimum of 52% loss of upper field of vision OU, with upper lid skin in repose and elevated by taping of the lid to demonstrate potential correction. This field shows that taping the lids significantly improved this patient's superior field of vision by approximately 52%, OU. None

## 2023-02-06 NOTE — ED PROVIDER NOTE - PROGRESS NOTE DETAILS
Discussed the case with Dr. Gonzales (c [phone]).  He thinks that this is related to her asthma and if her breathing improves she can go home.  He would like to be informed upon the patient's disposition and recommends for prednisone 40 upon discharge..  Patient's lung sounds are improved and she appears much better.  Patient still is quite nervous about the situation.  We discussed the possibility to monitor her breathing in the observation unit and continue steroids.

## 2023-02-06 NOTE — ED CDU PROVIDER INITIAL DAY NOTE - PHYSICAL EXAMINATION
CONSTITUTIONAL: Well appearing and in no apparent distress.  ENT: Airway patent, moist mucous membranes.   EYES: Pupils equal, round and reactive to light. EOMI. Conjunctiva normal appearing.   CARDIAC: Normal rate, regular rhythm.  Heart sounds S1, S2.    RESPIRATORY: Mild exp wheezing. O2 sat 100% on RA. Speaking in full sentences.    GASTROINTESTINAL: Abdomen soft, non-tender, not distended.  MUSCULOSKELETAL: Spine appears normal.  NEUROLOGICAL: Alert and oriented x3, no focal deficits, no motor or sensory deficits. 5/5 muscle strength throughout.  SKIN: Skin normal color, warm, dry and intact.   PSYCHIATRIC: Normal mood and affect.
no

## 2023-02-06 NOTE — ED PROVIDER NOTE - CLINICAL SUMMARY MEDICAL DECISION MAKING FREE TEXT BOX
Patient is very symptomatic from her asthma.  We will give 3 albuterol nebulizations and steroids.  Is unclear the patient's volume status.  We will do a proBNP.  We will also check for DVT given that her right leg is more swollen than the left leg.  With an ultrasound.  We will do a chest x-ray and viral swab. Patient is very symptomatic from her asthma.  We will give 3 albuterol nebulizations and steroids.  Is unclear the patient's volume status.  We will do a proBNP.  We will also check for DVT given that her right leg is more swollen than the left leg.  With an ultrasound. PE less likely given her lungs w wheeze cw asthma and if improved will not cta. We will do a chest x-ray and viral swab.

## 2023-02-06 NOTE — H&P ADULT - HISTORY OF PRESENT ILLNESS
77-year-old female who is coming in with shortness of breath.  She has a past history of ovarian cancer that is not an active treatment, asthma, diabetes, hypertension, hyperlipidemia, depression, glaucoma, GERD.  She was recently seen on January 16 at this emergency department and she got high-dose steroids and was discharged. She went to her primary care doctor who started her on furosemide 20 mg because her legs were swelling and she feels like her lungs are congested.  No fevers or chills.  No chest pain.  No nausea or vomiting.  She feels like there is "gurgling in my chest".  She has URI symptoms as well.

## 2023-02-06 NOTE — ED ADULT NURSE REASSESSMENT NOTE - NS ED NURSE REASSESS COMMENT FT1
1500-admitted to Dr Horne.
1355-Patient received alert & oriented x4. Denies difficulty breathing. Tolerated getting up & walked to bed. 02 saturation-97% RA. Complained of feeling generally tired. Verbalized that she feels alright that shes staying in the hospital to monitor her 02 saturation.

## 2023-02-07 LAB
ANION GAP SERPL CALC-SCNC: 16 MMOL/L — SIGNIFICANT CHANGE UP (ref 5–17)
BUN SERPL-MCNC: 36 MG/DL — HIGH (ref 7–23)
CALCIUM SERPL-MCNC: 9.6 MG/DL — SIGNIFICANT CHANGE UP (ref 8.4–10.5)
CHLORIDE SERPL-SCNC: 95 MMOL/L — LOW (ref 96–108)
CO2 SERPL-SCNC: 19 MMOL/L — LOW (ref 22–31)
CREAT SERPL-MCNC: 0.65 MG/DL — SIGNIFICANT CHANGE UP (ref 0.5–1.3)
EGFR: 92 ML/MIN/1.73M2 — SIGNIFICANT CHANGE UP
GLUCOSE SERPL-MCNC: 382 MG/DL — HIGH (ref 70–99)
HCT VFR BLD CALC: 34.6 % — SIGNIFICANT CHANGE UP (ref 34.5–45)
HGB BLD-MCNC: 11.5 G/DL — SIGNIFICANT CHANGE UP (ref 11.5–15.5)
MCHC RBC-ENTMCNC: 29.9 PG — SIGNIFICANT CHANGE UP (ref 27–34)
MCHC RBC-ENTMCNC: 33.2 GM/DL — SIGNIFICANT CHANGE UP (ref 32–36)
MCV RBC AUTO: 89.9 FL — SIGNIFICANT CHANGE UP (ref 80–100)
NRBC # BLD: 0 /100 WBCS — SIGNIFICANT CHANGE UP (ref 0–0)
PLATELET # BLD AUTO: 240 K/UL — SIGNIFICANT CHANGE UP (ref 150–400)
POTASSIUM SERPL-MCNC: 3.8 MMOL/L — SIGNIFICANT CHANGE UP (ref 3.5–5.3)
POTASSIUM SERPL-SCNC: 3.8 MMOL/L — SIGNIFICANT CHANGE UP (ref 3.5–5.3)
RBC # BLD: 3.85 M/UL — SIGNIFICANT CHANGE UP (ref 3.8–5.2)
RBC # FLD: 14.3 % — SIGNIFICANT CHANGE UP (ref 10.3–14.5)
SODIUM SERPL-SCNC: 130 MMOL/L — LOW (ref 135–145)
WBC # BLD: 10.42 K/UL — SIGNIFICANT CHANGE UP (ref 3.8–10.5)
WBC # FLD AUTO: 10.42 K/UL — SIGNIFICANT CHANGE UP (ref 3.8–10.5)

## 2023-02-07 PROCEDURE — 73620 X-RAY EXAM OF FOOT: CPT | Mod: 26,RT

## 2023-02-07 PROCEDURE — 73560 X-RAY EXAM OF KNEE 1 OR 2: CPT | Mod: 26,RT

## 2023-02-07 PROCEDURE — 99223 1ST HOSP IP/OBS HIGH 75: CPT

## 2023-02-07 PROCEDURE — 73610 X-RAY EXAM OF ANKLE: CPT | Mod: 26,RT

## 2023-02-07 RX ORDER — TIOTROPIUM BROMIDE 18 UG/1
2 CAPSULE ORAL; RESPIRATORY (INHALATION) DAILY
Refills: 0 | Status: DISCONTINUED | OUTPATIENT
Start: 2023-02-07 | End: 2023-02-08

## 2023-02-07 RX ORDER — BUDESONIDE AND FORMOTEROL FUMARATE DIHYDRATE 160; 4.5 UG/1; UG/1
2 AEROSOL RESPIRATORY (INHALATION)
Refills: 0 | Status: DISCONTINUED | OUTPATIENT
Start: 2023-02-07 | End: 2023-02-08

## 2023-02-07 RX ORDER — MONTELUKAST 4 MG/1
10 TABLET, CHEWABLE ORAL DAILY
Refills: 0 | Status: DISCONTINUED | OUTPATIENT
Start: 2023-02-07 | End: 2023-02-08

## 2023-02-07 RX ADMIN — BUDESONIDE AND FORMOTEROL FUMARATE DIHYDRATE 2 PUFF(S): 160; 4.5 AEROSOL RESPIRATORY (INHALATION) at 06:19

## 2023-02-07 RX ADMIN — Medication 40 MILLIGRAM(S): at 05:16

## 2023-02-07 RX ADMIN — PANTOPRAZOLE SODIUM 40 MILLIGRAM(S): 20 TABLET, DELAYED RELEASE ORAL at 05:25

## 2023-02-07 RX ADMIN — Medication 10 MILLIGRAM(S): at 06:19

## 2023-02-07 RX ADMIN — Medication 3 MILLILITER(S): at 05:16

## 2023-02-07 RX ADMIN — Medication 3 MILLILITER(S): at 11:27

## 2023-02-07 RX ADMIN — Medication 40 MILLIGRAM(S): at 21:27

## 2023-02-07 RX ADMIN — TIOTROPIUM BROMIDE 2 PUFF(S): 18 CAPSULE ORAL; RESPIRATORY (INHALATION) at 11:31

## 2023-02-07 RX ADMIN — BUDESONIDE AND FORMOTEROL FUMARATE DIHYDRATE 2 PUFF(S): 160; 4.5 AEROSOL RESPIRATORY (INHALATION) at 17:38

## 2023-02-07 RX ADMIN — Medication 3 MILLILITER(S): at 17:39

## 2023-02-07 RX ADMIN — MONTELUKAST 10 MILLIGRAM(S): 4 TABLET, CHEWABLE ORAL at 11:27

## 2023-02-07 RX ADMIN — Medication 40 MILLIGRAM(S): at 13:04

## 2023-02-07 NOTE — PATIENT PROFILE ADULT - FALL HARM RISK - HARM RISK INTERVENTIONS

## 2023-02-07 NOTE — CONSULT NOTE ADULT - TIME BILLING
as above:  multifactorial dyspnea: fluid OL-DD/CHF is primary DX, baseline severe persistent asthma (s/p flair), debility- RA sat above 90%  Cards-c/w CHF in situation of labile BP--f/up BNP, daily weight, echo as  per Page et al  asthma--would reduce steroids to pred 10mg per day, symbicort 160, spiriva, singulair, duoneb q 6, out pt nucala, incentive spirometry  DVT and GI prophylaxis-protonix 40 and pepcid 40 qhs  OSAS-cpap o/n  PT-OOB                           Onc Ovarian CA-quiet-Lorrie et al  BS control  DC planning upon cards clearance    Aldo Gonzales MD-Pulmonary   [phone]
agree with above  77-year-old female pmhx htn, hld, asthma, coming in with shortness of breath.     #SOB  -Likely 2/2 asthma exacerbation i/s/o human metapneumovirus  -Does not appear grossly fluid overloaded on exam  -CXR clear  -EKG NSR  -S/P IV lasix 80mg  -Would hold off on further diuresis for now  -Continue nebs, steroids per primary  -Pulm consult  -Check echo    #LE Edema  -S/p IV lasix 80mg  -LE doppler negative for dvt b/l    #HTN  -BP acceptable  -Start metoprolol 25mg bid  -Start amlodipine 5mg qd if bp can tolerate

## 2023-02-07 NOTE — CONSULT NOTE ADULT - SUBJECTIVE AND OBJECTIVE BOX
HPI: 77-year-old female who is coming in with shortness of breath.  She has a past history of ovarian cancer that is not an active treatment, asthma, diabetes, hypertension, hyperlipidemia, depression, glaucoma, GERD.  She was recently seen on  at this emergency department and she got high-dose steroids and was discharged. She went to her primary care doctor who started her on furosemide 20 mg because her legs were swelling and she feels like her lungs are congested.  No fevers or chills.  No chest pain.  No nausea or vomiting.  She feels like there is "gurgling in my chest".  She has URI symptoms as well.   Sx now are sob, cough-dry and leg swelling but better this Am after lasix rx  Awaiting Cards Ami et al      PAST MEDICAL & SURGICAL HISTORY:  Lumbar Disc Disease  lumbar disc displacement      Asthma  h/o asthma in the past , not on meds      Pneumonia  h/o pneumonia in the past      Migraine Headache      Diabetes  diet controlled, not on meds      Ovarian Cancer  in  had chemo      Depression      Glaucoma      Sleep Apnea  uses CPAP      S/P KEVIN-BSO (Total Abdominal Hysterectomy and Bilateral Salpingo-Oophorectomy)  in  for ovarian cancer      S/P  Section  in ,       S/P Lumbar Microdiscectomy  left side in       mediport insertion in           FAMILY HISTORY:  Family history of asthma-M        SOCIAL HISTORY:  Smoking: neg  EtOH Use: neg  Marital Status: M  Occupation: retired  Exposures: neg  Recent Travel: neg    Allergies    aspirin (Swelling)  Ceftin (Angioedema)  cephalexin (Hives; Rash)  ciprofloxacin (Rash)  fresh friuts cause swelling; salads and carrots cause swelling a cough (Other)  Motrin (Swelling)    Intolerances        HOME MEDICATIONS: Home Medications:  amLODIPine 5 mg oral tablet: 1 tab(s) orally once a day (2023 17:24)  atorvastatin 10 mg oral tablet: 1 tab(s) orally once a day    NOTE: pharmacy last filled 2022 for 90 days supply (2023 17:23)  FLUoxetine 20 mg oral capsule: 1 cap(s) orally once a day (2023 17:24)  furosemide 20 mg oral tablet: 1 tab(s) orally once a day (2023 17:24)  glimepiride 2 mg oral tablet: 1 tab(s) orally once a day (2023 17:24)  metoprolol succinate 25 mg oral tablet, extended release: 1 tab(s) orally once a day (2023 17:24)  omeprazole 40 mg oral delayed release capsule: 1 cap(s) orally once a day    Note: Pharmacy last filled in 2022 for 90 day supply (2023 17:19)  Pepcid 40 mg oral tablet: 1 tab(s) orally once a day (at bedtime) (2023 17:24)  Singulair 10 mg oral tablet: 1 tab(s) orally once a day (2023 17:24)  Xalatan 0.005% ophthalmic solution: 1 drop(s) to each affected eye once a day (in the evening) (2023 17:24)      REVIEW OF SYSTEMS:  Constitutional: No fevers or chills. No weight loss. + fatigue or generalized malaise.  Eyes: No itching or discharge from the eyes  ENT: No ear pain. No ear discharge. No nasal congestion. No post nasal drip. No epistaxis. No throat pain. No sore throat. No difficulty swallowing.   CV: No chest pain. No palpitations. No lightheadedness or dizziness.   Resp: + dyspnea at rest. + dyspnea on exertion. No orthopnea. No wheezing. No cough. No stridor. No sputum production. No chest pain with respiration.  GI: No nausea. No vomiting. No diarrhea.  MSK: No joint pain or pain in any extremities  Integumentary: No skin lesions. + pedal edema.  Neurological: No gross motor weakness. No sensory changes.    [+ ] All other systems negative  [ ] Unable to assess ROS because ________    OBJECTIVE:  ICU Vital Signs Last 24 Hrs  T(C): 36.9 (2023 17:18), Max: 36.9 (2023 07:47)  T(F): 98.4 (2023 17:18), Max: 98.4 (2023 07:47)  HR: 72 (2023 17:18) (72 - 92)  BP: 144/88 (2023 17:18) (118/45 - 169/88)  BP(mean): --  ABP: --  ABP(mean): --  RR: 20 (2023 17:18) (17 - 20)  SpO2: 98% (2023 17:18) (96% - 100%)    O2 Parameters below as of 2023 17:18  Patient On (Oxygen Delivery Method): room air              CAPILLARY BLOOD GLUCOSE      POCT Blood Glucose.: 269 mg/dL (2023 18:15)      PHYSICAL EXAM: NAD in bed on RA  General: Awake, alert, oriented X 3.   HEENT: Atraumatic, normocephalic.                 Mallampatti Grade 3                No nasal congestion.                No tonsillar or pharyngeal exudates.  Lymph Nodes: No palpable lymphadenopathy  Neck: No JVD. No carotid bruit.   Respiratory: Normal chest expansion                         Normal percussion                         Normal and equal air entry                         No wheeze, rhonchi but mild basilar rales.  Cardiovascular: S1 S2 normal. No murmurs, rubs or gallops.   Abdomen: Soft, non-tender, non-distended. No organomegaly. Normoactive bowel sounds.  Extremities: Warm to touch. Peripheral pulse palpable. + pedal edema.   Skin: No rashes or skin lesions  Neurological: Motor and sensory examination equal and normal in all four extremities.  Psychiatry: Appropriate mood and affect.    HOSPITAL MEDICATIONS:  MEDICATIONS  (STANDING):  albuterol/ipratropium for Nebulization 3 milliLiter(s) Nebulizer every 6 hours  enoxaparin Injectable 40 milliGRAM(s) SubCutaneous every 24 hours  methylPREDNISolone sodium succinate Injectable 40 milliGRAM(s) IV Push every 8 hours  pantoprazole    Tablet 40 milliGRAM(s) Oral before breakfast    MEDICATIONS  (PRN):  acetaminophen     Tablet .. 650 milliGRAM(s) Oral every 6 hours PRN Temp greater or equal to 38.5C (101.3F), Mild Pain (1 - 3)      LABS:                        13.0   6.95  )-----------( 251      ( 2023 08:42 )             40.7     02-06    135  |  95<L>  |  23  ----------------------------<  148<H>  3.6   |  27  |  0.69    Ca    9.8      2023 08:42    TPro  8.1  /  Alb  4.3  /  TBili  0.4  /  DBili  x   /  AST  20  /  ALT  23  /  AlkPhos  83            Venous Blood Gas:   @ 08:41  7.37/50/33/29/53.1  VBG Lactate: 1.9      MICROBIOLOGY:     RADIOLOGY:   < from: Xray Chest 1 View- PORTABLE-Urgent (23 @ 10:57) >    PROCEDURE DATE:  2023          INTERPRETATION:  EXAMINATION: XR CHEST URGENT    CLINICAL INDICATION: Shortness breath.    TECHNIQUE: Single frontal, portable view of the chest was obtained.    COMPARISON: Chest x-ray 23.    FINDINGS:  The cardiomediastinal silhouette is  not accurately assessed in this AP   projection.  Bilateral lower lung field linear subsegmental atelectasis, otherwise   clear lungs.  There is no pneumothorax or pleural effusion.  No acute bony abnormality.    IMPRESSION:  Bibasilar linear atelectasis  < from: VA Duplex Lower Ext Vein Scan, Right (23 @ 09:47) >    TECHNIQUE: Duplex sonography of the RIGHT LOWER extremity veins with   color and spectral Doppler, with and without compression.    FINDINGS:    There is normal compressibility of the right common femoral, femoral and   popliteal veins.  The contralateral common femoral vein is patent.  Doppler examination shows normal spontaneous and phasic flow.    No calf vein thrombosis is detected.    IMPRESSION:  No evidence of right lower extremity deep venous thrombosis.    < end of copied text >    --- End of Report ---    < end of copied text >  [ ] Reviewed and interpreted by me    Point of Care Ultrasound Findings;    PFT:    EKG:
CARDIOLOGY CONSULT - Dr. Abel         HPI: 77-year-old female who is coming in with shortness of breath.  She has a past history of ovarian cancer that is not an active treatment, asthma, diabetes, hypertension, hyperlipidemia, depression, glaucoma, GERD.  She was recently seen on  at this emergency department and she got high-dose steroids and was discharged.  She went to see Dr. Gonzales and she still does not feel wonderful.  She went to her primary care doctor who started her on furosemide 20 mg because her legs were swelling and she feels like her lungs are congested.  No fevers or chills.  No chest pain.  No nausea or vomiting.  She feels like there is "gurgling in my chest".  She has URI symptoms as well.      PAST MEDICAL & SURGICAL HISTORY:  Lumbar Disc Disease  lumbar disc displacement      Asthma  h/o asthma in the past , not on meds      Pneumonia  h/o pneumonia in the past      Migraine Headache      Diabetes  diet controlled, not on meds      Ovarian Cancer  in  had chemo      Depression      Glaucoma      Sleep Apnea  uses CPAP      S/P KEVIN-BSO (Total Abdominal Hysterectomy and Bilateral Salpingo-Oophorectomy)  in  for ovarian cancer      S/P  Section  in ,       S/P Lumbar Microdiscectomy  left side in       mediport insertion in               PREVIOUS DIAGNOSTIC TESTING:    [x] Echocardiogram:  < from: Transthoracic Echocardiogram (20 @ 12:56) >  Conclusions:  Technically difficult study. Suboptimal parasternal  windows.  Small, hyperdynamic left ventricle.    < end of copied text >    [ ]  Catheterization:  [ ] Stress Test:  	    MEDICATIONS:  Home Medications:  albuterol 90 mcg/inh inhalation aerosol: 1 puff(s) inhaled , As Needed (2020 12:41)  calcium: 1 tab(s) orally once a day (2020 12:41)  glimepiride 2 mg oral tablet: 1 tab(s) orally once a day (2020 12:41)  ipratropium-albuterol 0.5 mg-2.5 mg/3 mLinhalation solution: 3 milliliter(s) inhaled every 6 hours (2020 17:26)  Lipitor 10 mg oral tablet: 1 tab(s) orally once a day (2020 12:41)  omeprazole 40 mg oral delayed release capsule: 1 cap(s) orally once a day    Note:Last filled in fe for 90 day supply (2020 12:41)  Pepcid 40 mg oral tablet: 1 tab(s) orally once a day (at bedtime) (2020 12:41)  Singulair 10 mg oral tablet: 1 tab(s) orally once a day     (2020 17:26)  Trintellix 20 mg oral tablet: 1 tab(s) orally once a day (2020 12:41)  Vitamin B-12: 1 tab(s) orally once a day (2020 12:41)  Vitamin D3: 1 tab(s) orally once a day (2020 12:41)  Xalatan 0.005% ophthalmic solution: 1 drop(s) to each affected eye once a day (in the evening) (2020 12:41)      MEDICATIONS  (STANDING):  albuterol/ipratropium for Nebulization 3 milliLiter(s) Nebulizer every 6 hours  methylPREDNISolone sodium succinate Injectable 40 milliGRAM(s) IV Push every 8 hours      FAMILY HISTORY:  Family history of asthma        SOCIAL HISTORY:    [x] Non-smoker  [ ] Smoker  [ ] Alcohol    Allergies    aspirin (Swelling)  Ceftin (Angioedema)  cephalexin (Hives; Rash)  ciprofloxacin (Rash)  fresh friuts cause swelling; salads and carrots cause swelling a cough (Other)  Motrin (Swelling)    Intolerances    	    REVIEW OF SYSTEMS:  CONSTITUTIONAL: No fever, weight loss, or fatigue  EYES: No eye pain, visual disturbances, or discharge  ENMT:  No difficulty hearing, tinnitus, vertigo; No sinus or throat pain  NECK: No pain or stiffness  RESPIRATORY: +Cough, no wheezing, chills or hemoptysis; +SOB  CARDIOVASCULAR: No chest pain, palpitations, passing out, dizziness, or leg swelling  GASTROINTESTINAL: No abdominal or epigastric pain. No nausea, vomiting, or hematemesis; No diarrhea or constipation. No melena or hematochezia.  GENITOURINARY: No dysuria, frequency, hematuria, or incontinence  NEUROLOGICAL: No headaches, memory loss, loss of strength, numbness, or tremors  SKIN: No itching, burning, rashes, or lesions   	    [x] All others negative	  [ ] Unable to obtain    PHYSICAL EXAM:  T(C): 36.8 (23 @ 14:03), Max: 36.9 (23 @ 07:47)  HR: 87 (23 @ 14:03) (85 - 92)  BP: 118/45 (23 @ 14:03) (118/45 - 169/88)  RR: 20 (23 @ 14:03) (17 - 20)  SpO2: 97% (23 @ 14:03) (96% - 100%)  Wt(kg): --  I&O's Summary      Appearance: Normal	  Psychiatry: A & O x 3, Mood & affect appropriate  HEENT:   Normal oral mucosa, PERRL, EOMI	  Lymphatic: No lymphadenopathy  Cardiovascular: Normal S1 S2,RRR, No JVD, No murmurs  Respiratory: Lungs clear to auscultation	  Gastrointestinal:  Soft, Non-tender, + BS	  Skin: No rashes, No ecchymoses, No cyanosis	  Neurologic: Non-focal  Extremities: Normal range of motion, No clubbing, cyanosis or edema  Vascular: Peripheral pulses palpable 2+ bilaterally    TELEMETRY: 	    ECG:  	  RADIOLOGY:  OTHER: 	  	  LABS:	 	    CARDIAC MARKERS:  Troponin T, High Sensitivity Result: 15 ng/L ( @ 08:42)                                  13.0   6.95  )-----------( 251      ( 2023 08:42 )             40.7     02    135  |  95<L>  |  23  ----------------------------<  148<H>  3.6   |  27  |  0.69    Ca    9.8      2023 08:42    TPro  8.1  /  Alb  4.3  /  TBili  0.4  /  DBili  x   /  AST  20  /  ALT  23  /  AlkPhos  83  -      proBNP: Serum Pro-Brain Natriuretic Peptide: 324 pg/mL ( @ 08:42)    Lipid Profile:   HgA1c:   TSH:

## 2023-02-07 NOTE — PATIENT PROFILE ADULT - FUNCTIONAL ASSESSMENT - DAILY ACTIVITY 2.
After Visit Summary   4/4/2018    Leopoldo Zhang    MRN: [id number]           Patient Information     Date Of Birth          1972        Visit Information        Provider Department      4/4/2018 9:00 AM 1, Uc Cv Device [facility]        Today's Diagnoses     Idiopathic cardiomyopathy (H)    -  1       Follow-ups after your visit        Your next 10 appointments already scheduled     Apr 05, 2018   Procedure with Bhaskar Leung MD   [facility] ([facility]    [address] [address]   [phone]            Apr 05, 2018 11:00 AM CDT   Ep 90 Minute with [id number]   [facility]  [facility] ([facility] [place])    [address] [address] [phone]            Jul 03, 2018  4:30 PM CDT   Remote ICD Check with CARRANZA DCR2   [facility] ([facility])    [address]  [facility] [id number] [phone] OPT 2           This appointment is for a remote check of your debrillator.  This is not an appointment at the office.            Jul 11, 2018 10:30 AM CDT   (Arrive by 10:15 AM)   Implanted Defibulator with Uc Cv Device 1   [facility] ([facility])    [address] [place] [address]  [address] [place] [id number] [phone]            Jul 11, 2018 11:00 AM CDT   (Arrive by 10:45 AM)   RETURN ARRHYTHMIA with Bhaskar Leung MD   [facility] ([facility])    [address] [place] [address]  [address] [place] [id number] [phone]            Aug 24, 2018  7:45 AM CDT   Return Visit with Pj Dickey MD   [facility] ([facility])    [address]  [facility] [place] [id number] [phone] OPT 2              Future tests that were ordered for you today     Open Future Orders        Priority Expected Expires Ordered    **Comprehensive  metabolic panel FUTURE anytime Routine 4/3/2018 4/3/2019 4/3/2018    **CBC with platelets FUTURE anytime Routine 4/3/2018 4/3/2019 4/3/2018            Who to contact     If you have questions or need follow up information about today's clinic visit or your schedule please contact [facility] directly at [phone].  Normal or non-critical lab and imaging results will be communicated to you by MyChart, letter or phone within 4 business days after the clinic has received the results. If you do not hear from us within 7 days, please contact the clinic through [employer] or phone. If you have a critical or abnormal lab result, we will notify you by phone as soon as possible.  Submit refill requests through [employer] or call your pharmacy and they will forward the refill request to us. Please allow 3 business days for your refill to be completed.          Additional Information About Your Visit        [employer] Information     [employer] gives you secure access to your electronic health record. If you see a primary care provider, you can also send messages to your care team and make appointments. If you have questions, please call your primary care clinic.  If you do not have a primary care provider, please call [phone] and they will assist you.        Care EveryWhere ID     This is your Care EveryWhere ID. This could be used by other organizations to access your [facility] medical records  [id number]         Blood Pressure from Last 3 Encounters:   04/04/18 122/54   03/27/18 110/60   04/07/17 110/76    Weight from Last 3 Encounters:   04/04/18 98.4 kg (217 lb)   03/27/18 99.3 kg (219 lb)   04/07/17 101.1 kg (222 lb 12.8 oz)              We Performed the Following     (68664) ICD DEVICE PROGRAMMING EVAL, SINGLE LEAD ICD        Primary Care Provider Office Phone # Fax #    Pj Dickey [phone] [address]  [place] [address]        Equal Access to Services     PHYLLIS URIBE AH:  Hadii sydnie padron Sojazmineali, waaxda luqadaha, qaybta kaalmada daisy, melissa rosaliain hayaacarter gutierrezrachele jackson lajohncarter tin. So [facility] [phone].    ATENCIÓN: Si erika maradiaga, tiene a barcenas disposición servicios gratuitos de asistencia lingüística. Llame al [phone].    We comply with applicable federal civil rights laws and [place] laws. We do not discriminate on the basis of race, color, national origin, age, disability, sex, sexual orientation, or gender identity.            Thank you!     Thank you for choosing [facility]  for your care. Our goal is always to provide you with excellent care. Hearing back from our patients is one way we can continue to improve our services. Please take a few minutes to complete the written survey that you may receive in the mail after your visit with us. Thank you!             Your Updated Medication List - Protect others around you: Learn how to safely use, store and throw away your medicines at www.disposemymeds.org.          This list is accurate as of 4/4/18  3:10 PM.  Always use your most recent med list.                   Brand Name Dispense Instructions for use Diagnosis    carvedilol 25 MG tablet    COREG    60 tablet    Take 1 tablet (25 mg) by mouth 2 times daily (with meals)    Chronic systolic congestive heart failure (H)       lisinopril 5 MG tablet    PRINIVIL/ZESTRIL    60 tablet    Take 1 tablet (5 mg) by mouth 2 times daily    Dilated cardiomyopathy (H)       LORazepam 0.5 MG tablet    ATIVAN    20 tablet    Take 1 tablet (0.5 mg) by mouth every 6 hours as needed for anxiety    Anxiety       propafenone 325 MG 12 hr capsule    RYTHMOL SR     Take 1 capsule (325 mg) by mouth 2 times daily    Ventricular tachycardia (H)       simvastatin 20 MG tablet    ZOCOR    90 tablet    Take 1 tablet (20 mg) by mouth daily    Hyperlipidemia LDL goal <100       testosterone cypionate 200 MG/ML injection    DEPOTESTOTERONE     Inject 150 mg into the muscle once a week  Tuesdays.           4 = No assist / stand by assistance

## 2023-02-07 NOTE — CONSULT NOTE ADULT - ASSESSMENT
77 F w/ past history of ovarian cancer that is not an active treatment, severe persistent asthma, diabetes, hypertension, hyperlipidemia, depression, glaucoma, GERD.  Now admit w/sob and leg swelling following course of steroids for asthma flair--situation c/w fluid OL/CHF-DD rather than her asthma.   *****************          SEE Below:
Echo 7/24/20: Hyperdynamic lv fxn EF 75%    A/P  77-year-old female pmhx htn, hld, asthma, coming in with shortness of breath.     #SOB  -Likely 2/2 asthma exacerbation i/s/o human metapneumovirus  -Does not appear grossly fluid overloaded on exam  -CXR clear  -EKG NSR  -S/P IV lasix 80mg  -Would hold off on further diuresis for now  -Continue nebs, steroids per primary  -Pulm consult  -Check echo    #LE Edema  -S/p IV lasix 80mg  -LE doppler negative for dvt b/l    #HTN  -BP acceptable  -Start metoprolol 25mg bid  -Start amlodipine 5mg qd if bp can tolerate      Please call/text me with any questions/concerns between 8am-4pm  [phone]

## 2023-02-08 ENCOUNTER — TRANSCRIPTION ENCOUNTER (OUTPATIENT)
Age: 78
End: 2023-02-08

## 2023-02-08 VITALS
SYSTOLIC BLOOD PRESSURE: 154 MMHG | RESPIRATION RATE: 18 BRPM | TEMPERATURE: 98 F | DIASTOLIC BLOOD PRESSURE: 67 MMHG | OXYGEN SATURATION: 95 % | HEART RATE: 91 BPM

## 2023-02-08 PROCEDURE — 93356 MYOCRD STRAIN IMG SPCKL TRCK: CPT

## 2023-02-08 PROCEDURE — 94640 AIRWAY INHALATION TREATMENT: CPT

## 2023-02-08 PROCEDURE — 80053 COMPREHEN METABOLIC PANEL: CPT

## 2023-02-08 PROCEDURE — 82435 ASSAY OF BLOOD CHLORIDE: CPT

## 2023-02-08 PROCEDURE — 85014 HEMATOCRIT: CPT

## 2023-02-08 PROCEDURE — 73610 X-RAY EXAM OF ANKLE: CPT

## 2023-02-08 PROCEDURE — 80048 BASIC METABOLIC PNL TOTAL CA: CPT

## 2023-02-08 PROCEDURE — 85027 COMPLETE CBC AUTOMATED: CPT

## 2023-02-08 PROCEDURE — 73560 X-RAY EXAM OF KNEE 1 OR 2: CPT

## 2023-02-08 PROCEDURE — 82803 BLOOD GASES ANY COMBINATION: CPT

## 2023-02-08 PROCEDURE — 84132 ASSAY OF SERUM POTASSIUM: CPT

## 2023-02-08 PROCEDURE — 96375 TX/PRO/DX INJ NEW DRUG ADDON: CPT

## 2023-02-08 PROCEDURE — 36415 COLL VENOUS BLD VENIPUNCTURE: CPT

## 2023-02-08 PROCEDURE — 99285 EMERGENCY DEPT VISIT HI MDM: CPT | Mod: 25

## 2023-02-08 PROCEDURE — 93306 TTE W/DOPPLER COMPLETE: CPT

## 2023-02-08 PROCEDURE — 96374 THER/PROPH/DIAG INJ IV PUSH: CPT

## 2023-02-08 PROCEDURE — 85025 COMPLETE CBC W/AUTO DIFF WBC: CPT

## 2023-02-08 PROCEDURE — G0378: CPT

## 2023-02-08 PROCEDURE — 93971 EXTREMITY STUDY: CPT

## 2023-02-08 PROCEDURE — 83880 ASSAY OF NATRIURETIC PEPTIDE: CPT

## 2023-02-08 PROCEDURE — 82947 ASSAY GLUCOSE BLOOD QUANT: CPT

## 2023-02-08 PROCEDURE — 84484 ASSAY OF TROPONIN QUANT: CPT

## 2023-02-08 PROCEDURE — 99232 SBSQ HOSP IP/OBS MODERATE 35: CPT

## 2023-02-08 PROCEDURE — 71045 X-RAY EXAM CHEST 1 VIEW: CPT

## 2023-02-08 PROCEDURE — 82330 ASSAY OF CALCIUM: CPT

## 2023-02-08 PROCEDURE — 85018 HEMOGLOBIN: CPT

## 2023-02-08 PROCEDURE — 82962 GLUCOSE BLOOD TEST: CPT

## 2023-02-08 PROCEDURE — 83605 ASSAY OF LACTIC ACID: CPT

## 2023-02-08 PROCEDURE — 0225U NFCT DS DNA&RNA 21 SARSCOV2: CPT

## 2023-02-08 PROCEDURE — 84295 ASSAY OF SERUM SODIUM: CPT

## 2023-02-08 PROCEDURE — 73620 X-RAY EXAM OF FOOT: CPT

## 2023-02-08 RX ADMIN — PANTOPRAZOLE SODIUM 40 MILLIGRAM(S): 20 TABLET, DELAYED RELEASE ORAL at 05:21

## 2023-02-08 RX ADMIN — Medication 3 MILLILITER(S): at 00:41

## 2023-02-08 RX ADMIN — BUDESONIDE AND FORMOTEROL FUMARATE DIHYDRATE 2 PUFF(S): 160; 4.5 AEROSOL RESPIRATORY (INHALATION) at 05:21

## 2023-02-08 RX ADMIN — Medication 3 MILLILITER(S): at 05:21

## 2023-02-08 RX ADMIN — Medication 3 MILLILITER(S): at 12:01

## 2023-02-08 RX ADMIN — TIOTROPIUM BROMIDE 2 PUFF(S): 18 CAPSULE ORAL; RESPIRATORY (INHALATION) at 12:01

## 2023-02-08 RX ADMIN — Medication 10 MILLIGRAM(S): at 05:21

## 2023-02-08 RX ADMIN — MONTELUKAST 10 MILLIGRAM(S): 4 TABLET, CHEWABLE ORAL at 12:01

## 2023-02-08 NOTE — DISCHARGE NOTE PROVIDER - NSDCFUSCHEDAPPT_GEN_ALL_CORE_FT
Aldo Gonzales  [facility]  [facility] [address]  Scheduled Appointment: 03/17/2023

## 2023-02-08 NOTE — DISCHARGE NOTE PROVIDER - PROVIDER TOKENS
PROVIDER:[TOKEN:[[id number]],FOLLOWUP:[1 week]],PROVIDER:[TOKEN:[[id number]],FOLLOWUP:[1 week]]

## 2023-02-08 NOTE — DISCHARGE NOTE PROVIDER - NSDCFUADDAPPT_GEN_ALL_CORE_FT
APPTS ARE READY TO BE MADE: [x ] YES    Best Family or Patient Contact (if needed):    Additional Information about above appointments (if needed):    1:   2:   3:     Other comments or requests:    APPTS ARE READY TO BE MADE: [x ] YES    Best Family or Patient Contact (if needed):    Additional Information about above appointments (if needed):    1:   2:   3:     Other comments or requests:   Patient was previously scheduled with Dr. Aldo Gonzales on 03/17 10:30am at [address] [place] [id number].     Patient advised they currently have a cardiologist that they will follow up with on 02/28.    Patient was provided with follow up request details for Dr. Keven Jack and was advised to call to schedule follow up within specified time frame.

## 2023-02-08 NOTE — PROGRESS NOTE ADULT - SUBJECTIVE AND OBJECTIVE BOX
CARDIOLOGY FOLLOW UP - Dr. Abel  DATE OF SERVICE: 2/7/23    CC  No CV complaints, feels better  Still with RLE swelling    REVIEW OF SYSTEMS:  CONSTITUTIONAL: No fever, weight loss, or fatigue  RESPIRATORY: No cough, wheezing, chills or hemoptysis; No Shortness of Breath  CARDIOVASCULAR: No chest pain, palpitations, passing out, dizziness, or leg swelling  GASTROINTESTINAL: No abdominal or epigastric pain. No nausea, vomiting, or hematemesis; No diarrhea or constipation. No melena or hematochezia.  VASCULAR: No edema     PHYSICAL EXAM:  T(C): 36.4 (02-07-23 @ 11:25), Max: 36.9 (02-06-23 @ 17:18)  HR: 90 (02-07-23 @ 11:25) (72 - 90)  BP: 173/73 (02-07-23 @ 11:25) (117/68 - 173/73)  RR: 18 (02-07-23 @ 11:25) (18 - 20)  SpO2: 93% (02-07-23 @ 11:25) (93% - 98%)  Wt(kg): --  I&O's Summary      Appearance: Normal	  Cardiovascular: Normal S1 S2,RRR, No JVD, No murmurs  Respiratory: Lungs clear to auscultation b/l  Gastrointestinal:  Soft, Non-tender, + BS	  Extremities: Normal range of motion, No clubbing, cyanosis. +RLE edema      Home Medications:  amLODIPine 5 mg oral tablet: 1 tab(s) orally once a day (06 Feb 2023 17:24)  atorvastatin 10 mg oral tablet: 1 tab(s) orally once a day    NOTE: pharmacy last filled JULY 2022 for 90 days supply (06 Feb 2023 17:23)  FLUoxetine 20 mg oral capsule: 1 cap(s) orally once a day (06 Feb 2023 17:24)  furosemide 20 mg oral tablet: 1 tab(s) orally once a day (06 Feb 2023 17:24)  glimepiride 2 mg oral tablet: 1 tab(s) orally once a day (06 Feb 2023 17:24)  metoprolol succinate 25 mg oral tablet, extended release: 1 tab(s) orally once a day (06 Feb 2023 17:24)  omeprazole 40 mg oral delayed release capsule: 1 cap(s) orally once a day    Note: Pharmacy last filled in JULY 2022 for 90 day supply (06 Feb 2023 17:19)  Pepcid 40 mg oral tablet: 1 tab(s) orally once a day (at bedtime) (06 Feb 2023 17:24)  Singulair 10 mg oral tablet: 1 tab(s) orally once a day (06 Feb 2023 17:24)  Xalatan 0.005% ophthalmic solution: 1 drop(s) to each affected eye once a day (in the evening) (06 Feb 2023 17:24)      MEDICATIONS  (STANDING):  albuterol/ipratropium for Nebulization 3 milliLiter(s) Nebulizer every 6 hours  budesonide 160 MICROgram(s)/formoterol 4.5 MICROgram(s) Inhaler 2 Puff(s) Inhalation two times a day  enoxaparin Injectable 40 milliGRAM(s) SubCutaneous every 24 hours  methylPREDNISolone sodium succinate Injectable 40 milliGRAM(s) IV Push every 8 hours  montelukast 10 milliGRAM(s) Oral daily  pantoprazole    Tablet 40 milliGRAM(s) Oral before breakfast  predniSONE   Tablet 10 milliGRAM(s) Oral daily  tiotropium 2.5 MICROgram(s) Inhaler 2 Puff(s) Inhalation daily      TELEMETRY: 	    ECG:  	  RADIOLOGY:   DIAGNOSTIC TESTING:  [x] Echocardiogram:  < from: Transthoracic Echocardiogram (02.06.23 @ 15:39) >  Observations:  Mitral Valve: Normal mitral valve. Minimal mitral  regurgitation.  Aortic Valve/Aorta: Normal trileaflet aortic valve. Peak  transaortic valve gradient equals 20 mm Hg, mean  transaortic valve gradient equals 9 mm Hg, aortic valve  velocity time integral equals 40 cm. No aortic valve  regurgitation seen. Peak left ventricular outflow tract  gradient equals 26 mm Hg, mean gradient is equal to 7 mm  Hg,LVOT velocity time integral equals 30 cm, consistent  with mild LVOT obstruction. The peak LVOT gradient with  valsalva is 37mmHg consistent with moderate LVOT  obstruction.  Aortic Root: 3 cm.  LVOT diameter: 1.6 cm.  Left Atrium: Normal left atrium.  LA volume index = 25  cc/m2.  Left Ventricle: Hyperdynamic left ventricular systolic  function. Asymmetric septal hypertrophy. Mild diastolic  dysfunction (Stage I).  Right Heart: Normal right atrium. Normal right ventricular  size and function. Normal tricuspid valve. Mild tricuspid  regurgitation. Normal pulmonic valve. No pulmonic  regurgitation.  Pericardium/Pleura: Normal pericardium with no pericardial  effusion.  Hemodynamic: Estimated right ventricular systolic pressure  equals 37 mm Hg, assuming right atrial pressure equals 8 mm  Hg, consistent with borderline pulmonary hypertension.    < end of copied text >    [ ]  Catheterization:  [ ] Stress Test:    OTHER: 	    LABS:	 	    Troponin T, High Sensitivity Result: 15 ng/L [0 - 51] (02-06 @ 08:42)                          11.5   10.42 )-----------( 240      ( 07 Feb 2023 07:02 )             34.6     02-07    130<L>  |  95<L>  |  36<H>  ----------------------------<  382<H>  3.8   |  19<L>  |  0.65    Ca    9.6      07 Feb 2023 07:03    TPro  8.1  /  Alb  4.3  /  TBili  0.4  /  DBili  x   /  AST  20  /  ALT  23  /  AlkPhos  83  02-06            
CHIEF COMPLAINT: f/up sob, severe persistent asthma, osas, allergies-much better over all, less leg swelling, no wheezing or cough    Interval Events: cards f/up    REVIEW OF SYSTEMS:  Constitutional: No fevers or chills. No weight loss. No fatigue or generalized malaise.  Eyes: No itching or discharge from the eyes  ENT: No ear pain. No ear discharge. No nasal congestion. No post nasal drip. No epistaxis. No throat pain. No sore throat. No difficulty swallowing.   CV: No chest pain. No palpitations. No lightheadedness or dizziness.   Resp: No dyspnea at rest. No dyspnea on exertion. No orthopnea. No wheezing. No cough. No stridor. No sputum production. No chest pain with respiration.  GI: No nausea. No vomiting. No diarrhea.  MSK: No joint pain or pain in any extremities  Integumentary: No skin lesions. + pedal edema.  Neurological: No gross motor weakness. No sensory changes.  [+ ] All other systems negative  [ ] Unable to assess ROS because ________    OBJECTIVE:  ICU Vital Signs Last 24 Hrs  T(C): 36.6 (08 Feb 2023 04:49), Max: 36.7 (07 Feb 2023 20:09)  T(F): 97.9 (08 Feb 2023 04:49), Max: 98 (07 Feb 2023 20:09)  HR: 98 (08 Feb 2023 04:49) (82 - 98)  BP: 128/63 (08 Feb 2023 04:49) (128/63 - 150/64)  BP(mean): --  ABP: --  ABP(mean): --  RR: 17 (08 Feb 2023 04:49) (17 - 18)  SpO2: 90% (08 Feb 2023 04:49) (90% - 95%)    O2 Parameters below as of 08 Feb 2023 04:49  Patient On (Oxygen Delivery Method): room air              02-07 @ 07:01  -  02-08 @ 05:28  --------------------------------------------------------  IN: 180 mL / OUT: 0 mL / NET: 180 mL      CAPILLARY BLOOD GLUCOSE      POCT Blood Glucose.: 269 mg/dL (06 Feb 2023 18:15)      PHYSICAL EXAM: NAD in bed on RA  General: Awake, alert, oriented X 3.   HEENT: Atraumatic, normocephalic.                 Mallampatti Grade 3                No nasal congestion.                No tonsillar or pharyngeal exudates.  Lymph Nodes: No palpable lymphadenopathy  Neck: No JVD. No carotid bruit.   Respiratory: Normal chest expansion                         Normal percussion                         Normal and equal air entry                         No wheeze, rhonchi or rales.  Cardiovascular: S1 S2 normal. + murmurs, rubs or gallops.   Abdomen: Soft, non-tender, non-distended. No organomegaly. Normoactive bowel sounds.  Extremities: Warm to touch. Peripheral pulse palpable. + pedal edema.   Skin: No rashes or skin lesions  Neurological: Motor and sensory examination equal and normal in all four extremities.  Psychiatry: Appropriate mood and affect.    HOSPITAL MEDICATIONS:  MEDICATIONS  (STANDING):  albuterol/ipratropium for Nebulization 3 milliLiter(s) Nebulizer every 6 hours  budesonide 160 MICROgram(s)/formoterol 4.5 MICROgram(s) Inhaler 2 Puff(s) Inhalation two times a day  enoxaparin Injectable 40 milliGRAM(s) SubCutaneous every 24 hours  montelukast 10 milliGRAM(s) Oral daily  pantoprazole    Tablet 40 milliGRAM(s) Oral before breakfast  predniSONE   Tablet 10 milliGRAM(s) Oral daily  tiotropium 2.5 MICROgram(s) Inhaler 2 Puff(s) Inhalation daily    MEDICATIONS  (PRN):  acetaminophen     Tablet .. 650 milliGRAM(s) Oral every 6 hours PRN Temp greater or equal to 38.5C (101.3F), Mild Pain (1 - 3)      LABS:                        11.5   10.42 )-----------( 240      ( 07 Feb 2023 07:02 )             34.6     02-07    130<L>  |  95<L>  |  36<H>  ----------------------------<  382<H>  3.8   |  19<L>  |  0.65    Ca    9.6      07 Feb 2023 07:03    TPro  8.1  /  Alb  4.3  /  TBili  0.4  /  DBili  x   /  AST  20  /  ALT  23  /  AlkPhos  83  02-06          Venous Blood Gas:  02-06 @ 08:41  7.37/50/33/29/53.1  VBG Lactate: 1.9      MICROBIOLOGY:     RADIOLOGY:  [ ] Reviewed and interpreted by me    Point of Care Ultrasound Findings:    PFT:    EKG:
Patient is a 77y old  Female who presents with a chief complaint of sob (07 Feb 2023 05:44)      SUBJECTIVE / OVERNIGHT EVENTS: feels better  Review of Systems  chest pain no  palpitations no  sob improving   nausea no  headache no    MEDICATIONS  (STANDING):  albuterol/ipratropium for Nebulization 3 milliLiter(s) Nebulizer every 6 hours  budesonide 160 MICROgram(s)/formoterol 4.5 MICROgram(s) Inhaler 2 Puff(s) Inhalation two times a day  enoxaparin Injectable 40 milliGRAM(s) SubCutaneous every 24 hours  methylPREDNISolone sodium succinate Injectable 40 milliGRAM(s) IV Push every 8 hours  montelukast 10 milliGRAM(s) Oral daily  pantoprazole    Tablet 40 milliGRAM(s) Oral before breakfast  predniSONE   Tablet 10 milliGRAM(s) Oral daily  tiotropium 2.5 MICROgram(s) Inhaler 2 Puff(s) Inhalation daily    MEDICATIONS  (PRN):  acetaminophen     Tablet .. 650 milliGRAM(s) Oral every 6 hours PRN Temp greater or equal to 38.5C (101.3F), Mild Pain (1 - 3)      Vital Signs Last 24 Hrs  T(C): 36.4 (07 Feb 2023 11:25), Max: 36.9 (07 Feb 2023 04:45)  T(F): 97.6 (07 Feb 2023 11:25), Max: 98.4 (07 Feb 2023 04:45)  HR: 90 (07 Feb 2023 11:25) (75 - 90)  BP: 150/64 (07 Feb 2023 11:25) (117/68 - 150/64)  BP(mean): --  RR: 18 (07 Feb 2023 11:25) (18 - 18)  SpO2: 93% (07 Feb 2023 11:25) (93% - 96%)    Parameters below as of 07 Feb 2023 11:25  Patient On (Oxygen Delivery Method): room air        PHYSICAL EXAM:  GENERAL: NAD  HEAD:  Atraumatic, Normocephalic  EYES: EOMI, PERRLA, conjunctiva and sclera clear  NECK: Supple, No JVD  CHEST/LUNG: Clear to auscultation bilaterally; No wheeze  HEART: Regular rate and rhythm; No murmurs, rubs, or gallops  ABDOMEN: Soft, Nontender, Nondistended; Bowel sounds present  EXTREMITIES:  2+ Peripheral Pulses, No clubbing, cyanosis, or edema  PSYCH: AAOx3,anxious   NEUROLOGY: non-focal  SKIN: No rashes or lesions    LABS:                        11.5   10.42 )-----------( 240      ( 07 Feb 2023 07:02 )             34.6     02-07    130<L>  |  95<L>  |  36<H>  ----------------------------<  382<H>  3.8   |  19<L>  |  0.65    Ca    9.6      07 Feb 2023 07:03    TPro  8.1  /  Alb  4.3  /  TBili  0.4  /  DBili  x   /  AST  20  /  ALT  23  /  AlkPhos  83  02-06                RADIOLOGY & ADDITIONAL TESTS:    Imaging Personally Reviewed:    Consultant(s) Notes Reviewed:      Care Discussed with Consultants/Other Providers:  
CARDIOLOGY FOLLOW UP - Dr. Abel  DATE OF SERVICE: 2/8/23    CC  No CV complaints, feels better    REVIEW OF SYSTEMS:  CONSTITUTIONAL: No fever, weight loss, or fatigue  RESPIRATORY: No cough, wheezing, chills or hemoptysis; No Shortness of Breath  CARDIOVASCULAR: No chest pain, palpitations, passing out, dizziness, or leg swelling  GASTROINTESTINAL: No abdominal or epigastric pain. No nausea, vomiting, or hematemesis; No diarrhea or constipation. No melena or hematochezia.  VASCULAR: No edema     PHYSICAL EXAM:  T(C): 36.6 (02-08-23 @ 04:49), Max: 36.7 (02-07-23 @ 20:09)  HR: 98 (02-08-23 @ 04:49) (82 - 98)  BP: 128/63 (02-08-23 @ 04:49) (128/63 - 150/64)  RR: 17 (02-08-23 @ 04:49) (17 - 18)  SpO2: 90% (02-08-23 @ 04:49) (90% - 95%)  Wt(kg): --  I&O's Summary    07 Feb 2023 07:01  -  08 Feb 2023 07:00  --------------------------------------------------------  IN: 180 mL / OUT: 0 mL / NET: 180 mL        Appearance: Normal	  Cardiovascular: Normal S1 S2,RRR, No JVD, No murmurs  Respiratory: Lungs clear to auscultation b/l	  Gastrointestinal:  Soft, Non-tender, + BS	  Extremities: Normal range of motion, No clubbing, cyanosis. +Trace R foot edema      Home Medications:  amLODIPine 5 mg oral tablet: 1 tab(s) orally once a day (06 Feb 2023 17:24)  atorvastatin 10 mg oral tablet: 1 tab(s) orally once a day    NOTE: pharmacy last filled JULY 2022 for 90 days supply (06 Feb 2023 17:23)  FLUoxetine 20 mg oral capsule: 1 cap(s) orally once a day (06 Feb 2023 17:24)  furosemide 20 mg oral tablet: 1 tab(s) orally once a day (06 Feb 2023 17:24)  glimepiride 2 mg oral tablet: 1 tab(s) orally once a day (06 Feb 2023 17:24)  metoprolol succinate 25 mg oral tablet, extended release: 1 tab(s) orally once a day (06 Feb 2023 17:24)  omeprazole 40 mg oral delayed release capsule: 1 cap(s) orally once a day    Note: Pharmacy last filled in JULY 2022 for 90 day supply (06 Feb 2023 17:19)  Pepcid 40 mg oral tablet: 1 tab(s) orally once a day (at bedtime) (06 Feb 2023 17:24)  Singulair 10 mg oral tablet: 1 tab(s) orally once a day (06 Feb 2023 17:24)  Xalatan 0.005% ophthalmic solution: 1 drop(s) to each affected eye once a day (in the evening) (06 Feb 2023 17:24)      MEDICATIONS  (STANDING):  albuterol/ipratropium for Nebulization 3 milliLiter(s) Nebulizer every 6 hours  budesonide 160 MICROgram(s)/formoterol 4.5 MICROgram(s) Inhaler 2 Puff(s) Inhalation two times a day  enoxaparin Injectable 40 milliGRAM(s) SubCutaneous every 24 hours  montelukast 10 milliGRAM(s) Oral daily  pantoprazole    Tablet 40 milliGRAM(s) Oral before breakfast  predniSONE   Tablet 10 milliGRAM(s) Oral daily  tiotropium 2.5 MICROgram(s) Inhaler 2 Puff(s) Inhalation daily      TELEMETRY: 	    ECG:  	  RADIOLOGY:   DIAGNOSTIC TESTING:  [ ] Echocardiogram:  [ ]  Catheterization:  [ ] Stress Test:    OTHER: 	    LABS:	 	    Troponin T, High Sensitivity Result: 15 ng/L [0 - 51] (02-06 @ 08:42)                          11.5   10.42 )-----------( 240      ( 07 Feb 2023 07:02 )             34.6     02-07    130<L>  |  95<L>  |  36<H>  ----------------------------<  382<H>  3.8   |  19<L>  |  0.65    Ca    9.6      07 Feb 2023 07:03              
Patient is a 77y old  Female who presents with a chief complaint of SOB (08 Feb 2023 13:29)      SUBJECTIVE / OVERNIGHT EVENTS: Comfortable without new complaints.   Review of Systems  chest pain no  palpitations no  sob improving   nausea no  headache no    MEDICATIONS  (STANDING):  albuterol/ipratropium for Nebulization 3 milliLiter(s) Nebulizer every 6 hours  budesonide 160 MICROgram(s)/formoterol 4.5 MICROgram(s) Inhaler 2 Puff(s) Inhalation two times a day  enoxaparin Injectable 40 milliGRAM(s) SubCutaneous every 24 hours  montelukast 10 milliGRAM(s) Oral daily  pantoprazole    Tablet 40 milliGRAM(s) Oral before breakfast  predniSONE   Tablet 10 milliGRAM(s) Oral daily  tiotropium 2.5 MICROgram(s) Inhaler 2 Puff(s) Inhalation daily    MEDICATIONS  (PRN):  acetaminophen     Tablet .. 650 milliGRAM(s) Oral every 6 hours PRN Temp greater or equal to 38.5C (101.3F), Mild Pain (1 - 3)      Vital Signs Last 24 Hrs  T(C): 36.9 (08 Feb 2023 16:10), Max: 36.9 (08 Feb 2023 16:10)  T(F): 98.5 (08 Feb 2023 16:10), Max: 98.5 (08 Feb 2023 16:10)  HR: 91 (08 Feb 2023 16:10) (82 - 98)  BP: 154/67 (08 Feb 2023 16:10) (128/63 - 157/81)  BP(mean): --  RR: 18 (08 Feb 2023 16:10) (17 - 18)  SpO2: 95% (08 Feb 2023 16:10) (90% - 95%)    Parameters below as of 08 Feb 2023 16:10  Patient On (Oxygen Delivery Method): room air        PHYSICAL EXAM:  GENERAL: NAD, well-developed  HEAD:  Atraumatic, Normocephalic  EYES: EOMI, PERRLA, conjunctiva and sclera clear  NECK: Supple, No JVD  CHEST/LUNG: Clear to auscultation bilaterally; No wheeze  HEART: Regular rate and rhythm; No murmurs, rubs, or gallops  ABDOMEN: Soft, Nontender, Nondistended; Bowel sounds present  EXTREMITIES:  2+ Peripheral Pulses, No clubbing, cyanosis, or edema  PSYCH: AAOx3 anxious  NEUROLOGY: non-focal  SKIN: No rashes or lesions    LABS:                        11.5   10.42 )-----------( 240      ( 07 Feb 2023 07:02 )             34.6     02-07    130<L>  |  95<L>  |  36<H>  ----------------------------<  382<H>  3.8   |  19<L>  |  0.65    Ca    9.6      07 Feb 2023 07:03                  RADIOLOGY & ADDITIONAL TESTS:    Imaging Personally Reviewed:    Consultant(s) Notes Reviewed:      Care Discussed with Consultants/Other Providers:

## 2023-02-08 NOTE — PROGRESS NOTE ADULT - TIME BILLING
agree with above  cont current mgmt
agree with above  cont current mgmt
as above: better-no resp sx at present  multifactorial dyspnea: fluid OL-DD/CHF is primary DX, baseline severe persistent asthma (s/p flair), debility- RA sat above 90%  Cards-c/w CHF in situation of labile BP--f/up BNP, daily weight, echo as  per Page et al (careful w/b-blocker bronchospasm)   asthma- reduced steroids to pred 10mg per day, symbicort 160, spiriva, singulair, duoneb q 6, out pt nucala, incentive       spirometry  DVT and GI prophylaxis-protonix 40 and pepcid 40 qhs  OSAS-cpap o/n  PT-OOB                           Onc Ovarian CA-quiet-Lorrie et al  BS control  DC planning upon cards clearance (would DC off steroids as asthma under control at present)    Aldo Gonzales MD-Pulmonary   [phone]

## 2023-02-08 NOTE — DISCHARGE NOTE PROVIDER - NSDCCPCAREPLAN_GEN_ALL_CORE_FT
No PRINCIPAL DISCHARGE DIAGNOSIS  Diagnosis: SOB (shortness of breath)  Assessment and Plan of Treatment: - Seen by pulmonary and cardiology  - SOB likely multifactorial, has baseline severe asthma   - Received IV lasix and IV steroids   - Continue home medication with nucala and albuterol   - Pulmonary follow up in 1 week        SECONDARY DISCHARGE DIAGNOSES  Diagnosis: Leg edema  Assessment and Plan of Treatment: - Received IV lasix 80mg  - Improved  -LE doppler negative for DVT  -XR foot/ankle shows no significant findings  - Continue lasix 20mg QD    Diagnosis: HTN (hypertension)  Assessment and Plan of Treatment: - Resume home dose metoprolol   - Also resume home dose amlodipine if blood pressures can tolerate  - Blood pressure monitoring at home       Diagnosis: Ovarian cancer  Assessment and Plan of Treatment: - Outpatient follow up

## 2023-02-08 NOTE — DISCHARGE NOTE NURSING/CASE MANAGEMENT/SOCIAL WORK - PATIENT PORTAL LINK FT
You can access the FollowMyHealth Patient Portal offered by [facility] by registering at the following website: [email or web address]. By joining [facility]’s FollowMyHealth portal, you will also be able to view your health information using other applications (apps) compatible with our system.

## 2023-02-08 NOTE — DISCHARGE NOTE PROVIDER - CARE PROVIDERS DIRECT ADDRESSES
[email or web address]

## 2023-02-08 NOTE — DISCHARGE NOTE PROVIDER - HOSPITAL COURSE
77-year-old female who is coming in with shortness of breath.  She has a past history of ovarian cancer that is not an active treatment, asthma, diabetes, hypertension, hyperlipidemia, depression, glaucoma, GERD.  She was recently seen on January 16 at this emergency department and she got high-dose steroids and was discharged. She went to her primary care doctor who started her on furosemide 20 mg because her legs were swelling and she feels like her lungs are congested.  No fevers or chills.  No chest pain.  No nausea or vomiting.  She feels like there is "gurgling in my chest".  She has URI symptoms as well.    #SOB  -Likely 2/2 asthma exacerbation i/s/o human metapneumovirus  -Does not appear grossly fluid overloaded on exam  -CXR clear  -EKG NSR  -S/P IV lasix 80mg  -Can   -Continue nebs, steroids per pulm    #LE Edema  -S/p IV lasix 80mg  -Improved  -LE doppler negative for dvt b/l  -XR foot/ankle w/o significant findings    #HTN  -BP acceptable  -Start metoprolol 25mg bid  -Start amlodipine 5mg qd if bp can tolerate    Depression   - continue Rx    R ankle edema  - R ankle xray no fracture    Diabetes   - ADA diet  - BS control    Ovarian Cancer in 2005 had chemo  - stable    Pt is  medically cleared by attending and med rec/dc note reviewed with Dr. Horne. DC home w/ outpatient follow up.

## 2023-02-08 NOTE — DISCHARGE NOTE NURSING/CASE MANAGEMENT/SOCIAL WORK - NSDCPEFALRISK_GEN_ALL_CORE
For information on Fall & Injury Prevention, visit: [email or web address] OR  [email or web address] OR  https://www.cdc.gov/steadi/patient.html

## 2023-02-08 NOTE — DISCHARGE NOTE PROVIDER - CARE PROVIDER_API CALL
Aldo Gonzales)  Internal Medicine; Pulmonary Disease  [address], [address]  [place] [address]  Phone: [phone]  Fax: [phone]  Follow Up Time: 1 week    Marcie Baca)  Medicine  [facility], [address]  [place] [address]  Phone: [phone]  Fax: [phone]  Follow Up Time: 1 week

## 2023-02-08 NOTE — DISCHARGE NOTE PROVIDER - NSDCMRMEDTOKEN_GEN_ALL_CORE_FT
Albuterol (Eqv-ProAir HFA) 90 mcg/inh inhalation aerosol: 2 puff(s) inhaled every 6 hours, As Needed  amLODIPine 5 mg oral tablet: 1 tab(s) orally once a day  atorvastatin 10 mg oral tablet: 1 tab(s) orally once a day    NOTE: pharmacy last filled JULY 2022 for 90 days supply  FLUoxetine 20 mg oral capsule: 1 cap(s) orally once a day  furosemide 20 mg oral tablet: 1 tab(s) orally once a day  glimepiride 2 mg oral tablet: 1 tab(s) orally once a day  metoprolol succinate 25 mg oral tablet, extended release: 1 tab(s) orally once a day  omeprazole 40 mg oral delayed release capsule: 1 cap(s) orally once a day    Note: Pharmacy last filled in JULY 2022 for 90 day supply  Pepcid 40 mg oral tablet: 1 tab(s) orally once a day (at bedtime)  Singulair 10 mg oral tablet: 1 tab(s) orally once a day  Xalatan 0.005% ophthalmic solution: 1 drop(s) to each affected eye once a day (in the evening)

## 2023-02-13 ENCOUNTER — APPOINTMENT (OUTPATIENT)
Dept: CARDIOLOGY | Facility: CLINIC | Age: 78
End: 2023-02-13
Payer: MEDICARE

## 2023-02-21 ENCOUNTER — INPATIENT (INPATIENT)
Facility: HOSPITAL | Age: 78
LOS: 2 days | Discharge: HOME | DRG: 392 | End: 2023-02-24
Attending: INTERNAL MEDICINE | Admitting: INTERNAL MEDICINE
Payer: MEDICARE

## 2023-02-21 VITALS
RESPIRATION RATE: 20 BRPM | SYSTOLIC BLOOD PRESSURE: 193 MMHG | DIASTOLIC BLOOD PRESSURE: 79 MMHG | OXYGEN SATURATION: 94 % | HEART RATE: 93 BPM | HEIGHT: 58 IN | TEMPERATURE: 98 F

## 2023-02-21 DIAGNOSIS — R06.02 SHORTNESS OF BREATH: ICD-10-CM

## 2023-02-21 LAB
ALBUMIN SERPL ELPH-MCNC: 4.9 G/DL — SIGNIFICANT CHANGE UP (ref 3.3–5)
ALP SERPL-CCNC: 108 U/L — SIGNIFICANT CHANGE UP (ref 40–120)
ALT FLD-CCNC: 25 U/L — SIGNIFICANT CHANGE UP (ref 10–45)
ANION GAP SERPL CALC-SCNC: 16 MMOL/L — SIGNIFICANT CHANGE UP (ref 5–17)
APTT BLD: 26.4 SEC — LOW (ref 27.5–35.5)
AST SERPL-CCNC: 23 U/L — SIGNIFICANT CHANGE UP (ref 10–40)
BASOPHILS # BLD AUTO: 0.01 K/UL — SIGNIFICANT CHANGE UP (ref 0–0.2)
BASOPHILS NFR BLD AUTO: 0.1 % — SIGNIFICANT CHANGE UP (ref 0–2)
BILIRUB SERPL-MCNC: 0.5 MG/DL — SIGNIFICANT CHANGE UP (ref 0.2–1.2)
BUN SERPL-MCNC: 19 MG/DL — SIGNIFICANT CHANGE UP (ref 7–23)
CALCIUM SERPL-MCNC: 10 MG/DL — SIGNIFICANT CHANGE UP (ref 8.4–10.5)
CHLORIDE SERPL-SCNC: 97 MMOL/L — SIGNIFICANT CHANGE UP (ref 96–108)
CO2 SERPL-SCNC: 23 MMOL/L — SIGNIFICANT CHANGE UP (ref 22–31)
CREAT SERPL-MCNC: 0.68 MG/DL — SIGNIFICANT CHANGE UP (ref 0.5–1.3)
EGFR: 91 ML/MIN/1.73M2 — SIGNIFICANT CHANGE UP
EOSINOPHIL # BLD AUTO: 0 K/UL — SIGNIFICANT CHANGE UP (ref 0–0.5)
EOSINOPHIL NFR BLD AUTO: 0 % — SIGNIFICANT CHANGE UP (ref 0–6)
FLUAV AG NPH QL: SIGNIFICANT CHANGE UP
FLUBV AG NPH QL: SIGNIFICANT CHANGE UP
GLUCOSE SERPL-MCNC: 125 MG/DL — HIGH (ref 70–99)
HCT VFR BLD CALC: 42.8 % — SIGNIFICANT CHANGE UP (ref 34.5–45)
HGB BLD-MCNC: 14.1 G/DL — SIGNIFICANT CHANGE UP (ref 11.5–15.5)
IMM GRANULOCYTES NFR BLD AUTO: 0.4 % — SIGNIFICANT CHANGE UP (ref 0–0.9)
INR BLD: 0.95 RATIO — SIGNIFICANT CHANGE UP (ref 0.88–1.16)
LYMPHOCYTES # BLD AUTO: 2.27 K/UL — SIGNIFICANT CHANGE UP (ref 1–3.3)
LYMPHOCYTES # BLD AUTO: 32.8 % — SIGNIFICANT CHANGE UP (ref 13–44)
MAGNESIUM SERPL-MCNC: 2.1 MG/DL — SIGNIFICANT CHANGE UP (ref 1.6–2.6)
MCHC RBC-ENTMCNC: 29.7 PG — SIGNIFICANT CHANGE UP (ref 27–34)
MCHC RBC-ENTMCNC: 32.9 GM/DL — SIGNIFICANT CHANGE UP (ref 32–36)
MCV RBC AUTO: 90.1 FL — SIGNIFICANT CHANGE UP (ref 80–100)
MONOCYTES # BLD AUTO: 0.67 K/UL — SIGNIFICANT CHANGE UP (ref 0–0.9)
MONOCYTES NFR BLD AUTO: 9.7 % — SIGNIFICANT CHANGE UP (ref 2–14)
NEUTROPHILS # BLD AUTO: 3.95 K/UL — SIGNIFICANT CHANGE UP (ref 1.8–7.4)
NEUTROPHILS NFR BLD AUTO: 57 % — SIGNIFICANT CHANGE UP (ref 43–77)
NRBC # BLD: 0 /100 WBCS — SIGNIFICANT CHANGE UP (ref 0–0)
NT-PROBNP SERPL-SCNC: 96 PG/ML — SIGNIFICANT CHANGE UP (ref 0–300)
PHOSPHATE SERPL-MCNC: 2.6 MG/DL — SIGNIFICANT CHANGE UP (ref 2.5–4.5)
PLATELET # BLD AUTO: 264 K/UL — SIGNIFICANT CHANGE UP (ref 150–400)
POTASSIUM SERPL-MCNC: 3.8 MMOL/L — SIGNIFICANT CHANGE UP (ref 3.5–5.3)
POTASSIUM SERPL-SCNC: 3.8 MMOL/L — SIGNIFICANT CHANGE UP (ref 3.5–5.3)
PROT SERPL-MCNC: 8.7 G/DL — HIGH (ref 6–8.3)
PROTHROM AB SERPL-ACNC: 11 SEC — SIGNIFICANT CHANGE UP (ref 10.5–13.4)
RBC # BLD: 4.75 M/UL — SIGNIFICANT CHANGE UP (ref 3.8–5.2)
RBC # FLD: 14 % — SIGNIFICANT CHANGE UP (ref 10.3–14.5)
RSV RNA NPH QL NAA+NON-PROBE: SIGNIFICANT CHANGE UP
SARS-COV-2 RNA SPEC QL NAA+PROBE: SIGNIFICANT CHANGE UP
SODIUM SERPL-SCNC: 136 MMOL/L — SIGNIFICANT CHANGE UP (ref 135–145)
TROPONIN T, HIGH SENSITIVITY RESULT: 16 NG/L — SIGNIFICANT CHANGE UP (ref 0–51)
TROPONIN T, HIGH SENSITIVITY RESULT: 18 NG/L — SIGNIFICANT CHANGE UP (ref 0–51)
WBC # BLD: 6.93 K/UL — SIGNIFICANT CHANGE UP (ref 3.8–10.5)
WBC # FLD AUTO: 6.93 K/UL — SIGNIFICANT CHANGE UP (ref 3.8–10.5)

## 2023-02-21 PROCEDURE — 99285 EMERGENCY DEPT VISIT HI MDM: CPT

## 2023-02-21 PROCEDURE — 71045 X-RAY EXAM CHEST 1 VIEW: CPT | Mod: 26

## 2023-02-21 PROCEDURE — 71275 CT ANGIOGRAPHY CHEST: CPT | Mod: 26

## 2023-02-21 RX ORDER — ENOXAPARIN SODIUM 100 MG/ML
40 INJECTION SUBCUTANEOUS EVERY 24 HOURS
Refills: 0 | Status: DISCONTINUED | OUTPATIENT
Start: 2023-02-21 | End: 2023-02-24

## 2023-02-21 RX ORDER — DEXTROSE 50 % IN WATER 50 %
15 SYRINGE (ML) INTRAVENOUS ONCE
Refills: 0 | Status: DISCONTINUED | OUTPATIENT
Start: 2023-02-21 | End: 2023-02-24

## 2023-02-21 RX ORDER — DEXTROSE 50 % IN WATER 50 %
12.5 SYRINGE (ML) INTRAVENOUS ONCE
Refills: 0 | Status: DISCONTINUED | OUTPATIENT
Start: 2023-02-21 | End: 2023-02-24

## 2023-02-21 RX ORDER — SODIUM CHLORIDE 9 MG/ML
1000 INJECTION, SOLUTION INTRAVENOUS
Refills: 0 | Status: DISCONTINUED | OUTPATIENT
Start: 2023-02-21 | End: 2023-02-24

## 2023-02-21 RX ORDER — ACETAMINOPHEN 500 MG
975 TABLET ORAL ONCE
Refills: 0 | Status: COMPLETED | OUTPATIENT
Start: 2023-02-21 | End: 2023-02-21

## 2023-02-21 RX ORDER — IPRATROPIUM/ALBUTEROL SULFATE 18-103MCG
3 AEROSOL WITH ADAPTER (GRAM) INHALATION ONCE
Refills: 0 | Status: COMPLETED | OUTPATIENT
Start: 2023-02-21 | End: 2023-02-21

## 2023-02-21 RX ORDER — METOPROLOL TARTRATE 50 MG
1 TABLET ORAL
Qty: 0 | Refills: 0 | DISCHARGE

## 2023-02-21 RX ORDER — MONTELUKAST 4 MG/1
10 TABLET, CHEWABLE ORAL DAILY
Refills: 0 | Status: DISCONTINUED | OUTPATIENT
Start: 2023-02-21 | End: 2023-02-24

## 2023-02-21 RX ORDER — ACETAMINOPHEN 500 MG
650 TABLET ORAL EVERY 6 HOURS
Refills: 0 | Status: DISCONTINUED | OUTPATIENT
Start: 2023-02-21 | End: 2023-02-24

## 2023-02-21 RX ORDER — INSULIN LISPRO 100/ML
VIAL (ML) SUBCUTANEOUS
Refills: 0 | Status: DISCONTINUED | OUTPATIENT
Start: 2023-02-21 | End: 2023-02-24

## 2023-02-21 RX ORDER — FLUOXETINE HCL 10 MG
20 CAPSULE ORAL DAILY
Refills: 0 | Status: DISCONTINUED | OUTPATIENT
Start: 2023-02-21 | End: 2023-02-24

## 2023-02-21 RX ORDER — METOPROLOL TARTRATE 50 MG
25 TABLET ORAL
Refills: 0 | Status: DISCONTINUED | OUTPATIENT
Start: 2023-02-21 | End: 2023-02-24

## 2023-02-21 RX ORDER — GLUCAGON INJECTION, SOLUTION 0.5 MG/.1ML
1 INJECTION, SOLUTION SUBCUTANEOUS ONCE
Refills: 0 | Status: DISCONTINUED | OUTPATIENT
Start: 2023-02-21 | End: 2023-02-24

## 2023-02-21 RX ORDER — DEXTROSE 50 % IN WATER 50 %
25 SYRINGE (ML) INTRAVENOUS ONCE
Refills: 0 | Status: DISCONTINUED | OUTPATIENT
Start: 2023-02-21 | End: 2023-02-24

## 2023-02-21 RX ORDER — FUROSEMIDE 40 MG
20 TABLET ORAL DAILY
Refills: 0 | Status: DISCONTINUED | OUTPATIENT
Start: 2023-02-21 | End: 2023-02-24

## 2023-02-21 RX ORDER — INSULIN LISPRO 100/ML
VIAL (ML) SUBCUTANEOUS AT BEDTIME
Refills: 0 | Status: DISCONTINUED | OUTPATIENT
Start: 2023-02-21 | End: 2023-02-24

## 2023-02-21 RX ORDER — MORPHINE SULFATE 50 MG/1
2 CAPSULE, EXTENDED RELEASE ORAL ONCE
Refills: 0 | Status: DISCONTINUED | OUTPATIENT
Start: 2023-02-21 | End: 2023-02-21

## 2023-02-21 RX ORDER — AMLODIPINE BESYLATE 2.5 MG/1
5 TABLET ORAL DAILY
Refills: 0 | Status: DISCONTINUED | OUTPATIENT
Start: 2023-02-21 | End: 2023-02-24

## 2023-02-21 RX ORDER — LATANOPROST 0.05 MG/ML
1 SOLUTION/ DROPS OPHTHALMIC; TOPICAL AT BEDTIME
Refills: 0 | Status: DISCONTINUED | OUTPATIENT
Start: 2023-02-21 | End: 2023-02-24

## 2023-02-21 RX ORDER — PANTOPRAZOLE SODIUM 20 MG/1
40 TABLET, DELAYED RELEASE ORAL
Refills: 0 | Status: DISCONTINUED | OUTPATIENT
Start: 2023-02-21 | End: 2023-02-24

## 2023-02-21 RX ORDER — ATORVASTATIN CALCIUM 80 MG/1
10 TABLET, FILM COATED ORAL AT BEDTIME
Refills: 0 | Status: DISCONTINUED | OUTPATIENT
Start: 2023-02-21 | End: 2023-02-24

## 2023-02-21 RX ORDER — IPRATROPIUM/ALBUTEROL SULFATE 18-103MCG
3 AEROSOL WITH ADAPTER (GRAM) INHALATION EVERY 6 HOURS
Refills: 0 | Status: DISCONTINUED | OUTPATIENT
Start: 2023-02-21 | End: 2023-02-24

## 2023-02-21 RX ADMIN — Medication 3 MILLILITER(S): at 16:06

## 2023-02-21 RX ADMIN — Medication 975 MILLIGRAM(S): at 16:06

## 2023-02-21 NOTE — ED PROVIDER NOTE - PROGRESS NOTE DETAILS
Received signout on pt from resident Dr. Reyes pending CTA result and admission. Dr. Horne aware CT pending, will admit to him once resulted. - Remy Pettit PA-C CTA negative for PE. Will admit to Dr. Horne. - Remy Pettit PA-C

## 2023-02-21 NOTE — ED ADULT NURSE NOTE - NSIMPLEMENTINTERV_GEN_ALL_ED
Implemented All Fall Risk Interventions:  [place] to call system. Call bell, personal items and telephone within reach. Instruct patient to call for assistance. Room bathroom lighting operational. Non-slip footwear when patient is off stretcher. Physically safe environment: no spills, clutter or unnecessary equipment. Stretcher in lowest position, wheels locked, appropriate side rails in place. Provide visual cue, wrist band, yellow gown, etc. Monitor gait and stability. Monitor for mental status changes and reorient to person, place, and time. Review medications for side effects contributing to fall risk. Reinforce activity limits and safety measures with patient and family.

## 2023-02-21 NOTE — ED ADULT NURSE REASSESSMENT NOTE - NS ED NURSE REASSESS COMMENT FT1
MALLY Pfeiffer contacted at spectra [phone] for clarification of troponin orders, states she is with another patient and will contact me back at extension [phone], pending floor bed

## 2023-02-21 NOTE — H&P ADULT - NSHPPHYSICALEXAM_GEN_ALL_CORE
PHYSICAL EXAMINATION:  Vital Signs Last 24 Hrs  T(C): 36.7 (21 Feb 2023 15:40), Max: 37 (21 Feb 2023 15:16)  T(F): 98.1 (21 Feb 2023 15:40), Max: 98.6 (21 Feb 2023 15:16)  HR: 85 (21 Feb 2023 19:31) (77 - 93)  BP: 112/67 (21 Feb 2023 19:31) (112/67 - 193/79)  BP(mean): --  RR: 20 (21 Feb 2023 19:31) (18 - 20)  SpO2: 95% (21 Feb 2023 19:31) (94% - 97%)    Parameters below as of 21 Feb 2023 19:31  Patient On (Oxygen Delivery Method): room air      CAPILLARY BLOOD GLUCOSE          GENERAL: NAD  HEAD:  atraumatic, normocephalic  EYES: sclera anicteric  ENMT: mucous membranes moist  NECK: supple, No JVD  CHEST/LUNG: clear to auscultation bilaterally; no rales, rhonchi, or wheezing b/l  HEART: normal S1, S2  ABDOMEN: BS+, soft, ND, NT   EXTREMITIES:  1+ edema b/l LEs  NEURO: awake, alert, interactive; moves all extremities  SKIN: no rashes or lesions

## 2023-02-21 NOTE — ED PROVIDER NOTE - NS ED ROS FT
GENERAL: No fever or chills  EYES: no change in vision   HEENT: no trouble swallowing or speaking   CARDIAC: no chest pain   PULMONARY: +cough, + SOB  GI:  No abdominal pain  : No changes in urination   SKIN: no rashes   NEURO: no headache   MSK: No joint pain     All other ROS negative unless otherwise specified in HPI.

## 2023-02-21 NOTE — ED PROVIDER NOTE - PHYSICAL EXAMINATION
GENERAL: NAD  HEENT:  Atraumatic  CHEST/LUNG: Chest rise equal bilaterally. Bilateral expiratory wheezing auscultated.   HEART: Regular rate and rhythm  ABDOMEN: Soft, Nontender, Nondistended  EXTREMITIES:  Extremities warm. +2 bilateral pitting edema in both extremities  PSYCH: A&Ox3  SKIN: No obvious rashes or lesions  NEUROLOGY: strength and sensation intact in all extremities

## 2023-02-21 NOTE — H&P ADULT - HISTORY OF PRESENT ILLNESS
77-year-old female with past history of ovarian cancer (not on active treatment), asthma, diabetes, hypertension, hyperlipidemia, depression, glaucoma, GERD, presents to the emergency department complaining of 1 week history of  bilateral lower extremity pain w/ radiation to the bilateral thighs with associated shortness of breath, cough and localized midsternal intermittent exertional chest pain.  Patient with recent admission from 2/6 through 2/8 for shortness of breath found to have asthma exacerbation.  At that time she endorsed lower extremity swelling as well with negative DVT studies. Denies fevers, chills, nausea, vomiting, dizziness, abdominal pain, dysuria, hematuria.

## 2023-02-21 NOTE — H&P ADULT - NSHPSOCIALHISTORY_GEN_ALL_CORE
Social History:    Marital Status:  (  x)    (   ) Single    (   )    (  )   Occupation:   Lives with: (  ) alone  (  ) children   (x  ) spouse   (  ) parents  (  ) other    Substance Use (street drugs): (x  ) never used  (  ) other:  Tobacco Usage:  (  x ) never smoked   (   ) former smoker   (   ) current smoker  (     ) pack years  (        ) last cigarette date  Alcohol Usage: no    (     ) Advanced Directives: (     ) None    (      ) DNR    (     ) DNI    (     ) Health Care Proxy:

## 2023-02-21 NOTE — ED ADULT NURSE REASSESSMENT NOTE - NS ED NURSE REASSESS COMMENT FT1
Report received from KATALINA Arguello. PT A,O x3, denies any pain/discomfort at time, VSS, able to BEAR pt admitted to medicine, pending bed placement.

## 2023-02-21 NOTE — H&P ADULT - NSHPLABSRESULTS_GEN_ALL_CORE
14.1   6.93  )-----------( 264      ( 21 Feb 2023 15:24 )             42.8       02-21    136  |  97  |  19  ----------------------------<  125<H>  3.8   |  23  |  0.68    Ca    10.0      21 Feb 2023 15:24  Phos  2.6     02-21  Mg     2.1     02-21    TPro  8.7<H>  /  Alb  4.9  /  TBili  0.5  /  DBili  x   /  AST  23  /  ALT  25  /  AlkPhos  108  02-21                  PT/INR - ( 21 Feb 2023 15:24 )   PT: 11.0 sec;   INR: 0.95 ratio         PTT - ( 21 Feb 2023 15:24 )  PTT:26.4 sec    Lactate Trend  < from: Xray Chest 1 View AP/PA (02.21.23 @ 16:21) >    IMPRESSION:  Clear lungs with no acute cardiopulmonary abnormalities.    < end of copied text >    IMPRESSION:    No pulmonary embolism.    < end of copied text >    EKG SR NSST/T< from: Transthoracic Echocardiogram (02.06.23 @ 15:39) >    Conclusions:  1. Normal mitral valve. Minimal mitral regurgitation.  2. Normal trileaflet aortic valve. No aortic valve  regurgitation seen.  3. Normal left atrium.  LA volume index = 25 cc/m2.  4. Asymmetric septal hypertrophy.  5. Hyperdynamic left ventricular systolic function. Peak  left ventricular outflow tract gradient equals 26 mm Hg,  mean gradient is equal to 7 mm Hg, LVOT velocity time  integral equals 30 cm, consistent with mild LVOT  obstruction. The peak LVOT gradient with valsalva is 37mmHg  consistent with moderate LVOT obstruction.  6. Global longitudinal strain measures -31.5%.   GLS was  measured on the [employer].  7. Mild diastolic dysfunction (Stage I).  8. Normal right ventricular size and function.  9. Estimated pulmonary artery systolic pressure equals 37  mm Hg, assuming right atrial pressure equals 6 - 10 mm Hg,  consistent with borderline pulmonary pressures.  *** Compared with echocardiogram of 7/24/2020, no  significant changes noted.    < end of copied text >

## 2023-02-21 NOTE — H&P ADULT - ASSESSMENT
Letter written. Carmella Moran LPN     77 f with    Chest pain  - cardiac enzymes  - telemetry  - ASA unable ( allergic )  - BB  - Cardiology evaluation Dr. Abel    Possible GI cause  - GI evaluation in am  - PPI     Asthma  - nebs    PVR?   - ARISTIDES     Depression   - continue Rx    Diabetes  - ADA diet  - BS control    Glaucoma   - stable    Ovarian Cancer in 2005 had chemo  - stable    DVT prophylaxis    Further action as per clinical course     d/w patient and daughter     Cy Horne MD phone [phone]

## 2023-02-21 NOTE — ED PROVIDER NOTE - CLINICAL SUMMARY MEDICAL DECISION MAKING FREE TEXT BOX
77-year-old female with past history of ovarian cancer (not on active treatment), asthma, diabetes, hypertension, hyperlipidemia, depression, glaucoma, GERD, presents to the emergency department complaining of 1 week history of  bilateral lower extremity pain w/ radiation to the bilateral thighs with associated shortness of breath, cough and localized midsternal intermittent exertional chest pain.  Patient with recent admission from 2/6 through 2/8 for shortness of breath found to have asthma exacerbation. Will screen for ACS (troponin), anemia/electrolytes, and chest x ray to screen for cardiopulmonary pathology. BNP for heart failure. Patient's bilateral pitting lower extremity edema is concerning for congestive heart failure with fluid overload.  Patient's wheezing heard on lung exam is concerning for asthma.  Will treat empirically with DuoNebs, obtain bloodwork, and likely admission for CHF exacerbation w/ cardiac workup. 77-year-old female with past history of ovarian cancer (not on active treatment), asthma, diabetes, hypertension, hyperlipidemia, depression, glaucoma, GERD, presents to the emergency department complaining of 1 week history of  bilateral lower extremity pain w/ radiation to the bilateral thighs with associated shortness of breath, cough and localized midsternal intermittent exertional chest pain.  Patient with recent admission from 2/6 through 2/8 for shortness of breath found to have asthma exacerbation. Will screen for ACS (troponin), anemia/electrolytes, and chest x ray to screen for cardiopulmonary pathology. BNP for heart failure. Patient's bilateral pitting lower extremity edema is concerning for congestive heart failure with fluid overload.  Patient's wheezing heard on lung exam is concerning for asthma.  Will treat empirically with DuoNebs, obtain bloodwork, and likely admission for CHF exacerbation w/ cardiac workup.    Gabriela: 77 year old female with pmhx of ovarain cancer, b/l le pain here with sob, cough and exertional cp.  recent admission for sob for asthma exacerbation.  PE: att exam: patient awake alert NAD. LUNGS b/l wheezes. CARD RRR no m/r/g.  Abdomen soft NT ND no rebound no guarding no CVA tenderness. EXT WWP no edema no calf tenderness CV 2+DP/PT bilaterally. neuro A&Ox3 gait normal.  skin warm and dry no rash 77-year-old female with past history of ovarian cancer (not on active treatment), asthma, diabetes, hypertension, hyperlipidemia, depression, glaucoma, GERD, presents to the emergency department complaining of 1 week history of  bilateral lower extremity pain w/ radiation to the bilateral thighs with associated shortness of breath, cough and localized midsternal intermittent exertional chest pain.  Patient with recent admission from 2/6 through 2/8 for shortness of breath found to have asthma exacerbation. Will screen for ACS (troponin), anemia/electrolytes, and chest x ray to screen for cardiopulmonary pathology. BNP for heart failure. Patient's bilateral pitting lower extremity edema is concerning for congestive heart failure with fluid overload.  Patient's wheezing heard on lung exam is concerning for asthma.  Will treat empirically with DuoNebs, obtain bloodwork, and likely admission for CHF exacerbation w/ cardiac workup.    Gabriela: 77 year old female with pmhx of ovarain cancer, b/l le pain here with sob, cough and exertional cp.  recent admission for sob for asthma exacerbation.  PE: att exam: patient awake alert NAD. LUNGS b/l wheezes. CARD RRR no m/r/g.  Abdomen soft NT ND no rebound no guarding no CVA tenderness. EXT WWP no edema no calf tenderness CV 2+DP/PT bilaterally. neuro A&Ox3 gait normal.  skin warm and dry no rash. patient with negative dopplers recently.  will get labs, cxr, ekg, cardiac enzymes, cta chest r/o PE, reasssess

## 2023-02-21 NOTE — ED ADULT NURSE NOTE - OBJECTIVE STATEMENT
Pt presented to ed with c/o sob and chest pain that started 1 wk ago. pt denies nausea, vomiting, fever, chills, headache, dizziness. pt also reports b/l leg swelling/pain, diagnosed with PVD x 1 month ago.

## 2023-02-21 NOTE — ED PROVIDER NOTE - RAPID ASSESSMENT
77-year-old female with past history of ovarian cancer (not on active treatment), asthma, diabetes, hypertension, hyperlipidemia, depression, glaucoma, GERD, presents to the emergency department complaining of bilateral lower extremity pain with associated shortness of breath, cough and chest pain.  Patient with recent admission from 2/6 through 2/8 for shortness of breath found to have asthma exacerbation.  At that time she endorsed lower extremity swelling as well with negative DVT studies.  She reports over the past few days she has had increased shortness of breath with a productive cough and pressure in her chest.  She also endorses lower extremity pain from her ankles to her groin.  She states that after discharge she had someone come to her house but diagnosed her with PVD and is attributing pain to that.  She denies fevers, chills.  Patient is well-appearing in triage    Rapid assessment by Adina Boudreaux PA-C full eval to be performed in ED 77-year-old female with past history of ovarian cancer (not on active treatment), asthma, diabetes, hypertension, hyperlipidemia, depression, glaucoma, GERD, presents to the emergency department complaining of bilateral lower extremity pain with associated shortness of breath, cough and chest pain.  Patient with recent admission from 2/6 through 2/8 for shortness of breath found to have asthma exacerbation.  At that time she endorsed lower extremity swelling as well with negative DVT studies.  She reports over the past few days she has had increased shortness of breath with a productive cough and pressure in her chest.  She also endorses lower extremity pain from her ankles to her groin.  She states that after discharge she had someone come to her house but diagnosed her with PVD and is attributing pain to that.  She denies fevers, chills.  Patient is well-appearing in triage    Rapid assessment by Adina polk eval to be performed in ED    I have reviewed the rapid assessment note above. This note reflects the initial assessment performed by an ACP in triage during high volume times.  I was available for supervision if needed but was not presented this assessment nor directly involved in the rapid assessment of this patient. -BILLY Munoz MD

## 2023-02-22 ENCOUNTER — APPOINTMENT (OUTPATIENT)
Dept: VASCULAR SURGERY | Facility: CLINIC | Age: 78
End: 2023-02-22

## 2023-02-22 LAB
A1C WITH ESTIMATED AVERAGE GLUCOSE RESULT: 7.3 % — HIGH (ref 4–5.6)
ESTIMATED AVERAGE GLUCOSE: 163 MG/DL — HIGH (ref 68–114)
GLUCOSE BLDC GLUCOMTR-MCNC: 139 MG/DL — HIGH (ref 70–99)
GLUCOSE BLDC GLUCOMTR-MCNC: 156 MG/DL — HIGH (ref 70–99)
GLUCOSE BLDC GLUCOMTR-MCNC: 170 MG/DL — HIGH (ref 70–99)
GLUCOSE BLDC GLUCOMTR-MCNC: 200 MG/DL — HIGH (ref 70–99)
GLUCOSE BLDC GLUCOMTR-MCNC: 201 MG/DL — HIGH (ref 70–99)

## 2023-02-22 PROCEDURE — 75574 CT ANGIO HRT W/3D IMAGE: CPT | Mod: 26

## 2023-02-22 PROCEDURE — 93923 UPR/LXTR ART STDY 3+ LVLS: CPT | Mod: 26

## 2023-02-22 RX ADMIN — MONTELUKAST 10 MILLIGRAM(S): 4 TABLET, CHEWABLE ORAL at 13:43

## 2023-02-22 RX ADMIN — PANTOPRAZOLE SODIUM 40 MILLIGRAM(S): 20 TABLET, DELAYED RELEASE ORAL at 06:12

## 2023-02-22 RX ADMIN — Medication 3 MILLILITER(S): at 13:43

## 2023-02-22 RX ADMIN — PANTOPRAZOLE SODIUM 40 MILLIGRAM(S): 20 TABLET, DELAYED RELEASE ORAL at 18:20

## 2023-02-22 RX ADMIN — Medication 2: at 09:32

## 2023-02-22 RX ADMIN — ATORVASTATIN CALCIUM 10 MILLIGRAM(S): 80 TABLET, FILM COATED ORAL at 22:00

## 2023-02-22 RX ADMIN — ENOXAPARIN SODIUM 40 MILLIGRAM(S): 100 INJECTION SUBCUTANEOUS at 22:02

## 2023-02-22 RX ADMIN — LATANOPROST 1 DROP(S): 0.05 SOLUTION/ DROPS OPHTHALMIC; TOPICAL at 21:59

## 2023-02-22 RX ADMIN — Medication 650 MILLIGRAM(S): at 11:01

## 2023-02-22 RX ADMIN — Medication 3 MILLILITER(S): at 18:20

## 2023-02-22 RX ADMIN — Medication 2: at 18:21

## 2023-02-22 RX ADMIN — AMLODIPINE BESYLATE 5 MILLIGRAM(S): 2.5 TABLET ORAL at 06:12

## 2023-02-22 RX ADMIN — Medication 650 MILLIGRAM(S): at 02:10

## 2023-02-22 RX ADMIN — Medication 3 MILLILITER(S): at 07:55

## 2023-02-22 RX ADMIN — Medication 20 MILLIGRAM(S): at 11:01

## 2023-02-22 RX ADMIN — Medication 650 MILLIGRAM(S): at 11:23

## 2023-02-22 RX ADMIN — Medication 20 MILLIGRAM(S): at 13:43

## 2023-02-22 RX ADMIN — Medication 2: at 13:44

## 2023-02-22 NOTE — CONSULT NOTE ADULT - SUBJECTIVE AND OBJECTIVE BOX
Patient is a 77y old  Female who presents with a chief complaint of     HPI:   77-year-old female with past history of ovarian cancer (not on active treatment), asthma, diabetes, hypertension, hyperlipidemia, depression, glaucoma, GERD, presents to the emergency department complaining of 1 week history of  bilateral lower extremity pain w/ radiation to the bilateral thighs with associated shortness of breath, cough and localized midsternal intermittent exertional chest pain.  Patient with recent admission from  through  for shortness of breath found to have asthma exacerbation.  At that time she endorsed lower extremity swelling as well with negative DVT studies. Denies fevers, chills, nausea, vomiting, dizziness, abdominal pain, dysuria, hematuria. (2023 20:27)    Pt also noted to have discomfort from throat to epigastric region -   recent admission for COPD exacerbation - s/p steroids; was taking PPI as inpt but admits to taking pepcid HS and PRN omeprazole after discharge. Discharged 23    no nausea or vomiting  no oral lesions or plaques  tolerating PO  no rectal bleeding or melena  no dysphagia or odynophagia    underwent CV work-up last admission (earlier this month) had echo (no change sine  study), duplex negative for DVT    Last colonoscopy  ([facility]) negative except hemorrhoids per pt     PAST MEDICAL & SURGICAL HISTORY:  Lumbar Disc Disease  lumbar disc displacement    Asthma  h/o asthma in the past , not on meds    Pneumonia  h/o pneumonia in the past    Migraine Headache    Diabetes  diet controlled, not on meds    Ovarian Cancer  in  had chemo    Depression    Glaucoma    Sleep Apnea  uses CPAP    S/P KEVIN-BSO (Total Abdominal Hysterectomy and Bilateral Salpingo-Oophorectomy)  in  for ovarian cancer    S/P  Section  in ,     S/P Lumbar Microdiscectomy  left side in     mediport insertion in           Allergies  aspirin (Swelling)  Ceftin (Angioedema)  cephalexin (Hives; Rash)  ciprofloxacin (Rash)  fresh friuts cause swelling; salads and carrots cause swelling a cough (Other)  metoprolol (Short breath)  Motrin (Swelling)      MEDICATIONS  (STANDING):  albuterol/ipratropium for Nebulization 3 milliLiter(s) Nebulizer every 6 hours  amLODIPine   Tablet 5 milliGRAM(s) Oral daily  atorvastatin 10 milliGRAM(s) Oral at bedtime  dextrose 5%. 1000 milliLiter(s) (100 mL/Hr) IV Continuous <Continuous>  dextrose 5%. 1000 milliLiter(s) (50 mL/Hr) IV Continuous <Continuous>  dextrose 50% Injectable 25 Gram(s) IV Push once  dextrose 50% Injectable 12.5 Gram(s) IV Push once  dextrose 50% Injectable 25 Gram(s) IV Push once  enoxaparin Injectable 40 milliGRAM(s) SubCutaneous every 24 hours  FLUoxetine 20 milliGRAM(s) Oral daily  furosemide    Tablet 20 milliGRAM(s) Oral daily  glucagon  Injectable 1 milliGRAM(s) IntraMuscular once  insulin lispro (ADMELOG) corrective regimen sliding scale   SubCutaneous three times a day before meals  insulin lispro (ADMELOG) corrective regimen sliding scale   SubCutaneous at bedtime  latanoprost 0.005% Ophthalmic Solution 1 Drop(s) Both EYES at bedtime  metoprolol tartrate 25 milliGRAM(s) Oral two times a day  montelukast 10 milliGRAM(s) Oral daily  pantoprazole    Tablet 40 milliGRAM(s) Oral two times a day    MEDICATIONS  (PRN):  acetaminophen     Tablet .. 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  ambulated with walker and leg braces (b/l foot drop)  no tobacco  no ETOH      Family History   IBD (  ) Yes   (X  ) No  GI Malignancy (  )  Yes    ( X ) No      Advanced Directives: (  X   ) None    (      ) DNR    (     ) DNI    (     ) Health Care Proxy:     Review of Systems:  see HPI- remainder 10 point ROS negative    Vital Signs Last 24 Hrs  T(C): 36.6 (2023 06:45), Max: 37 (2023 15:16)  T(F): 97.9 (2023 06:45), Max: 98.6 (2023 15:16)  HR: 77 (2023 06:45) (77 - 89)  BP: 151/77 (2023 06:45) (112/67 - 153/86)  BP(mean): --  RR: 18 (2023 06:45) (18 - 20)  SpO2: 96% (2023 06:45) (93% - 97%)    Parameters below as of 2023 06:45  Patient On (Oxygen Delivery Method): room air    PHYSICAL EXAM:    Constitutional: NAD, well-developed non toxic appearing south  female  Neck: No LAD, supple no JVD  Mouth: no lesions or plaques, uvula midline  Respiratory: clear b/l no accessory muscle use  Cardiovascular: S1 and S2, RRR  Gastrointestinal: BS+, obese soft, NT/ND, neg HSM, WH surgical scars  Extremities: tr - 1+ edema b/l  Vascular: 2+ peripheral pulses  Neurological: A/O x 3, no focal deficits/asymmetry  Psychiatric: Normal mood, normal affect  Skin: No rashes, anicteric        LABS:                        14.1   6.93  )-----------( 264      ( 2023 15:24 )             42.8     02-21    136  |  97  |  19  ----------------------------<  125<H>  3.8   |  23  |  0.68    Ca    10.0      2023 15:24  Phos  2.6     02-  Mg     2.1     02-21    TPro  8.7<H>  /  Alb  4.9  /  TBili  0.5  /  DBili  x   /  AST  23  /  ALT  25  /  AlkPhos  108  02-21    PT/INR - ( 2023 15:24 )   PT: 11.0 sec;   INR: 0.95 ratio    PTT - ( 2023 15:24 )  PTT:26.4 sec    Troponin T, High Sensitivity (23 @ 17:50)   Troponin T, High Sensitivity Result: 18  Troponin T, High Sensitivity (23 @ 15:24)   Troponin T, High Sensitivity Result: 16:    Flu With COVID-19 By MIGUEL (23 @ 15:23)   SARS-CoV-2 Result: NotDetec:  Influenza A Result: NotDetec   Influenza B Result: NotDetec   Resp Syn Virus Result: NotDetec       RADIOLOGY & ADDITIONAL TESTS:    Patient name: MATILDE CHENG  YOB: 1945   Age: 77 (F)   MR#: [id number]  Study Date: 2023  Location: [place] [address]  DSonographer: Kimo Ramirez  [facility]  Study quality: Technically fair  Referring Physician: Freddy Marcano MD  Blood Pressure: 118/45 mmHg  Height: 147 cm  Weight: 70 kg  BSA: 1.6 m2  Heart Rate: 67 mmHg  ------------------------------------------------------------------------  PROCEDURE: Transthoracic echocardiogram with 2-D, M-Mode  and complete spectral and color flow Doppler. Strain  Imaging.  INDICATION: Heart failure, unspecified (I50.9)  ------------------------------------------------------------------------  Dimensions:    Normal Values:  LA:     3.3    2.0 - 4.0 cm  Ao:     3.0    2.0 - 3.8 cm  SEPTUM: 1.8    0.6 - 1.2 cm  PWT:    1.1    0.6 - 1.1 cm  LVIDd:  3.3    3.0 - 5.6 cm  LVIDs:  1.8    1.8 - 4.0 cm  Derived variables:  LVMI: 111 g/m2  RWT: 0.70  Fractional short: 43 %  EF (Modified Weems Rule): 79 %Doppler Peak Velocity  (m/sec): AoV=2.2  ------------------------------------------------------------------------  Observations:  Mitral Valve: Normal mitral valve. Minimal mitral  regurgitation.  Aortic Valve/Aorta: Normal trileaflet aortic valve. Peak  transaortic valve gradient equals 20 mm Hg, mean  transaortic valve gradient equals 9 mm Hg, aortic valve  velocity time integral equals 40 cm. No aortic valve  regurgitation seen. Peak left ventricular outflow tract  gradient equals 26 mm Hg, mean gradient is equal to 7 mm  Hg,LVOT velocity time integral equals 30 cm, consistent  with mild LVOT obstruction. The peak LVOT gradient with  valsalva is 37mmHg consistent with moderate LVOT  obstruction.  Aortic Root: 3 cm.  LVOT diameter: 1.6 cm.  Left Atrium: Normal left atrium.  LA volume index = 25  cc/m2.  Left Ventricle: Hyperdynamic left ventricular systolic  function. Asymmetric septal hypertrophy. Mild diastolic  dysfunction (Stage I).  Right Heart: Normal right atrium. Normal right ventricular  size and function. Normal tricuspid valve. Mild tricuspid  regurgitation. Normal pulmonic valve. No pulmonic  regurgitation.  Pericardium/Pleura: Normal pericardium with no pericardial  effusion.  Hemodynamic: Estimated right ventricular systolic pressure  equals 37 mm Hg, assuming right atrial pressure equals 8 mm  Hg, consistent with borderline pulmonary hypertension.  ------------------------------------------------------------------------  Conclusions:  1. Normal mitral valve. Minimal mitral regurgitation.  2. Normal trileaflet aortic valve. No aortic valve  regurgitation seen.  3. Normal left atrium.  LA volume index = 25 cc/m2.  4. Asymmetric septal hypertrophy.  5. Hyperdynamic left ventricular systolic function. Peak  left ventricular outflow tract gradient equals 26 mm Hg,  mean gradient is equal to 7 mm Hg, LVOT velocity time  integral equals 30 cm, consistent with mild LVOT  obstruction. The peak LVOT gradient with valsalva is 37mmHg  consistent with moderate LVOT obstruction.  6. Global longitudinal strain measures -31.5%.   GLS was  measured on the TomTe.  7. Mild diastolic dysfunction (Stage I).  8. Normal right ventricular size and function.  9. Estimated pulmonary artery systolic pressure equals 37  mm Hg, assuming right atrial pressure equals 6 - 10 mm Hg,  consistent with borderline pulmonary pressures.  *** Compared with echocardiogram of 2020, no  significant changes noted.  ------------------------------------------------------------------------  Confirmed on  2023 - 12:26:35 by Sadi Shore M.D.  ------------------------------------------------------------------------  
CARDIOLOGY CONSULT - Dr. Abel         HPI:   77-year-old female with past history of ovarian cancer (not on active treatment), asthma, diabetes, hypertension, hyperlipidemia, depression, glaucoma, GERD, presents to the emergency department complaining of 1 week history of  bilateral lower extremity pain w/ radiation to the bilateral thighs with associated shortness of breath, cough and localized midsternal intermittent exertional chest pain.  Patient with recent admission from  through  for shortness of breath found to have asthma exacerbation.  At that time she endorsed lower extremity swelling as well with negative DVT studies. Denies fevers, chills, nausea, vomiting, dizziness, abdominal pain, dysuria, hematuria.        PAST MEDICAL & SURGICAL HISTORY:  Lumbar Disc Disease  lumbar disc displacement      Asthma  h/o asthma in the past , not on meds      Pneumonia  h/o pneumonia in the past      Migraine Headache      Diabetes  diet controlled, not on meds      Ovarian Cancer  in  had chemo      Depression      Glaucoma      Sleep Apnea  uses CPAP      S/P KEIVN-BSO (Total Abdominal Hysterectomy and Bilateral Salpingo-Oophorectomy)  in  for ovarian cancer      S/P  Section  in ,       S/P Lumbar Microdiscectomy  left side in       mediport insertion in               PREVIOUS DIAGNOSTIC TESTING:    [x] Echocardiogram:  < from: Transthoracic Echocardiogram (23 @ 15:39) >  Conclusions:  1. Normal mitral valve. Minimal mitral regurgitation.  2. Normal trileaflet aortic valve. No aortic valve  regurgitation seen.  3. Normal left atrium.  LA volume index = 25 cc/m2.  4. Asymmetric septal hypertrophy.  5. Hyperdynamic left ventricular systolic function. Peak  left ventricular outflow tract gradient equals 26 mm Hg,  mean gradient is equal to 7 mm Hg, LVOT velocity time  integral equals 30 cm, consistent with mild LVOT  obstruction. The peak LVOT gradient with valsalva is 37mmHg  consistent with moderate LVOT obstruction.  6. Global longitudinal strain measures -31.5%.   GLS was  measured on the TomTec.  7. Mild diastolic dysfunction (Stage I).  8. Normal right ventricular size and function.  9. Estimated pulmonary artery systolic pressure equals 37  mm Hg, assuming right atrial pressure equals 6 - 10 mm Hg,  consistent with borderline pulmonary pressures.  *** Compared with echocardiogram of 2020, no  significant changes noted.    < end of copied text >    [ ]  Catheterization:  [x] Stress Test:  	  < from: Nuclear Stress Test-Exercise (Nuclear Stress Test-Exercise .) (12.05.13 @ 00:00) >  IMPRESSIONS:Normal Study  * Exercise capacity: 10 METS, Excellent for age and  gender.  * Symptom: No Symptom.  * HR Response: Appropriate.  * BP Response: Hypertensive response (excessive increase  in systolic/diastolic BP).  * Heart Rhythm: Normal Sinus Rhythm - 69 BPM.  * ECG Abnormalities: None.  * Arrhythmia: Frequent VPD's.  * Dela Cruz Treadmill Score of 9 (low probability of cardiac  events)  * The left ventricle was normal in size. Normal myocardial  perfusion scan, with no evidence of infarction or  inducible ischemia.Very small, mild distal infero-lateral  perfusion defect that corrects with prone imaging and with  normal wall motion in the region suggestive of  diaphragmatic attenuation artifact.  * Post-stress gated wall motion analysis was performed  (LVEF > 70%;LVEDV = 50 ml.), revealing normal LV function.  * Compared with Nuclear/Stress test of 2012, no  significant changes noted.    < end of copied text >      MEDICATIONS:  Home Medications:  Albuterol (Eqv-ProAir HFA) 90 mcg/inh inhalation aerosol: 2 puff(s) inhaled every 6 hours, As Needed (2023 17:59)  albuterol 2.5 mg/3 mL (0.083%) inhalation solution: 3 milliliter(s) inhaled every 8 hours, As Needed (2023 17:59)  amLODIPine 5 mg oral tablet: 1 tab(s) orally once a day (2023 17:59)  atorvastatin 10 mg oral tablet: 1 tab(s) orally once a day    NOTE: pharmacy last filled 2022 for 90 days supply (2023 17:59)  FLUoxetine 20 mg oral capsule: 1 cap(s) orally once a day (2023 17:59)  furosemide 20 mg oral tablet: 1 tab(s) orally once a day (2023 17:59)  glimepiride 2 mg oral tablet: 1 tab(s) orally once a day (2023 17:59)  omeprazole 40 mg oral delayed release capsule: 1 cap(s) orally once a day    Note: Pharmacy last filled in 2022 for 90 day supply (2023 17:59)  Pepcid 40 mg oral tablet: 1 tab(s) orally once a day (at bedtime) (2023 17:59)  Singulair 10 mg oral tablet: 1 tab(s) orally once a day (2023 17:59)  Xalatan 0.005% ophthalmic solution: 1 drop(s) to each affected eye once a day (in the evening) (2023 17:59)      MEDICATIONS  (STANDING):  albuterol/ipratropium for Nebulization 3 milliLiter(s) Nebulizer every 6 hours  amLODIPine   Tablet 5 milliGRAM(s) Oral daily  atorvastatin 10 milliGRAM(s) Oral at bedtime  dextrose 5%. 1000 milliLiter(s) (100 mL/Hr) IV Continuous <Continuous>  dextrose 5%. 1000 milliLiter(s) (50 mL/Hr) IV Continuous <Continuous>  dextrose 50% Injectable 25 Gram(s) IV Push once  dextrose 50% Injectable 12.5 Gram(s) IV Push once  dextrose 50% Injectable 25 Gram(s) IV Push once  enoxaparin Injectable 40 milliGRAM(s) SubCutaneous every 24 hours  FLUoxetine 20 milliGRAM(s) Oral daily  furosemide    Tablet 20 milliGRAM(s) Oral daily  glucagon  Injectable 1 milliGRAM(s) IntraMuscular once  insulin lispro (ADMELOG) corrective regimen sliding scale   SubCutaneous three times a day before meals  insulin lispro (ADMELOG) corrective regimen sliding scale   SubCutaneous at bedtime  latanoprost 0.005% Ophthalmic Solution 1 Drop(s) Both EYES at bedtime  metoprolol tartrate 25 milliGRAM(s) Oral two times a day  montelukast 10 milliGRAM(s) Oral daily  pantoprazole    Tablet 40 milliGRAM(s) Oral two times a day      FAMILY HISTORY:  Family history of asthma        SOCIAL HISTORY:    [x] Non-smoker  [ ] Smoker  [ ] Alcohol    Allergies    aspirin (Swelling)  Ceftin (Angioedema)  cephalexin (Hives; Rash)  ciprofloxacin (Rash)  fresh friuts cause swelling; salads and carrots cause swelling a cough (Other)  metoprolol (Short breath)  Motrin (Swelling)    Intolerances    	    REVIEW OF SYSTEMS:  CONSTITUTIONAL: No fever, weight loss, or fatigue  EYES: No eye pain, visual disturbances, or discharge  ENMT:  No difficulty hearing, tinnitus, vertigo; No sinus or throat pain  NECK: No pain or stiffness  RESPIRATORY: No cough, wheezing, chills or hemoptysis; No Shortness of Breath  CARDIOVASCULAR: +Chest pain, +Leg swelling, no palpitations  GASTROINTESTINAL: No abdominal or epigastric pain. No nausea, vomiting, or hematemesis; No diarrhea or constipation. No melena or hematochezia.  GENITOURINARY: No dysuria, frequency, hematuria, or incontinence  NEUROLOGICAL: No headaches, memory loss, loss of strength, numbness, or tremors  SKIN: No itching, burning, rashes, or lesions   	    [x] All others negative	  [ ] Unable to obtain    PHYSICAL EXAM:  T(C): 36.6 (23 @ 06:45), Max: 37 (23 @ 15:16)  HR: 77 (23 @ 06:45) (77 - 89)  BP: 151/77 (23 @ 06:45) (112/67 - 153/86)  RR: 18 (23 @ 06:45) (18 - 20)  SpO2: 96% (23 @ 06:45) (93% - 97%)  Wt(kg): --  I&O's Summary      Appearance: Normal	  Psychiatry: A & O x 3, Mood & affect appropriate  HEENT:   Normal oral mucosa, PERRL, EOMI	  Lymphatic: No lymphadenopathy  Cardiovascular: Normal S1 S2,RRR, No JVD, No murmurs. Chest wall +TTP  Respiratory: Lungs clear to auscultation b/l	  Gastrointestinal:  Soft, Non-tender, + BS	  Skin: No rashes, No ecchymoses, No cyanosis	  Neurologic: Non-focal  Extremities: Normal range of motion, No clubbing, cyanosis or edema  Vascular: Peripheral pulses palpable 2+ bilaterally    TELEMETRY: 	    ECG:  NSR	  RADIOLOGY:  < from: CT Angio Chest PE Protocol w/ IV Cont (23 @ 18:47) >  IMPRESSION:    No pulmonary embolism.    < end of copied text >    < from: [facility] Physiol Extremity Lower 3+ Level, BI (23 @ 12:18) >  IMPRESSION: Lower extremity arterial vascular disease is not identified.    < end of copied text >      OTHER: 	  	  LABS:	 	    CARDIAC MARKERS:  Troponin T, High Sensitivity Result: 18 ng/L ( @ 17:50)  Troponin T, High Sensitivity Result: 16 ng/L ( @ 15:24)                                  14.1   6.93  )-----------( 264      ( 2023 15:24 )             42.8         136  |  97  |  19  ----------------------------<  125<H>  3.8   |  23  |  0.68    Ca    10.0      2023 15:24  Phos  2.6       Mg     2.1         TPro  8.7<H>  /  Alb  4.9  /  TBili  0.5  /  DBili  x   /  AST  23  /  ALT  25  /  AlkPhos  108      PT/INR - ( 2023 15:24 )   PT: 11.0 sec;   INR: 0.95 ratio         PTT - ( 2023 15:24 )  PTT:26.4 sec  proBNP: Serum Pro-Brain Natriuretic Peptide: 96 pg/mL ( @ 15:24)    Lipid Profile:   HgA1c:   TSH:

## 2023-02-22 NOTE — PATIENT PROFILE ADULT - HAVE YOU RECEIVED AT LEAST TWO PFIZER AND/OR MODERNA VACCINATIONS (IN ANY COMBINATION) AND/OR ONE JOHNSON & JOHNSON VACCINATION?
Detail Level: Detailed
Add 65262 Cpt? (Important Note: In 2017 The Use Of 23576 Is Being Tracked By Cms To Determine Future Global Period Reimbursement For Global Periods): yes
Yes

## 2023-02-22 NOTE — CONSULT NOTE ADULT - ASSESSMENT
77-year-old female with past history of ovarian cancer (not on active treatment), asthma, diabetes, hypertension, hyperlipidemia, depression, glaucoma, GERD, with recent admission for COPD exacerbation - s/p steroids; was taking PPI as inpt but admits to taking pepcid HS and PRN omeprazole after discharge. Discharged 2/8/23  underwent CV work-up last admission (earlier this month) had echo (no change sine 2020 study), duplex negative for DVT    Now admitted for LE edema and Pt also noted to have discomfort from throat to epigastric region     #r/o atypical CP  ?esophagitis, GERD ?thrush (given recent steroids)  -PPI daily  -Plan for EGD 2/23/23; please document Cardiology optimization  -NPO after MN except meds    Discussed with pt; all questions answered  Discussed with Medicine attending    Ronn Atwood PA-C    [facility]  [phone]  [facility] Non Teaching GI Service  Available on TEAMS Mon-Fri 8a-4p  After hours and weekend coverage [phone]

## 2023-02-22 NOTE — CONSULT NOTE ADULT - TIME BILLING
Patient seen and examined.  Agree with above PA note.  A/P  77-year-old female pmhx htn, hld, asthma, coming in with chest pain.    #Chest Pain  -No  ACS, neg trop  -multiple cad risk factors  -CT no PE  -will check cta cors for definitive coronary study   -Recent echo with hyperdynamic lv fxn    #LE Pain, Edema  -No evidence of overload  -ARISTIDES w/o evidence of PAD  -Amlodipine recently reduced to 5mg  -Recent doppler w/o evidence of dvt, can consider repeat  -low dose lasix   -will dec amlo to 2.5 as outpt if persists    #HTN  -Continue amlodipine 5mg for now  -off metoprolol per pulmon. recs for bronchospastic lung disease  -ok to give metoprolol if needed for CTA cors

## 2023-02-22 NOTE — PATIENT PROFILE ADULT - FALL HARM RISK - HARM RISK INTERVENTIONS

## 2023-02-22 NOTE — CONSULT NOTE ADULT - ASSESSMENT
Echo 7/24/20: Hyperdynamic lv fxn EF 75%  Echo 2/6/23: Hyperdynamic LV fxn ef 79%    A/P  77-year-old female pmhx htn, hld, asthma, coming in with chest pain.    #Chest Pain  -No evidence of ACS  -HST negative x2  -EKG NSR no ischemic changes  -CP has subsided  -Given risk factors, will evaluate w/ CTA cors  -Recent echo with hyperdynamic lv fxn    #LE Pain, Edema  -No evidence of overload  -ARISTIDES w/o evidence of PAD  -Amlodipine recently reduced to 5mg  -Recent doppler w/o evidence of dvt  -Consider trial of lasix    #HTN  -Continue amlodipine 5mg for now  -Pt recently told to d/c metoprolol per her pulmonologist  -Can give metoprolol if needed for CTA cors        Please call/text me with any questions/concerns between 8am-4pm  [phone]   Echo 7/24/20: Hyperdynamic lv fxn EF 75%  Echo 2/6/23: Hyperdynamic LV fxn ef 79%    A/P  77-year-old female pmhx htn, hld, asthma, coming in with chest pain.    #Chest Pain  -No evidence of ACS  -HST negative x2  -EKG NSR no ischemic changes  -CT chest no evidence of PE or infectious process  -Possibly MSK related as pt previously admitted w/ pna & has had chronic cough. Chest wall is TTP on exam.  -CP has subsided since initial presentation  -Given risk factors, will evaluate w/ CTA cors  -Recent echo with hyperdynamic lv fxn    #LE Pain, Edema  -No evidence of overload  -ARISTIDES w/o evidence of PAD  -Amlodipine recently reduced to 5mg  -Recent doppler w/o evidence of dvt  -Consider trial of lasix    #HTN  -Continue amlodipine 5mg for now  -Pt recently told to d/c metoprolol per her pulmonologist  -Can give metoprolol if needed for CTA cors        Please call/text me with any questions/concerns between 8am-4pm  [phone]   Echo 7/24/20: Hyperdynamic lv fxn EF 75%  Echo 2/6/23: Hyperdynamic LV fxn ef 79%    A/P  77-year-old female pmhx htn, hld, asthma, coming in with chest pain.    #Chest Pain  -No evidence of ACS  -HST negative x2  -EKG NSR no ischemic changes  -CT chest no evidence of PE or infectious process  -Possibly MSK related as pt previously admitted w/ pna & has had chronic cough. Chest wall is TTP on exam.  -CP has subsided since initial presentation  -Given risk factors, will evaluate w/ CTA cors  -Recent echo with hyperdynamic lv fxn    #LE Pain, Edema  -No evidence of overload  -ARISTIDES w/o evidence of PAD  -Amlodipine recently reduced to 5mg  -Recent doppler w/o evidence of dvt, can consider repeat  -Consider trial of lasix    #HTN  -Continue amlodipine 5mg for now  -Pt recently told to d/c metoprolol per her pulmonologist  -Can give metoprolol if needed for CTA cors        Please call/text me with any questions/concerns between 8am-4pm  [phone]

## 2023-02-23 ENCOUNTER — RESULT REVIEW (OUTPATIENT)
Age: 78
End: 2023-02-23

## 2023-02-23 ENCOUNTER — TRANSCRIPTION ENCOUNTER (OUTPATIENT)
Age: 78
End: 2023-02-23

## 2023-02-23 LAB
GLUCOSE BLDC GLUCOMTR-MCNC: 194 MG/DL — HIGH (ref 70–99)
GLUCOSE BLDC GLUCOMTR-MCNC: 216 MG/DL — HIGH (ref 70–99)
GLUCOSE BLDC GLUCOMTR-MCNC: 226 MG/DL — HIGH (ref 70–99)
SARS-COV-2 RNA SPEC QL NAA+PROBE: SIGNIFICANT CHANGE UP

## 2023-02-23 PROCEDURE — 88305 TISSUE EXAM BY PATHOLOGIST: CPT | Mod: 26

## 2023-02-23 PROCEDURE — 43239 EGD BIOPSY SINGLE/MULTIPLE: CPT

## 2023-02-23 RX ADMIN — PANTOPRAZOLE SODIUM 40 MILLIGRAM(S): 20 TABLET, DELAYED RELEASE ORAL at 06:21

## 2023-02-23 RX ADMIN — ATORVASTATIN CALCIUM 10 MILLIGRAM(S): 80 TABLET, FILM COATED ORAL at 22:11

## 2023-02-23 RX ADMIN — LATANOPROST 1 DROP(S): 0.05 SOLUTION/ DROPS OPHTHALMIC; TOPICAL at 22:17

## 2023-02-23 RX ADMIN — Medication 4: at 17:56

## 2023-02-23 RX ADMIN — AMLODIPINE BESYLATE 5 MILLIGRAM(S): 2.5 TABLET ORAL at 22:09

## 2023-02-23 RX ADMIN — Medication 3 MILLILITER(S): at 11:05

## 2023-02-23 RX ADMIN — Medication 20 MILLIGRAM(S): at 06:21

## 2023-02-23 RX ADMIN — MONTELUKAST 10 MILLIGRAM(S): 4 TABLET, CHEWABLE ORAL at 11:05

## 2023-02-23 RX ADMIN — PANTOPRAZOLE SODIUM 40 MILLIGRAM(S): 20 TABLET, DELAYED RELEASE ORAL at 17:03

## 2023-02-23 RX ADMIN — Medication 3 MILLILITER(S): at 06:22

## 2023-02-23 RX ADMIN — Medication 3 MILLILITER(S): at 00:15

## 2023-02-23 RX ADMIN — Medication 20 MILLIGRAM(S): at 11:05

## 2023-02-23 RX ADMIN — Medication 3 MILLILITER(S): at 17:03

## 2023-02-23 RX ADMIN — ENOXAPARIN SODIUM 40 MILLIGRAM(S): 100 INJECTION SUBCUTANEOUS at 22:11

## 2023-02-23 NOTE — DISCHARGE NOTE PROVIDER - CARE PROVIDER_API CALL
Dave Abel (MD)  Cardiovascular Disease; Interventional Cardiology; Nuclear Cardiology  [address], [address]  [place] [address]  Phone: [phone]  Fax: [phone]  Follow Up Time:     Alex Hamlin)  Gastroenterology  [address], [address]  [place] [address]  Phone: [phone]  Fax: [phone]  Follow Up Time:

## 2023-02-23 NOTE — PRE PROCEDURE NOTE - PRE PROCEDURE EVALUATION
Pre-Endoscopy Evaluation      Referring Physician:                                LATRICE Horne MD    Procedure: EGD    Indication for Procedure: r/o atypical CP    Pertinent History:  77-year-old female with past history of ovarian cancer (not on active treatment), asthma, diabetes, hypertension, hyperlipidemia, depression, glaucoma, GERD, with recent admission for COPD exacerbation - s/p steroids; was taking PPI as inpt but admits to taking pepcid HS and PRN omeprazole after discharge. Discharged 23  underwent CV work-up last admission (earlier this month) had echo (no change sine 2020 study), duplex negative for DVT    Now admitted for LE edema and Pt also noted to have discomfort from throat to epigastric region     #r/o atypical CP  ?esophagitis, GERD ?thrush (given recent steroids)  CT cors negative  CTA neg PE  NPO since MN except meds    COVID negative 23    Sedation by Anesthesia [X ]    PAST MEDICAL & SURGICAL HISTORY:  Lumbar Disc Disease  lumbar disc displacement      Asthma  h/o asthma in the past , not on meds      Pneumonia  h/o pneumonia in the past      Migraine Headache      Diabetes  diet controlled, not on meds      Ovarian Cancer  in  had chemo      Depression      Glaucoma      Sleep Apnea  uses CPAP      S/P KEVIN-BSO (Total Abdominal Hysterectomy and Bilateral Salpingo-Oophorectomy)  in  for ovarian cancer      S/P  Section  in ,       S/P Lumbar Microdiscectomy  left side in       mediport insertion in           PMH of Gastroparesis [ ]  Gastric Surgery [ ]  Gastric Outlet Obstruction [ ]    Allergies    aspirin (Swelling)  Ceftin (Angioedema)  cephalexin (Hives; Rash)  ciprofloxacin (Rash)  fresh friuts cause swelling; salads and carrots cause swelling a cough (Other)  metoprolol (Short breath)  Motrin (Swelling)      Latex allergy: [ ] yes [X ] no    Medications:MEDICATIONS  (STANDING):  albuterol/ipratropium for Nebulization 3 milliLiter(s) Nebulizer every 6 hours  amLODIPine   Tablet 5 milliGRAM(s) Oral daily  atorvastatin 10 milliGRAM(s) Oral at bedtime  dextrose 5%. 1000 milliLiter(s) (100 mL/Hr) IV Continuous <Continuous>  dextrose 5%. 1000 milliLiter(s) (50 mL/Hr) IV Continuous <Continuous>  dextrose 50% Injectable 25 Gram(s) IV Push once  dextrose 50% Injectable 12.5 Gram(s) IV Push once  dextrose 50% Injectable 25 Gram(s) IV Push once  enoxaparin Injectable 40 milliGRAM(s) SubCutaneous every 24 hours  FLUoxetine 20 milliGRAM(s) Oral daily  furosemide    Tablet 20 milliGRAM(s) Oral daily  glucagon  Injectable 1 milliGRAM(s) IntraMuscular once  insulin lispro (ADMELOG) corrective regimen sliding scale   SubCutaneous three times a day before meals  insulin lispro (ADMELOG) corrective regimen sliding scale   SubCutaneous at bedtime  latanoprost 0.005% Ophthalmic Solution 1 Drop(s) Both EYES at bedtime  metoprolol tartrate 25 milliGRAM(s) Oral two times a day  montelukast 10 milliGRAM(s) Oral daily  pantoprazole    Tablet 40 milliGRAM(s) Oral two times a day    MEDICATIONS  (PRN):  acetaminophen     Tablet .. 650 milliGRAM(s) Oral every 6 hours PRN Temp greater or equal to 38.5C (101.3F), Mild Pain (1 - 3)  dextrose Oral Gel 15 Gram(s) Oral once PRN Blood Glucose LESS THAN 70 milliGRAM(s)/deciliter      Smoking: [ ] yes  [ X] no    AICD/PPM: [ ] yes   [X ] no    Pertinent lab data:                        14.1   6.93  )-----------( 264      ( 2023 15:24 )             42.8     -    136  |  97  |  19  ----------------------------<  125<H>  3.8   |  23  |  0.68    Ca    10.0      2023 15:24  Phos  2.6     -  Mg     2.1     -    TPro  8.7<H>  /  Alb  4.9  /  TBili  0.5  /  DBili  x   /  AST  23  /  ALT  25  /  AlkPhos  108  02-21    PT/INR - ( 2023 15:24 )   PT: 11.0 sec;   INR: 0.95 ratio         PTT - ( 2023 15:24 )  PTT:26.4 sec    ACC: [id number] EXAM:  CT ANGIO HEART CORONARY IC   ORDERED BY:  VON TRUJILLO     PROCEDURE DATE:  2023    IMPRESSION:  1. 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    2. Agatston calcium score of 80, GARCIA percentile could not be calculated   due to insufficient demographic information.    --- End of Report ---    ACC: [id number] EXAM:  PHYSIOL EXTREM LOW 3+ LEV BI   ORDERED BY: CHHAYA HORNE     PROCEDURE DATE:  2023    IMPRESSION: Lower extremity arterial vascular disease is not identified.    --- End of Report ---      ACC: [id number] EXAM:  CT ANGIO CHEST PULM ART WAWIC   ORDERED BY:  RONALD BOWMAN     PROCEDURE DATE:  2023          INTERPRETATION:  CLINICAL INFORMATION: Shortness of breath    COMPARISON: CT chest 2020    CONTRAST/COMPLICATIONS:  IV Contrast: Omnipaque 350  56 cc administered   44 cc discarded  Oral Contrast: NONE  Complications: None reported at time of study completion    PROCEDURE:  CT Angiogram of the chest was obtained with intravenous contrast. Three   dimensional maximum intensity projection (MIP) images were generated.    FINDINGS:    PULMONARY ANGIOGRAM: No pulmonary embolism.    LYMPH NODES: No lymphadenopathy.    HEART/VASCULATURE: The heart is normal in size. No pericardial effusion.   Coronary artery and aortic atherosclerosis.    AIRWAYS/LUNGS/PLEURA: The central airways are patent.  Right apical   scarring. Left upper lobe nodule measuring 6 mm (5-21), stable since   2020. No pleural effusion or pneumothorax.    UPPER ABDOMEN: Unremarkable.    BONES/SOFT TISSUES: Heterogeneous thyroid.    IMPRESSION:    No pulmonary embolism.        Physical Examination:  Daily     Daily   Vital Signs Last 24 Hrs  T(C): 36.3 (2023 05:54), Max: 36.7 (2023 14:52)  T(F): 97.3 (2023 05:54), Max: 98.1 (2023 14:52)  HR: 67 (2023 05:54) (63 - 77)  BP: 138/67 (2023 05:54) (100/61 - 138/72)  BP(mean): --  RR: 18 (2023 05:54) (18 - 18)  SpO2: 98% (2023 05:54) (95% - 98%)    Parameters below as of 2023 05:54  Patient On (Oxygen Delivery Method): room air        BP:                 HR:                  SPO2:               Temperature:    Constitutional: NAD    HEENT: PERRLA, EOMI,       Neck:  No JVD    Respiratory: CTAB/L    Cardiovascular: S1 and S2    Gastrointestinal: BS+, soft, NT/ND +WH surgical scars    Extremities: tr-1+b/l edema     Neurological: A/O x 3, no focal asymmetry  b/l foot drop    Psychiatric: Normal mood, normal affect    : No Lamb    Skin: No rashes    Comments:    ASA Class: I [ ]  II [ ]  III [X ]  IV [ ]    The patient is a suitable candidate for the planned procedure unless box checked [ ]  No, explain:

## 2023-02-23 NOTE — DISCHARGE NOTE PROVIDER - NSDCFUSCHEDAPPT_GEN_ALL_CORE_FT
Armando Pineda  [facility]  CARDIOLOGY [address]  Scheduled Appointment: 03/06/2023    Asya Mathur  [facility]  OTOLARYNG [address] [facility]  Scheduled Appointment: 03/08/2023    Aldo Gonzales  [facility]  PULMMED [address] [place] [address]  Scheduled Appointment: 03/17/2023     Armando Pineda  [facility]  CARDIOLOGY [address]  Scheduled Appointment: 03/06/2023    Asya Mathur  [facility]  [address]NG [address] [facility]  Scheduled Appointment: 03/08/2023    Alex Hamlin  [facility]  GASTRO [address] [facility]  Scheduled Appointment: 03/08/2023    Aldo Gonzales  [facility]  PULMMED [address] [facility]  Scheduled Appointment: 03/17/2023

## 2023-02-23 NOTE — PRE PROCEDURE NOTE - NS ATTEND AMEND GEN_ALL_CORE FT
atypical chest pain,recommend egd to r/o esophagitis, gastritis etc.    Discussed risks including but not limited to bleeding,infection,drug reaction,perforation requiring surgery,missed lesion, benefits and alternatives of EGD/Colonoscopy with patient including no treatment and patient consents to procedure.    plan egd today

## 2023-02-23 NOTE — DISCHARGE NOTE PROVIDER - NSDCMRMEDTOKEN_GEN_ALL_CORE_FT
Albuterol (Eqv-ProAir HFA) 90 mcg/inh inhalation aerosol: 2 puff(s) inhaled every 6 hours, As Needed  albuterol 2.5 mg/3 mL (0.083%) inhalation solution: 3 milliliter(s) inhaled every 8 hours, As Needed  amLODIPine 5 mg oral tablet: 1 tab(s) orally once a day  atorvastatin 10 mg oral tablet: 1 tab(s) orally once a day    NOTE: pharmacy last filled JULY 2022 for 90 days supply  FLUoxetine 20 mg oral capsule: 1 cap(s) orally once a day  furosemide 20 mg oral tablet: 1 tab(s) orally once a day  glimepiride 2 mg oral tablet: 1 tab(s) orally once a day  omeprazole 40 mg oral delayed release capsule: 1 cap(s) orally once a day    Note: Pharmacy last filled in JULY 2022 for 90 day supply  Pepcid 40 mg oral tablet: 1 tab(s) orally once a day (at bedtime)  Singulair 10 mg oral tablet: 1 tab(s) orally once a day  Xalatan 0.005% ophthalmic solution: 1 drop(s) to each affected eye once a day (in the evening)   amLODIPine 2.5 mg oral tablet: 2 tab(s) orally once a day   atorvastatin 10 mg oral tablet: 1 tab(s) orally once a day    NOTE: pharmacy last filled JULY 2022 for 90 days supply  FLUoxetine 20 mg oral capsule: 1 cap(s) orally once a day  furosemide 20 mg oral tablet: 1-2  tab(s) orally once a day depending on leg swelling  glimepiride 2 mg oral tablet: 1 tab(s) orally once a day  pantoprazole 40 mg oral delayed release tablet: 1 tab(s) orally once a day   Singulair 10 mg oral tablet: 1 tab(s) orally once a day  Xalatan 0.005% ophthalmic solution: 1 drop(s) to each affected eye once a day (in the evening)

## 2023-02-23 NOTE — DISCHARGE NOTE PROVIDER - HOSPITAL COURSE
77-year-old female with past history of  Ovarian Cancer (not on active treatment), Asthma, Diabetes, Hypertension, Hyperlipidemia, Depression, Glaucoma, GERD, presents to the emergency department complaining of 1 week history of  bilateral lower extremity pain w/ radiation to the bilateral thighs with associated shortness of breath, cough and localized midsternal intermittent exertional chest pain 77-year-old female with past history of  Ovarian Cancer (not on active treatment), Asthma, Diabetes, Hypertension, Hyperlipidemia, Depression, Glaucoma, GERD, presents to the emergency department complaining of 1 week history of  bilateral lower extremity pain w/ radiation to the bilateral thighs with associated shortness of breath, cough and localized midsternal intermittent exertional chest pain.    CTA of chest and coronary arteries was unremarkable . 77-year-old female with past history of  Ovarian Cancer (not on active treatment), Asthma, Diabetes, Hypertension, Hyperlipidemia, Depression, Glaucoma, GERD, presents to the emergency department complaining of 1 week history of  bilateral lower extremity pain w/ radiation to the bilateral thighs with associated shortness of breath, cough and localized midsternal intermittent exertional chest pain. Previously admitted 2/8/23 with COPD exacerbation, s/p course of steroids, was taking pepcid qHS and omeprazole prn.     1. Chest pain  - cardiac enzymes negative  - telemetry  - ASA unable ( allergic )  - BB will discontinue per pulmonology recommendations, cardiology in agreement  - Cardiology evaluation Dr. Abel noted  - CT coronaries noted  - CTA of chest negative    2. Possible GI cause for pain/ r/o esophagitis/ gastritis  - GI evaluation noted  - s/p EGD showing gastritis; biopsies taken  - PPI with pantoprazole daily  - will follow up with GI as outpatient    3. Asthma  - nebs    4. PVD   - ARISTIDES noted. No PVD    5. Depression   - continue Rx    6. Diabetes  - ADA diet  - BS control    7. Glaucoma   - stable    8. Ovarian Cancer in 2005 had chemo  - stable

## 2023-02-23 NOTE — DISCHARGE NOTE PROVIDER - NSDCFUADDAPPT_GEN_ALL_CORE_FT
- please call Dr. Alex Hamlin gastroenterologist for follow up.  - please follow up with your cardiologist upon discharge.

## 2023-02-23 NOTE — PRE-ANESTHESIA EVALUATION ADULT - NSANTHPMHFT_GEN_ALL_CORE
77F PMH MARA on CPAP, ovarian CA, asthma, T2DM, lumbar microdiscectomy, KEVIN-BSO, glaucoma, depression, moderate LVOT obstruction, GERD, HTN, HLD.  Ct coronary: non obstructive disease, troponins negative.

## 2023-02-23 NOTE — DISCHARGE NOTE PROVIDER - NSDCCPCAREPLAN_GEN_ALL_CORE_FT
PRINCIPAL DISCHARGE DIAGNOSIS  Diagnosis: Gastritis  Assessment and Plan of Treatment: continue pantoprazole (Protonix) daily as prescribed.  Follow up with gastroenterologist and cardiologist. Report to doctor or go to emergency for severe abdominal pain, shortness of breath, and/or bleeding.      SECONDARY DISCHARGE DIAGNOSES  Diagnosis: HTN (hypertension)  Assessment and Plan of Treatment: stop metoprolol. contonue amldipine 5mg daily. check blood pressure at home. If BP low, below <100 mmHg systolic, reduce dose to 2.5mg once daily.

## 2023-02-24 ENCOUNTER — TRANSCRIPTION ENCOUNTER (OUTPATIENT)
Age: 78
End: 2023-02-24

## 2023-02-24 ENCOUNTER — NON-APPOINTMENT (OUTPATIENT)
Age: 78
End: 2023-02-24

## 2023-02-24 VITALS
SYSTOLIC BLOOD PRESSURE: 137 MMHG | DIASTOLIC BLOOD PRESSURE: 85 MMHG | HEART RATE: 75 BPM | RESPIRATION RATE: 18 BRPM | OXYGEN SATURATION: 95 % | TEMPERATURE: 98 F

## 2023-02-24 LAB
GLUCOSE BLDC GLUCOMTR-MCNC: 153 MG/DL — HIGH (ref 70–99)
GLUCOSE BLDC GLUCOMTR-MCNC: 165 MG/DL — HIGH (ref 70–99)
SURGICAL PATHOLOGY STUDY: SIGNIFICANT CHANGE UP

## 2023-02-24 PROCEDURE — 88305 TISSUE EXAM BY PATHOLOGIST: CPT

## 2023-02-24 PROCEDURE — 84100 ASSAY OF PHOSPHORUS: CPT

## 2023-02-24 PROCEDURE — U0005: CPT

## 2023-02-24 PROCEDURE — 93923 UPR/LXTR ART STDY 3+ LVLS: CPT

## 2023-02-24 PROCEDURE — 80053 COMPREHEN METABOLIC PANEL: CPT

## 2023-02-24 PROCEDURE — 83735 ASSAY OF MAGNESIUM: CPT

## 2023-02-24 PROCEDURE — 71045 X-RAY EXAM CHEST 1 VIEW: CPT

## 2023-02-24 PROCEDURE — 83880 ASSAY OF NATRIURETIC PEPTIDE: CPT

## 2023-02-24 PROCEDURE — U0003: CPT

## 2023-02-24 PROCEDURE — 82962 GLUCOSE BLOOD TEST: CPT

## 2023-02-24 PROCEDURE — 75574 CT ANGIO HRT W/3D IMAGE: CPT

## 2023-02-24 PROCEDURE — 83036 HEMOGLOBIN GLYCOSYLATED A1C: CPT

## 2023-02-24 PROCEDURE — 99285 EMERGENCY DEPT VISIT HI MDM: CPT

## 2023-02-24 PROCEDURE — 36415 COLL VENOUS BLD VENIPUNCTURE: CPT

## 2023-02-24 PROCEDURE — 84484 ASSAY OF TROPONIN QUANT: CPT

## 2023-02-24 PROCEDURE — 94640 AIRWAY INHALATION TREATMENT: CPT

## 2023-02-24 PROCEDURE — 85610 PROTHROMBIN TIME: CPT

## 2023-02-24 PROCEDURE — 87637 SARSCOV2&INF A&B&RSV AMP PRB: CPT

## 2023-02-24 PROCEDURE — 85730 THROMBOPLASTIN TIME PARTIAL: CPT

## 2023-02-24 PROCEDURE — 85025 COMPLETE CBC W/AUTO DIFF WBC: CPT

## 2023-02-24 PROCEDURE — 71275 CT ANGIOGRAPHY CHEST: CPT | Mod: MA

## 2023-02-24 RX ORDER — AMLODIPINE BESYLATE 2.5 MG/1
1 TABLET ORAL
Qty: 0 | Refills: 0 | DISCHARGE

## 2023-02-24 RX ORDER — FAMOTIDINE 10 MG/ML
1 INJECTION INTRAVENOUS
Qty: 0 | Refills: 0 | DISCHARGE

## 2023-02-24 RX ORDER — FUROSEMIDE 40 MG
1 TABLET ORAL
Qty: 0 | Refills: 0 | DISCHARGE

## 2023-02-24 RX ORDER — ALBUTEROL 90 UG/1
3 AEROSOL, METERED ORAL
Qty: 0 | Refills: 0 | DISCHARGE

## 2023-02-24 RX ORDER — OMEPRAZOLE 10 MG/1
1 CAPSULE, DELAYED RELEASE ORAL
Qty: 0 | Refills: 0 | DISCHARGE

## 2023-02-24 RX ORDER — ALBUTEROL 90 UG/1
2 AEROSOL, METERED ORAL
Qty: 0 | Refills: 0 | DISCHARGE

## 2023-02-24 RX ORDER — PANTOPRAZOLE SODIUM 20 MG/1
1 TABLET, DELAYED RELEASE ORAL
Qty: 30 | Refills: 2
Start: 2023-02-24 | End: 2023-05-24

## 2023-02-24 RX ORDER — AMLODIPINE BESYLATE 2.5 MG/1
2 TABLET ORAL
Qty: 60 | Refills: 2
Start: 2023-02-24 | End: 2023-05-24

## 2023-02-24 RX ORDER — FUROSEMIDE 40 MG
1 TABLET ORAL
Qty: 30 | Refills: 2
Start: 2023-02-24 | End: 2023-05-24

## 2023-02-24 RX ADMIN — Medication 20 MILLIGRAM(S): at 06:29

## 2023-02-24 RX ADMIN — Medication 100 MILLIGRAM(S): at 02:21

## 2023-02-24 RX ADMIN — PANTOPRAZOLE SODIUM 40 MILLIGRAM(S): 20 TABLET, DELAYED RELEASE ORAL at 06:29

## 2023-02-24 RX ADMIN — Medication 20 MILLIGRAM(S): at 11:22

## 2023-02-24 RX ADMIN — Medication 3 MILLILITER(S): at 00:14

## 2023-02-24 RX ADMIN — AMLODIPINE BESYLATE 5 MILLIGRAM(S): 2.5 TABLET ORAL at 06:29

## 2023-02-24 RX ADMIN — MONTELUKAST 10 MILLIGRAM(S): 4 TABLET, CHEWABLE ORAL at 11:24

## 2023-02-24 RX ADMIN — Medication 2: at 08:03

## 2023-02-24 RX ADMIN — Medication 2: at 12:23

## 2023-02-24 RX ADMIN — Medication 3 MILLILITER(S): at 11:24

## 2023-02-24 RX ADMIN — Medication 3 MILLILITER(S): at 06:29

## 2023-02-24 NOTE — DISCHARGE NOTE NURSING/CASE MANAGEMENT/SOCIAL WORK - PATIENT PORTAL LINK FT
You can access the FollowMyHealth Patient Portal offered by [facility] by registering at the following website: [email or web address]. By joining [facility]’s FollowMyHealth portal, you will also be able to view your health information using other applications (apps) compatible with our system.

## 2023-02-24 NOTE — PROGRESS NOTE ADULT - ASSESSMENT
77 f with    Chest pain  - cardiac enzymes negative  - telemetry  - ASA unable ( allergic )  - BB  - Cardiology evaluation Dr. Abel noted  - CT coronaries noted    Possible GI cause for pain/ r/o esophagitis  - GI evaluation noted  - PPI   - EGD pending     Asthma  - nebs    PVD   - ARISTIDES noted. No PVD    Depression   - continue Rx    Diabetes  - ADA diet  - BS control    Glaucoma   - stable    Ovarian Cancer in 2005 had chemo  - stable    DVT prophylaxis    d/w patient     Cy Horne MD phone [phone] 
Echo 7/24/20: Hyperdynamic lv fxn EF 75%  Echo 2/6/23: Hyperdynamic LV fxn ef 79%    A/P  77-year-old female pmhx htn, hld, asthma, coming in with chest pain.    #Chest Pain  -No evidence of ACS  -HST negative x2  -EKG NSR no ischemic changes  -CT chest no evidence of PE or infectious process  -Recent echo with hyperdynamic lv fxn  -Possibly MSK vs GERD  -CP has subsided since initial presentation  -CTA cors w/o evidence of obstructive CAD  -s/p EGD w/ diffuse inflammation, no e/o thrush  -Continue PPI  -Has f/u apt w/ Dr Abel on 2/28    #LE Pain, Edema  -No evidence of overload  -ARISTIDES w/o evidence of PAD  -Amlodipine recently reduced to 5mg - consider reducing to 2.5mg as outpatient  -Recent doppler w/o evidence of dvt  -Continue lasix 20mg qd    #HTN  -Amlodipine 5mg for now -  consider reducing to 2.5mg as outpatient  -Pt recently told to d/c metoprolol per her pulmonologist, ok to d/c        No CV contraindications to d/c  Please call/text me with any questions/concerns between 8am-4pm  [phone]  
77 f with    Chest pain  - cardiac enzymes negative  - telemetry  - ASA unable ( allergic )  - BB  - Cardiology evaluation Dr. Abel noted  - CT coronaries noted    Possible GI cause for pain/ r/o esophagitis/ gastritis  - GI evaluation noted  - PPI     Asthma  - nebs    PVD   - ARISTIDES noted. No PVD    Depression   - continue Rx    Diabetes  - ADA diet  - BS control    Glaucoma   - stable    Ovarian Cancer in 2005 had chemo  - stable    DVT prophylaxis    DCP home in am if stable.    d/w patient     Cy Horne MD phone [phone] 
77 f with    Chest pain resolved  - cardiac enzymes negative  - ASA unable ( allergic )  - BB  - Cardiology evaluation Dr. Abel noted  - CT coronaries noted    Possible GI cause for pain/ r/o esophagitis/ gastritis  - GI evaluation noted  - PPI     Asthma  - nebs    PVD   - ARISTIDES noted. No PVD    Depression   - continue Rx    Diabetes  - ADA diet  - BS control    Glaucoma   - stable    Ovarian Cancer in 2005 had chemo  - stable    DVT prophylaxis    DC home. Follow with PMD/ Cardiology/ Gastroenterology/ Pulmonary in 3-4 days.    d/w patient QA    Cy Horne MD phone [phone] 
77-year-old female with past history of ovarian cancer (not on active treatment), asthma, diabetes, hypertension, hyperlipidemia, depression, glaucoma, GERD, with recent admission for COPD exacerbation - s/p steroids; was taking PPI as inpt but admits to taking pepcid HS and PRN omeprazole after discharge. Discharged 2/8/23  underwent CV work-up last admission (earlier this month) had echo (no change sine 2020 study), duplex negative for DVT    Now admitted for LE edema and Pt also noted to have discomfort from throat to epigastric region     #Atypical CP - improved/resolved  EGD 2/23/23: normal esophagus (biopsied), gastritis (biopsied), normal 1st and 2nd portion duodenum    -continue PPI  -f/u EGD path (outpt); per pt has scheduled appt    No GI objection to DC plans  Discussed with pt; all questions answered  Discussed with Medicine     Ronn Atwood PA-C    [facility]  [phone]  [facility] Non Teaching GI Service  Available on TEAMS Mon-Fri 8a-4p  After hours and weekend coverage [phone]      
Echo 7/24/20: Hyperdynamic lv fxn EF 75%  Echo 2/6/23: Hyperdynamic LV fxn ef 79%    A/P  77-year-old female pmhx htn, hld, asthma, coming in with chest pain.    #Chest Pain  -No evidence of ACS  -HST negative x2  -EKG NSR no ischemic changes  -CT chest no evidence of PE or infectious process  -Recent echo with hyperdynamic lv fxn  -Possibly MSK vs GERD  -CP has subsided since initial presentation  -CTA cors w/o evidence of obstructive CAD  -s/p EGD today w/ diffuse inflammation, no e/o thrush  -Use PPI  -Has f/u apt w/ Dr Abel on 2/28    #LE Pain, Edema  -No evidence of overload  -ARISTIDES w/o evidence of PAD  -Amlodipine recently reduced to 5mg - consider reducing to 2.5mg as outpatient  -Recent doppler w/o evidence of dvt  -On lasix 20mg qd at home, can resume    #HTN  -Amlodipine 5mg for now -  consider reducing to 2.5mg as outpatient  -Pt recently told to d/c metoprolol per her pulmonologist, ok to d/c        No CV contraindications to d/c  Please call/text me with any questions/concerns between 8am-4pm  [phone]

## 2023-02-24 NOTE — PROGRESS NOTE ADULT - TIME BILLING
Agree with above PA note.  cv stable  cont current tx   cta cors noted  s/p egd  dcp
Patient seen and examined.  Agree with above PA note.  cv stable   cta cors no obs cad  cont current tx  dcp

## 2023-02-24 NOTE — PROGRESS NOTE ADULT - SUBJECTIVE AND OBJECTIVE BOX
CARDIOLOGY FOLLOW UP - Dr. Abel  DATE OF SERVICE: 2/23/23    CC  No CV complaints  Seen s/p Egd    REVIEW OF SYSTEMS:  CONSTITUTIONAL: No fever, weight loss, or fatigue  RESPIRATORY: No cough, wheezing, chills or hemoptysis; No Shortness of Breath  CARDIOVASCULAR: No chest pain, palpitations, passing out, dizziness, or leg swelling  GASTROINTESTINAL: No abdominal or epigastric pain. No nausea, vomiting, or hematemesis; No diarrhea or constipation. No melena or hematochezia.  VASCULAR: No edema     PHYSICAL EXAM:  T(C): 36.7 (02-23-23 @ 13:39), Max: 36.7 (02-22-23 @ 21:24)  HR: 72 (02-23-23 @ 14:39) (63 - 76)  BP: 143/72 (02-23-23 @ 14:39) (109/54 - 147/65)  RR: 22 (02-23-23 @ 14:39) (18 - 25)  SpO2: 98% (02-23-23 @ 14:39) (95% - 98%)  Wt(kg): --  I&O's Summary    22 Feb 2023 07:01  -  23 Feb 2023 07:00  --------------------------------------------------------  IN: 300 mL / OUT: 0 mL / NET: 300 mL    23 Feb 2023 07:01  -  23 Feb 2023 15:00  --------------------------------------------------------  IN: 0 mL / OUT: 0 mL / NET: 0 mL        Appearance: Normal	  Cardiovascular: Normal S1 S2,RRR, No JVD, No murmurs  Respiratory: Lungs clear to auscultation b/l  Gastrointestinal:  Soft, Non-tender, + BS	  Extremities: Normal range of motion, No clubbing, cyanosis or edema      Home Medications:  Albuterol (Eqv-ProAir HFA) 90 mcg/inh inhalation aerosol: 2 puff(s) inhaled every 6 hours, As Needed (21 Feb 2023 17:59)  albuterol 2.5 mg/3 mL (0.083%) inhalation solution: 3 milliliter(s) inhaled every 8 hours, As Needed (21 Feb 2023 17:59)  amLODIPine 5 mg oral tablet: 1 tab(s) orally once a day (21 Feb 2023 17:59)  atorvastatin 10 mg oral tablet: 1 tab(s) orally once a day    NOTE: pharmacy last filled JULY 2022 for 90 days supply (21 Feb 2023 17:59)  FLUoxetine 20 mg oral capsule: 1 cap(s) orally once a day (21 Feb 2023 17:59)  furosemide 20 mg oral tablet: 1 tab(s) orally once a day (21 Feb 2023 17:59)  glimepiride 2 mg oral tablet: 1 tab(s) orally once a day (21 Feb 2023 17:59)  omeprazole 40 mg oral delayed release capsule: 1 cap(s) orally once a day    Note: Pharmacy last filled in JULY 2022 for 90 day supply (21 Feb 2023 17:59)  Pepcid 40 mg oral tablet: 1 tab(s) orally once a day (at bedtime) (21 Feb 2023 17:59)  Singulair 10 mg oral tablet: 1 tab(s) orally once a day (21 Feb 2023 17:59)  Xalatan 0.005% ophthalmic solution: 1 drop(s) to each affected eye once a day (in the evening) (21 Feb 2023 17:59)      MEDICATIONS  (STANDING):  albuterol/ipratropium for Nebulization 3 milliLiter(s) Nebulizer every 6 hours  amLODIPine   Tablet 5 milliGRAM(s) Oral daily  atorvastatin 10 milliGRAM(s) Oral at bedtime  dextrose 5%. 1000 milliLiter(s) (100 mL/Hr) IV Continuous <Continuous>  dextrose 5%. 1000 milliLiter(s) (50 mL/Hr) IV Continuous <Continuous>  dextrose 50% Injectable 25 Gram(s) IV Push once  dextrose 50% Injectable 12.5 Gram(s) IV Push once  dextrose 50% Injectable 25 Gram(s) IV Push once  enoxaparin Injectable 40 milliGRAM(s) SubCutaneous every 24 hours  FLUoxetine 20 milliGRAM(s) Oral daily  furosemide    Tablet 20 milliGRAM(s) Oral daily  glucagon  Injectable 1 milliGRAM(s) IntraMuscular once  insulin lispro (ADMELOG) corrective regimen sliding scale   SubCutaneous three times a day before meals  insulin lispro (ADMELOG) corrective regimen sliding scale   SubCutaneous at bedtime  latanoprost 0.005% Ophthalmic Solution 1 Drop(s) Both EYES at bedtime  metoprolol tartrate 25 milliGRAM(s) Oral two times a day  montelukast 10 milliGRAM(s) Oral daily  pantoprazole    Tablet 40 milliGRAM(s) Oral two times a day      TELEMETRY: 	    ECG:  	  RADIOLOGY:   < from: CT Angio Heart and Coronaries w/ IV Cont (02.22.23 @ 20:28) >  IMPRESSION:  1. 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    2. Agatston calcium score of 80, GARCIA percentile could not be calculated   due to insufficient demographic information.    < end of copied text >    DIAGNOSTIC TESTING:  [ ] Echocardiogram:  [ ]  Catheterization:  [ ] Stress Test:    OTHER: 	    < from: Upper Endoscopy (02.23.23 @ 13:26) >  Impression:          - Normal esophagus. Biopsied.                       - Gastritis.                       - Normal gastric body. Biopsied.                       - Normal first portion of the duodenum and second portion of the duodenum.    < end of copied text >      LABS:	 	    Troponin T, High Sensitivity Result: 18 ng/L [0 - 51] (02-21 @ 17:50)  Troponin T, High Sensitivity Result: 16 ng/L [0 - 51] (02-21 @ 15:24)                          14.1   6.93  )-----------( 264      ( 21 Feb 2023 15:24 )             42.8     02-21    136  |  97  |  19  ----------------------------<  125<H>  3.8   |  23  |  0.68    Ca    10.0      21 Feb 2023 15:24  Phos  2.6     02-21  Mg     2.1     02-21    TPro  8.7<H>  /  Alb  4.9  /  TBili  0.5  /  DBili  x   /  AST  23  /  ALT  25  /  AlkPhos  108  02-21    PT/INR - ( 21 Feb 2023 15:24 )   PT: 11.0 sec;   INR: 0.95 ratio         PTT - ( 21 Feb 2023 15:24 )  PTT:26.4 sec        
Patient is a 77y old  Female who presents with a chief complaint of     SUBJECTIVE / OVERNIGHT EVENTS: feels better.  Review of Systems  chest pain no  palpitations no  sob no  nausea no  headache no    MEDICATIONS  (STANDING):  albuterol/ipratropium for Nebulization 3 milliLiter(s) Nebulizer every 6 hours  amLODIPine   Tablet 5 milliGRAM(s) Oral daily  atorvastatin 10 milliGRAM(s) Oral at bedtime  dextrose 5%. 1000 milliLiter(s) (100 mL/Hr) IV Continuous <Continuous>  dextrose 5%. 1000 milliLiter(s) (50 mL/Hr) IV Continuous <Continuous>  dextrose 50% Injectable 25 Gram(s) IV Push once  dextrose 50% Injectable 12.5 Gram(s) IV Push once  dextrose 50% Injectable 25 Gram(s) IV Push once  enoxaparin Injectable 40 milliGRAM(s) SubCutaneous every 24 hours  FLUoxetine 20 milliGRAM(s) Oral daily  furosemide    Tablet 20 milliGRAM(s) Oral daily  glucagon  Injectable 1 milliGRAM(s) IntraMuscular once  insulin lispro (ADMELOG) corrective regimen sliding scale   SubCutaneous three times a day before meals  insulin lispro (ADMELOG) corrective regimen sliding scale   SubCutaneous at bedtime  latanoprost 0.005% Ophthalmic Solution 1 Drop(s) Both EYES at bedtime  metoprolol tartrate 25 milliGRAM(s) Oral two times a day  montelukast 10 milliGRAM(s) Oral daily  pantoprazole    Tablet 40 milliGRAM(s) Oral two times a day    MEDICATIONS  (PRN):  acetaminophen     Tablet .. 650 milliGRAM(s) Oral every 6 hours PRN Temp greater or equal to 38.5C (101.3F), Mild Pain (1 - 3)  dextrose Oral Gel 15 Gram(s) Oral once PRN Blood Glucose LESS THAN 70 milliGRAM(s)/deciliter      Vital Signs Last 24 Hrs  T(C): 36.6 (23 Feb 2023 15:11), Max: 36.7 (22 Feb 2023 21:24)  T(F): 97.9 (23 Feb 2023 15:11), Max: 98.1 (22 Feb 2023 21:24)  HR: 66 (23 Feb 2023 15:11) (63 - 76)  BP: 185/79 (23 Feb 2023 15:11) (109/54 - 185/79)  BP(mean): --  RR: 18 (23 Feb 2023 15:11) (18 - 25)  SpO2: 95% (23 Feb 2023 15:11) (95% - 98%)    Parameters below as of 23 Feb 2023 14:39  Patient On (Oxygen Delivery Method): room air        PHYSICAL EXAM:  GENERAL: NAD   HEAD:  Atraumatic, Normocephalic  EYES: EOMI, PERRLA, conjunctiva and sclera clear  NECK: Supple, No JVD  CHEST/LUNG: Clear to auscultation bilaterally; No wheeze  HEART: Regular rate and rhythm; No murmurs, rubs, or gallops  ABDOMEN: Soft, Nontender, Nondistended; Bowel sounds present   EXTREMITIES:  2+ Peripheral Pulses, No clubbing, cyanosis, or edema  PSYCH: AAOx3, anxious   NEUROLOGY: non-focal  SKIN: No rashes or lesions    LABS:          < from: Upper Endoscopy (02.23.23 @ 13:26) >  Impression:          - Normal esophagus. Biopsied.                       - Gastritis.                       - Normal gastric body. Biopsied.                       - Normal first portion of the duodenum and second portion of the duodenum.  Recommendation:      - Await pathology results.                       - Use Protonix (pantoprazole) 40 mg PO daily daily.    < end of copied text >              RADIOLOGY & ADDITIONAL TESTS:    Imaging Personally Reviewed:    Consultant(s) Notes Reviewed:      Care Discussed with Consultants/Other Providers:  
Patient is a 77y old  Female who presents with a chief complaint of     SUBJECTIVE / OVERNIGHT EVENTS: feels better. Wants to go home.  Review of Systems  chest pain no  palpitations no  sob no  nausea no  headache no    MEDICATIONS  (STANDING):  albuterol/ipratropium for Nebulization 3 milliLiter(s) Nebulizer every 6 hours  amLODIPine   Tablet 5 milliGRAM(s) Oral daily  atorvastatin 10 milliGRAM(s) Oral at bedtime  dextrose 5%. 1000 milliLiter(s) (100 mL/Hr) IV Continuous <Continuous>  dextrose 5%. 1000 milliLiter(s) (50 mL/Hr) IV Continuous <Continuous>  dextrose 50% Injectable 25 Gram(s) IV Push once  dextrose 50% Injectable 12.5 Gram(s) IV Push once  dextrose 50% Injectable 25 Gram(s) IV Push once  enoxaparin Injectable 40 milliGRAM(s) SubCutaneous every 24 hours  FLUoxetine 20 milliGRAM(s) Oral daily  furosemide    Tablet 20 milliGRAM(s) Oral daily  glucagon  Injectable 1 milliGRAM(s) IntraMuscular once  insulin lispro (ADMELOG) corrective regimen sliding scale   SubCutaneous three times a day before meals  insulin lispro (ADMELOG) corrective regimen sliding scale   SubCutaneous at bedtime  latanoprost 0.005% Ophthalmic Solution 1 Drop(s) Both EYES at bedtime  metoprolol tartrate 25 milliGRAM(s) Oral two times a day  montelukast 10 milliGRAM(s) Oral daily  pantoprazole    Tablet 40 milliGRAM(s) Oral two times a day    MEDICATIONS  (PRN):  acetaminophen     Tablet .. 650 milliGRAM(s) Oral every 6 hours PRN Temp greater or equal to 38.5C (101.3F), Mild Pain (1 - 3)  dextrose Oral Gel 15 Gram(s) Oral once PRN Blood Glucose LESS THAN 70 milliGRAM(s)/deciliter      Vital Signs Last 24 Hrs  T(C): 36.6 (24 Feb 2023 12:15), Max: 36.8 (24 Feb 2023 05:14)  T(F): 97.9 (24 Feb 2023 12:15), Max: 98.3 (24 Feb 2023 05:14)  HR: 75 (24 Feb 2023 12:15) (75 - 85)  BP: 137/85 (24 Feb 2023 12:15) (117/64 - 143/83)  BP(mean): --  RR: 18 (24 Feb 2023 12:15) (16 - 18)  SpO2: 95% (24 Feb 2023 12:15) (95% - 99%)    Parameters below as of 24 Feb 2023 12:15  Patient On (Oxygen Delivery Method): room air        PHYSICAL EXAM:  GENERAL: NAD, well-developed  HEAD:  Atraumatic, Normocephalic  EYES: EOMI, PERRLA, conjunctiva and sclera clear  NECK: Supple, No JVD  CHEST/LUNG: Clear to auscultation bilaterally; No wheeze  HEART: Regular rate and rhythm; No murmurs, rubs, or gallops  ABDOMEN: Soft, Nontender, Nondistended; Bowel sounds present  EXTREMITIES:  2+ Peripheral Pulses, No clubbing, cyanosis, or edema  PSYCH: AAOx3, anxious   NEUROLOGY: non-focal  SKIN: No rashes or lesions    LABS:                      RADIOLOGY & ADDITIONAL TESTS:    Imaging Personally Reviewed:    Consultant(s) Notes Reviewed:      Care Discussed with Consultants/Other Providers:  
  INTERVAL HPI/OVERNIGHT EVENTS:  feels well  s/p EGD yesterday: +mild gastritis  pt states she has scheduled follow up appt with Dr Hamlin    eating well  no GI events or complaints  no CP or SOB    anticipated dc today    MEDICATIONS  (STANDING):  albuterol/ipratropium for Nebulization 3 milliLiter(s) Nebulizer every 6 hours  amLODIPine   Tablet 5 milliGRAM(s) Oral daily  atorvastatin 10 milliGRAM(s) Oral at bedtime  dextrose 5%. 1000 milliLiter(s) (100 mL/Hr) IV Continuous <Continuous>  dextrose 5%. 1000 milliLiter(s) (50 mL/Hr) IV Continuous <Continuous>  dextrose 50% Injectable 25 Gram(s) IV Push once  dextrose 50% Injectable 12.5 Gram(s) IV Push once  dextrose 50% Injectable 25 Gram(s) IV Push once  enoxaparin Injectable 40 milliGRAM(s) SubCutaneous every 24 hours  FLUoxetine 20 milliGRAM(s) Oral daily  furosemide    Tablet 20 milliGRAM(s) Oral daily  glucagon  Injectable 1 milliGRAM(s) IntraMuscular once  insulin lispro (ADMELOG) corrective regimen sliding scale   SubCutaneous three times a day before meals  insulin lispro (ADMELOG) corrective regimen sliding scale   SubCutaneous at bedtime  latanoprost 0.005% Ophthalmic Solution 1 Drop(s) Both EYES at bedtime  metoprolol tartrate 25 milliGRAM(s) Oral two times a day  montelukast 10 milliGRAM(s) Oral daily  pantoprazole    Tablet 40 milliGRAM(s) Oral two times a day    MEDICATIONS  (PRN):  acetaminophen     Tablet .. 650 milliGRAM(s) Oral every 6 hours PRN Temp greater or equal to 38.5C (101.3F), Mild Pain (1 - 3)  dextrose Oral Gel 15 Gram(s) Oral once PRN Blood Glucose LESS THAN 70 milliGRAM(s)/deciliter      Allergies    aspirin (Swelling)  Ceftin (Angioedema)  cephalexin (Hives; Rash)  ciprofloxacin (Rash)  fresh friuts cause swelling; carrots cause swelling a cough (Other)  metoprolol (Short breath)  Motrin (Swelling)      Review of Systems:  see HPI- remainder 10 point ROS negative    Vital Signs Last 24 Hrs  T(C): 36.8 (24 Feb 2023 05:14), Max: 36.8 (24 Feb 2023 05:14)  T(F): 98.3 (24 Feb 2023 05:14), Max: 98.3 (24 Feb 2023 05:14)  HR: 85 (24 Feb 2023 05:14) (65 - 85)  BP: 143/83 (24 Feb 2023 05:14) (109/54 - 185/79)  BP(mean): --  RR: 16 (24 Feb 2023 05:14) (16 - 25)  SpO2: 99% (24 Feb 2023 05:14) (95% - 99%)    Parameters below as of 24 Feb 2023 05:14  Patient On (Oxygen Delivery Method): room air    PHYSICAL EXAM:  Constitutional: NAD, well-developed non toxic appearing south  female  sitting at edge of bed, eating  Neck: No LAD, supple no JVD  Mouth: no lesions or plaques, uvula midline  Respiratory: clear b/l no accessory muscle use  Cardiovascular: S1 and S2, RRR  Gastrointestinal: BS+, obese soft, NT/ND, neg HSM, WH surgical scars  Extremities: tr - 1+ edema b/l  Vascular: 2+ peripheral pulses  Neurological: A/O x 3, no focal deficits/asymmetry  Psychiatric: Normal mood, normal affect  Skin: No rashes, anicteric      LABS:          RADIOLOGY & ADDITIONAL TESTS:  
CARDIOLOGY FOLLOW UP - Dr. Abel  DATE OF SERVICE: 2/24/23    CC  No CV complaints    REVIEW OF SYSTEMS:  CONSTITUTIONAL: No fever, weight loss, or fatigue  RESPIRATORY: No cough, wheezing, chills or hemoptysis; No Shortness of Breath  CARDIOVASCULAR: No chest pain, palpitations, passing out, dizziness, or leg swelling  GASTROINTESTINAL: No abdominal or epigastric pain. No nausea, vomiting, or hematemesis; No diarrhea or constipation. No melena or hematochezia.  VASCULAR: No edema     PHYSICAL EXAM:  T(C): 36.8 (02-24-23 @ 05:14), Max: 36.8 (02-24-23 @ 05:14)  HR: 85 (02-24-23 @ 05:14) (65 - 85)  BP: 143/83 (02-24-23 @ 05:14) (109/54 - 185/79)  RR: 16 (02-24-23 @ 05:14) (16 - 25)  SpO2: 99% (02-24-23 @ 05:14) (95% - 99%)  Wt(kg): --  I&O's Summary    23 Feb 2023 07:01  -  24 Feb 2023 07:00  --------------------------------------------------------  IN: 900 mL / OUT: 0 mL / NET: 900 mL    24 Feb 2023 07:01  -  24 Feb 2023 13:22  --------------------------------------------------------  IN: 120 mL / OUT: 0 mL / NET: 120 mL        Appearance: Normal	  Cardiovascular: Normal S1 S2,RRR, No JVD, No murmurs  Respiratory: Lungs clear to auscultation	  Gastrointestinal:  Soft, Non-tender, + BS	  Extremities: Normal range of motion, No clubbing, cyanosis or edema      Home Medications:  atorvastatin 10 mg oral tablet: 1 tab(s) orally once a day    NOTE: pharmacy last filled JULY 2022 for 90 days supply (21 Feb 2023 17:59)  FLUoxetine 20 mg oral capsule: 1 cap(s) orally once a day (21 Feb 2023 17:59)  glimepiride 2 mg oral tablet: 1 tab(s) orally once a day (21 Feb 2023 17:59)  Singulair 10 mg oral tablet: 1 tab(s) orally once a day (21 Feb 2023 17:59)  Xalatan 0.005% ophthalmic solution: 1 drop(s) to each affected eye once a day (in the evening) (21 Feb 2023 17:59)      MEDICATIONS  (STANDING):  albuterol/ipratropium for Nebulization 3 milliLiter(s) Nebulizer every 6 hours  amLODIPine   Tablet 5 milliGRAM(s) Oral daily  atorvastatin 10 milliGRAM(s) Oral at bedtime  dextrose 5%. 1000 milliLiter(s) (100 mL/Hr) IV Continuous <Continuous>  dextrose 5%. 1000 milliLiter(s) (50 mL/Hr) IV Continuous <Continuous>  dextrose 50% Injectable 25 Gram(s) IV Push once  dextrose 50% Injectable 12.5 Gram(s) IV Push once  dextrose 50% Injectable 25 Gram(s) IV Push once  enoxaparin Injectable 40 milliGRAM(s) SubCutaneous every 24 hours  FLUoxetine 20 milliGRAM(s) Oral daily  furosemide    Tablet 20 milliGRAM(s) Oral daily  glucagon  Injectable 1 milliGRAM(s) IntraMuscular once  insulin lispro (ADMELOG) corrective regimen sliding scale   SubCutaneous three times a day before meals  insulin lispro (ADMELOG) corrective regimen sliding scale   SubCutaneous at bedtime  latanoprost 0.005% Ophthalmic Solution 1 Drop(s) Both EYES at bedtime  metoprolol tartrate 25 milliGRAM(s) Oral two times a day  montelukast 10 milliGRAM(s) Oral daily  pantoprazole    Tablet 40 milliGRAM(s) Oral two times a day      TELEMETRY: 	    ECG:  	  RADIOLOGY:   DIAGNOSTIC TESTING:  [ ] Echocardiogram:  [ ]  Catheterization:  [ ] Stress Test:    OTHER: 	    LABS:	 	    Troponin T, High Sensitivity Result: 18 ng/L [0 - 51] (02-21 @ 17:50)  Troponin T, High Sensitivity Result: 16 ng/L [0 - 51] (02-21 @ 15:24)                    
Patient is a 77y old  Female who presents with a chief complaint of     SUBJECTIVE / OVERNIGHT EVENTS: feels better.   Review of Systems  chest pain no  palpitations no  sob no  nausea no  headache no    MEDICATIONS  (STANDING):  albuterol/ipratropium for Nebulization 3 milliLiter(s) Nebulizer every 6 hours  amLODIPine   Tablet 5 milliGRAM(s) Oral daily  atorvastatin 10 milliGRAM(s) Oral at bedtime  dextrose 5%. 1000 milliLiter(s) (100 mL/Hr) IV Continuous <Continuous>  dextrose 5%. 1000 milliLiter(s) (50 mL/Hr) IV Continuous <Continuous>  dextrose 50% Injectable 25 Gram(s) IV Push once  dextrose 50% Injectable 12.5 Gram(s) IV Push once  dextrose 50% Injectable 25 Gram(s) IV Push once  enoxaparin Injectable 40 milliGRAM(s) SubCutaneous every 24 hours  FLUoxetine 20 milliGRAM(s) Oral daily  furosemide    Tablet 20 milliGRAM(s) Oral daily  glucagon  Injectable 1 milliGRAM(s) IntraMuscular once  insulin lispro (ADMELOG) corrective regimen sliding scale   SubCutaneous three times a day before meals  insulin lispro (ADMELOG) corrective regimen sliding scale   SubCutaneous at bedtime  latanoprost 0.005% Ophthalmic Solution 1 Drop(s) Both EYES at bedtime  metoprolol tartrate 25 milliGRAM(s) Oral two times a day  montelukast 10 milliGRAM(s) Oral daily  pantoprazole    Tablet 40 milliGRAM(s) Oral two times a day    MEDICATIONS  (PRN):  acetaminophen     Tablet .. 650 milliGRAM(s) Oral every 6 hours PRN Temp greater or equal to 38.5C (101.3F), Mild Pain (1 - 3)  dextrose Oral Gel 15 Gram(s) Oral once PRN Blood Glucose LESS THAN 70 milliGRAM(s)/deciliter      Vital Signs Last 24 Hrs  T(C): 36.7 (22 Feb 2023 14:52), Max: 36.7 (22 Feb 2023 01:50)  T(F): 98.1 (22 Feb 2023 14:52), Max: 98.1 (22 Feb 2023 14:52)  HR: 77 (22 Feb 2023 14:52) (77 - 83)  BP: 100/61 (22 Feb 2023 14:52) (100/61 - 151/77)  BP(mean): --  RR: 18 (22 Feb 2023 14:52) (18 - 18)  SpO2: 96% (22 Feb 2023 14:52) (93% - 96%)    Parameters below as of 22 Feb 2023 14:52  Patient On (Oxygen Delivery Method): room air        PHYSICAL EXAM:  GENERAL: NAD  HEAD:  Atraumatic, Normocephalic  EYES: EOMI, PERRLA, conjunctiva and sclera clear  NECK: Supple, No JVD  CHEST/LUNG: Clear to auscultation bilaterally; No wheeze  HEART: Regular rate and rhythm; No murmurs, rubs, or gallops  ABDOMEN: Soft, Nontender, Nondistended; Bowel sounds present  EXTREMITIES:  2+ Peripheral Pulses, No clubbing, cyanosis, or edema  PSYCH: AAOx3, anxious   NEUROLOGY: non-focal  SKIN: No rashes or lesions    LABS:                        14.1   6.93  )-----------( 264      ( 21 Feb 2023 15:24 )             42.8     02-21    136  |  97  |  19  ----------------------------<  125<H>  3.8   |  23  |  0.68    Ca    10.0      21 Feb 2023 15:24  Phos  2.6     02-21  Mg     2.1     02-21    TPro  8.7<H>  /  Alb  4.9  /  TBili  0.5  /  DBili  x   /  AST  23  /  ALT  25  /  AlkPhos  108  02-21    PT/INR - ( 21 Feb 2023 15:24 )   PT: 11.0 sec;   INR: 0.95 ratio         PTT - ( 21 Feb 2023 15:24 )  PTT:26.4 sec            RADIOLOGY & ADDITIONAL TESTS:    Imaging Personally Reviewed:  < from: CT Angio Heart and Coronaries w/ IV Cont (02.22.23 @ 20:28) >    IMPRESSION:  1. 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    2. Agatston calcium score of 80, GARCIA percentile could not be calculated   due to insufficient demographic information.    < end of copied text >  < from: [facility] Physiol Extremity Lower 3+ Level, BI (02.22.23 @ 12:18) >  IMPRESSION: Lower extremity arterial vascular disease is not identified.    < end of copied text >    Consultant(s) Notes Reviewed:      Care Discussed with Consultants/Other Providers:

## 2023-02-28 NOTE — PATIENT PROFILE ADULT - FUNCTIONAL SCREEN CURRENT LEVEL: COMMUNICATION, MLM
GORDON MERINO  89y Male    INTERVAL HPI/OVERNIGHT EVENTS:    Pt seen this morning - denied any pain or SOB  no fever, N/V  RN unable to obtain orthostatic BP while pt was standing up  no orthostatic BP changes from lying to sitting    T(F): 96.8 (23 @ 12:52), Max: 96.8 (23 @ 12:52)  HR: 97 (23 @ 12:52) (82 - 120)  BP: 113/72 (23 @ 12:52) (81/52 - 128/60)  RR: 17 (23 @ 12:52) (17 - 18)  SpO2: 99% (23 @ 06:29) (95% - 99%) on RA    I&O's Summary    2023 07:01  -  2023 17:18  --------------------------------------------------------  IN: 118 mL / OUT: 0 mL / NET: 118 mL      Daily Weight in k (2023 04:30)    PHYSICAL EXAM:  GENERAL: NAD  HEAD:  Normocephalic  EYES:  conjunctiva and sclera clear  ENMT: Moist mucous membranes  NECK: Supple,  NERVOUS SYSTEM:  Alert, awake, oriented x 1 (to person only), follows commands, moves all extremities  CHEST/LUNG: CTA b/l  HEART: IRR  ABDOMEN: Soft, Nontender, Nondistended; Bowel sounds present  EXTREMITIES: No edema  SKIN: warm, dry    Consultant(s) Notes Reviewed:  [x ] YES  [ ] NO  Care Discussed with Consultants/Other Providers [ x] YES  [ ] NO    MEDICATIONS  (STANDING):  apixaban 5 milliGRAM(s) Oral every 12 hours  donepezil 10 milliGRAM(s) Oral at bedtime  finasteride 5 milliGRAM(s) Oral daily  furosemide    Tablet 20 milliGRAM(s) Oral daily  latanoprost 0.005% Ophthalmic Solution 1 Drop(s) Both EYES at bedtime  memantine 10 milliGRAM(s) Oral two times a day  metoprolol tartrate 50 milliGRAM(s) Oral two times a day  polyethylene glycol 3350 17 Gram(s) Oral daily  QUEtiapine 25 milliGRAM(s) Oral two times a day  sacubitril 24 mG/valsartan 26 mG 1 Tablet(s) Oral two times a day  senna 2 Tablet(s) Oral at bedtime  spironolactone 25 milliGRAM(s) Oral daily  tamsulosin 0.4 milliGRAM(s) Oral at bedtime    MEDICATIONS  (PRN):      Telemetry reviewed by me    LABS:                        14.7   5.45  )-----------( 107      ( 2023 04:30 )             47.3         139  |  106  |  18  ----------------------------<  77  4.4   |  20  |  1.0    Ca    9.3      2023 04:30  Mg     2.0         TPro  6.4  /  Alb  3.3<L>  /  TBili  0.8  /  DBili  x   /  AST  24  /  ALT  14  /  AlkPhos  77  02-21    PT/INR - ( 2023 04:30 )   PT: 15.60 sec;   INR: 1.36 ratio         PTT - ( 2023 04:30 )  PTT:35.0 sec  CARDIAC MARKERS ( 2023 04:30 )  x     / 0.01 ng/mL / x     / x     / x      CARDIAC MARKERS ( 2023 21:12 )  x     / 0.01 ng/mL / x     / x     / x      CARDIAC MARKERS ( 2023 12:08 )  x     / 0.03 ng/mL / x     / x     / x              RADIOLOGY & ADDITIONAL TESTS:    Imaging or report Personally Reviewed:  [ x] YES  [ ] NO    < from: CT Abdomen and Pelvis w/ IV Cont (23 @ 10:22) >  IMPRESSION:    No CT evidence of acute traumatic injury in the chest abdomen or pelvis.    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    Subcentimeter left renal mid polar exophytic hyperdensity. An outpatient   contrast-enhanced renal protocol MR of the abdomen may be considered if   further evaluation is of clinical relevance.      < end of copied text >      < from: Xray Pelvis AP only (23 @ 10:16) >    Findings/  impression:    Bony demineralization. No definitive evidence of acute displaced   fracture. Contrast in the left ureter and bladder.      < end of copied text >      < from: Xray Chest 1 View AP/PA (23 @ 10:15) >  Impression:    No acute abnormality on frontal chest radiograph.      < end of copied text >      < from: CT Head No Cont (23 @ 10:04) >  IMPRESSION:  CT head: No evidence of acute transcortical infarct, acute intracranial   hemorrhage or mass effect.    CT cervical spine: No acute fracture or traumatic subluxation.    < end of copied text >    < from: TTE Echo Complete w/o Contrast w/ Doppler (. @ 08:59) >  Summary:   1. LV Ejection Fraction by Galindo's Method with a biplane EF of 27 %.   2. Mildly enlarged left atrium.   3. Mildly enlarged right atrium.   4. Moderate to severe mitral valve regurgitation.   5. Mild tricuspid regurgitation.   6. Sclerotic aortic valve with normal opening.   7. There is mild aortic root calcification.    < end of copied text >        Case discussed with residents and RN on rounds today             GORDON MERINO  89y Male    INTERVAL HPI/OVERNIGHT EVENTS:    off levophed since yesterday  digoxin PO started  no fever  pt denies any pain, dizziness, SOB  ate all of his breakfast per PCA    T(F): 95.9 (02-26-23 @ 13:08), Max: 97.5 (02-25-23 @ 20:15)  HR: 109 (02-26-23 @ 13:08) (61 - 138)  BP: 111/68 (02-26-23 @ 13:08) (90/52 - 132/64)  RR: 18 (02-26-23 @ 13:08) (18 - 18)  SpO2: --    I&O's Summary    25 Feb 2023 07:01  -  26 Feb 2023 07:00  --------------------------------------------------------  IN: 461 mL / OUT: 300 mL / NET: 161 mL      PHYSICAL EXAM:  GENERAL: NAD  HEAD:  Normocephalic  EYES:  conjunctiva and sclera clear  ENMT: Moist mucous membranes  NECK: Supple  NERVOUS SYSTEM:  sleeping but easily aroused  CHEST/LUNG: CTA b/l  HEART: tachy, irregular  ABDOMEN: Soft, Nontender, Nondistended  EXTREMITIES: No edema  SKIN: warm, dry    Consultant(s) Notes Reviewed:  [x ] YES  [ ] NO  Care Discussed with Consultants/Other Providers [ x] YES  [ ] NO    MEDICATIONS  (STANDING):  apixaban 5 milliGRAM(s) Oral every 12 hours  digoxin     Tablet 250 MICROGram(s) Oral daily  donepezil 10 milliGRAM(s) Oral at bedtime  finasteride 5 milliGRAM(s) Oral daily  latanoprost 0.005% Ophthalmic Solution 1 Drop(s) Both EYES at bedtime  memantine 10 milliGRAM(s) Oral two times a day  midodrine. 15 milliGRAM(s) Oral three times a day  polyethylene glycol 3350 17 Gram(s) Oral daily  QUEtiapine 25 milliGRAM(s) Oral two times a day  senna 2 Tablet(s) Oral at bedtime  tamsulosin 0.4 milliGRAM(s) Oral at bedtime    MEDICATIONS  (PRN):      Telemetry reviewed by me    LABS:                        13.6   6.02  )-----------( 185      ( 26 Feb 2023 06:34 )             42.6     02-26    138  |  105  |  11  ----------------------------<  104<H>  5.0   |  28  |  1.2    Ca    9.1      26 Feb 2023 06:34  Mg     1.8     02-26    TPro  5.9<L>  /  Alb  3.1<L>  /  TBili  0.4  /  DBili  x   /  AST  18  /  ALT  14  /  AlkPhos  71  02-26            Culture - Blood (collected 23 Feb 2023 21:00)  Source: .Blood Blood-Venous  Preliminary Report (25 Feb 2023 02:02):    No growth to date.        Case discussed with RN today    Care discussed with pt's family           GORDON MERINO  89y Male    INTERVAL HPI/OVERNIGHT EVENTS:    pt lying in bed in no distress  no pain, SOB, other complaints  remains on levophed but now at 0.01  midodrine dose increased today in hope to taper off levophed  tachy at times    T(F): 97 (02-24-23 @ 11:29), Max: 97 (02-24-23 @ 11:29)  HR: 78 (02-25-23 @ 10:15) (53 - 116)  BP: 94/52 (02-25-23 @ 10:15) (86/49 - 123/57)  RR: 18 (02-25-23 @ 10:15) (18 - 20)  SpO2: 96% (02-25-23 @ 04:15) (96% - 96%) on RA    I&O's Summary    24 Feb 2023 07:01  -  25 Feb 2023 07:00  --------------------------------------------------------  IN: 0 mL / OUT: 300 mL / NET: -300 mL    PHYSICAL EXAM:  GENERAL: NAD  HEAD:  Normocephalic  EYES:  conjunctiva and sclera clear  ENMT: Moist mucous membranes  NECK: Supple  NERVOUS SYSTEM:  Alert, awake, Good concentration  CHEST/LUNG: CTA b/l  HEART: IRR  ABDOMEN: Soft, Nontender, Nondistended; Bowel sounds present  EXTREMITIES: No edema, YANN stockings in place  SKIN: warm, dry    Consultant(s) Notes Reviewed:  [x ] YES  [ ] NO  Care Discussed with Consultants/Other Providers [ x] YES  [ ] NO    MEDICATIONS  (STANDING):  apixaban 5 milliGRAM(s) Oral every 12 hours  donepezil 10 milliGRAM(s) Oral at bedtime  finasteride 5 milliGRAM(s) Oral daily  latanoprost 0.005% Ophthalmic Solution 1 Drop(s) Both EYES at bedtime  memantine 10 milliGRAM(s) Oral two times a day  midodrine. 15 milliGRAM(s) Oral three times a day  norepinephrine Infusion 0.01 MICROgram(s)/kG/Min (1.55 mL/Hr) IV Continuous <Continuous>  polyethylene glycol 3350 17 Gram(s) Oral daily  QUEtiapine 25 milliGRAM(s) Oral two times a day  senna 2 Tablet(s) Oral at bedtime  tamsulosin 0.4 milliGRAM(s) Oral at bedtime    MEDICATIONS  (PRN):      Telemetry reviewed by me    LABS:                        13.9   6.00  )-----------( 163      ( 25 Feb 2023 05:45 )             43.1     02-25    138  |  106  |  13  ----------------------------<  95  4.3   |  21  |  1.0    Ca    9.0      25 Feb 2023 05:45  Mg     1.8     02-25    TPro  5.7<L>  /  Alb  3.1<L>  /  TBili  0.5  /  DBili  x   /  AST  18  /  ALT  13  /  AlkPhos  68  02-25            Culture - Blood (collected 23 Feb 2023 21:00)  Source: .Blood Blood-Venous  Preliminary Report (25 Feb 2023 02:02):    No growth to date.          Case discussed with resident and RN on rounds today    Care discussed with pt's family           DKLINDYCHIQUIGORDON DUDLEY  89y Male    INTERVAL HPI/OVERNIGHT EVENTS:    pt seen this morning  no pain or SOB  tolerating meds    T(F): 96.2 (02-28-23 @ 12:22), Max: 96.2 (02-28-23 @ 12:22)  HR: 85 (02-28-23 @ 12:22) (82 - 90)  BP: 158/59 (02-28-23 @ 12:22) (120/73 - 159/64)  RR: 18 (02-28-23 @ 04:55) (18 - 18)  SpO2: --    I&O's Summary    28 Feb 2023 07:01  -  28 Feb 2023 15:27  --------------------------------------------------------  IN: 120 mL / OUT: 0 mL / NET: 120 mL      PHYSICAL EXAM:  GENERAL: NAD  HEAD:  Normocephalic  EYES:  conjunctiva and sclera clear  ENMT: Mouth closed  NECK: Supple  NERVOUS SYSTEM:  Alert, awake, Good concentration  CHEST/LUNG: CTA b/l  HEART: IRR  ABDOMEN: Soft, Nontender, Nondistended  EXTREMITIES: No edema  SKIN: warm, dry    Consultant(s) Notes Reviewed:  [x ] YES  [ ] NO  Care Discussed with Consultants/Other Providers [ x] YES  [ ] NO    MEDICATIONS  (STANDING):  apixaban 5 milliGRAM(s) Oral every 12 hours  digoxin     Tablet 250 MICROGram(s) Oral daily  donepezil 10 milliGRAM(s) Oral at bedtime  finasteride 5 milliGRAM(s) Oral daily  latanoprost 0.005% Ophthalmic Solution 1 Drop(s) Both EYES at bedtime  memantine 10 milliGRAM(s) Oral two times a day  metoprolol tartrate 12.5 milliGRAM(s) Oral two times a day  midodrine. 15 milliGRAM(s) Oral three times a day  polyethylene glycol 3350 17 Gram(s) Oral daily  QUEtiapine 25 milliGRAM(s) Oral two times a day  senna 2 Tablet(s) Oral at bedtime  tamsulosin 0.4 milliGRAM(s) Oral at bedtime    MEDICATIONS  (PRN):      Telemetry reviewed by me    LABS:                        13.9   6.23  )-----------( 167      ( 28 Feb 2023 07:41 )             43.1     02-28    138  |  103  |  11  ----------------------------<  100<H>  4.4   |  25  |  1.0    Ca    9.2      28 Feb 2023 07:41  Mg     1.9     02-28    TPro  6.0  /  Alb  3.3<L>  /  TBili  0.5  /  DBili  x   /  AST  17  /  ALT  13  /  AlkPhos  72  02-28                Case discussed with residents and RN on rounds today             GORDON MERINO  89y Male    INTERVAL HPI/OVERNIGHT EVENTS:    pt denies chest pain, SOB  ate breakfast  no fever  HR better controlled now    T(F): 97.2 (02-27-23 @ 05:02), Max: 97.2 (02-27-23 @ 05:02)  HR: 83 (02-27-23 @ 06:44) (83 - 132)  BP: 145/58 (02-27-23 @ 06:44) (91/55 - 145/58)    PHYSICAL EXAM:  GENERAL: NAD  HEAD:  Normocephalic  EYES:  conjunctiva and sclera clear  ENMT: Mouth tremor  NECK: Suppl  NERVOUS SYSTEM:  Alert, awake, Good concentration  CHEST/LUNG: CTA b/l  HEART: IRR  ABDOMEN: Soft, Nontender, Nondistended; Bowel sounds present  EXTREMITIES: No edema  SKIN: warm, dry    Consultant(s) Notes Reviewed:  [x ] YES  [ ] NO  Care Discussed with Consultants/Other Providers [ x] YES  [ ] NO    MEDICATIONS  (STANDING):  apixaban 5 milliGRAM(s) Oral every 12 hours  digoxin     Tablet 250 MICROGram(s) Oral daily  donepezil 10 milliGRAM(s) Oral at bedtime  finasteride 5 milliGRAM(s) Oral daily  latanoprost 0.005% Ophthalmic Solution 1 Drop(s) Both EYES at bedtime  memantine 10 milliGRAM(s) Oral two times a day  metoprolol tartrate 12.5 milliGRAM(s) Oral two times a day  midodrine. 15 milliGRAM(s) Oral three times a day  polyethylene glycol 3350 17 Gram(s) Oral daily  QUEtiapine 25 milliGRAM(s) Oral two times a day  senna 2 Tablet(s) Oral at bedtime  tamsulosin 0.4 milliGRAM(s) Oral at bedtime    MEDICATIONS  (PRN):      Telemetry reviewed by me    LABS:                        12.6   6.19  )-----------( 158      ( 27 Feb 2023 05:11 )             38.9     02-27    137  |  104  |  12  ----------------------------<  95  4.0   |  25  |  1.0    Ca    9.0      27 Feb 2023 05:11  Mg     1.8     02-27    TPro  5.7<L>  /  Alb  3.0<L>  /  TBili  0.5  /  DBili  x   /  AST  16  /  ALT  13  /  AlkPhos  66  02-27                Case discussed with residents and RN on rounds today             GORDON MERINO  89y Male    INTERVAL HPI/OVERNIGHT EVENTS:    pt denies pain, SOB, other complaints  lying in bed  no fever  remains on levophed at 0.04     T(F): 97 (02-24-23 @ 11:29), Max: 97.3 (02-23-23 @ 13:27)  HR: 66 (02-24-23 @ 11:29) (66 - 102)  BP: 93/57 (02-24-23 @ 11:29) (72/49 - 115/67)  RR: 18 (02-24-23 @ 11:29) (17 - 20)  SpO2: 99% (02-23-23 @ 20:13) (99% - 100%) on RA    I&O's Summary    23 Feb 2023 07:01  -  24 Feb 2023 07:00  --------------------------------------------------------  IN: 311.2 mL / OUT: 0 mL / NET: 311.2 mL      PHYSICAL EXAM:  GENERAL: NAD  HEAD:  Normocephalic  EYES:  conjunctiva and sclera clear  ENMT: Moist mucous membranes  NECK: Supple  NERVOUS SYSTEM:  Alert, awake, Good concentration, confused at times  CHEST/LUNG: CTA b/l (only anterior auscultated)  HEART: IRR  ABDOMEN: Soft, Nontender, Nondistended; Bowel sounds present  EXTREMITIES: No edema LE, YANN stockings in place  SKIN: warm, dry    Consultant(s) Notes Reviewed:  [x ] YES  [ ] NO  Care Discussed with Consultants/Other Providers [ x] YES  [ ] NO    MEDICATIONS  (STANDING):  apixaban 5 milliGRAM(s) Oral every 12 hours  donepezil 10 milliGRAM(s) Oral at bedtime  finasteride 5 milliGRAM(s) Oral daily  latanoprost 0.005% Ophthalmic Solution 1 Drop(s) Both EYES at bedtime  memantine 10 milliGRAM(s) Oral two times a day  midodrine. 10 milliGRAM(s) Oral three times a day  norepinephrine Infusion 0.04 MICROgram(s)/kG/Min (6.19 mL/Hr) IV Continuous <Continuous>  polyethylene glycol 3350 17 Gram(s) Oral daily  QUEtiapine 25 milliGRAM(s) Oral two times a day  senna 2 Tablet(s) Oral at bedtime  tamsulosin 0.4 milliGRAM(s) Oral at bedtime    MEDICATIONS  (PRN):      Telemetry reviewed by me    LABS:                        13.8   6.43  )-----------( 187      ( 24 Feb 2023 05:22 )             42.9     02-24    137  |  106  |  11  ----------------------------<  122<H>  4.3   |  23  |  1.0    Ca    9.0      24 Feb 2023 05:22  Mg     1.8     02-24    TPro  5.9<L>  /  Alb  3.0<L>  /  TBili  0.5  /  DBili  x   /  AST  16  /  ALT  13  /  AlkPhos  69  02-24      Lactate, Blood (02.23.23 @ 21:10)   Lactate, Blood: 2.4: Elevated lactate.      RADIOLOGY & ADDITIONAL TESTS:    Imaging or report Personally Reviewed:  [ x] YES  [ ] NO    CXR 11/24 reviewed by me and official report: [facility]      Case discussed with residents and RN on rounds today             GORDON MERINO  89y Male    INTERVAL HPI/OVERNIGHT EVENTS:    pt now on 1 to 1 observation for safety  no complaints when seen this morning  no fever  wanted to eat breakfast - PCA fed pt    T(F): 97 (23 @ 05:08), Max: 97 (23 @ 05:08)  HR: 96 (23 @ 11:50) (86 - 99)  BP: 78/40 (23 @ 11:50) (78/40 - 107/59)  RR: 18 (23 @ 11:50) (18 - 18)  SpO2: --    I&O's Summary    2023 07:01  -  2023 07:00  --------------------------------------------------------  IN: 118 mL / OUT: 0 mL / NET: 118 mL    Daily Weight in k.6 (2023 05:08)      PHYSICAL EXAM:  GENERAL: NAD  HEAD:  Normocephalic  EYES:  conjunctiva and sclera clear  ENMT: Moist mucous membranes  NECK: Supple  NERVOUS SYSTEM:  Alert, awake, oriented x 2  CHEST/LUNG: CTA b/l  HEART: Regular rate and rhythm  ABDOMEN: Soft, Nontender, Nondistended; Bowel sounds present  EXTREMITIES: No edema  SKIN: warm, dry    Consultant(s) Notes Reviewed:  [x ] YES  [ ] NO  Care Discussed with Consultants/Other Providers [ x] YES  [ ] NO    MEDICATIONS  (STANDING):  apixaban 5 milliGRAM(s) Oral every 12 hours  donepezil 10 milliGRAM(s) Oral at bedtime  finasteride 5 milliGRAM(s) Oral daily  furosemide    Tablet 20 milliGRAM(s) Oral daily  latanoprost 0.005% Ophthalmic Solution 1 Drop(s) Both EYES at bedtime  memantine 10 milliGRAM(s) Oral two times a day  metoprolol tartrate 50 milliGRAM(s) Oral two times a day  midodrine. 10 milliGRAM(s) Oral three times a day  polyethylene glycol 3350 17 Gram(s) Oral daily  QUEtiapine 25 milliGRAM(s) Oral two times a day  sacubitril 24 mG/valsartan 26 mG 1 Tablet(s) Oral two times a day  senna 2 Tablet(s) Oral at bedtime  spironolactone 25 milliGRAM(s) Oral daily  tamsulosin 0.4 milliGRAM(s) Oral at bedtime    MEDICATIONS  (PRN):      Telemetry reviewed by me    LABS:                        14.0   5.90  )-----------( 164      ( 2023 05:56 )             43.5         139  |  107  |  13  ----------------------------<  103<H>  4.2   |  22  |  1.0    Ca    8.7      2023 05:56  Mg     1.9         TPro  5.9<L>  /  Alb  3.1<L>  /  TBili  0.6  /  DBili  x   /  AST  19  /  ALT  14  /  AlkPhos  70      PT/INR - ( 2023 04:30 )   PT: 15.60 sec;   INR: 1.36 ratio         PTT - ( 2023 04:30 )  PTT:35.0 sec  CARDIAC MARKERS ( 2023 04:30 )  x     / 0.01 ng/mL / x     / x     / x      CARDIAC MARKERS ( 2023 21:12 )  x     / 0.01 ng/mL / x     / x     / x                  Case discussed with residents and RN on rounds today             GORDON MERINO  89y Male    INTERVAL HPI/OVERNIGHT EVENTS:    pt seen with daughter and son in law at bedside today  he feels well and has no complaints  (he never complains per his daughter)  no dizziness, pain, SOB, fever  SBP >100 this morning and he received metoprolol and Entresto.  BP dropped to the 70's now and did not improve after IVF and midodrine -> will start levophed  awaiting cardio eval today (resident spoke to the fellow)    T(F): 97.3 (23 @ 13:27), Max: 97.5 (23 @ 05:51)  HR: 92 (23 @ 13:27) (92 - 105)  BP: 72/49 (23 @ 13:27) (72/49 - 101/72)  RR: 18 (23 @ 13:27) (18 - 18)  SpO2: --    I&O's Summary    2023 07:01  -  2023 15:08  --------------------------------------------------------  IN: 240 mL / OUT: 0 mL / NET: 240 mL     Daily Weight in k.5 (2023 05:51)      PHYSICAL EXAM:  GENERAL: NAD  HEAD:  Normocephalic  EYES:  conjunctiva and sclera clear  ENMT: Moist mucous membranes  NECK: Supple  NERVOUS SYSTEM:  Alert, awake, Good concentration, tremor of mouth  CHEST/LUNG: CTA b/l  HEART: IRR  ABDOMEN: Soft, Nontender, Nondistended; Bowel sounds present  EXTREMITIES: No edema  SKIN: warm, dry    Consultant(s) Notes Reviewed:  [x ] YES  [ ] NO  Care Discussed with Consultants/Other Providers [ x] YES  [ ] NO    MEDICATIONS  (STANDING):  apixaban 5 milliGRAM(s) Oral every 12 hours  donepezil 10 milliGRAM(s) Oral at bedtime  finasteride 5 milliGRAM(s) Oral daily  latanoprost 0.005% Ophthalmic Solution 1 Drop(s) Both EYES at bedtime  memantine 10 milliGRAM(s) Oral two times a day  metoprolol tartrate 25 milliGRAM(s) Oral two times a day  midodrine. 10 milliGRAM(s) Oral three times a day  polyethylene glycol 3350 17 Gram(s) Oral daily  QUEtiapine 25 milliGRAM(s) Oral two times a day  senna 2 Tablet(s) Oral at bedtime  spironolactone 25 milliGRAM(s) Oral daily  tamsulosin 0.4 milliGRAM(s) Oral at bedtime    MEDICATIONS  (PRN):      Telemetry reviewed by me    LABS:                        14.3   6.55  )-----------( 192      ( 2023 05:51 )             43.8         137  |  104  |  13  ----------------------------<  108<H>  4.4   |  24  |  1.2    Ca    8.8      2023 05:51  Mg     1.8         TPro  5.9<L>  /  Alb  3.0<L>  /  TBili  0.6  /  DBili  x   /  AST  19  /  ALT  15  /  AlkPhos  70                RADIOLOGY & ADDITIONAL TESTS:    Imaging or report Personally Reviewed:  [ x] YES  [ ] NO    < from: TTE Echo Complete w/o Contrast w/ Doppler (23 @ 11:03) >  Summary:   1. Left ventricular ejection fraction, by visual estimation, is <20%.   2. Severely decreased global left ventricular systolic function.   3. Severely enlarged right atrium.   4. The mitral in-flow pattern reveals no discernable A-wave, therefore   no comment on diastolic function can be made.   5. Moderately enlarged right ventricle.   6. Severely reduced RV systolic function.   7. Moderately enlarged left atrium.   8. Mild mitral valve regurgitation.   9. Mild tricuspid regurgitation.  10. Mild pulmonic valve regurgitation.    < end of copied text >      Case discussed with residents and RN on rounds today    Care discussed with pt's family         0 = understands/communicates without difficulty

## 2023-03-06 ENCOUNTER — NON-APPOINTMENT (OUTPATIENT)
Age: 78
End: 2023-03-06

## 2023-03-06 ENCOUNTER — APPOINTMENT (OUTPATIENT)
Dept: CARDIOLOGY | Facility: CLINIC | Age: 78
End: 2023-03-06
Payer: MEDICARE

## 2023-03-06 VITALS
DIASTOLIC BLOOD PRESSURE: 83 MMHG | SYSTOLIC BLOOD PRESSURE: 149 MMHG | BODY MASS INDEX: 32.54 KG/M2 | HEART RATE: 74 BPM | HEIGHT: 58 IN | OXYGEN SATURATION: 97 % | WEIGHT: 155 LBS

## 2023-03-06 PROCEDURE — 99204 OFFICE O/P NEW MOD 45 MIN: CPT | Mod: 25

## 2023-03-06 PROCEDURE — 93000 ELECTROCARDIOGRAM COMPLETE: CPT

## 2023-03-08 ENCOUNTER — APPOINTMENT (OUTPATIENT)
Dept: OTOLARYNGOLOGY | Facility: CLINIC | Age: 78
End: 2023-03-08
Payer: MEDICARE

## 2023-03-08 VITALS
DIASTOLIC BLOOD PRESSURE: 66 MMHG | WEIGHT: 158 LBS | SYSTOLIC BLOOD PRESSURE: 137 MMHG | BODY MASS INDEX: 33.17 KG/M2 | HEART RATE: 71 BPM | HEIGHT: 58 IN

## 2023-03-08 DIAGNOSIS — H93.293 OTHER ABNORMAL AUDITORY PERCEPTIONS, BILATERAL: ICD-10-CM

## 2023-03-08 PROCEDURE — G0268 REMOVAL OF IMPACTED WAX MD: CPT

## 2023-03-08 PROCEDURE — 92557 COMPREHENSIVE HEARING TEST: CPT

## 2023-03-08 PROCEDURE — 99204 OFFICE O/P NEW MOD 45 MIN: CPT | Mod: 25

## 2023-03-08 PROCEDURE — 92567 TYMPANOMETRY: CPT

## 2023-03-08 NOTE — REASON FOR VISIT
[Initial Consultation] : an initial consultation for [FreeTextEntry2] : hearing loss and itchy throat

## 2023-03-08 NOTE — PHYSICAL EXAM
[FreeTextEntry8] : cerumen impaction. removed [FreeTextEntry9] : cerumen impaction. removed [Midline] : trachea located in midline position [Normal] : mucosa is normal

## 2023-03-08 NOTE — ASSESSMENT
[FreeTextEntry1] : 77 year F with bilateral cerumen impaction and bilateral SNHL. Physical exam shows bilateral  ears normal EAC/TM\par \par Personally reviewed Audiogram shows AD Mild to profound SNHL 250-8k hz. AS Normal to profound SNHL 250-8k hz. AU Tymp A. Speech Recog AD 36% AS 62. Patient tolerated cerumen removal without complaints.\par \par Recommend:\par Cerumen Impaction\par -Discussed not using q-tips or instruments to remove wax\par -Olive or mineral oil 1x times every 2 week to keep ear canal lubricated. Discussed that the ear is a self cleaning structure and just allow it clean itself. If wax builds up can try debrox. Once it gets impacted recommend return to get it cleaned out.\par \par Asymmetrical SNHL\par -Discussed Benefit of Hearings Aids and their value of helping keep brain stimulated by helping hear conversation which keeps a person active and socially connected. Stressed also the association with a lower risk of incident dementia and slower cognitive decline.\par -Discussed extensively that asymmetrical hearing loss can be associate many etiologies one of which may be a small lesions within the nerve of hearing. An MRI Brain/IAC can usually  these lesion. Majority of the time even if there is a lesion we would monitor the lesion with serial MRI to follow its growth.\par -Patient states would like to pursue the MRI Brain/IAC at this time\par -Ordered MRI Brain/IAC to rule out auditory neuromas with THIN section high res CISS T2 to  lesion on the CP angle cistern or IAC\par -Clearance Hearing Aid Evaluation Given\par \par -Return to clinic as needed or sooner if new/worsen symptoms present\par

## 2023-03-08 NOTE — DATA REVIEWED
[de-identified] : An audiogram was ordered and performed including pure tones, tympanometry and speech testing for the patients complaint of hearing loss\par I have independently reviewed the patient's audiogram from today and my findings include \par AD Mild to profound SNHL 250-8k hz. AS Normal to profound SNHL 250-8k hz. AU Tymp A. Speech Recog AD 36% AS 62%

## 2023-03-08 NOTE — HISTORY OF PRESENT ILLNESS
[de-identified] : 77Y F present hearing loss for at least 5 years\par Hearing loss was gradual  \par Left worse than right\par Denies Family history of hearing loss father 60s\par Denies history of otalgia, otorrhea, ear infections, ear surgeries. \par No history of head/otologic trauma,  IV antibiotics or ototoxins. \par Chemo therapy 2005 for ovarian cancer\par Denies loud noise exposure. \par Last audiogram conducted. \par Patient denies cat scan or MRI\par

## 2023-03-15 ENCOUNTER — APPOINTMENT (OUTPATIENT)
Dept: GASTROENTEROLOGY | Facility: CLINIC | Age: 78
End: 2023-03-15

## 2023-03-17 ENCOUNTER — NON-APPOINTMENT (OUTPATIENT)
Age: 78
End: 2023-03-17

## 2023-03-17 ENCOUNTER — APPOINTMENT (OUTPATIENT)
Dept: PULMONOLOGY | Facility: CLINIC | Age: 78
End: 2023-03-17
Payer: MEDICARE

## 2023-03-17 VITALS
BODY MASS INDEX: 38.62 KG/M2 | HEART RATE: 74 BPM | TEMPERATURE: 97.1 F | HEIGHT: 58 IN | WEIGHT: 184 LBS | OXYGEN SATURATION: 93 % | DIASTOLIC BLOOD PRESSURE: 88 MMHG | RESPIRATION RATE: 16 BRPM | SYSTOLIC BLOOD PRESSURE: 160 MMHG

## 2023-03-17 DIAGNOSIS — I89.0 LYMPHEDEMA, NOT ELSEWHERE CLASSIFIED: ICD-10-CM

## 2023-03-17 PROCEDURE — 99214 OFFICE O/P EST MOD 30 MIN: CPT | Mod: 25

## 2023-03-17 PROCEDURE — 94010 BREATHING CAPACITY TEST: CPT

## 2023-03-17 PROCEDURE — 90677 PCV20 VACCINE IM: CPT

## 2023-03-17 PROCEDURE — G0009: CPT

## 2023-03-17 NOTE — PROCEDURE
[FreeTextEntry1] : PFT reveals moderate restrictive dysfunction, with an FEV1 of  0.79L, which is 50% of predicted, with a normal flow volume loop. \par \par FENO was unable ; a normal value being less than 25\par Fractional exhaled nitric oxide (FENO) is regarded as a simple, noninvasive method for assessing eosinophilic airway inflammation. Produced by a variety of cells within the lung, nitric oxide (NO) concentrations are generally low in healthy individuals. However, high concentrations of NO appear to be involved in nonspecific host defense mechanisms and chronic inflammatory diseases such as asthma. The American Thor[employer] Society (ATS) therefore has recommended using FENO to aid in the diagnosis and monitoring of eosinophilic airway inflammation and asthma, and for identifying steroid responsive individuals whose chronic respiratory symptoms may be caused by airway inflammation.

## 2023-03-17 NOTE — ADDENDUM
[FreeTextEntry1] : Documented by PANKAJ Jimenez acting as a scribe for Dr. Aldo Gonzales on 03/17/2023 .\par \par All medical record entries made by the Scribe were at my, Dr. Aldo Gonzales's, direction and personally dictated by me on 03/17/2023. I have reviewed the chart and agree that the record accurately reflects my personal performance of the history, physical exam, assessment and plan. I have also personally directed, reviewed, and agree with the discharge instructions.

## 2023-03-17 NOTE — ASSESSMENT
[FreeTextEntry1] : Ms. CHENG is a 77 year old female with a history of eosinophilic asthma, severe persistent asthma, allergies,  DM, HTN, OSAS, GERD, who comes into the office today for pulmonary evaluation on fasenra 9/2020 (controlled) s/p by R chest pain s/p fall 3/2022 c/w rib fx- s/p covid-19 8/2022; s/p w/ human meta pneumovirus induced asthma; gastritis- mild cough\par \par Problem 1: severe persistent asthma (?active)\par - Fasenra is started since 9/2020\par -continue Albuterol nebulizer at least BID up to QiD (gargle and spit after use) \par -continue to use Singulair 10 mg before bed\par - continue Symbicort (160) 2 inhalations BID / Tudorza at 1 inhalation BID or Spiriva at 2 inhalations QAM \par \par -Asthma is  believed to be caused by inherited (genetic) and environmental factor, but its exact cause is unknown. Asthma may be triggered by allergens, lung infections, or irritants in the air. Asthma triggers are different for each person\par -Inhaler technique reviewed as well as oral hygiene techniques reviewed with patient. Avoidance of cold air, extremes of temperature, rescue inhaler should be used before exercise. Order of medication reviewed with patient. Recommended use of a cool mist humidifier in the bedroom.\par \par Problem 1A: Eosinophilic asthma\par - on Fasenra (2) since 9/2020\par -The safety and efficacy of Nucala was established in three double-blind, randomized, placebo-controlled trials in patients with severe asthma. Compared to a placebo, patients with severe asthma receiving Nucala had fewer exacerbation requiring hospitalization and/or emergency department visits, and a longer time to first exacerbation. In addition, patients with severe asthma receiving Nucala or Fasenra experienced greater reductions in their daily maintenance oral corticosteroid dose, while maintaining asthma control compared with patients receiving placebo. Treatment with Nucala did not result in a significant improvement in lung function, as measured by the volume of air exhaled by patients in one second. The most common side effects include: headache, injection site reactions, back pain, weakness, and fatigue; hypersensitivity reactions can occur within hours or days including swelling of the face, mouth, and tongue, fainting, dizziness, hives, breathing problems, and rash; herpes zoster infections have occurred. The drug is a monoclonal antibody that inhibits interleukin-5 which helps regular eosinophils, a type of white blood cell that contributes to asthma. The over-production of eosinophils can cause inflammation in the lungs, increasing the frequency of asthma attacks. Patients must also take other medications, including high dose inhaled corticosteroids and at least one additional asthma drug.\par \par Problem 1B: human metapneumovirus\par -s/p Prednisone 20 mg x 7 days, 10 mg x 7 days 1/2022\par \par problem 2: allergic rhinitis\par -continue to use Astelin 1 sniff/nostril BID (.15)\par -continue to use Xyzal 5 mg before bed\par -continue to use Claritin 10 mg QAM\par -continue Pataday eye drops \par -Environmental measures for allergies were encouraged including mattress and pillow cover, air purifier, and environmental controls.\par \par problem 3: GERD\par -continue to use omeprazole 40 mg before breakfast\par -continue to use Pepcid 40 mg QHS \par -Rule of 2s: avoid eating too much, eating too late, eating too spicy, eating two hours before bed\par -Things to avoid including overeating, spicy foods, tight clothing, eating within three hours of bed, this list is not all inclusive. \par -For treatment of reflux, possible options discussed including diet control, H2 blockers, PPIs, as well as coating motility agents discussed as treatment options. Timing of meals and proximity of last meal to sleep were discussed. If symptoms persist, a formal gastrointestinal evaluation is needed.\par \par problem 4: OSAS ([place])\par -Repeat HSS \par -continue to use the CPAP machine, tolerating it well, and seeing the benefits- Williams Alanis/ Myrna if wt loss\par \par -Sleep apnea is associated with adverse clinical consequences which an affect most organ systems.  Cardiovascular disease risk includes arrhythmias, atrial fibrillation, hypertension, coronary artery disease, and stroke. Metabolic disorders include diabetes type 2, non-alcoholic fatty liver disease. Mood disorder especially depression; and cognitive decline especially in the elderly. Associations with  chronic reflux/Vicente’s esophagus some but not all inclusive. \par -Reasons to assess this include arousal consistent with hypopnea; respiratory events most prominent in REM sleep or supine position; therefore sleep staging and body position are important for accurate diagnosis and estimation of AHI.\par \par problem 5: obesity ([place])\par -Recommend "Muniq" OTC Supplement \par -Weight loss, exercise, and diet control were discussed and are highly encouraged. Treatment options were given such as, aqua therapy, and contacting a nutritionist. Recommended to use the elliptical, stationary bike, less use of treadmill.  Obesity is associated with worsening asthma, shortness of breath, and potential for cardiac disease, diabetes, and other underlying medical conditions.\par \par problem 6: low immunity \par -recommended early intervention \par \par problem 7: muscle cramps/ back pain \par -recommended to use Myocalm PM at bed\par -Topricin cream PRN \par \par Problem 7a: lymphedema\par -recommended lymphedema clinic @ [facility]\par \par Problem 8: Health Maintenance/COVID19 Precautions:\par -Covid-19 8/2022 \par -s/p Pfizer COVID 19 vaccine x 3\par - Clean your hands often. Wash your hands often with soap and water for at least 20 seconds, especially after blowing your nose, coughing, or sneezing, or having been in a public place.\par - If soap and water are not available, use a hand  that contains at least 60% alcohol.\par - To the extent possible, avoid touching high-touch surfaces in public places - elevator buttons, door handles, handrails, handshaking with people, etc. Use a tissue or your sleeve to cover your hand or finger if you must touch something.\par - Wash your hands after touching surfaces in public places.\par - Avoid touching your face, nose, eyes, etc.\par - Clean and disinfect your home to remove germs: practice routine cleaning of frequently touched surfaces (for example: tables, doorknobs, light switches, handles, desks, toilets, faucets, sinks & cell phones)\par - Avoid crowds, especially in poorly ventilated spaces. Your risk of exposure to respiratory viruses like COVID-19 may increase in crowded, closed-in settings with little air circulation if there are people in the crowd who are sick. All patients are recommended to practice social distancing and stay at least 6 feet away from others.\par - Avoid all non-essential travel including plane trips, and especially avoid embarking on cruise ships.\par -If COVID-19 is spreading in your community, take extra measures to put distance between yourself and other people to further reduce your risk of being exposed to this new virus.\par -Stay home as much as possible.\par - Consider ways of getting food brought to your house through family, social, or commercial networks\par -Be aware that the virus has been known to live in the air up to 3 hours post exposure, cardboard up to 24 hours post exposure, copper up to 4 hours post exposure, steel and plastic up to 2-3 days post exposure. Risk of transmission from these surfaces are affected by many variables.\par Immune Support Recommendations:\par -OTC Vitamin C 500mg BID \par -OTC Quercetin 250-500mg BID \par -OTC Zinc 75-100mg per day \par -OTC Melatonin 1 or 2 mg a night \par -OTC Vitamin D 1-4000mg per day \par -OTC Tonic Water 8oz per day\par Asthma and COVID19:\par You need to make sure your asthma is under control. This often requires the use of inhaled corticosteroids (and sometimes oral corticosteroids). Inhaled corticosteroids do not likely reduce your immune system’s ability to fight infections, but oral corticosteroids may. It is important to use the steps above to protect yourself to limit your exposure to any respiratory virus.\par \par Problem 9 : health maintenance\par -s/p influenza vaccine - 2022\par -recommended strep pneumonia vaccines after age 65: Prevnar-20 vaccine (given in office 03/17/2023 ), followed by Pneumo vaccine 23 one year following\par -recommended early intervention for URIs\par -recommended regular osteoporosis evaluations\par -recommended early dermatological evaluations\par -recommended after the age of 50 to the age of 70, colonoscopy every 5 years \par \par F/U in 4 months\par She is encouraged to call with any changes, concerns, or questions

## 2023-03-17 NOTE — PHYSICAL EXAM
[No Acute Distress] : no acute distress [Normal Oropharynx] : normal oropharynx [III] : Mallampati Class: III [Normal Appearance] : normal appearance [No Neck Mass] : no neck mass [Normal Rate/Rhythm] : normal rate/rhythm [Normal S1, S2] : normal s1, s2 [No Murmurs] : no murmurs [No Resp Distress] : no resp distress [Clear to Auscultation Bilaterally] : clear to auscultation bilaterally [No Abnormalities] : no abnormalities [Benign] : benign [Normal Gait] : normal gait [No Clubbing] : no clubbing [No Cyanosis] : no cyanosis [No Edema] : no edema [FROM] : FROM [Normal Color/ Pigmentation] : normal color/ pigmentation [No Focal Deficits] : no focal deficits [Oriented x3] : oriented x3 [Normal Affect] : normal affect [TextBox_2] : OW,  [TextBox_68] : I:E 1:3; Clear  [TextBox_105] : 2+ LE edema

## 2023-03-21 ENCOUNTER — APPOINTMENT (OUTPATIENT)
Dept: VASCULAR SURGERY | Facility: CLINIC | Age: 78
End: 2023-03-21

## 2023-03-22 ENCOUNTER — NON-APPOINTMENT (OUTPATIENT)
Age: 78
End: 2023-03-22

## 2023-03-22 ENCOUNTER — APPOINTMENT (OUTPATIENT)
Dept: VASCULAR SURGERY | Facility: CLINIC | Age: 78
End: 2023-03-22
Payer: MEDICARE

## 2023-03-22 VITALS — HEART RATE: 73 BPM | SYSTOLIC BLOOD PRESSURE: 112 MMHG | DIASTOLIC BLOOD PRESSURE: 68 MMHG

## 2023-03-22 VITALS — BODY MASS INDEX: 38.62 KG/M2 | TEMPERATURE: 97.6 F | HEIGHT: 58 IN | WEIGHT: 184 LBS

## 2023-03-22 PROCEDURE — 93970 EXTREMITY STUDY: CPT

## 2023-03-22 PROCEDURE — 99203 OFFICE O/P NEW LOW 30 MIN: CPT

## 2023-03-22 RX ORDER — ASCORBIC ACID 500 MG
TABLET ORAL
Refills: 0 | Status: ACTIVE | COMMUNITY

## 2023-03-22 RX ORDER — NIRMATRELVIR AND RITONAVIR 300-100 MG
20 X 150 MG & KIT ORAL
Qty: 30 | Refills: 0 | Status: COMPLETED | COMMUNITY
Start: 2022-07-28 | End: 2023-03-22

## 2023-03-22 RX ORDER — HYDRALAZINE HYDROCHLORIDE 50 MG/1
TABLET ORAL
Refills: 0 | Status: ACTIVE | COMMUNITY

## 2023-03-22 RX ORDER — PROMETHAZINE HYDROCHLORIDE AND DEXTROMETHORPHAN HYDROBROMIDE ORAL SOLUTION 15; 6.25 MG/5ML; MG/5ML
6.25-15 SOLUTION ORAL
Qty: 300 | Refills: 0 | Status: COMPLETED | COMMUNITY
Start: 2022-07-02 | End: 2023-03-22

## 2023-03-22 RX ORDER — IPRATROPIUM BROMIDE AND ALBUTEROL 20; 100 UG/1; UG/1
20-100 SPRAY, METERED RESPIRATORY (INHALATION) 4 TIMES DAILY
Qty: 1 | Refills: 1 | Status: COMPLETED | COMMUNITY
Start: 2020-07-21 | End: 2023-03-22

## 2023-03-22 RX ORDER — OMEPRAZOLE MAGNESIUM 40 MG/1
CAPSULE, DELAYED RELEASE ORAL
Refills: 0 | Status: COMPLETED | COMMUNITY
End: 2023-03-22

## 2023-03-22 RX ORDER — BENZONATATE 200 MG/1
200 CAPSULE ORAL 3 TIMES DAILY
Qty: 90 | Refills: 0 | Status: COMPLETED | COMMUNITY
Start: 2020-06-17 | End: 2023-03-22

## 2023-03-22 RX ORDER — DEXLANSOPRAZOLE 60 MG/1
60 CAPSULE, DELAYED RELEASE ORAL DAILY
Qty: 30 | Refills: 1 | Status: COMPLETED | COMMUNITY
Start: 2020-06-04 | End: 2023-03-22

## 2023-03-22 RX ORDER — HYDROCHLOROTHIAZIDE 12.5 MG/1
12.5 TABLET ORAL
Refills: 0 | Status: ACTIVE | COMMUNITY

## 2023-03-22 RX ORDER — TIOTROPIUM BROMIDE INHALATION SPRAY 3.12 UG/1
2.5 SPRAY, METERED RESPIRATORY (INHALATION) DAILY
Qty: 3 | Refills: 1 | Status: COMPLETED | COMMUNITY
Start: 2023-03-17 | End: 2023-03-22

## 2023-03-22 RX ORDER — LORAZEPAM 0.5 MG/1
0.5 TABLET ORAL
Qty: 60 | Refills: 0 | Status: COMPLETED | COMMUNITY
Start: 2022-11-01 | End: 2023-03-22

## 2023-03-22 RX ORDER — ALBUTEROL SULFATE 2.5 MG/3ML
(2.5 MG/3ML) SOLUTION RESPIRATORY (INHALATION)
Qty: 120 | Refills: 5 | Status: COMPLETED | COMMUNITY
Start: 2018-06-27 | End: 2023-03-22

## 2023-03-22 RX ORDER — AMLODIPINE BESYLATE 5 MG/1
5 TABLET ORAL
Qty: 90 | Refills: 0 | Status: COMPLETED | COMMUNITY
Start: 2021-11-29 | End: 2023-03-22

## 2023-03-22 RX ORDER — LANCETS 28 GAUGE
EACH MISCELLANEOUS
Qty: 100 | Refills: 0 | Status: COMPLETED | COMMUNITY
Start: 2017-03-08 | End: 2023-03-22

## 2023-03-22 RX ORDER — OLOPATADINE HCL 1 MG/ML
0.1 SOLUTION/ DROPS OPHTHALMIC TWICE DAILY
Qty: 1 | Refills: 0 | Status: COMPLETED | COMMUNITY
Start: 2018-05-08 | End: 2023-03-22

## 2023-03-22 RX ORDER — ERGOCALCIFEROL 1.25 MG/1
1.25 MG CAPSULE, LIQUID FILLED ORAL
Qty: 12 | Refills: 0 | Status: COMPLETED | COMMUNITY
Start: 2017-03-31 | End: 2023-03-22

## 2023-03-22 RX ORDER — SEMAGLUTIDE 0.68 MG/ML
INJECTION, SOLUTION SUBCUTANEOUS
Refills: 0 | Status: ACTIVE | COMMUNITY

## 2023-03-22 RX ORDER — AMLODIPINE BESYLATE 2.5 MG/1
2.5 TABLET ORAL
Qty: 90 | Refills: 0 | Status: COMPLETED | COMMUNITY
Start: 2022-10-25 | End: 2023-03-22

## 2023-03-22 RX ORDER — PANTOPRAZOLE SODIUM 40 MG/1
40 GRANULE, DELAYED RELEASE ORAL
Refills: 0 | Status: ACTIVE | COMMUNITY

## 2023-03-22 RX ORDER — ALBUTEROL SULFATE 90 UG/1
108 (90 BASE) AEROSOL, METERED RESPIRATORY (INHALATION)
Qty: 1 | Refills: 3 | Status: COMPLETED | COMMUNITY
Start: 2018-04-11 | End: 2023-03-22

## 2023-03-22 RX ORDER — PNV NO.95/FERROUS FUM/FOLIC AC 28MG-0.8MG
TABLET ORAL
Refills: 0 | Status: ACTIVE | COMMUNITY

## 2023-03-22 RX ORDER — FAMOTIDINE 40 MG/1
40 TABLET, FILM COATED ORAL
Qty: 30 | Refills: 0 | Status: COMPLETED | COMMUNITY
Start: 2020-05-20 | End: 2023-03-22

## 2023-03-22 RX ORDER — VORTIOXETINE 20 MG/1
20 TABLET, FILM COATED ORAL
Refills: 0 | Status: COMPLETED | COMMUNITY
End: 2023-03-22

## 2023-03-22 RX ORDER — LOSARTAN POTASSIUM 50 MG/1
50 TABLET, FILM COATED ORAL
Refills: 0 | Status: COMPLETED | COMMUNITY
End: 2023-03-22

## 2023-03-22 RX ORDER — PREDNISONE 10 MG/1
10 TABLET ORAL
Qty: 100 | Refills: 0 | Status: COMPLETED | COMMUNITY
Start: 2023-01-17 | End: 2023-03-22

## 2023-03-22 RX ORDER — FLUOXETINE HYDROCHLORIDE 20 MG/1
20 CAPSULE ORAL
Qty: 30 | Refills: 0 | Status: COMPLETED | COMMUNITY
Start: 2022-11-01 | End: 2023-03-22

## 2023-03-22 RX ORDER — METOPROLOL TARTRATE 25 MG/1
25 TABLET, FILM COATED ORAL
Qty: 180 | Refills: 0 | Status: COMPLETED | COMMUNITY
Start: 2022-11-13 | End: 2023-03-22

## 2023-03-22 RX ORDER — LATANOPROST/PF 0.005 %
0.01 DROPS OPHTHALMIC (EYE)
Qty: 8 | Refills: 0 | Status: COMPLETED | COMMUNITY
Start: 2022-03-18 | End: 2023-03-22

## 2023-03-22 NOTE — HISTORY OF PRESENT ILLNESS
[FreeTextEntry1] : Ms. MATILDE CHENG is a 77 year F who presents for initial evaluation of bilateral leg pain for the past several years. \par Ms. CHENG leg discomfort is associated with leg swelling, fatigue, heaviness, achiness, restlessness, muscle cramping, itchiness, dry, flaky skin, and skin darkening at the ankles. \par She complains of painful varicose veins.\par Her symptoms have persisted despite conservative management with leg elevation, exercise, attempted weight loss, over-the-counter medications (ibuprofen), and compression stocking use for more than 3 months. \par Her symptoms are aggravated by weight bearing, prolonged standing, prolonged sitting, extended travel, exercise \par Nothing helps to alleviate patient's symptoms. \par Her symptoms interfere with the her ADLs such as work, shopping and housekeeping. \par Ms. 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 \par Previous treatment, vein procedures and vein surgeries include:  wearing compression stockings for more than 3 months with little or no relief \par \par

## 2023-03-22 NOTE — ASSESSMENT
[FreeTextEntry1] : Ms. MATILDE CHENG is a 77 year with persistent and worsening bilateral lower extremity venous insufficiency, CEAP classification C3 which is unresponsive to at least 3 months of compression stockings 20-30 mmHg, leg elevation, exercise and over the counter pain medication_(Ibuprofen). Patient has complaints of worsening bilateral leg discomfort with swelling, fatigue, heaviness, achiness, cramping, restlessness, and painful varicosities. The patient’s symptoms interfere with their ADL’s, such as walking, shopping and house work, and their ability to work and exercise. Patient has intact pulses in both legs without evidence of arterial insufficiency. \par Treatment is indicated not for cosmetic reasons but for symptomatic venous reflux disease with symptoms which is refractory to conservative therapy. Venous duplex study demonstrates bilateral lower extremity venous insufficiency. The need for definitive effective treatment is based on severe, interfering and worsening reflux symptoms with evidence of venous insufficiency on venous ultrasound. \par Patient is a candidate for endovenous ablation and Varithena treatment of the right GSV and endovenous ablation of the left GSV. \par The risks and benefits of endovenous ablation treatment versus continued conservative management were discussed with the patient. Patient chooses endovenous ablation treatments. Treatment plan to be scheduled.\par

## 2023-03-22 NOTE — PHYSICAL EXAM
[FreeTextEntry1] : LE vein findings: \par Varicosities (spider, reticular, varicose) bilateral \par Edema bilateral \par Skin changes dry skin\par Ulcer none\par CEAP classification C3\par Palpable DP pulses bilaterally\par \par

## 2023-03-23 ENCOUNTER — APPOINTMENT (OUTPATIENT)
Dept: VASCULAR SURGERY | Facility: CLINIC | Age: 78
End: 2023-03-23
Payer: MEDICARE

## 2023-03-23 PROCEDURE — 36475 ENDOVENOUS RF 1ST VEIN: CPT | Mod: RT

## 2023-03-23 NOTE — PROCEDURE
[FreeTextEntry1] : right GSV RFA [FreeTextEntry3] : Procedural safety checklist and time out completed:\par Confirmed patient identification (Patient Name, , and/or medical record number including when possible affirmation by patient or parent/family/other).\par Confirmed procedure with the patient. Consent present, accurate and signed. \par Confirmed special equipment and supplies are present.\par Sterility confirmed. Position verified. \par Site/ side is marked and visible and confirmed. \par Procedure confirmed by consent. Accurate consent including side and site.\par Review of medical records, including venous ultrasound, noting correct procedure including site and side.\par MD/PA verifies presence and review of imaging studies and or written report of imaging studies.\par Agreement on the procedure to be performed\par Time out completed.\par All of the above has been confirmed by the team.\par All patient-specific concerns have been addressed. \par \par Indication: right lower extremity varicose veins with inflammation, leg pain, leg swelling, and leg cramping.  Venous insufficiency/ reflux.\par \par Procedure: radiofrequency ablation of the right great saphenous vein. \par 	\par Ms. MATILDE CHENG is a 77 year old F with a history of right lower extremity varicose veins previously seen in the office.  Ultrasound examination demonstrated venous insufficiency. A trial of compression stockings, exercise, elevation, and pain medication was attempted without relief and definitive treatment with radiofrequency ablation was offered. \par \par The patient has come for radiofrequency ablation treatment of the right great saphenous vein.\par I have discussed the risks of the procedure at length with the patient. The risks discussed were inclusive of but not limited to infection, irritation at the site of infiltration of local anesthesia, and also rare risk of deep venous thrombosis and pulmonary emboli. The patient agrees to proceed with the procedure. \par The patient was escorted into the procedure room and a time out called.\par The entire limb was prepped and draped in sterile fashion. The RF fiber was placed on the sterile field and connected by a sterile cable. Actuation, temperature, and impedance testing were performed to ensure that all components were connected and operating properly. \par The patient was placed on the procedure table and local anesthesia was instilled in the skin overlying the access site. Under ultrasound guidance, the vein was punctured with a micropuncture needle, using the anterior wall technique. A guide wire was now introduced through the needle, and the needle was then exchanged over the guide wire for a 7F sheath. The guide wire was removed and the RF probe was then placed into the right great saphenous vein through the sheath and position confirmed using ultrasound guidance. After the RF probe position was verified by ultrasound, tumescent anesthesia consisting of normal saline, 1% lidocaine with 8.4% sodium bicarbonate was infiltrated, under ultrasound guidance, precisely into the perivenous compartment along the entire length of the vein until a “halo” of fluid was noted around the vein. After RF probe position was again confirmed with ultrasound imaging, RF energy was applied. The probe was gradually and carefully withdrawn at a rate of 6.5cm/20seconds. \par \par 7 cycles of RF performed using the 7 cm probe\par Total treatment time was 140 seconds.\par The total volume injected was 300 cc\par Treatment length was 39.5 cm and \par The probe is 3.5 cm from the SFJ.\par \par Estimated Blood Loss: minimal\par Repeat ultrasound of the treated vein was performed confirming successful treatment. The catheter and sheath were withdrawn and hemostasis established with direct pressure. After assuring hemostasis, a sterile 4x4 was placed on the access site and an ACE compression wrap was applied. Patient tolerated procedure well. Patient was given post-procedure instructions and follow up appointment was scheduled.\par \par \par

## 2023-03-28 ENCOUNTER — APPOINTMENT (OUTPATIENT)
Dept: VASCULAR SURGERY | Facility: CLINIC | Age: 78
End: 2023-03-28
Payer: MEDICARE

## 2023-03-28 PROCEDURE — 93971 EXTREMITY STUDY: CPT | Mod: RT

## 2023-03-30 ENCOUNTER — APPOINTMENT (OUTPATIENT)
Dept: VASCULAR SURGERY | Facility: CLINIC | Age: 78
End: 2023-03-30
Payer: MEDICARE

## 2023-03-30 PROCEDURE — 36465Z: CUSTOM | Mod: RT

## 2023-03-30 NOTE — PROCEDURE
[FreeTextEntry1] : Ultrasound guided microfoam chemical ablation with Varithena® of the right distal great saphenous vein [FreeTextEntry3] : Procedural safety checklist and time out completed:\par Confirmed patient identification (Patient Name, , and/or medical record number including when possible affirmation by patient or parent/family/other).\par Confirmed procedure with the patient. Consent present, accurate and signed. \par Confirmed special equipment and supplies are present.\par Sterility confirmed. Position verified. \par Site/ side is marked and visible and confirmed. \par Procedure confirmed by consent. Accurate consent including side and site.\par Review of medical records, including venous ultrasound, noting correct procedure including site and side.\par MD/PA verifies presence and review of imaging studies and or written report of imaging studies.\par Agreement on the procedure to be performed\par Time out completed.\par All of the above has been confirmed by the team.\par All patient-specific concerns have been addressed. \par \par Indication: Right lower extremity varicose veins with inflammation, leg pain, leg swelling, and leg cramping.  Venous insufficiency/ reflux.\par \par Procedure: Ultrasound guided microfoam chemical ablation with Varithena® of the right distal great saphenous vein\par 	\par Ms. MATILDE CHENG is a 77 year old F with a history of bilateral lower extremity varicose veins and venous insufficiency previously seen in the office.  Ultrasound examination demonstrated venous insufficiency. A trial of compression stockings, exercise, elevation, and pain medication was attempted without relief and definitive treatment with radiofrequency ablation was offered. \par \par The patient has come for ultrasound guided microfoam chemical ablation with Varithena® of the right great saphenous vein\par I have discussed the risks of the procedure at length with the patient. The risks discussed were inclusive of but not limited to infection, irritation at the site of infiltration of local anesthesia, and also rare risk of deep venous thrombosis and pulmonary emboli. The patient agrees to proceed with the procedure. \par The patient was escorted into the procedure room and a time out called.\par \par The patient was placed on the procedure room table and was evaluated in reverse Trendelenburg position. The leg was prepped and sterile drapes applied. 1.0% lidocaine was administered at the chosen access site for local anesthesia. The vein was accessed using a 4F micro-puncture needle followed by a guide wire through the punctured needle and dilator sheath, standard IV catheters, or butterfly needle under advanced ultrasound guidance. Vein access was established and confirmed by aspiration of dark low pressure blood. After gaining access at distal right  great saphenous vein just above the ankle , the leg was positioned at 45 degrees of elevation in relationship to the torso. \par The Varithena canister was primed and purged as required by the instructions. A 2mL aliquot was drawn into a sterile syringe then attached to the access device. \par \par Varithena was administered at 1.0 cc per second with close observation under ultrasound in its course of the GSV. The vein was observed for spasm under ultrasound, once vein was in full spasm the administration of Varithena was stopped \par \par After the administration of Varithena the patient was asked to dorsiflex the ankle to limit flow of foam into perforating veins. Once appropriate spasm had been confirmed in the treated veins, the access device was removed from the leg and light pressure was applied to the puncture site for hemostasis.\par \par The common femoral and deep superficial veins were then evaluated for flow and compressibility prior to dressing placement. The lower extremity was kept elevated at 45 degree angle and a multilayer dressing was applied including an under layer, compression pad, and thigh high 20-30 mmHg compression stocking to the patient. The leg was lowered only after compression had been applied. \par \par The patient ambulated 10 minutes under supervision and was without concerns at time of release. The patient was instructed to take an anti-inflammatory medicine as needed and to follow up for duplex scan of treated vein. \par \par Patient tolerated procedure, was given post-procedure instructions and a follow-up appt scheduled.\par Post-care instructions include walking, wearing compression during the day, taking off only to shower and then reapplying for two weeks, advising patient to keep post-treatment bandages in place and dry for 48 hours, avoid extended periods of inactivity, avoid heavy exercise for one week, to walk daily for 10 minutes over the next month. The patient was instructed to take an anti-inflammatory medicine as needed.\par LOT#  [id number]\par EXP 2024\par \par

## 2023-04-02 NOTE — PHYSICAL EXAM
[Well Developed] : well developed [Well Nourished] : well nourished [No Acute Distress] : no acute distress [Normal Conjunctiva] : normal conjunctiva [Normal Venous Pressure] : normal venous pressure [No Carotid Bruit] : no carotid bruit [Normal S1, S2] : normal S1, S2 [No Rub] : no rub [No Gallop] : no gallop [Clear Lung Fields] : clear lung fields [Good Air Entry] : good air entry [No Respiratory Distress] : no respiratory distress  [Soft] : abdomen soft [Non Tender] : non-tender [No Masses/organomegaly] : no masses/organomegaly [Normal Bowel Sounds] : normal bowel sounds [Normal Gait] : normal gait [No Edema] : no edema [No Cyanosis] : no cyanosis [No Clubbing] : no clubbing [No Varicosities] : no varicosities [No Rash] : no rash [No Skin Lesions] : no skin lesions [Moves all extremities] : moves all extremities [No Focal Deficits] : no focal deficits [Normal Speech] : normal speech [Alert and Oriented] : alert and oriented [Normal memory] : normal memory [de-identified] : 2/6 DM

## 2023-04-02 NOTE — HISTORY OF PRESENT ILLNESS
[FreeTextEntry1] : She was seem in the hospital \par She was having LE edema. She was on Norvasc and it was stopped. Her last dose was on Friday. She still has LE edema. She has since been changed to Hydralazine 25 BID. \par She was on lisinopril but coughed. She was then on ARB but seemed to have some reaction to that. \par She was then on Metoprolol but it aggravated her asthma. \par She has chronic cough from GERD and asthma. \par \par Echo while in hospital in February she was noted to have a small hyperdynamic LV with CHAN and LVOT gradient of 37mmHG with Valsava. \par \par CCTA in the hospital in Feb CA score 80. No obstructive CAD.

## 2023-04-02 NOTE — DISCUSSION/SUMMARY
[FreeTextEntry1] : Pt with HCM with dynamic LVOT grad. Can try verapamil. \par Continue hydralazine for bail out. \par Continue other medications but avoid hypotension\par Patient had stress recently she reports it as normal.  [EKG obtained to assist in diagnosis and management of assessed problem(s)] : EKG obtained to assist in diagnosis and management of assessed problem(s)

## 2023-04-05 ENCOUNTER — OUTPATIENT (OUTPATIENT)
Dept: OUTPATIENT SERVICES | Facility: HOSPITAL | Age: 78
LOS: 1 days | End: 2023-04-05
Payer: MEDICARE

## 2023-04-05 ENCOUNTER — APPOINTMENT (OUTPATIENT)
Dept: MRI IMAGING | Facility: IMAGING CENTER | Age: 78
End: 2023-04-05
Payer: MEDICARE

## 2023-04-05 DIAGNOSIS — H90.3 SENSORINEURAL HEARING LOSS, BILATERAL: ICD-10-CM

## 2023-04-05 PROCEDURE — A9585: CPT

## 2023-04-05 PROCEDURE — 70553 MRI BRAIN STEM W/O & W/DYE: CPT

## 2023-04-05 PROCEDURE — 70553 MRI BRAIN STEM W/O & W/DYE: CPT | Mod: 26

## 2023-04-06 ENCOUNTER — APPOINTMENT (OUTPATIENT)
Dept: VASCULAR SURGERY | Facility: CLINIC | Age: 78
End: 2023-04-06
Payer: MEDICARE

## 2023-04-06 ENCOUNTER — NON-APPOINTMENT (OUTPATIENT)
Age: 78
End: 2023-04-06

## 2023-04-06 PROCEDURE — 93971 EXTREMITY STUDY: CPT | Mod: RT

## 2023-04-07 RX ORDER — SODIUM BICARBONATE 84 MG/ML
8.4 INJECTION, SOLUTION INTRAVENOUS
Qty: 5 | Refills: 0 | Status: COMPLETED | COMMUNITY
Start: 2023-04-07 | End: 2023-04-13

## 2023-04-07 RX ORDER — LIDOCAINE HYDROCHLORIDE 10 MG/ML
1 INJECTION, SOLUTION INFILTRATION; PERINEURAL
Qty: 50 | Refills: 0 | Status: COMPLETED | COMMUNITY
Start: 2023-04-07 | End: 2023-04-13

## 2023-04-13 ENCOUNTER — APPOINTMENT (OUTPATIENT)
Dept: VASCULAR SURGERY | Facility: CLINIC | Age: 78
End: 2023-04-13
Payer: MEDICARE

## 2023-04-13 PROCEDURE — 36475 ENDOVENOUS RF 1ST VEIN: CPT | Mod: LT

## 2023-04-13 NOTE — PROCEDURE
[FreeTextEntry1] : left GSV RFA [FreeTextEntry3] : Procedural safety checklist and time out completed:\par Confirmed patient identification (Patient Name, , and/or medical record number including when possible affirmation by patient or parent/family/other).\par Confirmed procedure with the patient. Consent present, accurate and signed. \par Confirmed special equipment and supplies are present.\par Sterility confirmed. Position verified. \par Site/ side is marked and visible and confirmed. \par Procedure confirmed by consent. Accurate consent including side and site.\par Review of medical records, including venous ultrasound, noting correct procedure including site and side.\par MD/PA verifies presence and review of imaging studies and or written report of imaging studies.\par Agreement on the procedure to be performed\par Time out completed.\par All of the above has been confirmed by the team.\par All patient-specific concerns have been addressed. \par \par Indication: left lower extremity varicose veins with inflammation, leg pain, leg swelling, and leg cramping.  Venous insufficiency/ reflux.\par \par Procedure: radiofrequency ablation of the left great saphenous vein. \par 	\par Ms. MATILDE CHENG is a 77 year old F with a history of left lower extremity varicose veins previously seen in the office.  Ultrasound examination demonstrated venous insufficiency. A trial of compression stockings, exercise, elevation, and pain medication was attempted without relief and definitive treatment with radiofrequency ablation was offered. \par \par The patient has come for radiofrequency ablation treatment of the left great saphenous vein.\par I have discussed the risks of the procedure at length with the patient. The risks discussed were inclusive of but not limited to infection, irritation at the site of infiltration of local anesthesia, and also rare risk of deep venous thrombosis and pulmonary emboli. The patient agrees to proceed with the procedure. \par The patient was escorted into the procedure room and a time out called.\par The entire limb was prepped and draped in sterile fashion. The RF fiber was placed on the sterile field and connected by a sterile cable. Actuation, temperature, and impedance testing were performed to ensure that all components were connected and operating properly. \par The patient was placed on the procedure table and local anesthesia was instilled in the skin overlying the access site. Under ultrasound guidance, the vein was punctured with a micropuncture needle, using the anterior wall technique. A guide wire was now introduced through the needle, and the needle was then exchanged over the guide wire for a 7F sheath. The guide wire was removed and the RF probe was then placed into the left great saphenous vein through the sheath and position confirmed using ultrasound guidance. After the RF probe position was verified by ultrasound, tumescent anesthesia consisting of normal saline, 1% lidocaine with 8.4% sodium bicarbonate was infiltrated, under ultrasound guidance, precisely into the perivenous compartment along the entire length of the vein until a “halo” of fluid was noted around the vein. After RF probe position was again confirmed with ultrasound imaging, RF energy was applied. The probe was gradually and carefully withdrawn at a rate of 6.5cm/20seconds. \par \par 7 cycles of RF performed using the 7 cm probe\par Total treatment time was 140 seconds.\par The total volume injected was 300  cc\par Treatment length was 39.5 cm and \par The probe is 3.7 cm from the SFJ.\par \par Estimated Blood Loss: minimal\par Repeat ultrasound of the treated vein was performed confirming successful treatment. The catheter and sheath were withdrawn and hemostasis established with direct pressure. After assuring hemostasis, a sterile 4x4 was placed on the access site and an ACE compression wrap was applied. Patient tolerated procedure well. Patient was given post-procedure instructions and follow up appointment was scheduled.\par \par \par

## 2023-04-18 ENCOUNTER — APPOINTMENT (OUTPATIENT)
Dept: VASCULAR SURGERY | Facility: CLINIC | Age: 78
End: 2023-04-18
Payer: MEDICARE

## 2023-04-18 PROCEDURE — 93971 EXTREMITY STUDY: CPT | Mod: LT

## 2023-04-19 ENCOUNTER — APPOINTMENT (OUTPATIENT)
Dept: CARDIOLOGY | Facility: CLINIC | Age: 78
End: 2023-04-19
Payer: MEDICARE

## 2023-04-19 ENCOUNTER — NON-APPOINTMENT (OUTPATIENT)
Age: 78
End: 2023-04-19

## 2023-04-19 VITALS
HEART RATE: 74 BPM | BODY MASS INDEX: 38.46 KG/M2 | DIASTOLIC BLOOD PRESSURE: 74 MMHG | WEIGHT: 184 LBS | SYSTOLIC BLOOD PRESSURE: 174 MMHG | OXYGEN SATURATION: 94 %

## 2023-04-19 VITALS — SYSTOLIC BLOOD PRESSURE: 152 MMHG | DIASTOLIC BLOOD PRESSURE: 70 MMHG

## 2023-04-19 DIAGNOSIS — I50.31 ACUTE DIASTOLIC (CONGESTIVE) HEART FAILURE: ICD-10-CM

## 2023-04-19 PROCEDURE — 93000 ELECTROCARDIOGRAM COMPLETE: CPT

## 2023-04-19 PROCEDURE — 99214 OFFICE O/P EST MOD 30 MIN: CPT | Mod: 25

## 2023-05-03 ENCOUNTER — APPOINTMENT (OUTPATIENT)
Dept: PHARMACY | Facility: CLINIC | Age: 78
End: 2023-05-03

## 2023-05-16 ENCOUNTER — APPOINTMENT (OUTPATIENT)
Dept: VASCULAR SURGERY | Facility: CLINIC | Age: 78
End: 2023-05-16
Payer: MEDICARE

## 2023-05-16 VITALS
BODY MASS INDEX: 35.68 KG/M2 | WEIGHT: 170 LBS | HEART RATE: 67 BPM | SYSTOLIC BLOOD PRESSURE: 179 MMHG | DIASTOLIC BLOOD PRESSURE: 91 MMHG | HEIGHT: 58 IN | TEMPERATURE: 97.4 F

## 2023-05-16 PROCEDURE — 99213 OFFICE O/P EST LOW 20 MIN: CPT

## 2023-05-16 PROCEDURE — 93970 EXTREMITY STUDY: CPT

## 2023-05-16 NOTE — ASSESSMENT
[FreeTextEntry1] : Ms. MATILDE CHENG is a 77 year with persistent and worsening bilateral lower extremity venous insufficiency, CEAP classification C3 which is unresponsive to at least 3 months of compression stockings 20-30 mmHg, leg elevation, exercise and over the counter pain medication_(Ibuprofen). Patient has complaints of worsening bilateral leg discomfort with swelling, fatigue, heaviness, achiness, cramping, restlessness, and painful varicosities. The patient’s symptoms interfere with their ADL’s, such as walking, shopping and house work, and their ability to work and exercise. Patient has intact pulses in both legs without evidence of arterial insufficiency. \par Treatment is indicated not for cosmetic reasons but for symptomatic venous reflux disease with symptoms which is refractory to conservative therapy. Venous duplex study demonstrates bilateral lower extremity venous insufficiency. The need for definitive effective treatment is based on severe, interfering and worsening reflux symptoms with evidence of venous insufficiency on venous ultrasound. \par Patient is a candidate for Varithena treatment of the left GSV and endovenous ablation of the bilateral SSV. \par The risks and benefits of endovenous ablation treatment versus continued conservative management were discussed with the patient. Patient chooses Varithena endovenous ablation treatments. Treatment plan to be scheduled.

## 2023-05-16 NOTE — DATA REVIEWED
[FreeTextEntry1] : bilateral GSV ablated in thigh\par R calf GSV s/p Varithena\par \par L calf GSV with persistent reflux\par R AGSV reflux - 6.1mm\par \par R SSV reflux\par R SPJ 6.1mm\par \par L SSV reflux\par L SPJ 6.1mm

## 2023-05-16 NOTE — HISTORY OF PRESENT ILLNESS
[FreeTextEntry1] : Ms. MATILDE CHENG is a 77 year F who presents for initial evaluation of bilateral leg pain for the past several years. \par Ms. CHENG leg discomfort is associated with leg swelling, fatigue, heaviness, achiness, restlessness, muscle cramping, itchiness, dry, flaky skin, and skin darkening at the ankles. \par She complains of painful varicose veins.\par Her symptoms have persisted despite conservative management with leg elevation, exercise, attempted weight loss, over-the-counter medications (ibuprofen), and compression stocking use for more than 3 months. \par Her symptoms are aggravated by weight bearing, prolonged standing, prolonged sitting, extended travel, exercise \par Nothing helps to alleviate patient's symptoms. \par Her symptoms interfere with the her ADLs such as work, shopping and housekeeping. \par Ms. 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 \par Previous treatment, vein procedures and vein surgeries include:  wearing compression stockings for more than 3 months with little or no relief \par \par  [de-identified] : Ms. MATILDE CHENG is a 77 year F who presents for follow-up evaluation of bilateral leg pain for the past several years. \par Ms. CHENG leg discomfort is associated with leg swelling, fatigue, heaviness, achiness, restlessness, muscle cramping, itchiness, dry, flaky skin, and skin darkening at the ankles. \par She complains of painful varicose veins.\par Her symptoms have persisted despite conservative management with leg elevation, exercise, attempted weight loss, over-the-counter medications (ibuprofen), and compression stocking use for more than 3 months. \par Her symptoms are aggravated by weight bearing, prolonged standing, prolonged sitting, extended travel, exercise \par Nothing helps to alleviate patient's symptoms. \par Her symptoms interfere with the her ADLs such as work, shopping and housekeeping. \par Ms. 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 \par Previous treatment, vein procedures and vein surgeries include:  wearing compression stockings for more than 3 months with little or no relief as well as bilateral GSV endovenous ablation and right GSV varithena with excellent result\par \par

## 2023-05-24 ENCOUNTER — EMERGENCY (EMERGENCY)
Facility: HOSPITAL | Age: 78
LOS: 1 days | Discharge: HOME | End: 2023-05-24
Attending: EMERGENCY MEDICINE
Payer: MEDICARE

## 2023-05-24 VITALS
HEIGHT: 58 IN | SYSTOLIC BLOOD PRESSURE: 156 MMHG | DIASTOLIC BLOOD PRESSURE: 80 MMHG | OXYGEN SATURATION: 96 % | RESPIRATION RATE: 22 BRPM | HEART RATE: 82 BPM | TEMPERATURE: 99 F | WEIGHT: 169.98 LBS

## 2023-05-24 PROCEDURE — 71045 X-RAY EXAM CHEST 1 VIEW: CPT

## 2023-05-24 PROCEDURE — 93005 ELECTROCARDIOGRAM TRACING: CPT

## 2023-05-24 PROCEDURE — 71045 X-RAY EXAM CHEST 1 VIEW: CPT | Mod: 26

## 2023-05-24 PROCEDURE — 99285 EMERGENCY DEPT VISIT HI MDM: CPT | Mod: 25

## 2023-05-24 PROCEDURE — 99284 EMERGENCY DEPT VISIT MOD MDM: CPT

## 2023-05-24 PROCEDURE — 94640 AIRWAY INHALATION TREATMENT: CPT

## 2023-05-24 RX ORDER — ALBUTEROL 90 UG/1
2.5 AEROSOL, METERED ORAL
Refills: 0 | Status: COMPLETED | OUTPATIENT
Start: 2023-05-24 | End: 2023-05-24

## 2023-05-24 RX ORDER — ALBUTEROL 90 UG/1
2 AEROSOL, METERED ORAL
Qty: 1 | Refills: 0
Start: 2023-05-24

## 2023-05-24 RX ORDER — IPRATROPIUM/ALBUTEROL SULFATE 18-103MCG
3 AEROSOL WITH ADAPTER (GRAM) INHALATION ONCE
Refills: 0 | Status: COMPLETED | OUTPATIENT
Start: 2023-05-24 | End: 2023-05-24

## 2023-05-24 RX ADMIN — ALBUTEROL 2.5 MILLIGRAM(S): 90 AEROSOL, METERED ORAL at 08:28

## 2023-05-24 RX ADMIN — ALBUTEROL 2.5 MILLIGRAM(S): 90 AEROSOL, METERED ORAL at 08:49

## 2023-05-24 RX ADMIN — ALBUTEROL 2.5 MILLIGRAM(S): 90 AEROSOL, METERED ORAL at 09:35

## 2023-05-24 RX ADMIN — Medication 3 MILLILITER(S): at 07:56

## 2023-05-24 NOTE — ED PROVIDER NOTE - PROGRESS NOTE DETAILS
DO Sylvia: Patient re-assessed and reports improvement of symptoms at this time.  No physical complaints reported.  pulmonary re-assessment: lungs clear to auscultation bilaterally, speaking in complete sentences DO Sylvia: Patient re-assessed and reports improvement of symptoms at this time.  No physical complaints reported.  pulmonary re-assessment: lungs clear to auscultation bilaterally, speaking in complete sentences. DO Sylvia: while awaiting daughter to  patient, the patient's primary care physician, Dr. Horne, was contacted and agreed with decision for discharge and outpatient follow-up.  patient and patient's daughter were updated at length.

## 2023-05-24 NOTE — ED PROVIDER NOTE - PHYSICAL EXAMINATION
PHYSICAL EXAM:  GENERAL: NAD, lying in bed comfortably  HEAD:  Atraumatic, Normocephalic  EYES: EOMI, PERRLA, conjunctiva and sclera clear  ENT: No erythema/pallor/petechiae/lesions; TMs clear b/l  NECK: Supple, No JVD  LUNG: + mild expiratory wheeze in bilateral lung fields, speaking in complete sentences  HEART: RRR, +S1/S2; No m/r/g  ABDOMEN: soft, NT/ND; BS audible   EXTREMITIES:  2+ Peripheral Pulses, brisk cap refill. No clubbing, cyanosis, or edema  NERVOUS SYSTEM:  AAOx3, speech clear. No sensory/motor deficits   MSK: FROM all 4 extremities, full and equal strength  SKIN: No rashes or lesions

## 2023-05-24 NOTE — ED PROVIDER NOTE - CLINICAL SUMMARY MEDICAL DECISION MAKING FREE TEXT BOX
Patient is a 77 year old female with past medical history significant for ovarian cancer (not on active treatment), asthma, diabetes, hypertension, hyperlipidemia, depression, glaucoma, GERD presents to the Emergency Department with chief complaint of cough.  Patient with recent admission where she had a CT coronaries, CT angio of the chest, TTE, and ultrasound of her bilateral legs.  Patient's physical and historical findings consistent with asthma exacerbation.  Patient received EKG, plain films, and Duoneb treatment. Patient is a 77 year old female with past medical history significant for ovarian cancer (not on active treatment), asthma, diabetes, hypertension, hyperlipidemia, depression, glaucoma, GERD presents to the Emergency Department with chief complaint of cough.  Patient with recent admission where she had a CT coronaries, CT angio of the chest, TTE, and ultrasound of her bilateral legs.  Patient's physical and historical findings consistent with asthma exacerbation.  Patient received EKG, plain films, and Duoneb treatment.  Patient reports improvement of symptoms at this time.  Patient verbalizes understanding of all testing performed throughout their stay.  Patient agrees with plan for discharge and outpatient follow-up.  Patient given refills of requested prescriptions.

## 2023-05-24 NOTE — ED ADULT NURSE REASSESSMENT NOTE - NSFALLUNIVINTERV_ED_ALL_ED
Bed/Stretcher in lowest position, wheels locked, appropriate side rails in place/Call bell, personal items and telephone in reach/Instruct patient to call for assistance before getting out of bed/chair/stretcher/Non-slip footwear applied when patient is off stretcher/[place] to call system/Physically safe environment - no spills, clutter or unnecessary equipment/Purposeful proactive rounding/Room/bathroom lighting operational, light cord in reach

## 2023-05-24 NOTE — ED PROVIDER NOTE - NS ED ROS FT
CONSTITUTIONAL: No weakness, fevers or chills  EYES/ENT: No visual changes;  No vertigo or throat pain   NECK: No pain or stiffness  RESPIRATORY: + cough, dyspnea  CARDIOVASCULAR: No chest pain or palpitations  GASTROINTESTINAL: No abdominal or epigastric pain. No nausea, vomiting, or hematemesis; No diarrhea or constipation. No melena or hematochezia.  GENITOURINARY: No dysuria, frequency or hematuria  NEUROLOGICAL: No numbness or weakness  SKIN: No itching, burning, rashes, or lesions   All other review of systems is negative unless indicated above.

## 2023-05-24 NOTE — ED ADULT NURSE NOTE - OBJECTIVE STATEMENT
0732 pt 77yf aox4 c/o cough, diff breathing, pt noted w/ non productive cough states [place] and forgot her hearing aid, pt had finished her antibx and was recently discharged from the hospital, able to verbalize concerns, pending eval

## 2023-05-24 NOTE — ED PROVIDER NOTE - ATTENDING CONTRIBUTION TO CARE
Pt with wheezing, dyspnea, being treated with azithro with no relief.  Started on prednisone.  No fever, vomiting.    PE: wheezing b/l, good aeration, no retractions, no rhonchi.  No JVD.    MDM: asthma exacerbation, check cbc r/o anemia or leukocytosis, check bmp to r/o metabolic derangement and lyte imbalance, pt using inhaler with likely poor delivery of meds, will switch to nebulizer, cxr r/o pneumonia, outpt treatment.

## 2023-05-24 NOTE — ED PROVIDER NOTE - PATIENT PORTAL LINK FT
You can access the FollowMyHealth Patient Portal offered by [facility] by registering at the following website: [email or web address]. By joining [employer]’s FollowMyHealth portal, you will also be able to view your health information using other applications (apps) compatible with our system.

## 2023-05-24 NOTE — ED ADULT NURSE REASSESSMENT NOTE - NS ED NURSE REASSESS COMMENT FT1
1124 pt states wants her pmd called before pts dc today/pending final dispo
0823 pt able to tolerate neb tx/pending xray/dispo

## 2023-05-24 NOTE — ED PROVIDER NOTE - OBJECTIVE STATEMENT
Patient is a 77 year old female with past medical history significant for ovarian cancer (not on active treatment), asthma, diabetes, hypertension, hyperlipidemia, depression, glaucoma, GERD presents to the Emergency Department with chief complaint of cough.  Patient reports cough and mild dyspnea starting approximately 5 days prior to presentation.  Patient saw her primary care physician and was prescribed azithromycin and prednisone.  Patient states she has had mild improvement since that time, but is still reporting mild cough productive of clear sputum.  Patient denies fever, chest pain, abdominal pain, or other physical complaints.  No sick contacts or recent travel.

## 2023-05-24 NOTE — ED ADULT NURSE NOTE - NSFALLHARMRISKINTERV_ED_ALL_ED

## 2023-05-24 NOTE — ED PROVIDER NOTE - NSFOLLOWUPINSTRUCTIONS_ED_ALL_ED_FT
There were no signs of an emergency medical condition on completion of today's workup.  You will need further medical care and evaluation. A presumptive diagnosis has been made, however further evaluation may be required by your primary care doctor or specialist for a definitive diagnosis.      Follow up with your medical doctor in 2-3 days or call our clinic at [phone] and state you were seen in the Emergency Department and would like to be seen in clinic. You may also call [phone] to speak with a representative to assist follow up care with medicine, surgery, or specialists.    If you are having pain, take Tylenol/acetaminophen 1 g every six hours and supplement (if allowed by your physician, and if you're not having gastric/gastrointestinal/stomach/intestinal problems) with ibuprofen 600 mg, with food or milk/maalox, every six hours which can be taken three hours apart from the Tylenol to have a layered effect.     Be sure to take no more than 4000mg or 4g of Tylenol/acetaminophen in a 24 hour period. Be sure to check your other medications to see if they include Tylenol/acetaminophen and include them in your calculations to ensure you do not take more than 4000mg or 4g of Tylenol/acetaminophen a day.    Drink at least 2 Liters or 64 Ounces of water each day (UNLESS you are supposed to restrict fluids or have a history of congestive heart failure (CHF)).    Return for any persistent, worsening symptoms, or ANY concerns at all.

## 2023-05-26 ENCOUNTER — APPOINTMENT (OUTPATIENT)
Dept: OTOLARYNGOLOGY | Facility: CLINIC | Age: 78
End: 2023-05-26
Payer: MEDICARE

## 2023-05-26 VITALS — DIASTOLIC BLOOD PRESSURE: 71 MMHG | SYSTOLIC BLOOD PRESSURE: 161 MMHG | HEART RATE: 71 BPM

## 2023-05-26 PROCEDURE — 99214 OFFICE O/P EST MOD 30 MIN: CPT | Mod: 25

## 2023-05-26 PROCEDURE — 31575 DIAGNOSTIC LARYNGOSCOPY: CPT

## 2023-05-26 RX ORDER — SOD CHLOR,BICARB/SQUEEZ BOTTLE
PACKET, WITH RINSE DEVICE NASAL
Qty: 1 | Refills: 3 | Status: ACTIVE | COMMUNITY
Start: 2023-05-26 | End: 1900-01-01

## 2023-05-30 NOTE — DISCUSSION/SUMMARY
[FreeTextEntry1] : She may have symptoms related to diastolic dysfunction in small hyperdynamic LV\par ASsure adequate filling. Keep HR on lower side. \par BP elevated in office but relates to exertion. \par She was admitted in February at that time CCTA had non-obstructive disease. \par Followup in 3 months.  [EKG obtained to assist in diagnosis and management of assessed problem(s)] : EKG obtained to assist in diagnosis and management of assessed problem(s)

## 2023-05-30 NOTE — PHYSICAL EXAM
[Well Developed] : well developed [Well Nourished] : well nourished [No Acute Distress] : no acute distress [Normal Conjunctiva] : normal conjunctiva [Normal Venous Pressure] : normal venous pressure [No Carotid Bruit] : no carotid bruit [Normal S1, S2] : normal S1, S2 [No Rub] : no rub [Clear Lung Fields] : clear lung fields [No Gallop] : no gallop [Good Air Entry] : good air entry [No Respiratory Distress] : no respiratory distress  [Soft] : abdomen soft [Non Tender] : non-tender [No Masses/organomegaly] : no masses/organomegaly [Normal Bowel Sounds] : normal bowel sounds [Normal Gait] : normal gait [No Edema] : no edema [No Cyanosis] : no cyanosis [No Clubbing] : no clubbing [No Varicosities] : no varicosities [No Rash] : no rash [No Skin Lesions] : no skin lesions [Moves all extremities] : moves all extremities [No Focal Deficits] : no focal deficits [Normal Speech] : normal speech [Alert and Oriented] : alert and oriented [Normal memory] : normal memory [de-identified] : 2/6 DM

## 2023-05-30 NOTE — HISTORY OF PRESENT ILLNESS
[FreeTextEntry1] : She is seeing vascular surgery for venous insufficiency. She has had multiple therapies. \par She has less pain but swelling is still presents. \par No chest pain. \par Chronic SOB related to asthma\par She has small hyperdynamic LV with mild LVOT obstruction on echo.

## 2023-06-01 ENCOUNTER — APPOINTMENT (OUTPATIENT)
Dept: VASCULAR SURGERY | Facility: CLINIC | Age: 78
End: 2023-06-01
Payer: MEDICARE

## 2023-06-01 PROCEDURE — 36465Z: CUSTOM | Mod: LT

## 2023-06-01 NOTE — PROCEDURE
[FreeTextEntry1] : Ultrasound guided microfoam chemical ablation with Varithena® of the left distal great saphenous vein [FreeTextEntry3] : Procedural safety checklist and time out completed:\par Confirmed patient identification (Patient Name, , and/or medical record number including when possible affirmation by patient or parent/family/other).\par Confirmed procedure with the patient. Consent present, accurate and signed. \par Confirmed special equipment and supplies are present.\par Sterility confirmed. Position verified. \par Site/ side is marked and visible and confirmed. \par Procedure confirmed by consent. Accurate consent including side and site.\par Review of medical records, including venous ultrasound, noting correct procedure including site and side.\par MD/PA verifies presence and review of imaging studies and or written report of imaging studies.\par Agreement on the procedure to be performed\par Time out completed.\par All of the above has been confirmed by the team.\par All patient-specific concerns have been addressed. \par \par Indication: left lower extremity varicose veins with inflammation, leg pain, leg swelling, and leg cramping.  Venous insufficiency/ reflux.\par \par Procedure: Ultrasound guided microfoam chemical ablation with Varithena® of the left distal great saphenous vein\par 	\par Ms. MATILDE CHENG is a 77 year old F with a history of left lower extremity varicose veins and venous insufficiency previously seen in the office.  Ultrasound examination demonstrated venous insufficiency. A trial of compression stockings, exercise, elevation, and pain medication was attempted without relief and definitive treatment with radiofrequency ablation was offered. \par \par The patient has come for ultrasound guided microfoam chemical ablation with Varithena® of the left distal great saphenous vein\par I have discussed the risks of the procedure at length with the patient. The risks discussed were inclusive of but not limited to infection, irritation at the site of infiltration of local anesthesia, and also rare risk of deep venous thrombosis and pulmonary emboli. The patient agrees to proceed with the procedure. \par The patient was escorted into the procedure room and a time out called.\par \par The patient was placed on the procedure room table and was evaluated in reverse Trendelenburg position. The leg was prepped and sterile drapes applied. 1.0% lidocaine was administered at the chosen access site for local anesthesia. The vein was accessed using a 4F micro-puncture needle followed by a guide wire through the punctured needle and dilator sheath, standard IV catheters, or butterfly needle under advanced ultrasound guidance. Vein access was established and confirmed by aspiration of dark low pressure blood. After gaining access at distal GSV just above the left ankle , the leg was positioned at 45 degrees of elevation in relationship to the torso. \par The Varithena canister was primed and purged as required by the instructions. A 2mL aliquot was drawn into a sterile syringe then attached to the access device. \par Varithena was administered at 1.0 cc per second with close observation under ultrasound in its course of the GSV. The vein was observed for spasm under ultrasound, once vein was in full spasm the administration of Varithena was stopped \par \par After the administration of Varithena the patient was asked to dorsiflex the ankle to limit flow of foam into perforating veins. Once appropriate spasm had been confirmed in the treated veins, the access device was removed from the leg and light pressure was applied to the puncture site for hemostasis.\par \par The common femoral and deep superficial veins were then evaluated for flow and compressibility prior to dressing placement. The lower extremity was kept elevated at 45 degree angle and a multilayer dressing was applied including an under layer, compression pad, and thigh high 20-30 mmHg compression stocking to the patient. The leg was lowered only after compression had been applied. \par \par The patient ambulated 10 minutes under supervision and was without concerns at time of release. The patient was instructed to take an anti-inflammatory medicine as needed and to follow up for duplex scan of treated vein. \par \par Patient tolerated procedure, was given post-procedure instructions and a follow-up appt scheduled.\par Post-care instructions include walking, wearing compression during the day, taking off only to shower and then reapplying for two weeks, advising patient to keep post-treatment bandages in place and dry for 48 hours, avoid extended periods of inactivity, avoid heavy exercise for one week, to walk daily for 10 minutes over the next month. The patient was instructed to take an anti-inflammatory medicine as needed.\par LOT#  [id number]\par EXP 2024\par \par

## 2023-06-06 NOTE — ASSESSMENT
[FreeTextEntry1] : 77 year F with bilateral SNHL right worse than left, TMJ, and cough 2/2 combination of PND and LPR. Cough is also related asthma. \par \par 3/8/23 Audiogram shows AD Mild to profound SNHL 250-8k hz. AS Normal to profound SNHL 250-8k hz. AU Tymp A. Speech Recog AD 36% AS 62. Patient tolerated cerumen removal without complaints.\par \par 4/5/23 MRI Brain/IAC as per radiology shows no space occupying lesion in IAC and CPA cistern \par \par Indirect Laryngoscopy shows moderate postcricoid edema, mild arytenoids edema, moderate mucus production coating nasal cavity, bilateral vocal fold symmetrical mobile, no gross mass or lesions in larynx\par \par Recommend\par Laryngopharyngeal Reflux\par - Discussed Lifestyle changes as the most effective methods to improvement LPR. Weight Loss if overweight. Avoid foods that increase the level of acid in your stomach, including caffeinated/carbonated drinks. \par - Avoid foods that decrease the pressure in the lower esophagus, such as fatty foods, alcohol and peppermint.\par - Avoid large meals.Try to have an empty stomach 2 hours before going to bed. If still smoking please Quit.\par - Head of bed elevation when you lying down\par -LPR Handout Given\par -Discussed Sodium alginate as mechanical plug for preventing reflux\par - Discussed with Patient if they have not seen Gastroenterolgy in the past for endoscopy they should follow up as chronic reflux can causes mucosal changes in the lining of the lower esophagus \par -Continue Omeprazole 20mg daily as per PCP\par \par PND\par -Discussed Post-nasal drip occurs when mucus from the nasal passages and sinuses drains into the throat. If the mucus becomes thick, post-nasal drip can cause problems such as a sore throat, laryngitis, a cough, bad breath, hoarseness, and sometimes wheezing.\par -Discussed the importance of rinsing the sinus before using nasal spray so that excess mucus lining the nasal cavity can be wash away so medication can penetration the nose.\par -Continue Azelastine nasal spray after sinus rinse\par -If Azelastine spray is not effective can discuss additional other nasal sprays for treatment\par \par Bilateral SNHL / Asymmetrical SNHL\par -Discussed Benefit of Hearings Aids and their value of helping keep brain stimulated by helping hear conversation which keeps a person active and socially connected. Stressed also the association with a lower risk of incident dementia and slower cognitive decline.\par -MRI Brain/IAC completed\par -Instructed patient to get chain / attachment for hearing aids so they do not fall off by accident.Encouraged more compliance for hearing aids usage\par \par TMJ\par - Recommended applying moist heat or cold packs on areas of acute pain\par - Trial of Soft foods diet for 2 weeks during acute episodes\par - Follow up Dentist for for .\par - Avoid extreme jaw movements.\par \par -Return to clinic in 2 months or sooner if new/worsen symptoms present\par

## 2023-06-06 NOTE — HISTORY OF PRESENT ILLNESS
[de-identified] : 77 year old female presents for follow up of cough and SNHL\par Hx of BL SNHL, L worse than R - received HAs but is not wearing them because shes afraid she will lose them\par Hx of seasonal allergies & asthma - has constant dry cough\par Taking Claritin D daily for allergies\par No recent fevers or infections, throat pain, dysphagia

## 2023-06-08 ENCOUNTER — APPOINTMENT (OUTPATIENT)
Dept: VASCULAR SURGERY | Facility: CLINIC | Age: 78
End: 2023-06-08
Payer: MEDICARE

## 2023-06-08 PROCEDURE — 93971 EXTREMITY STUDY: CPT | Mod: LT

## 2023-06-11 ENCOUNTER — EMERGENCY (EMERGENCY)
Facility: HOSPITAL | Age: 78
LOS: 1 days | Discharge: HOME | End: 2023-06-11
Attending: EMERGENCY MEDICINE
Payer: MEDICARE

## 2023-06-11 VITALS
RESPIRATION RATE: 20 BRPM | HEIGHT: 58 IN | DIASTOLIC BLOOD PRESSURE: 102 MMHG | SYSTOLIC BLOOD PRESSURE: 210 MMHG | OXYGEN SATURATION: 97 % | HEART RATE: 76 BPM | TEMPERATURE: 99 F | WEIGHT: 149.91 LBS

## 2023-06-11 VITALS
RESPIRATION RATE: 17 BRPM | SYSTOLIC BLOOD PRESSURE: 136 MMHG | OXYGEN SATURATION: 95 % | DIASTOLIC BLOOD PRESSURE: 80 MMHG | TEMPERATURE: 99 F | HEART RATE: 72 BPM

## 2023-06-11 LAB
ALBUMIN SERPL ELPH-MCNC: 3.8 G/DL — SIGNIFICANT CHANGE UP (ref 3.3–5)
ALP SERPL-CCNC: 89 U/L — SIGNIFICANT CHANGE UP (ref 40–120)
ALT FLD-CCNC: 21 U/L — SIGNIFICANT CHANGE UP (ref 10–45)
ANION GAP SERPL CALC-SCNC: 11 MMOL/L — SIGNIFICANT CHANGE UP (ref 5–17)
AST SERPL-CCNC: 24 U/L — SIGNIFICANT CHANGE UP (ref 10–40)
BASOPHILS # BLD AUTO: 0 K/UL — SIGNIFICANT CHANGE UP (ref 0–0.2)
BASOPHILS NFR BLD AUTO: 0 % — SIGNIFICANT CHANGE UP (ref 0–2)
BILIRUB SERPL-MCNC: 0.4 MG/DL — SIGNIFICANT CHANGE UP (ref 0.2–1.2)
BUN SERPL-MCNC: 18 MG/DL — SIGNIFICANT CHANGE UP (ref 7–23)
CALCIUM SERPL-MCNC: 9.1 MG/DL — SIGNIFICANT CHANGE UP (ref 8.4–10.5)
CHLORIDE SERPL-SCNC: 96 MMOL/L — SIGNIFICANT CHANGE UP (ref 96–108)
CO2 SERPL-SCNC: 24 MMOL/L — SIGNIFICANT CHANGE UP (ref 22–31)
CREAT SERPL-MCNC: 0.65 MG/DL — SIGNIFICANT CHANGE UP (ref 0.5–1.3)
EGFR: 92 ML/MIN/1.73M2 — SIGNIFICANT CHANGE UP
EOSINOPHIL # BLD AUTO: 0 K/UL — SIGNIFICANT CHANGE UP (ref 0–0.5)
EOSINOPHIL NFR BLD AUTO: 0 % — SIGNIFICANT CHANGE UP (ref 0–6)
GLUCOSE SERPL-MCNC: 160 MG/DL — HIGH (ref 70–99)
HCT VFR BLD CALC: 37.6 % — SIGNIFICANT CHANGE UP (ref 34.5–45)
HGB BLD-MCNC: 12.6 G/DL — SIGNIFICANT CHANGE UP (ref 11.5–15.5)
IMM GRANULOCYTES NFR BLD AUTO: 0.4 % — SIGNIFICANT CHANGE UP (ref 0–0.9)
LIDOCAIN IGE QN: 32 U/L — SIGNIFICANT CHANGE UP (ref 7–60)
LYMPHOCYTES # BLD AUTO: 0.86 K/UL — LOW (ref 1–3.3)
LYMPHOCYTES # BLD AUTO: 15.2 % — SIGNIFICANT CHANGE UP (ref 13–44)
MCHC RBC-ENTMCNC: 29.4 PG — SIGNIFICANT CHANGE UP (ref 27–34)
MCHC RBC-ENTMCNC: 33.5 GM/DL — SIGNIFICANT CHANGE UP (ref 32–36)
MCV RBC AUTO: 87.9 FL — SIGNIFICANT CHANGE UP (ref 80–100)
MONOCYTES # BLD AUTO: 0.44 K/UL — SIGNIFICANT CHANGE UP (ref 0–0.9)
MONOCYTES NFR BLD AUTO: 7.8 % — SIGNIFICANT CHANGE UP (ref 2–14)
NEUTROPHILS # BLD AUTO: 4.34 K/UL — SIGNIFICANT CHANGE UP (ref 1.8–7.4)
NEUTROPHILS NFR BLD AUTO: 76.6 % — SIGNIFICANT CHANGE UP (ref 43–77)
NRBC # BLD: 0 /100 WBCS — SIGNIFICANT CHANGE UP (ref 0–0)
PLATELET # BLD AUTO: 221 K/UL — SIGNIFICANT CHANGE UP (ref 150–400)
POTASSIUM SERPL-MCNC: 4 MMOL/L — SIGNIFICANT CHANGE UP (ref 3.5–5.3)
POTASSIUM SERPL-SCNC: 4 MMOL/L — SIGNIFICANT CHANGE UP (ref 3.5–5.3)
PROT SERPL-MCNC: 7.1 G/DL — SIGNIFICANT CHANGE UP (ref 6–8.3)
RBC # BLD: 4.28 M/UL — SIGNIFICANT CHANGE UP (ref 3.8–5.2)
RBC # FLD: 13.5 % — SIGNIFICANT CHANGE UP (ref 10.3–14.5)
SODIUM SERPL-SCNC: 131 MMOL/L — LOW (ref 135–145)
TROPONIN T, HIGH SENSITIVITY RESULT: 17 NG/L — SIGNIFICANT CHANGE UP (ref 0–51)
TROPONIN T, HIGH SENSITIVITY RESULT: 21 NG/L — SIGNIFICANT CHANGE UP (ref 0–51)
WBC # BLD: 5.66 K/UL — SIGNIFICANT CHANGE UP (ref 3.8–10.5)
WBC # FLD AUTO: 5.66 K/UL — SIGNIFICANT CHANGE UP (ref 3.8–10.5)

## 2023-06-11 PROCEDURE — 85025 COMPLETE CBC W/AUTO DIFF WBC: CPT

## 2023-06-11 PROCEDURE — 36415 COLL VENOUS BLD VENIPUNCTURE: CPT

## 2023-06-11 PROCEDURE — 96374 THER/PROPH/DIAG INJ IV PUSH: CPT | Mod: XU

## 2023-06-11 PROCEDURE — 74177 CT ABD & PELVIS W/CONTRAST: CPT | Mod: MA

## 2023-06-11 PROCEDURE — 99284 EMERGENCY DEPT VISIT MOD MDM: CPT

## 2023-06-11 PROCEDURE — 71045 X-RAY EXAM CHEST 1 VIEW: CPT

## 2023-06-11 PROCEDURE — 71045 X-RAY EXAM CHEST 1 VIEW: CPT | Mod: 26

## 2023-06-11 PROCEDURE — 80053 COMPREHEN METABOLIC PANEL: CPT

## 2023-06-11 PROCEDURE — 93005 ELECTROCARDIOGRAM TRACING: CPT

## 2023-06-11 PROCEDURE — 84484 ASSAY OF TROPONIN QUANT: CPT

## 2023-06-11 PROCEDURE — 96375 TX/PRO/DX INJ NEW DRUG ADDON: CPT

## 2023-06-11 PROCEDURE — 74177 CT ABD & PELVIS W/CONTRAST: CPT | Mod: 26,MA

## 2023-06-11 PROCEDURE — 99285 EMERGENCY DEPT VISIT HI MDM: CPT | Mod: 25

## 2023-06-11 PROCEDURE — 83690 ASSAY OF LIPASE: CPT

## 2023-06-11 RX ORDER — ONDANSETRON 8 MG/1
1 TABLET, FILM COATED ORAL
Qty: 9 | Refills: 0
Start: 2023-06-11 | End: 2023-06-13

## 2023-06-11 RX ORDER — FAMOTIDINE 10 MG/ML
20 INJECTION INTRAVENOUS ONCE
Refills: 0 | Status: COMPLETED | OUTPATIENT
Start: 2023-06-11 | End: 2023-06-11

## 2023-06-11 RX ORDER — ONDANSETRON 8 MG/1
4 TABLET, FILM COATED ORAL ONCE
Refills: 0 | Status: COMPLETED | OUTPATIENT
Start: 2023-06-11 | End: 2023-06-11

## 2023-06-11 RX ORDER — PROCHLORPERAZINE MALEATE 5 MG
5 TABLET ORAL ONCE
Refills: 0 | Status: COMPLETED | OUTPATIENT
Start: 2023-06-11 | End: 2023-06-11

## 2023-06-11 RX ORDER — PANTOPRAZOLE SODIUM 20 MG/1
1 TABLET, DELAYED RELEASE ORAL
Qty: 30 | Refills: 0
Start: 2023-06-11 | End: 2023-07-10

## 2023-06-11 RX ADMIN — Medication 5 MILLIGRAM(S): at 09:04

## 2023-06-11 RX ADMIN — FAMOTIDINE 20 MILLIGRAM(S): 10 INJECTION INTRAVENOUS at 05:50

## 2023-06-11 RX ADMIN — Medication 30 MILLILITER(S): at 05:50

## 2023-06-11 RX ADMIN — ONDANSETRON 4 MILLIGRAM(S): 8 TABLET, FILM COATED ORAL at 05:50

## 2023-06-11 NOTE — ED PROVIDER NOTE - OBJECTIVE STATEMENT
77-year-old female with past medical history of ovarian cancer status post hysterectomy 18 years ago, asthma, diabetes, hypertension, hyperlipidemia, depression, glaucoma, GERD presenting to emergency department with upper epigastric abdominal pain that began last night at 10 PM.  Also endorsing nausea, hiccups, dry heaving.  Last bowel movement was around midnight, soft stool.  Pain has been constant in nature.  Has not taken any medications for pain thus far.  Takes pantoprazole daily.  Denies fever, headache, chest pain, shortness of breath, dysuria, hematuria, rash, extremity edema.

## 2023-06-11 NOTE — ED ADULT NURSE NOTE - NSFALLUNIVINTERV_ED_ALL_ED
Bed/Stretcher in lowest position, wheels locked, appropriate side rails in place/Call bell, personal items and telephone in reach/Instruct patient to call for assistance before getting out of bed/chair/stretcher/Non-slip footwear applied when patient is off stretcher/[place] to call system/Physically safe environment - no spills, clutter or unnecessary equipment/Purposeful proactive rounding/Room/bathroom lighting operational, light cord in reach

## 2023-06-11 NOTE — ED PROVIDER NOTE - PHYSICAL EXAMINATION
Gen: uncomfortable appearing, hiccuping.   HEENT: EOMI, no nasal discharge, mucous membranes moist  CV: RRR, +S1/S2, no M/R/G, 2+ radial pulses b/l  Resp: CTAB, no W/R/R, no accessory muscle use, no increased work of breathing  GI: +epigastric tenderness to palpation. No Lower quadrant tenderness to palpation. No rebound or guarding.   MSK: No open wounds, no bruising, no LE edema  Neuro: A&Ox4, following commands, moving all four extremities spontaneously  Psych: appropriate mood

## 2023-06-11 NOTE — ED ADULT TRIAGE NOTE - ADDITIONAL SAFETY/BANDS...
What Type Of Note Output Would You Prefer (Optional)?: Bullet Format Hpi Title: Evaluation of a Skin Lesion How Severe Are Your Spot(S)?: mild Have Your Spot(S) Been Treated In The Past?: has not been treated Additional Safety/Bands:

## 2023-06-11 NOTE — ED PROVIDER NOTE - CLINICAL SUMMARY MEDICAL DECISION MAKING FREE TEXT BOX
77 F with epigastric abdominal pain started 10pm with pmh of multiple abdominal surgeries. Also nausea and dry heaving. Last bowel movement at midnight. Vitals stable. Exam with epigastric tenderness to palpation, pt uncomfortable appearing, hiccuping. High suspicion for sbo given sx and past surgical history, also possible gastritis/PUD given history of gerd. Plan to get CTAP to eval for sbo. Get labs including trop as pain is epigastric. CXR, EKG. Give zofran, famotidine, maalox. Reassess.

## 2023-06-11 NOTE — CONSULT NOTE ADULT - SUBJECTIVE AND OBJECTIVE BOX
CARDIOLOGY CONSULT - Dr. Abel  Date of Service: 2023      HPI:  77-year-old female with past medical history of ovarian cancer status post hysterectomy 18 years ago, asthma, diabetes, hypertension, hyperlipidemia, depression, glaucoma, GERD presenting to emergency department with upper epigastric abdominal pain that began last night at 10 PM.  Also endorsing nausea, hiccups, dry heaving.  Last bowel movement was around midnight, soft stool.  Pain has been constant in nature.  Has not taken any medications for pain thus far.  Takes pantoprazole daily.  Denies fever, headache, chest pain, shortness of breath, dysuria, hematuria, rash, extremity edema.    PAST MEDICAL & SURGICAL HISTORY:  Lumbar Disc Disease  lumbar disc displacement      Asthma  h/o asthma in the past , not on meds      Pneumonia  h/o pneumonia in the past      Migraine Headache      Diabetes  diet controlled, not on meds      Ovarian Cancer  in  had chemo      Depression      Glaucoma      Sleep Apnea  uses CPAP      S/P KEVIN-BSO (Total Abdominal Hysterectomy and Bilateral Salpingo-Oophorectomy)  in  for ovarian cancer      S/P  Section  in ,       S/P Lumbar Microdiscectomy  left side in       mediport insertion in               PREVIOUS DIAGNOSTIC TESTING:    [ ] Echocardiogram:  [ ]  Catheterization:  [ ] Stress Test:  	    MEDICATIONS:  MEDICATIONS  (STANDING):  prochlorperazine   Injectable 5 milliGRAM(s) IV Push Once      FAMILY HISTORY:  Family history of asthma        SOCIAL HISTORY:    [ ] Non-smoker  [ ] Smoker  [ ] Alcohol    Allergies    Motrin (Swelling)  fresh friuts cause swelling; carrots cause swelling a cough (Other)  metoprolol (Short breath)  Ceftin (Angioedema)  aspirin (Swelling)  ciprofloxacin (Rash)  cephalexin (Hives; Rash)    Intolerances    	    REVIEW OF SYSTEMS:  CONSTITUTIONAL: No fever, weight loss, or fatigue  EYES: No eye pain, visual disturbances, or discharge  ENMT:  No difficulty hearing, tinnitus, vertigo; No sinus or throat pain  NECK: No pain or stiffness  RESPIRATORY: No cough, wheezing, chills or hemoptysis; No Shortness of Breath  CARDIOVASCULAR: No chest pain, palpitations, passing out, dizziness, or leg swelling  GASTROINTESTINAL: No abdominal or epigastric pain. No nausea, vomiting, or hematemesis; No diarrhea or constipation. No melena or hematochezia.  GENITOURINARY: No dysuria, frequency, hematuria, or incontinence  NEUROLOGICAL: No headaches, memory loss, loss of strength, numbness, or tremors  SKIN: No itching, burning, rashes, or lesions   	    [ ] All others negative	  [ ] Unable to obtain    PHYSICAL EXAM:  T(C): 37 (23 @ 07:39), Max: 37 (23 @ 04:46)  HR: 69 (23 @ 07:39) (67 - 76)  BP: 155/74 (23 @ 07:39) (147/83 - 210/102)  RR: 19 (23 @ 07:39) (18 - 20)  SpO2: 96% (23 @ 07:39) (96% - 97%)  Wt(kg): --  I&O's Summary      Appearance: Normal	  Psychiatry: A & O x 3, Mood & affect appropriate  HEENT:   Normal oral mucosa, PERRL, EOMI	  Lymphatic: No lymphadenopathy  Cardiovascular: Normal S1 S2,RRR, No JVD, No murmurs  Respiratory: Lungs clear to auscultation	  Gastrointestinal:  Soft, Non-tender, + BS	  Skin: No rashes, No ecchymoses, No cyanosis	  Neurologic: Non-focal  Extremities: Normal range of motion, No clubbing, cyanosis or edema  Vascular: Peripheral pulses palpable 2+ bilaterally    TELEMETRY: 	    ECG:  	  RADIOLOGY:  OTHER: 	  	  LABS:	 	    CARDIAC MARKERS:                                  12.6   5.66  )-----------( 221      ( 2023 06:11 )             37.6     06-11    131<L>  |  96  |  18  ----------------------------<  160<H>  4.0   |  24  |  0.65    Ca    9.1      2023 06:11    TPro  7.1  /  Alb  3.8  /  TBili  0.4  /  DBili  x   /  AST  24  /  ALT  21  /  AlkPhos  89  06-11      proBNP:   Lipid Profile:   HgA1c:   TSH:     ASSESSMENT/PLAN: 	              70 minutes spent on total encounter; more than 50% of the visit was spent counseling and/or coordinating care by the attending physician.

## 2023-06-11 NOTE — ED PROVIDER NOTE - PATIENT PORTAL LINK FT
You can access the FollowMyHealth Patient Portal offered by [facility] by registering at the following website: [email or web address]. By joining [facility]’s FollowMyHealth portal, you will also be able to view your health information using other applications (apps) compatible with our system.

## 2023-06-11 NOTE — ED PROVIDER NOTE - CARE PLAN
1 Principal Discharge DX:	GERD (gastroesophageal reflux disease)  Secondary Diagnosis:	Abdominal pain

## 2023-06-11 NOTE — ED ADULT NURSE REASSESSMENT NOTE - NS ED NURSE REASSESS COMMENT FT1
Patient lying in bed breathing spontaneous and unlabored with no signs of respiratory distress. Patient reports a headache 8/10 which she denies the need for medical pain intervention. Patient denies abdominal pain @ this time. Abdomen is soft, nondistended and TTP in the upper epigastric area. Patient reports last BM was 6/10 @ home, it was normal and PT denies presence of blood in stool. Note wrap to right foot. Right foot has full sensation and full ROM in comparison to left foot. Patient reports nausea, MD Peace made aware. Awaiting MD Reassessment.

## 2023-06-11 NOTE — ED ADULT NURSE NOTE - OBJECTIVE STATEMENT
78 y/o F AAO4 ambuliotry presents to the ED with upper abdominal pain. Pt has nausea but not vomiting, pt has been having bowel movements and passing gas. Pt has a PMH of HTN, ovarian cancer, hysterectomy. Pt Pt denies CP, SOB, HA, vision changes, /v/d, fevers chills,  Upon assessment, abdomen soft and tender in epigastric region, +strong peripheral pulses, moving all extremities without difficulty.

## 2023-06-11 NOTE — ED PROVIDER NOTE - PROGRESS NOTE DETAILS
Pilo, PGY2 - Received sign-out on patient. Introduced myself and updated patient on the medical evaluation process. Patient aware and in agreement. endorsing nausea, will give compazine instead of reglan since there is concern for SBO, do not want to increase gut motility at this time without diagnosis. Waiting for CT Pilo, PGY2 - patient improved nausea. will po trial. CT non-actionable, patient updated on cecum findings, states she had a normal colonoscopy last year. advised to continue following with her pcp and GI. Patient states she has not been taking her pantoprazole for her GERD due to running out, taking pepcid intermittently at home. will send ppi and zofran to pharmacy. Pilo, PGY2 - passed po trial. troponin decreased to 17, Dr. Abel came by to evaluate patient, per his note there was a recent negative CT coronary with non obstructive CAD. Patient stable for discharge. Understands the Emergency Room work-up and discharge precautions. Will follow-up with pcp and cardiology

## 2023-06-11 NOTE — ED PROVIDER NOTE - NSFOLLOWUPINSTRUCTIONS_ED_ALL_ED_FT
You were seen in the Emergency Room today because of abdominal pain. A copy of your results is included in your discharge paperwork.     We have sent medication to your Pharmacy. It is called Pantoprazole (for acid) and Odansetron (for nausea). Please follow-up with your Primary Care Physician within the week for further management of your symptoms.     We also recommend you following up with your Cardiologist for further management and workup.       WHAT YOU NEED TO KNOW   Gastritis is inflammation or irritation of the lining of your stomach.     DISCHARGE INSTRUCTIONS:  Seek care immediately if:   •You develop chest pain or shortness of breath.  •You vomit blood.  •You have black or bloody bowel movements.  •You have severe stomach or back pain.    Contact your healthcare provider if:   •You have a fever.  •You have new or worsening symptoms, even after treatment.  •You have questions or concerns about your condition or care.    Manage or prevent gastritis:   •Do not smoke. Nicotine and other chemicals in cigarettes and cigars can make your symptoms worse and cause lung damage. Ask your healthcare provider for information if you currently smoke and need help to quit. E-cigarettes or smokeless tobacco still contain nicotine. Talk to your healthcare provider before you use these products.   •Do not drink alcohol. Alcohol can prevent healing and make your gastritis worse. Talk to your healthcare provider if you need help to stop drinking.  •Do not take NSAIDs or aspirin unless directed. These and similar medicines can cause irritation. If your healthcare provider says it is okay to take NSAIDs, take them with food.   •Do not eat foods that cause irritation. Foods such as oranges and salsa can cause burning or pain. Eat a variety of healthy foods. Examples include fruits (not citrus), vegetables, low-fat dairy products, beans, whole-grain breads, and lean meats and fish. Try to eat small meals, and drink water with your meals. Do not eat for at least 3 hours before you go to bed.  •Find ways to relax and decrease stress. Stress can increase stomach acid and make gastritis worse. Activities such as yoga, meditation, or listening to music can help you relax. Spend time with friends, or do things you enjoy.    Follow up with your healthcare provider as directed: You may need ongoing tests or treatment, or referral to a gastroenterologist. Write down your questions so you remember to ask them during your visits.

## 2023-06-11 NOTE — ED ADULT NURSE NOTE - MODE OF DISCHARGE
Isotretinoin Pregnancy And Lactation Text: This medication is Pregnancy Category X and is considered extremely dangerous during pregnancy. It is unknown if it is excreted in breast milk. Tetracycline Counseling: Patient counseled regarding possible photosensitivity and increased risk for sunburn.  Patient instructed to avoid sunlight, if possible.  When exposed to sunlight, patients should wear protective clothing, sunglasses, and sunscreen.  The patient was instructed to call the office immediately if the following severe adverse effects occur:  hearing changes, easy bruising/bleeding, severe headache, or vision changes.  The patient verbalized understanding of the proper use and possible adverse effects of tetracycline.  All of the patient's questions and concerns were addressed. Patient understands to avoid pregnancy while on therapy due to potential birth defects. Birth Control Pills Counseling: Birth Control Pill Counseling: I discussed with the patient the potential side effects of OCPs including but not limited to increased risk of stroke, heart attack, thrombophlebitis, deep venous thrombosis, hepatic adenomas, breast changes, GI upset, headaches, and depression.  The patient verbalized understanding of the proper use and possible adverse effects of OCPs. All of the patient's questions and concerns were addressed. Aklief counseling:  Patient advised to apply a pea-sized amount only at bedtime and wait 30 minutes after washing their face before applying.  If too drying, patient may add a non-comedogenic moisturizer.  The most commonly reported side effects including irritation, redness, scaling, dryness, stinging, burning, itching, and increased risk of sunburn.  The patient verbalized understanding of the proper use and possible adverse effects of retinoids.  All of the patient's questions and concerns were addressed. Doxycycline Pregnancy And Lactation Text: This medication is Pregnancy Category D and not consider safe during pregnancy. It is also excreted in breast milk but is considered safe for shorter treatment courses. Sarecycline Counseling: Patient advised regarding possible photosensitivity and discoloration of the teeth, skin, lips, tongue and gums.  Patient instructed to avoid sunlight, if possible.  When exposed to sunlight, patients should wear protective clothing, sunglasses, and sunscreen.  The patient was instructed to call the office immediately if the following severe adverse effects occur:  hearing changes, easy bruising/bleeding, severe headache, or vision changes.  The patient verbalized understanding of the proper use and possible adverse effects of sarecycline.  All of the patient's questions and concerns were addressed. Topical Retinoid Pregnancy And Lactation Text: This medication is Pregnancy Category C. It is unknown if this medication is excreted in breast milk. Spironolactone Counseling: Patient advised regarding risks of diarrhea, abdominal pain, hyperkalemia, birth defects (for female patients), liver toxicity and renal toxicity. The patient may need blood work to monitor liver and kidney function and potassium levels while on therapy. The patient verbalized understanding of the proper use and possible adverse effects of spironolactone.  All of the patient's questions and concerns were addressed. Bactrim Counseling:  I discussed with the patient the risks of sulfa antibiotics including but not limited to GI upset, allergic reaction, drug rash, diarrhea, dizziness, photosensitivity, and yeast infections.  Rarely, more serious reactions can occur including but not limited to aplastic anemia, agranulocytosis, methemoglobinemia, blood dyscrasias, liver or kidney failure, lung infiltrates or desquamative/blistering drug rashes. Tazorac Pregnancy And Lactation Text: This medication is not safe during pregnancy. It is unknown if this medication is excreted in breast milk. Erythromycin Pregnancy And Lactation Text: This medication is Pregnancy Category B and is considered safe during pregnancy. It is also excreted in breast milk. Winlevi Counseling:  I discussed with the patient the risks of topical clascoterone including but not limited to erythema, scaling, itching, and stinging. Patient voiced their understanding. Detail Level: Detailed Benzoyl Peroxide Pregnancy And Lactation Text: This medication is Pregnancy Category C. It is unknown if benzoyl peroxide is excreted in breast milk. Azithromycin Counseling:  I discussed with the patient the risks of azithromycin including but not limited to GI upset, allergic reaction, drug rash, diarrhea, and yeast infections. Topical Sulfur Applications Counseling: Topical Sulfur Counseling: Patient counseled that this medication may cause skin irritation or allergic reactions.  In the event of skin irritation, the patient was advised to reduce the amount of the drug applied or use it less frequently.   The patient verbalized understanding of the proper use and possible adverse effects of topical sulfur application.  All of the patient's questions and concerns were addressed. High Dose Vitamin A Counseling: Side effects reviewed, pt to contact office should one occur. Azelaic Acid Pregnancy And Lactation Text: This medication is considered safe during pregnancy and breast feeding. Birth Control Pills Pregnancy And Lactation Text: This medication should be avoided if pregnant and for the first 30 days post-partum. Minocycline Counseling: Patient advised regarding possible photosensitivity and discoloration of the teeth, skin, lips, tongue and gums.  Patient instructed to avoid sunlight, if possible.  When exposed to sunlight, patients should wear protective clothing, sunglasses, and sunscreen.  The patient was instructed to call the office immediately if the following severe adverse effects occur:  hearing changes, easy bruising/bleeding, severe headache, or vision changes.  The patient verbalized understanding of the proper use and possible adverse effects of minocycline.  All of the patient's questions and concerns were addressed. Dapsone Pregnancy And Lactation Text: This medication is Pregnancy Category C and is not considered safe during pregnancy or breast feeding. Tetracycline Pregnancy And Lactation Text: This medication is Pregnancy Category D and not consider safe during pregnancy. It is also excreted in breast milk. Tazorac Counseling:  Patient advised that medication is irritating and drying.  Patient may need to apply sparingly and wash off after an hour before eventually leaving it on overnight.  The patient verbalized understanding of the proper use and possible adverse effects of tazorac.  All of the patient's questions and concerns were addressed. Use Enhanced Medication Counseling?: No Topical Clindamycin Counseling: Patient counseled that this medication may cause skin irritation or allergic reactions.  In the event of skin irritation, the patient was advised to reduce the amount of the drug applied or use it less frequently.   The patient verbalized understanding of the proper use and possible adverse effects of clindamycin.  All of the patient's questions and concerns were addressed. Isotretinoin Counseling: Patient should get monthly blood tests, not donate blood, not drive at night if vision affected, not share medication, and not undergo elective surgery for 6 months after tx completed. Side effects reviewed, pt to contact office should one occur. Aklief Pregnancy And Lactation Text: It is unknown if this medication is safe to use during pregnancy.  It is unknown if this medication is excreted in breast milk.  Breastfeeding women should use the topical cream on the smallest area of the skin for the shortest time needed while breastfeeding.  Do not apply to nipple and areola. Erythromycin Counseling:  I discussed with the patient the risks of erythromycin including but not limited to GI upset, allergic reaction, drug rash, diarrhea, increase in liver enzymes, and yeast infections. Azithromycin Pregnancy And Lactation Text: This medication is considered safe during pregnancy and is also secreted in breast milk. Spironolactone Pregnancy And Lactation Text: This medication can cause feminization of the male fetus and should be avoided during pregnancy. The active metabolite is also found in breast milk. Bactrim Pregnancy And Lactation Text: This medication is Pregnancy Category D and is known to cause fetal risk.  It is also excreted in breast milk. Benzoyl Peroxide Counseling: Patient counseled that medicine may cause skin irritation and bleach clothing.  In the event of skin irritation, the patient was advised to reduce the amount of the drug applied or use it less frequently.   The patient verbalized understanding of the proper use and possible adverse effects of benzoyl peroxide.  All of the patient's questions and concerns were addressed. Winlevi Pregnancy And Lactation Text: This medication is considered safe during pregnancy and breastfeeding. Doxycycline Counseling:  Patient counseled regarding possible photosensitivity and increased risk for sunburn.  Patient instructed to avoid sunlight, if possible.  When exposed to sunlight, patients should wear protective clothing, sunglasses, and sunscreen.  The patient was instructed to call the office immediately if the following severe adverse effects occur:  hearing changes, easy bruising/bleeding, severe headache, or vision changes.  The patient verbalized understanding of the proper use and possible adverse effects of doxycycline.  All of the patient's questions and concerns were addressed. Topical Retinoid counseling:  Patient advised to apply a pea-sized amount only at bedtime and wait 30 minutes after washing their face before applying.  If too drying, patient may add a non-comedogenic moisturizer. The patient verbalized understanding of the proper use and possible adverse effects of retinoids.  All of the patient's questions and concerns were addressed. Topical Clindamycin Pregnancy And Lactation Text: This medication is Pregnancy Category B and is considered safe during pregnancy. It is unknown if it is excreted in breast milk. Azelaic Acid Counseling: Patient counseled that medicine may cause skin irritation and to avoid applying near the eyes.  In the event of skin irritation, the patient was advised to reduce the amount of the drug applied or use it less frequently.   The patient verbalized understanding of the proper use and possible adverse effects of azelaic acid.  All of the patient's questions and concerns were addressed. Dapsone Counseling: I discussed with the patient the risks of dapsone including but not limited to hemolytic anemia, agranulocytosis, rashes, methemoglobinemia, kidney failure, peripheral neuropathy, headaches, GI upset, and liver toxicity.  Patients who start dapsone require monitoring including baseline LFTs and weekly CBCs for the first month, then every month thereafter.  The patient verbalized understanding of the proper use and possible adverse effects of dapsone.  All of the patient's questions and concerns were addressed. Ambulatory with cane/crutches/walker High Dose Vitamin A Pregnancy And Lactation Text: High dose vitamin A therapy is contraindicated during pregnancy and breast feeding. Topical Sulfur Applications Pregnancy And Lactation Text: This medication is Pregnancy Category C and has an unknown safety profile during pregnancy. It is unknown if this topical medication is excreted in breast milk.

## 2023-06-11 NOTE — CONSULT NOTE ADULT - ASSESSMENT
Echo 7/24/20: Hyperdynamic lv fxn EF 75%  Echo 2/6/23: Hyperdynamic LV fxn ef 79%    A/P  77-year-old female pmhx htn, hld, asthma, coming in with abdominal pain and nausea.    #Abdominal Pain  -awaiting results of CT  -dispo per ED    #CAD  -recent CT cors  w nonobs CAD  -cont current med mgmt    #HTN  -cont outpt meds    70 minutes spent on total encounter; more than 50% of the visit was spent counseling and/or coordinating care by the attending physician.

## 2023-06-22 RX ORDER — SODIUM BICARBONATE 84 MG/ML
8.4 INJECTION, SOLUTION INTRAVENOUS
Qty: 5 | Refills: 0 | Status: ACTIVE | COMMUNITY
Start: 2023-06-22 | End: 1900-01-01

## 2023-06-22 RX ORDER — LIDOCAINE HYDROCHLORIDE 10 MG/ML
1 INJECTION, SOLUTION INFILTRATION; PERINEURAL
Qty: 50 | Refills: 0 | Status: ACTIVE | COMMUNITY
Start: 2023-06-22 | End: 1900-01-01

## 2023-06-26 ENCOUNTER — APPOINTMENT (OUTPATIENT)
Dept: VASCULAR SURGERY | Facility: CLINIC | Age: 78
End: 2023-06-26
Payer: MEDICARE

## 2023-06-26 PROCEDURE — 36475 ENDOVENOUS RF 1ST VEIN: CPT | Mod: LT

## 2023-06-26 RX ORDER — LIDOCAINE HYDROCHLORIDE 10 MG/ML
1 INJECTION, SOLUTION INFILTRATION; PERINEURAL
Qty: 50 | Refills: 0 | Status: ACTIVE | COMMUNITY
Start: 2023-06-26 | End: 1900-01-01

## 2023-06-26 RX ORDER — SODIUM BICARBONATE 84 MG/ML
8.4 INJECTION, SOLUTION INTRAVENOUS
Qty: 5 | Refills: 0 | Status: ACTIVE | COMMUNITY
Start: 2023-06-26 | End: 1900-01-01

## 2023-06-28 NOTE — ED ADULT NURSE NOTE - CCCP TRG CHIEF CMPLNT
Denies known Latex allergy or symptoms of Latex sensitivity.  Medications reviewed and updated.  Allergies reviewed.  Social History     Tobacco Use   Smoking Status Former   • Current packs/day: 0.00   • Types: Cigarettes   • Quit date:    • Years since quittin.5   Smokeless Tobacco Never   Tobacco Comments    \"was a social smoker only\"       Consent signed and filed.  Pt was prepped in usual sterile manner and 2% lidocaine hydrochloride jelly instilled into urethra prior to cystoscopy. Patient tolerated flexible cystoscopy well.  Patient will call Dr. Sims if fever, chills, burning with urination, inability to urinate or excessive bleeding occurs.  Patient instructed to expect minimal blood in urine.  Will increase fluid intake for next couple of days to facilitate urination. Patient will call clinic if any symptoms occur.     shortness of breath

## 2023-06-29 ENCOUNTER — APPOINTMENT (OUTPATIENT)
Dept: VASCULAR SURGERY | Facility: CLINIC | Age: 78
End: 2023-06-29
Payer: MEDICARE

## 2023-06-29 PROCEDURE — 93971 EXTREMITY STUDY: CPT | Mod: LT

## 2023-06-29 PROCEDURE — 36475 ENDOVENOUS RF 1ST VEIN: CPT | Mod: RT

## 2023-06-29 NOTE — PROCEDURE
[FreeTextEntry1] : right SSV RFA [FreeTextEntry3] : Procedural safety checklist and time out completed:\par Confirmed patient identification (Patient Name, , and/or medical record number including when possible affirmation by patient or parent/family/other).\par Confirmed procedure with the patient. Consent present, accurate and signed. \par Confirmed special equipment and supplies are present.\par Sterility confirmed. Position verified. \par Site/ side is marked and visible and confirmed. \par Procedure confirmed by consent. Accurate consent including side and site.\par Review of medical records, including venous ultrasound, noting correct procedure including site and side.\par MD/PA verifies presence and review of imaging studies and or written report of imaging studies.\par Agreement on the procedure to be performed\par Time out completed.\par All of the above has been confirmed by the team.\par All patient-specific concerns have been addressed. \par \par Indication: right lower extremity varicose veins with inflammation, leg pain, leg swelling, and leg cramping.  Venous insufficiency/ reflux.\par \par Procedure: radiofrequency ablation of the right small saphenous vein. \par 	\par Ms. MATILDE CHENG is a 77 year old F with a history of  right lower extremity varicose veins previously seen in the office.  Ultrasound examination demonstrated venous insufficiency. A trial of compression stockings, exercise, elevation, and pain medication was attempted without relief and definitive treatment with radiofrequency ablation was offered. \par \par The patient has come for radiofrequency ablation treatment of the right small saphenous vein.\par I have discussed the risks of the procedure at length with the patient. The risks discussed were inclusive of but not limited to infection, irritation at the site of infiltration of local anesthesia, and also rare risk of deep venous thrombosis and pulmonary emboli. The patient agrees to proceed with the procedure. \par The patient was escorted into the procedure room and a time out called.\par The entire limb was prepped and draped in sterile fashion. The RF fiber was placed on the sterile field and connected by a sterile cable. Actuation, temperature, and impedance testing were performed to ensure that all components were connected and operating properly. \par The patient was placed on the procedure table and local anesthesia was instilled in the skin overlying the access site. Under ultrasound guidance, the vein was punctured with a micropuncture needle, using the anterior wall technique. A guide wire was now introduced through the needle, and the needle was then exchanged over the guide wire for a 7F sheath. The guide wire was removed and the RF probe was then placed into the right small saphenous vein through the sheath and position confirmed using ultrasound guidance. After the RF probe position was verified by ultrasound, tumescent anesthesia consisting of normal saline, 1% lidocaine with 8.4% sodium bicarbonate was infiltrated, under ultrasound guidance, precisely into the perivenous compartment along the entire length of the vein until a “halo” of fluid was noted around the vein. After RF probe position was again confirmed with ultrasound imaging, RF energy was applied. The probe was gradually and carefully withdrawn at a rate of 6.5cm/20seconds. \par \par 4 cycles of RF performed using the 3 cm probe\par Total treatment time was 80 seconds.\par The total volume injected was 100 cc\par Treatment length was 8 cm and \par The probe is >3.5cm from the SPJ.\par \par Estimated Blood Loss: minimal\par Repeat ultrasound of the treated vein was performed confirming successful treatment. The catheter and sheath were withdrawn and hemostasis established with direct pressure. After assuring hemostasis, a sterile 4x4 was placed on the access site and an ACE compression wrap was applied. Patient tolerated procedure well. Patient was given post-procedure instructions and follow up appointment was scheduled.\par \par \par

## 2023-07-06 ENCOUNTER — APPOINTMENT (OUTPATIENT)
Dept: VASCULAR SURGERY | Facility: CLINIC | Age: 78
End: 2023-07-06
Payer: MEDICARE

## 2023-07-06 PROCEDURE — 93971 EXTREMITY STUDY: CPT | Mod: RT

## 2023-07-06 NOTE — PROGRESS NOTE ADULT - TIME-BASED BILLING (NON-CRITICAL CARE)
Time-based billing (NON-critical care)
Time-based billing (NON-critical care)
Clindamycin Counseling: I counseled the patient regarding use of clindamycin as an antibiotic for prophylactic and/or therapeutic purposes. Clindamycin is active against numerous classes of bacteria, including skin bacteria. Side effects may include nausea, diarrhea, gastrointestinal upset, rash, hives, yeast infections, and in rare cases, colitis.

## 2023-07-14 ENCOUNTER — APPOINTMENT (OUTPATIENT)
Dept: PULMONOLOGY | Facility: CLINIC | Age: 78
End: 2023-07-14
Payer: MEDICARE

## 2023-07-14 VITALS
RESPIRATION RATE: 16 BRPM | DIASTOLIC BLOOD PRESSURE: 78 MMHG | HEART RATE: 70 BPM | WEIGHT: 166 LBS | OXYGEN SATURATION: 95 % | BODY MASS INDEX: 34.85 KG/M2 | TEMPERATURE: 97.3 F | SYSTOLIC BLOOD PRESSURE: 130 MMHG | HEIGHT: 58 IN

## 2023-07-14 DIAGNOSIS — B97.81 ACUTE BRONCHITIS DUE TO OTHER SPECIFIED ORGANISMS: ICD-10-CM

## 2023-07-14 DIAGNOSIS — J20.8 ACUTE BRONCHITIS DUE TO OTHER SPECIFIED ORGANISMS: ICD-10-CM

## 2023-07-14 PROCEDURE — 94727 GAS DIL/WSHOT DETER LNG VOL: CPT

## 2023-07-14 PROCEDURE — 94729 DIFFUSING CAPACITY: CPT

## 2023-07-14 PROCEDURE — 95012 NITRIC OXIDE EXP GAS DETER: CPT

## 2023-07-14 PROCEDURE — 99214 OFFICE O/P EST MOD 30 MIN: CPT | Mod: 25

## 2023-07-14 PROCEDURE — 94010 BREATHING CAPACITY TEST: CPT

## 2023-07-14 NOTE — PROCEDURE
[FreeTextEntry1] : Full PFT reveals moderate restrictive and mild obstructive dysfunction; FEV1 was 1.06L which is 59% of predicted; mildly reduced lung volumes; mild to moderately reduced diffusion at 10.3 , which is 58% of predicted; poor inspiratory limb. PFTs were performed to evaluate for SOB and Asthma.\par \par FENO was unable ; a normal value being less than 25\par Fractional exhaled nitric oxide (FENO) is regarded as a simple, noninvasive method for assessing eosinophilic airway inflammation. Produced by a variety of cells within the lung, nitric oxide (NO) concentrations are generally low in healthy individuals. However, high concentrations of NO appear to be involved in nonspecific host defense mechanisms and chronic inflammatory diseases such as asthma. The American Thor[employer] Society (ATS) therefore has recommended using FENO to aid in the diagnosis and monitoring of eosinophilic airway inflammation and asthma, and for identifying steroid responsive individuals whose chronic respiratory symptoms may be caused by airway inflammation.\par

## 2023-07-14 NOTE — ADDENDUM
[FreeTextEntry1] : Documented by Ирина Vela acting as a scribe for Dr. Aldo Gonzales on 07/14/2023. All medical record entries made by the Scribe were at my, Dr. Aldo Gonzales's, direction and personally dictated by me on 07/14/2023. I have reviewed the chart and agree that the record accurately reflects my personal performance of the history, physical exam, assessment and plan. I have also personally directed, reviewed, and agree with the discharge instructions.\par \par

## 2023-07-14 NOTE — HISTORY OF PRESENT ILLNESS
[FreeTextEntry1] : Ms. Lemus is a 77 year old female presenting to the office for a follow up visit for acute CHF, allergic rhinitis, asthma, chronic cough, GERD, overweight, MARA and shortness of breath. Her chief complaint is \par \par -she notes feeling generally well\par - she notes losing 25 lbs (Ozempic, 4 months)\par - she notes tolerating Ozempic well \par - she notes energy has improved \par -she notes good quality of sleep \par -she notes vision is stable \par -she notes exercising (walking and PT) \par - she notes intermittent leg pain \par -she denies LOUIS with walking \par - she notes intermittent itchy throat and nonproductive cough improved with inhalers \par - she notes blood sugar is well controlled \par \par -she denies any headaches, nausea, emesis, fever, chills, sweats, chest pain, chest pressure, wheezing, palpitations, diarrhea, constipation, dysphagia, vertigo, arthralgias, myalgias, leg swelling, itchy eyes, itchy ears, heartburn, reflux, or sour taste in the mouth.

## 2023-07-14 NOTE — ASSESSMENT
[FreeTextEntry1] : Ms. CHENG is a 77 year old female with a history of eosinophilic asthma, severe persistent asthma, allergies,  DM, HTN, OSAS, GERD, who comes into the office today for pulmonary evaluation on fasenra 9/2020 (controlled) s/p by R chest pain s/p fall 3/2022 c/w rib fx- s/p covid-19 8/2022; s/p w/ human meta pneumovirus induced asthma; gastritis- improved with weight loss on Ozempic \par \par Problem 1: severe persistent asthma (?active)\par - Fasenra is started since 9/2020\par -continue Albuterol nebulizer at least BID up to QiD (gargle and spit after use) \par -continue to use Singulair 10 mg before bed\par - continue Symbicort (160) 2 inhalations BID / Tudorza at 1 inhalation BID or Spiriva at 2 inhalations QAM \par \par -Asthma is  believed to be caused by inherited (genetic) and environmental factor, but its exact cause is unknown. Asthma may be triggered by allergens, lung infections, or irritants in the air. Asthma triggers are different for each person\par -Inhaler technique reviewed as well as oral hygiene techniques reviewed with patient. Avoidance of cold air, extremes of temperature, rescue inhaler should be used before exercise. Order of medication reviewed with patient. Recommended use of a cool mist humidifier in the bedroom.\par \par Problem 1A: Eosinophilic asthma\par - on Fasenra (2) since 9/2020\par -The safety and efficacy of Nucala was established in three double-blind, randomized, placebo-controlled trials in patients with severe asthma. Compared to a placebo, patients with severe asthma receiving Nucala had fewer exacerbation requiring hospitalization and/or emergency department visits, and a longer time to first exacerbation. In addition, patients with severe asthma receiving Nucala or Fasenra experienced greater reductions in their daily maintenance oral corticosteroid dose, while maintaining asthma control compared with patients receiving placebo. Treatment with Nucala did not result in a significant improvement in lung function, as measured by the volume of air exhaled by patients in one second. The most common side effects include: headache, injection site reactions, back pain, weakness, and fatigue; hypersensitivity reactions can occur within hours or days including swelling of the face, mouth, and tongue, fainting, dizziness, hives, breathing problems, and rash; herpes zoster infections have occurred. The drug is a monoclonal antibody that inhibits interleukin-5 which helps regular eosinophils, a type of white blood cell that contributes to asthma. The over-production of eosinophils can cause inflammation in the lungs, increasing the frequency of asthma attacks. Patients must also take other medications, including high dose inhaled corticosteroids and at least one additional asthma drug.\par \par Problem 1B: human metapneumovirus- 2023 \par -s/p Prednisone 20 mg x 7 days, 10 mg x 7 days 1/2022\par \par problem 2: allergic rhinitis\par -continue to use Astelin 1 sniff/nostril BID (.15)\par -continue to use Xyzal 5 mg before bed\par -continue to use Claritin 10 mg QAM\par -continue Pataday eye drops \par -Environmental measures for allergies were encouraged including mattress and pillow cover, air purifier, and environmental controls.\par \par problem 3: GERD\par -continue to use omeprazole 40 mg before breakfast\par -continue to use Pepcid 40 mg QHS \par -Rule of 2s: avoid eating too much, eating too late, eating too spicy, eating two hours before bed\par -Things to avoid including overeating, spicy foods, tight clothing, eating within three hours of bed, this list is not all inclusive. \par -For treatment of reflux, possible options discussed including diet control, H2 blockers, PPIs, as well as coating motility agents discussed as treatment options. Timing of meals and proximity of last meal to sleep were discussed. If symptoms persist, a formal gastrointestinal evaluation is needed.\par \par problem 4: OSAS (NC)\par -Repeat HSS \par -continue to use the CPAP machine, tolerating it well, and seeing the benefits- Williams Alanis/ Myrna if wt loss\par \par -Sleep apnea is associated with adverse clinical consequences which an affect most organ systems.  Cardiovascular disease risk includes arrhythmias, atrial fibrillation, hypertension, coronary artery disease, and stroke. Metabolic disorders include diabetes type 2, non-alcoholic fatty liver disease. Mood disorder especially depression; and cognitive decline especially in the elderly. Associations with  chronic reflux/Vicente’s esophagus some but not all inclusive. \par -Reasons to assess this include arousal consistent with hypopnea; respiratory events most prominent in REM sleep or supine position; therefore sleep staging and body position are important for accurate diagnosis and estimation of AHI.\par \par problem 5: obesity (Zach)- on Ozempic \par -Recommend "Muniq" OTC Supplement \par -Weight loss, exercise, and diet control were discussed and are highly encouraged. Treatment options were given such as, aqua therapy, and contacting a nutritionist. Recommended to use the elliptical, stationary bike, less use of treadmill.  Obesity is associated with worsening asthma, shortness of breath, and potential for cardiac disease, diabetes, and other underlying medical conditions.\par \par problem 6: low immunity \par -recommended early intervention \par \par problem 7: muscle cramps/ back pain \par -recommended to use Myocalm PM at bed\par -Topricin cream PRN \par \par Problem 7a: lymphedema\par -recommended lymphedema clinic @ [facility]\par \par Problem 8: Health Maintenance/COVID19 Precautions:\par -Covid-19 8/2022 \par -s/p Pfizer COVID 19 vaccine x 3\par - Clean your hands often. Wash your hands often with soap and water for at least 20 seconds, especially after blowing your nose, coughing, or sneezing, or having been in a public place.\par - If soap and water are not available, use a hand  that contains at least 60% alcohol.\par - To the extent possible, avoid touching high-touch surfaces in public places - elevator buttons, door handles, handrails, handshaking with people, etc. Use a tissue or your sleeve to cover your hand or finger if you must touch something.\par - Wash your hands after touching surfaces in public places.\par - Avoid touching your face, nose, eyes, etc.\par - Clean and disinfect your home to remove germs: practice routine cleaning of frequently touched surfaces (for example: tables, doorknobs, light switches, handles, desks, toilets, faucets, sinks & cell phones)\par - Avoid crowds, especially in poorly ventilated spaces. Your risk of exposure to respiratory viruses like COVID-19 may increase in crowded, closed-in settings with little air circulation if there are people in the crowd who are sick. All patients are recommended to practice social distancing and stay at least 6 feet away from others.\par - Avoid all non-essential travel including plane trips, and especially avoid embarking on cruise ships.\par -If COVID-19 is spreading in your community, take extra measures to put distance between yourself and other people to further reduce your risk of being exposed to this new virus.\par -Stay home as much as possible.\par - Consider ways of getting food brought to your house through family, social, or commercial networks\par -Be aware that the virus has been known to live in the air up to 3 hours post exposure, cardboard up to 24 hours post exposure, copper up to 4 hours post exposure, steel and plastic up to 2-3 days post exposure. Risk of transmission from these surfaces are affected by many variables.\par Immune Support Recommendations:\par -OTC Vitamin C 500mg BID \par -OTC Quercetin 250-500mg BID \par -OTC Zinc 75-100mg per day \par -OTC Melatonin 1 or 2 mg a night \par -OTC Vitamin D 1-4000mg per day \par -OTC Tonic Water 8oz per day\par Asthma and COVID19:\par You need to make sure your asthma is under control. This often requires the use of inhaled corticosteroids (and sometimes oral corticosteroids). Inhaled corticosteroids do not likely reduce your immune system’s ability to fight infections, but oral corticosteroids may. It is important to use the steps above to protect yourself to limit your exposure to any respiratory virus.\par \par Problem 9 : health maintenance\par -s/p influenza vaccine - 2022\par -recommended strep pneumonia vaccines after age 65: Prevnar-20 vaccine (given in office 03/17/2023 ), followed by Pneumo vaccine 23 one year following\par -recommended early intervention for URIs\par -recommended regular osteoporosis evaluations\par -recommended early dermatological evaluations\par -recommended after the age of 50 to the age of 70, colonoscopy every 5 years \par \par F/U in 4 months\par She is encouraged to call with any changes, concerns, or questions

## 2023-07-19 ENCOUNTER — APPOINTMENT (OUTPATIENT)
Dept: CARDIOLOGY | Facility: CLINIC | Age: 78
End: 2023-07-19
Payer: MEDICARE

## 2023-07-19 ENCOUNTER — NON-APPOINTMENT (OUTPATIENT)
Age: 78
End: 2023-07-19

## 2023-07-19 VITALS
WEIGHT: 166 LBS | HEIGHT: 58 IN | SYSTOLIC BLOOD PRESSURE: 135 MMHG | BODY MASS INDEX: 34.85 KG/M2 | OXYGEN SATURATION: 97 % | HEART RATE: 74 BPM | DIASTOLIC BLOOD PRESSURE: 78 MMHG

## 2023-07-19 DIAGNOSIS — Q24.8 OTHER SPECIFIED CONGENITAL MALFORMATIONS OF HEART: ICD-10-CM

## 2023-07-19 PROCEDURE — 99214 OFFICE O/P EST MOD 30 MIN: CPT | Mod: 25

## 2023-07-19 PROCEDURE — 93000 ELECTROCARDIOGRAM COMPLETE: CPT

## 2023-07-27 NOTE — DISCUSSION/SUMMARY
[EKG obtained to assist in diagnosis and management of assessed problem(s)] : EKG obtained to assist in diagnosis and management of assessed problem(s) [FreeTextEntry1] : Overall cardiac status is stable\par BP better with weight loss. Breathing better with weight loss\par Same medications for now. \par Order for home BP cuff. \par Follow up in 6 months

## 2023-07-27 NOTE — PHYSICAL EXAM
[Well Developed] : well developed [Well Nourished] : well nourished [No Acute Distress] : no acute distress [Normal Conjunctiva] : normal conjunctiva [Normal Venous Pressure] : normal venous pressure [No Carotid Bruit] : no carotid bruit [Normal S1, S2] : normal S1, S2 [No Rub] : no rub [No Gallop] : no gallop [Clear Lung Fields] : clear lung fields [Good Air Entry] : good air entry [No Respiratory Distress] : no respiratory distress  [Soft] : abdomen soft [Non Tender] : non-tender [No Masses/organomegaly] : no masses/organomegaly [Normal Bowel Sounds] : normal bowel sounds [Normal Gait] : normal gait [No Edema] : no edema [No Cyanosis] : no cyanosis [No Clubbing] : no clubbing [No Varicosities] : no varicosities [No Rash] : no rash [No Skin Lesions] : no skin lesions [Moves all extremities] : moves all extremities [No Focal Deficits] : no focal deficits [Normal Speech] : normal speech [Alert and Oriented] : alert and oriented [Normal memory] : normal memory [de-identified] : 2/6 DM

## 2023-07-27 NOTE — HISTORY OF PRESENT ILLNESS
[FreeTextEntry1] : Ms. CHENG presents for the evaluation of\par She has been feeling well. She has lost 25 lb on GLP-1. \par She notes her breathing feels better with the weight loss.\par She intends to lose another 10-20 meds. \par \par A1c is down to 6\par

## 2023-08-02 ENCOUNTER — APPOINTMENT (OUTPATIENT)
Dept: VASCULAR SURGERY | Facility: CLINIC | Age: 78
End: 2023-08-02
Payer: MEDICARE

## 2023-08-02 VITALS
HEART RATE: 79 BPM | HEIGHT: 58 IN | BODY MASS INDEX: 33.17 KG/M2 | WEIGHT: 158 LBS | SYSTOLIC BLOOD PRESSURE: 166 MMHG | DIASTOLIC BLOOD PRESSURE: 78 MMHG | TEMPERATURE: 97.8 F

## 2023-08-02 DIAGNOSIS — I83.893 VARICOSE VEINS OF BILATERAL LOWER EXTREMITIES WITH OTHER COMPLICATIONS: ICD-10-CM

## 2023-08-02 PROCEDURE — 99213 OFFICE O/P EST LOW 20 MIN: CPT

## 2023-08-04 PROBLEM — I83.893 VARICOSE VEINS OF BOTH LOWER EXTREMITIES WITH COMPLICATIONS: Status: ACTIVE | Noted: 2023-04-07

## 2023-08-04 NOTE — HISTORY OF PRESENT ILLNESS
[FreeTextEntry1] : Ms. MATILDE CHENG is a 77 year F who presents for initial evaluation of bilateral leg pain for the past several years. \par  Ms. CHENG leg discomfort is associated with leg swelling, fatigue, heaviness, achiness, restlessness, muscle cramping, itchiness, dry, flaky skin, and skin darkening at the ankles. \par  She complains of painful varicose veins.\par  Her symptoms have persisted despite conservative management with leg elevation, exercise, attempted weight loss, over-the-counter medications (ibuprofen), and compression stocking use for more than 3 months. \par  Her symptoms are aggravated by weight bearing, prolonged standing, prolonged sitting, extended travel, exercise \par  Nothing helps to alleviate patient's symptoms. \par  Her symptoms interfere with the her ADLs such as work, shopping and housekeeping. \par  Ms. 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 \par  Previous treatment, vein procedures and vein surgeries include:  wearing compression stockings for more than 3 months with little or no relief \par  \par   [de-identified] : s/p endovenous treatment plan doing very well very satisfied with her outcome

## 2023-08-04 NOTE — ASSESSMENT
[FreeTextEntry1] : Problem #1 symptomatic varicose veins of bilateral lower extremities s/p endovenous treatment plan - doing very well - physical therapy referral made for ambulation - follow up in 3 months for interval evaluation

## 2023-08-04 NOTE — PHYSICAL EXAM
[Respiratory Effort] : normal respiratory effort [Normal Rate and Rhythm] : normal rate and rhythm [2+] : left 2+ [Ankle Swelling (On Exam)] : not present [Varicose Veins Of Lower Extremities] : bilaterally [] : bilaterally [Ankle Swelling On The Right] : mild [Abdomen Tenderness] : ~T ~M No abdominal tenderness [Skin Ulcer] : no ulcer [Alert] : alert [Oriented to Person] : oriented to person [Oriented to Place] : oriented to place [Oriented to Time] : oriented to time [Calm] : calm [de-identified] : appears stated age [de-identified] : normocephalic, atraumatic [de-identified] : supple

## 2023-08-30 ENCOUNTER — APPOINTMENT (OUTPATIENT)
Dept: OTOLARYNGOLOGY | Facility: CLINIC | Age: 78
End: 2023-08-30
Payer: MEDICARE

## 2023-08-30 VITALS
TEMPERATURE: 97.8 F | HEIGHT: 58 IN | SYSTOLIC BLOOD PRESSURE: 111 MMHG | DIASTOLIC BLOOD PRESSURE: 69 MMHG | HEART RATE: 69 BPM | BODY MASS INDEX: 33.17 KG/M2 | WEIGHT: 158 LBS | OXYGEN SATURATION: 96 %

## 2023-08-30 DIAGNOSIS — H90.3 SENSORINEURAL HEARING LOSS, BILATERAL: ICD-10-CM

## 2023-08-30 DIAGNOSIS — R09.82 POSTNASAL DRIP: ICD-10-CM

## 2023-08-30 DIAGNOSIS — M26.609 UNSPECIFIED TEMPOROMANDIBULAR JOINT DISORDER: ICD-10-CM

## 2023-08-30 DIAGNOSIS — R05.3 CHRONIC COUGH: ICD-10-CM

## 2023-08-30 DIAGNOSIS — H61.23 IMPACTED CERUMEN, BILATERAL: ICD-10-CM

## 2023-08-30 PROCEDURE — 69210 REMOVE IMPACTED EAR WAX UNI: CPT

## 2023-08-30 PROCEDURE — 99214 OFFICE O/P EST MOD 30 MIN: CPT | Mod: 25

## 2023-08-31 PROBLEM — R09.82 POST-NASAL DRIP: Status: ACTIVE | Noted: 2019-02-28

## 2023-08-31 PROBLEM — M26.609 TMJ DYSFUNCTION: Status: ACTIVE | Noted: 2023-06-06

## 2023-08-31 PROBLEM — H90.3 SENSORINEURAL HEARING LOSS (SNHL) OF BOTH EARS: Status: ACTIVE | Noted: 2023-03-08

## 2023-08-31 PROBLEM — H90.3 SNHL (SENSORY-NEURAL HEARING LOSS), ASYMMETRICAL: Status: ACTIVE | Noted: 2023-03-08

## 2023-08-31 PROBLEM — R05.3 CHRONIC COUGH: Status: ACTIVE | Noted: 2023-05-26

## 2023-08-31 PROBLEM — H61.23 BILATERAL IMPACTED CERUMEN: Status: ACTIVE | Noted: 2023-03-08

## 2023-08-31 NOTE — ASSESSMENT
[FreeTextEntry1] : 77 year F follow up for bilateral SNHL right worse than left, TMJ, and cough 2/2 combination of PND/LPR and bilateral cerumen. Cough is also related asthma.   Patient tolerated cerumen removal without complaints.   Physical exam shows bilateral  ears normal EAC/TM.   3/8/23 Audiogram shows AD Mild to profound SNHL 250-8k hz. AS Normal to profound SNHL 250-8k hz. AU Tymp A. Speech Recog AD 36% AS 62. Patient tolerated cerumen removal without complaints.  4/5/23 MRI Brain/IAC as per radiology shows no space occupying lesion in IAC and CPA cistern   5/26/23 Indirect Laryngoscopy shows moderate postcricoid edema, mild arytenoids edema, moderate mucus production coating nasal cavity, bilateral vocal fold symmetrical mobile, no gross mass or lesions in larynx  Recommend: Cerumen Impaction -Discussed not using q-tips or instruments to remove wax -Olive or mineral oil 1x per month to keep ear canal lubricated. Discussed that the ear is a self cleaning structure and just allow it clean itself. If wax builds up can try debrox. Once it gets impacted recommend return to get it cleaned out.  Laryngopharyngeal Reflux - Improved - Discussed Lifestyle changes as the most effective methods to improvement LPR. Weight Loss if overweight. Avoid foods that increase the level of acid in your stomach, including caffeinated/carbonated drinks.  - Avoid foods that decrease the pressure in the lower esophagus, such as fatty foods, alcohol and peppermint. - Avoid large meals.Try to have an empty stomach 2 hours before going to bed. If still smoking please Quit. - Head of bed elevation when you lying down -LPR Handout Given -Discussed Sodium alginate as mechanical plug for preventing reflux - Discussed with Patient if they have not seen Gastroenterolgy in the past for endoscopy they should follow up as chronic reflux can causes mucosal changes in the lining of the lower esophagus  -Continue Omeprazole 20mg daily as per PCP  PND -Improved -Discussed Post-nasal drip occurs when mucus from the nasal passages and sinuses drains into the throat. If the mucus becomes thick, post-nasal drip can cause problems such as a sore throat, laryngitis, a cough, bad breath, hoarseness, and sometimes wheezing. -Discussed the importance of rinsing the sinus before using nasal spray so that excess mucus lining the nasal cavity can be wash away so medication can penetration the nose. -Continue Azelastine nasal spray after sinus rinse -If Azelastine spray is not effective can discuss additional other nasal sprays for treatment  Bilateral SNHL / Asymmetrical SNHL -Discussed Benefit of Hearings Aids and their value of helping keep brain stimulated by helping hear conversation which keeps a person active and socially connected. Stressed also the association with a lower risk of incident dementia and slower cognitive decline. -MRI Brain/IAC completed -Instructed patient to get chain / attachment for hearing aids so they do not fall off by accident. -Patient do well with hearing aids  TMJ - Improved - Recommended applying moist heat or cold packs on areas of acute pain - Trial of Soft foods diet for 2 weeks during acute episodes - Follow up Dentist for for . - Avoid extreme jaw movements.  -Return to clinic in 6 months or sooner if new/worsen symptoms present

## 2023-08-31 NOTE — HISTORY OF PRESENT ILLNESS
[de-identified] : 77 year F follow up for bilateral SNHL right worse than left, TMJ, and cough 2/2 combination of PND and LPR Patient states she is continuing the PPI as per PCP Doing welling with the nasal rinse Using hearing aids every day with food results TMJ has improved with soft diet

## 2023-09-17 ENCOUNTER — RX RENEWAL (OUTPATIENT)
Age: 78
End: 2023-09-17

## 2023-09-17 RX ORDER — BUDESONIDE AND FORMOTEROL FUMARATE DIHYDRATE 160; 4.5 UG/1; UG/1
160-4.5 AEROSOL RESPIRATORY (INHALATION) TWICE DAILY
Qty: 3 | Refills: 1 | Status: ACTIVE | COMMUNITY
Start: 2023-03-17 | End: 1900-01-01

## 2023-10-26 NOTE — ED PROVIDER NOTE - COVID-19 RESULT
Called [facility] for STAT .     I spoke to Nando. Nando advised me to have carrier Bill (regular carrier) take specimen .        Asked to send specimen to [facility].     Confirmation # [id number]   NEGATIVE

## 2023-11-08 ENCOUNTER — APPOINTMENT (OUTPATIENT)
Dept: VASCULAR SURGERY | Facility: CLINIC | Age: 78
End: 2023-11-08
Payer: MEDICARE

## 2023-11-08 VITALS
TEMPERATURE: 98 F | DIASTOLIC BLOOD PRESSURE: 85 MMHG | WEIGHT: 160 LBS | BODY MASS INDEX: 33.58 KG/M2 | SYSTOLIC BLOOD PRESSURE: 151 MMHG | HEIGHT: 58 IN | HEART RATE: 73 BPM

## 2023-11-08 DIAGNOSIS — I87.2 VENOUS INSUFFICIENCY (CHRONIC) (PERIPHERAL): ICD-10-CM

## 2023-11-08 PROCEDURE — 99213 OFFICE O/P EST LOW 20 MIN: CPT

## 2023-11-24 ENCOUNTER — APPOINTMENT (OUTPATIENT)
Dept: PULMONOLOGY | Facility: CLINIC | Age: 78
End: 2023-11-24
Payer: MEDICARE

## 2023-11-24 VITALS
RESPIRATION RATE: 16 BRPM | DIASTOLIC BLOOD PRESSURE: 80 MMHG | WEIGHT: 171 LBS | HEIGHT: 58 IN | OXYGEN SATURATION: 94 % | HEART RATE: 86 BPM | TEMPERATURE: 97.1 F | BODY MASS INDEX: 35.89 KG/M2 | SYSTOLIC BLOOD PRESSURE: 140 MMHG

## 2023-11-24 DIAGNOSIS — K21.9 GASTRO-ESOPHAGEAL REFLUX DISEASE W/OUT ESOPHAGITIS: ICD-10-CM

## 2023-11-24 PROCEDURE — 99214 OFFICE O/P EST MOD 30 MIN: CPT | Mod: 25

## 2023-11-24 PROCEDURE — 94010 BREATHING CAPACITY TEST: CPT

## 2023-11-24 RX ORDER — BUDESONIDE AND FORMOTEROL FUMARATE DIHYDRATE 160; 4.5 UG/1; UG/1
160-4.5 AEROSOL RESPIRATORY (INHALATION) TWICE DAILY
Qty: 3 | Refills: 1 | Status: ACTIVE | COMMUNITY
Start: 2023-11-24 | End: 1900-01-01

## 2023-11-24 RX ORDER — ALBUTEROL SULFATE 2.5 MG/3ML
(2.5 MG/3ML) SOLUTION RESPIRATORY (INHALATION)
Qty: 120 | Refills: 3 | Status: ACTIVE | COMMUNITY
Start: 2023-01-17 | End: 1900-01-01

## 2023-12-12 PROBLEM — I87.2 CHRONIC VENOUS INSUFFICIENCY: Status: ACTIVE | Noted: 2023-12-12

## 2023-12-18 NOTE — ED PROVIDER NOTE - TOBACCO USE
Never smoker

## 2024-01-02 NOTE — PATIENT PROFILE ADULT - FLU SEASON?
Yes...
  We discussed the potential side effects including but not limited to hair loss (which could be permanent), nausea, vomiting, diarrhea or constipation, mucositis, rashes, allergic reactions, organ damage including liver and kidney failure, cardiotoxicity, and direct effects on bone marrow which can lead to anemia and thrombocytopenia necessitating transfusion or neutropenia putting patient at risk for infection, sepsis and death if not treated.  Printed info given to pt for her reference.    - pain - abd and close to vaginal opening - She has pain when she is sitting down and when she is urinating.  She did have a urinalysis as well as culture and completed antibiotics.  But the burning continues with urination at vaginal opening.  I examined her today and she does have a little abrasion right in that location for which she can use diaper cream.  I was also able to get her in with palliative care for symptom management and appreciate Kathi's assistance with this case.  She is taking PRN Norco and ditropan.  - lengthy discussion re workup and need for tissue dx before tx options rendered.  She and brother VU.    She will proceed with biopsy to determine if this is the same process - pending pulmonology but likely navigational biopsy 6/29.  Debora was submitted on 5/17 and pending.    - while awaiting further work-up proceeded with FOLFOX +/- Haydee. C1 FOLFOX completed 6/12.   - buttock/perineal skin darkening - likely from chemotherapy and also some component of pressure injury on bony prominence of buttock (stage 1 pressure injury noted); encouraged activity and pressure off-loading with pillows/doughnut device. No burn appreciated.  - Dysuria/vaginal pain - PRN Norco and ditropan - not an issue today  - labs reviewed, proceed with C4 FOLFOX, C2 Avastin  - imaging - CT chest for staging which showed multiple, at least 6, pulmonary nodules. PET completed and showed hypermetabolic lung nodules, supraspinatus and R

## 2024-01-08 ENCOUNTER — INPATIENT (INPATIENT)
Facility: HOSPITAL | Age: 79
LOS: 9 days | Discharge: NURSING, REHAB OR LONG-TERM CARE | DRG: 696 | End: 2024-01-18
Attending: INTERNAL MEDICINE | Admitting: INTERNAL MEDICINE
Payer: MEDICARE

## 2024-01-08 VITALS
RESPIRATION RATE: 20 BRPM | DIASTOLIC BLOOD PRESSURE: 94 MMHG | SYSTOLIC BLOOD PRESSURE: 155 MMHG | HEART RATE: 91 BPM | WEIGHT: 164.91 LBS | OXYGEN SATURATION: 95 % | HEIGHT: 58 IN | TEMPERATURE: 99 F

## 2024-01-08 LAB
ALBUMIN SERPL ELPH-MCNC: 4.6 G/DL — SIGNIFICANT CHANGE UP (ref 3.3–5)
ALBUMIN SERPL ELPH-MCNC: 4.6 G/DL — SIGNIFICANT CHANGE UP (ref 3.3–5)
ALP SERPL-CCNC: 93 U/L — SIGNIFICANT CHANGE UP (ref 40–120)
ALP SERPL-CCNC: 93 U/L — SIGNIFICANT CHANGE UP (ref 40–120)
ALT FLD-CCNC: 28 U/L — SIGNIFICANT CHANGE UP (ref 10–45)
ALT FLD-CCNC: 28 U/L — SIGNIFICANT CHANGE UP (ref 10–45)
ANION GAP SERPL CALC-SCNC: 13 MMOL/L — SIGNIFICANT CHANGE UP (ref 5–17)
ANION GAP SERPL CALC-SCNC: 13 MMOL/L — SIGNIFICANT CHANGE UP (ref 5–17)
APTT BLD: 28.1 SEC — SIGNIFICANT CHANGE UP (ref 24.5–35.6)
APTT BLD: 28.1 SEC — SIGNIFICANT CHANGE UP (ref 24.5–35.6)
AST SERPL-CCNC: 29 U/L — SIGNIFICANT CHANGE UP (ref 10–40)
AST SERPL-CCNC: 29 U/L — SIGNIFICANT CHANGE UP (ref 10–40)
BASOPHILS # BLD AUTO: 0.01 K/UL — SIGNIFICANT CHANGE UP (ref 0–0.2)
BASOPHILS # BLD AUTO: 0.01 K/UL — SIGNIFICANT CHANGE UP (ref 0–0.2)
BASOPHILS NFR BLD AUTO: 0.1 % — SIGNIFICANT CHANGE UP (ref 0–2)
BASOPHILS NFR BLD AUTO: 0.1 % — SIGNIFICANT CHANGE UP (ref 0–2)
BILIRUB SERPL-MCNC: 0.3 MG/DL — SIGNIFICANT CHANGE UP (ref 0.2–1.2)
BILIRUB SERPL-MCNC: 0.3 MG/DL — SIGNIFICANT CHANGE UP (ref 0.2–1.2)
BUN SERPL-MCNC: 12 MG/DL — SIGNIFICANT CHANGE UP (ref 7–23)
BUN SERPL-MCNC: 12 MG/DL — SIGNIFICANT CHANGE UP (ref 7–23)
CALCIUM SERPL-MCNC: 10.5 MG/DL — SIGNIFICANT CHANGE UP (ref 8.4–10.5)
CALCIUM SERPL-MCNC: 10.5 MG/DL — SIGNIFICANT CHANGE UP (ref 8.4–10.5)
CHLORIDE SERPL-SCNC: 88 MMOL/L — LOW (ref 96–108)
CHLORIDE SERPL-SCNC: 88 MMOL/L — LOW (ref 96–108)
CO2 SERPL-SCNC: 25 MMOL/L — SIGNIFICANT CHANGE UP (ref 22–31)
CO2 SERPL-SCNC: 25 MMOL/L — SIGNIFICANT CHANGE UP (ref 22–31)
CREAT SERPL-MCNC: 0.59 MG/DL — SIGNIFICANT CHANGE UP (ref 0.5–1.3)
CREAT SERPL-MCNC: 0.59 MG/DL — SIGNIFICANT CHANGE UP (ref 0.5–1.3)
EGFR: 93 ML/MIN/1.73M2 — SIGNIFICANT CHANGE UP
EGFR: 93 ML/MIN/1.73M2 — SIGNIFICANT CHANGE UP
EOSINOPHIL # BLD AUTO: 0 K/UL — SIGNIFICANT CHANGE UP (ref 0–0.5)
EOSINOPHIL # BLD AUTO: 0 K/UL — SIGNIFICANT CHANGE UP (ref 0–0.5)
EOSINOPHIL NFR BLD AUTO: 0 % — SIGNIFICANT CHANGE UP (ref 0–6)
EOSINOPHIL NFR BLD AUTO: 0 % — SIGNIFICANT CHANGE UP (ref 0–6)
GLUCOSE SERPL-MCNC: 149 MG/DL — HIGH (ref 70–99)
GLUCOSE SERPL-MCNC: 149 MG/DL — HIGH (ref 70–99)
HCT VFR BLD CALC: 39.1 % — SIGNIFICANT CHANGE UP (ref 34.5–45)
HCT VFR BLD CALC: 39.1 % — SIGNIFICANT CHANGE UP (ref 34.5–45)
HGB BLD-MCNC: 13.6 G/DL — SIGNIFICANT CHANGE UP (ref 11.5–15.5)
HGB BLD-MCNC: 13.6 G/DL — SIGNIFICANT CHANGE UP (ref 11.5–15.5)
IMM GRANULOCYTES NFR BLD AUTO: 0.4 % — SIGNIFICANT CHANGE UP (ref 0–0.9)
IMM GRANULOCYTES NFR BLD AUTO: 0.4 % — SIGNIFICANT CHANGE UP (ref 0–0.9)
INR BLD: 1.06 RATIO — SIGNIFICANT CHANGE UP (ref 0.85–1.18)
INR BLD: 1.06 RATIO — SIGNIFICANT CHANGE UP (ref 0.85–1.18)
LYMPHOCYTES # BLD AUTO: 1.68 K/UL — SIGNIFICANT CHANGE UP (ref 1–3.3)
LYMPHOCYTES # BLD AUTO: 1.68 K/UL — SIGNIFICANT CHANGE UP (ref 1–3.3)
LYMPHOCYTES # BLD AUTO: 21.6 % — SIGNIFICANT CHANGE UP (ref 13–44)
LYMPHOCYTES # BLD AUTO: 21.6 % — SIGNIFICANT CHANGE UP (ref 13–44)
MCHC RBC-ENTMCNC: 29.4 PG — SIGNIFICANT CHANGE UP (ref 27–34)
MCHC RBC-ENTMCNC: 29.4 PG — SIGNIFICANT CHANGE UP (ref 27–34)
MCHC RBC-ENTMCNC: 34.8 GM/DL — SIGNIFICANT CHANGE UP (ref 32–36)
MCHC RBC-ENTMCNC: 34.8 GM/DL — SIGNIFICANT CHANGE UP (ref 32–36)
MCV RBC AUTO: 84.4 FL — SIGNIFICANT CHANGE UP (ref 80–100)
MCV RBC AUTO: 84.4 FL — SIGNIFICANT CHANGE UP (ref 80–100)
MONOCYTES # BLD AUTO: 0.57 K/UL — SIGNIFICANT CHANGE UP (ref 0–0.9)
MONOCYTES # BLD AUTO: 0.57 K/UL — SIGNIFICANT CHANGE UP (ref 0–0.9)
MONOCYTES NFR BLD AUTO: 7.3 % — SIGNIFICANT CHANGE UP (ref 2–14)
MONOCYTES NFR BLD AUTO: 7.3 % — SIGNIFICANT CHANGE UP (ref 2–14)
NEUTROPHILS # BLD AUTO: 5.47 K/UL — SIGNIFICANT CHANGE UP (ref 1.8–7.4)
NEUTROPHILS # BLD AUTO: 5.47 K/UL — SIGNIFICANT CHANGE UP (ref 1.8–7.4)
NEUTROPHILS NFR BLD AUTO: 70.6 % — SIGNIFICANT CHANGE UP (ref 43–77)
NEUTROPHILS NFR BLD AUTO: 70.6 % — SIGNIFICANT CHANGE UP (ref 43–77)
NRBC # BLD: 0 /100 WBCS — SIGNIFICANT CHANGE UP (ref 0–0)
NRBC # BLD: 0 /100 WBCS — SIGNIFICANT CHANGE UP (ref 0–0)
PLATELET # BLD AUTO: 337 K/UL — SIGNIFICANT CHANGE UP (ref 150–400)
PLATELET # BLD AUTO: 337 K/UL — SIGNIFICANT CHANGE UP (ref 150–400)
POTASSIUM SERPL-MCNC: 3.1 MMOL/L — LOW (ref 3.5–5.3)
POTASSIUM SERPL-MCNC: 3.1 MMOL/L — LOW (ref 3.5–5.3)
POTASSIUM SERPL-SCNC: 3.1 MMOL/L — LOW (ref 3.5–5.3)
POTASSIUM SERPL-SCNC: 3.1 MMOL/L — LOW (ref 3.5–5.3)
PROT SERPL-MCNC: 8.6 G/DL — HIGH (ref 6–8.3)
PROT SERPL-MCNC: 8.6 G/DL — HIGH (ref 6–8.3)
PROTHROM AB SERPL-ACNC: 11.1 SEC — SIGNIFICANT CHANGE UP (ref 9.5–13)
PROTHROM AB SERPL-ACNC: 11.1 SEC — SIGNIFICANT CHANGE UP (ref 9.5–13)
RBC # BLD: 4.63 M/UL — SIGNIFICANT CHANGE UP (ref 3.8–5.2)
RBC # BLD: 4.63 M/UL — SIGNIFICANT CHANGE UP (ref 3.8–5.2)
RBC # FLD: 12.6 % — SIGNIFICANT CHANGE UP (ref 10.3–14.5)
RBC # FLD: 12.6 % — SIGNIFICANT CHANGE UP (ref 10.3–14.5)
SODIUM SERPL-SCNC: 126 MMOL/L — LOW (ref 135–145)
SODIUM SERPL-SCNC: 126 MMOL/L — LOW (ref 135–145)
WBC # BLD: 7.76 K/UL — SIGNIFICANT CHANGE UP (ref 3.8–10.5)
WBC # BLD: 7.76 K/UL — SIGNIFICANT CHANGE UP (ref 3.8–10.5)
WBC # FLD AUTO: 7.76 K/UL — SIGNIFICANT CHANGE UP (ref 3.8–10.5)
WBC # FLD AUTO: 7.76 K/UL — SIGNIFICANT CHANGE UP (ref 3.8–10.5)

## 2024-01-08 PROCEDURE — 99285 EMERGENCY DEPT VISIT HI MDM: CPT

## 2024-01-09 DIAGNOSIS — E87.6 HYPOKALEMIA: ICD-10-CM

## 2024-01-09 DIAGNOSIS — E11.9 TYPE 2 DIABETES MELLITUS WITHOUT COMPLICATIONS: ICD-10-CM

## 2024-01-09 DIAGNOSIS — R09.89 OTHER SPECIFIED SYMPTOMS AND SIGNS INVOLVING THE CIRCULATORY AND RESPIRATORY SYSTEMS: ICD-10-CM

## 2024-01-09 DIAGNOSIS — M54.2 CERVICALGIA: ICD-10-CM

## 2024-01-09 DIAGNOSIS — R33.9 RETENTION OF URINE, UNSPECIFIED: ICD-10-CM

## 2024-01-09 DIAGNOSIS — Z29.9 ENCOUNTER FOR PROPHYLACTIC MEASURES, UNSPECIFIED: ICD-10-CM

## 2024-01-09 DIAGNOSIS — F41.9 ANXIETY DISORDER, UNSPECIFIED: ICD-10-CM

## 2024-01-09 DIAGNOSIS — I10 ESSENTIAL (PRIMARY) HYPERTENSION: ICD-10-CM

## 2024-01-09 LAB
ANION GAP SERPL CALC-SCNC: 11 MMOL/L — SIGNIFICANT CHANGE UP (ref 5–17)
ANION GAP SERPL CALC-SCNC: 11 MMOL/L — SIGNIFICANT CHANGE UP (ref 5–17)
ANION GAP SERPL CALC-SCNC: 16 MMOL/L — SIGNIFICANT CHANGE UP (ref 5–17)
ANION GAP SERPL CALC-SCNC: 16 MMOL/L — SIGNIFICANT CHANGE UP (ref 5–17)
APPEARANCE UR: CLEAR — SIGNIFICANT CHANGE UP
APPEARANCE UR: CLEAR — SIGNIFICANT CHANGE UP
BILIRUB UR-MCNC: NEGATIVE — SIGNIFICANT CHANGE UP
BILIRUB UR-MCNC: NEGATIVE — SIGNIFICANT CHANGE UP
BUN SERPL-MCNC: 7 MG/DL — SIGNIFICANT CHANGE UP (ref 7–23)
BUN SERPL-MCNC: 7 MG/DL — SIGNIFICANT CHANGE UP (ref 7–23)
BUN SERPL-MCNC: 8 MG/DL — SIGNIFICANT CHANGE UP (ref 7–23)
BUN SERPL-MCNC: 8 MG/DL — SIGNIFICANT CHANGE UP (ref 7–23)
CALCIUM SERPL-MCNC: 8.1 MG/DL — LOW (ref 8.4–10.5)
CALCIUM SERPL-MCNC: 8.1 MG/DL — LOW (ref 8.4–10.5)
CALCIUM SERPL-MCNC: 9.6 MG/DL — SIGNIFICANT CHANGE UP (ref 8.4–10.5)
CALCIUM SERPL-MCNC: 9.6 MG/DL — SIGNIFICANT CHANGE UP (ref 8.4–10.5)
CHLORIDE SERPL-SCNC: 91 MMOL/L — LOW (ref 96–108)
CHLORIDE SERPL-SCNC: 91 MMOL/L — LOW (ref 96–108)
CHLORIDE SERPL-SCNC: 97 MMOL/L — SIGNIFICANT CHANGE UP (ref 96–108)
CHLORIDE SERPL-SCNC: 97 MMOL/L — SIGNIFICANT CHANGE UP (ref 96–108)
CO2 SERPL-SCNC: 22 MMOL/L — SIGNIFICANT CHANGE UP (ref 22–31)
CO2 SERPL-SCNC: 22 MMOL/L — SIGNIFICANT CHANGE UP (ref 22–31)
CO2 SERPL-SCNC: 24 MMOL/L — SIGNIFICANT CHANGE UP (ref 22–31)
CO2 SERPL-SCNC: 24 MMOL/L — SIGNIFICANT CHANGE UP (ref 22–31)
COLOR SPEC: YELLOW — SIGNIFICANT CHANGE UP
COLOR SPEC: YELLOW — SIGNIFICANT CHANGE UP
CREAT SERPL-MCNC: 0.46 MG/DL — LOW (ref 0.5–1.3)
CREAT SERPL-MCNC: 0.46 MG/DL — LOW (ref 0.5–1.3)
CREAT SERPL-MCNC: 0.48 MG/DL — LOW (ref 0.5–1.3)
CREAT SERPL-MCNC: 0.48 MG/DL — LOW (ref 0.5–1.3)
DIFF PNL FLD: NEGATIVE — SIGNIFICANT CHANGE UP
DIFF PNL FLD: NEGATIVE — SIGNIFICANT CHANGE UP
EGFR: 98 ML/MIN/1.73M2 — SIGNIFICANT CHANGE UP
EGFR: 98 ML/MIN/1.73M2 — SIGNIFICANT CHANGE UP
EGFR: 99 ML/MIN/1.73M2 — SIGNIFICANT CHANGE UP
EGFR: 99 ML/MIN/1.73M2 — SIGNIFICANT CHANGE UP
GLUCOSE BLDC GLUCOMTR-MCNC: 130 MG/DL — HIGH (ref 70–99)
GLUCOSE BLDC GLUCOMTR-MCNC: 130 MG/DL — HIGH (ref 70–99)
GLUCOSE SERPL-MCNC: 134 MG/DL — HIGH (ref 70–99)
GLUCOSE SERPL-MCNC: 134 MG/DL — HIGH (ref 70–99)
GLUCOSE SERPL-MCNC: 144 MG/DL — HIGH (ref 70–99)
GLUCOSE SERPL-MCNC: 144 MG/DL — HIGH (ref 70–99)
GLUCOSE UR QL: NEGATIVE MG/DL — SIGNIFICANT CHANGE UP
GLUCOSE UR QL: NEGATIVE MG/DL — SIGNIFICANT CHANGE UP
KETONES UR-MCNC: NEGATIVE MG/DL — SIGNIFICANT CHANGE UP
KETONES UR-MCNC: NEGATIVE MG/DL — SIGNIFICANT CHANGE UP
LEUKOCYTE ESTERASE UR-ACNC: NEGATIVE — SIGNIFICANT CHANGE UP
LEUKOCYTE ESTERASE UR-ACNC: NEGATIVE — SIGNIFICANT CHANGE UP
NITRITE UR-MCNC: NEGATIVE — SIGNIFICANT CHANGE UP
NITRITE UR-MCNC: NEGATIVE — SIGNIFICANT CHANGE UP
PH UR: 7 — SIGNIFICANT CHANGE UP (ref 5–8)
PH UR: 7 — SIGNIFICANT CHANGE UP (ref 5–8)
POTASSIUM SERPL-MCNC: 3.1 MMOL/L — LOW (ref 3.5–5.3)
POTASSIUM SERPL-MCNC: 3.1 MMOL/L — LOW (ref 3.5–5.3)
POTASSIUM SERPL-MCNC: 4.3 MMOL/L — SIGNIFICANT CHANGE UP (ref 3.5–5.3)
POTASSIUM SERPL-MCNC: 4.3 MMOL/L — SIGNIFICANT CHANGE UP (ref 3.5–5.3)
POTASSIUM SERPL-SCNC: 3.1 MMOL/L — LOW (ref 3.5–5.3)
POTASSIUM SERPL-SCNC: 3.1 MMOL/L — LOW (ref 3.5–5.3)
POTASSIUM SERPL-SCNC: 4.3 MMOL/L — SIGNIFICANT CHANGE UP (ref 3.5–5.3)
POTASSIUM SERPL-SCNC: 4.3 MMOL/L — SIGNIFICANT CHANGE UP (ref 3.5–5.3)
PROT UR-MCNC: NEGATIVE MG/DL — SIGNIFICANT CHANGE UP
PROT UR-MCNC: NEGATIVE MG/DL — SIGNIFICANT CHANGE UP
SODIUM SERPL-SCNC: 130 MMOL/L — LOW (ref 135–145)
SODIUM SERPL-SCNC: 130 MMOL/L — LOW (ref 135–145)
SODIUM SERPL-SCNC: 131 MMOL/L — LOW (ref 135–145)
SODIUM SERPL-SCNC: 131 MMOL/L — LOW (ref 135–145)
SP GR SPEC: 1.01 — SIGNIFICANT CHANGE UP (ref 1–1.03)
SP GR SPEC: 1.01 — SIGNIFICANT CHANGE UP (ref 1–1.03)
UROBILINOGEN FLD QL: 0.2 MG/DL — SIGNIFICANT CHANGE UP (ref 0.2–1)
UROBILINOGEN FLD QL: 0.2 MG/DL — SIGNIFICANT CHANGE UP (ref 0.2–1)

## 2024-01-09 PROCEDURE — 99223 1ST HOSP IP/OBS HIGH 75: CPT

## 2024-01-09 PROCEDURE — 72156 MRI NECK SPINE W/O & W/DYE: CPT | Mod: 26

## 2024-01-09 PROCEDURE — 72131 CT LUMBAR SPINE W/O DYE: CPT | Mod: 26,MA

## 2024-01-09 PROCEDURE — 74177 CT ABD & PELVIS W/CONTRAST: CPT | Mod: 26,MA

## 2024-01-09 PROCEDURE — 72125 CT NECK SPINE W/O DYE: CPT | Mod: 26

## 2024-01-09 RX ORDER — FLUOXETINE HCL 10 MG
1 CAPSULE ORAL
Qty: 0 | Refills: 0 | DISCHARGE

## 2024-01-09 RX ORDER — LATANOPROST 0.05 MG/ML
1 SOLUTION/ DROPS OPHTHALMIC; TOPICAL AT BEDTIME
Refills: 0 | Status: DISCONTINUED | OUTPATIENT
Start: 2024-01-09 | End: 2024-01-18

## 2024-01-09 RX ORDER — HYDRALAZINE HCL 50 MG
25 TABLET ORAL
Refills: 0 | Status: DISCONTINUED | OUTPATIENT
Start: 2024-01-09 | End: 2024-01-14

## 2024-01-09 RX ORDER — MONTELUKAST 4 MG/1
1 TABLET, CHEWABLE ORAL
Qty: 0 | Refills: 0 | DISCHARGE

## 2024-01-09 RX ORDER — SEMAGLUTIDE 0.68 MG/ML
0.5 INJECTION, SOLUTION SUBCUTANEOUS
Refills: 0 | DISCHARGE

## 2024-01-09 RX ORDER — PANTOPRAZOLE SODIUM 20 MG/1
40 TABLET, DELAYED RELEASE ORAL
Refills: 0 | Status: DISCONTINUED | OUTPATIENT
Start: 2024-01-09 | End: 2024-01-18

## 2024-01-09 RX ORDER — POTASSIUM CHLORIDE 20 MEQ
20 PACKET (EA) ORAL ONCE
Refills: 0 | Status: COMPLETED | OUTPATIENT
Start: 2024-01-09 | End: 2024-01-09

## 2024-01-09 RX ORDER — DEXTROSE 50 % IN WATER 50 %
25 SYRINGE (ML) INTRAVENOUS ONCE
Refills: 0 | Status: DISCONTINUED | OUTPATIENT
Start: 2024-01-09 | End: 2024-01-18

## 2024-01-09 RX ORDER — INSULIN LISPRO 100/ML
VIAL (ML) SUBCUTANEOUS
Refills: 0 | Status: DISCONTINUED | OUTPATIENT
Start: 2024-01-09 | End: 2024-01-18

## 2024-01-09 RX ORDER — POTASSIUM CHLORIDE 20 MEQ
40 PACKET (EA) ORAL ONCE
Refills: 0 | Status: COMPLETED | OUTPATIENT
Start: 2024-01-09 | End: 2024-01-09

## 2024-01-09 RX ORDER — GLUCAGON INJECTION, SOLUTION 0.5 MG/.1ML
1 INJECTION, SOLUTION SUBCUTANEOUS ONCE
Refills: 0 | Status: DISCONTINUED | OUTPATIENT
Start: 2024-01-09 | End: 2024-01-18

## 2024-01-09 RX ORDER — RISPERIDONE 4 MG/1
75 TABLET ORAL
Refills: 0 | DISCHARGE

## 2024-01-09 RX ORDER — FLUTICASONE PROPIONATE 50 MCG
1 SPRAY, SUSPENSION NASAL
Refills: 0 | DISCHARGE

## 2024-01-09 RX ORDER — INSULIN LISPRO 100/ML
VIAL (ML) SUBCUTANEOUS AT BEDTIME
Refills: 0 | Status: DISCONTINUED | OUTPATIENT
Start: 2024-01-09 | End: 2024-01-18

## 2024-01-09 RX ORDER — SODIUM CHLORIDE 9 MG/ML
1000 INJECTION, SOLUTION INTRAVENOUS
Refills: 0 | Status: DISCONTINUED | OUTPATIENT
Start: 2024-01-09 | End: 2024-01-18

## 2024-01-09 RX ORDER — ACETAMINOPHEN 500 MG
650 TABLET ORAL EVERY 6 HOURS
Refills: 0 | Status: DISCONTINUED | OUTPATIENT
Start: 2024-01-09 | End: 2024-01-18

## 2024-01-09 RX ORDER — ATORVASTATIN CALCIUM 80 MG/1
1 TABLET, FILM COATED ORAL
Qty: 0 | Refills: 0 | DISCHARGE

## 2024-01-09 RX ORDER — GLIMEPIRIDE 1 MG
1 TABLET ORAL
Qty: 0 | Refills: 0 | DISCHARGE

## 2024-01-09 RX ORDER — DEXTROSE 50 % IN WATER 50 %
12.5 SYRINGE (ML) INTRAVENOUS ONCE
Refills: 0 | Status: DISCONTINUED | OUTPATIENT
Start: 2024-01-09 | End: 2024-01-18

## 2024-01-09 RX ORDER — POTASSIUM CHLORIDE 20 MEQ
10 PACKET (EA) ORAL ONCE
Refills: 0 | Status: COMPLETED | OUTPATIENT
Start: 2024-01-09 | End: 2024-01-09

## 2024-01-09 RX ORDER — MONTELUKAST 4 MG/1
10 TABLET, CHEWABLE ORAL AT BEDTIME
Refills: 0 | Status: DISCONTINUED | OUTPATIENT
Start: 2024-01-09 | End: 2024-01-18

## 2024-01-09 RX ORDER — SODIUM CHLORIDE 9 MG/ML
1000 INJECTION INTRAMUSCULAR; INTRAVENOUS; SUBCUTANEOUS ONCE
Refills: 0 | Status: COMPLETED | OUTPATIENT
Start: 2024-01-09 | End: 2024-01-09

## 2024-01-09 RX ORDER — RAMELTEON 8 MG
1 TABLET ORAL
Refills: 0 | DISCHARGE

## 2024-01-09 RX ORDER — HEPARIN SODIUM 5000 [USP'U]/ML
5000 INJECTION INTRAVENOUS; SUBCUTANEOUS EVERY 12 HOURS
Refills: 0 | Status: DISCONTINUED | OUTPATIENT
Start: 2024-01-09 | End: 2024-01-10

## 2024-01-09 RX ORDER — LATANOPROST 0.05 MG/ML
1 SOLUTION/ DROPS OPHTHALMIC; TOPICAL
Qty: 0 | Refills: 0 | DISCHARGE

## 2024-01-09 RX ORDER — DEXTROSE 50 % IN WATER 50 %
15 SYRINGE (ML) INTRAVENOUS ONCE
Refills: 0 | Status: DISCONTINUED | OUTPATIENT
Start: 2024-01-09 | End: 2024-01-18

## 2024-01-09 RX ORDER — BUDESONIDE AND FORMOTEROL FUMARATE DIHYDRATE 160; 4.5 UG/1; UG/1
2 AEROSOL RESPIRATORY (INHALATION)
Refills: 0 | Status: DISCONTINUED | OUTPATIENT
Start: 2024-01-09 | End: 2024-01-18

## 2024-01-09 RX ORDER — BUDESONIDE AND FORMOTEROL FUMARATE DIHYDRATE 160; 4.5 UG/1; UG/1
2 AEROSOL RESPIRATORY (INHALATION)
Refills: 0 | DISCHARGE

## 2024-01-09 RX ORDER — LANOLIN ALCOHOL/MO/W.PET/CERES
3 CREAM (GRAM) TOPICAL AT BEDTIME
Refills: 0 | Status: DISCONTINUED | OUTPATIENT
Start: 2024-01-09 | End: 2024-01-18

## 2024-01-09 RX ADMIN — MONTELUKAST 10 MILLIGRAM(S): 4 TABLET, CHEWABLE ORAL at 23:44

## 2024-01-09 RX ADMIN — Medication 100 MILLIEQUIVALENT(S): at 08:22

## 2024-01-09 RX ADMIN — Medication 50 MILLIEQUIVALENT(S): at 03:56

## 2024-01-09 RX ADMIN — LATANOPROST 1 DROP(S): 0.05 SOLUTION/ DROPS OPHTHALMIC; TOPICAL at 23:45

## 2024-01-09 RX ADMIN — Medication 40 MILLIEQUIVALENT(S): at 08:21

## 2024-01-09 RX ADMIN — SODIUM CHLORIDE 1000 MILLILITER(S): 9 INJECTION INTRAMUSCULAR; INTRAVENOUS; SUBCUTANEOUS at 01:31

## 2024-01-09 RX ADMIN — Medication 20 MILLIEQUIVALENT(S): at 03:56

## 2024-01-09 NOTE — ED ADULT NURSE REASSESSMENT NOTE - NS ED NURSE REASSESS COMMENT FT1
Bladder scan revealed 750cc urine. Lamb catheter inserted using sterile technique, draining by gravity, secured with StatLock. MATTHEW Guillermo at bedside present to confirm sterility. UA and culture drawn and sent to lab. Pt tolerated well.

## 2024-01-09 NOTE — ED PROVIDER NOTE - PHYSICAL EXAMINATION
Const: Well-nourished, Well-developed, appearing stated age.  Eyes: no conjunctival injection, and symmetrical lids.  HEENT: Head NCAT, no lesions. Atraumatic external nose and ears. Moist MM.  Neck: Symmetric, trachea midline.   RESP: Unlabored respiratory effort. Clear to auscultation bilaterally.  GI:  suprapubic tenderness  MSK: Normocephalic/Atraumatic, Lower Extremities w/o calf tenderness or edema b/l.   Skin: Warm, dry and intact.   Neuro: CNs II-XII grossly intact. Motor & Sensation grossly intact.  Psych: Awake, Alert, & Oriented (AAO) x3. Appropriate mood and affect.

## 2024-01-09 NOTE — PHYSICAL THERAPY INITIAL EVALUATION ADULT - ADDITIONAL COMMENTS
Pt lives with her  in a  no steps to enter. Pt has a walk in shower with a shower chair +grab bars. Pt was performing ADLs independently and ambulating with a cane independently.

## 2024-01-09 NOTE — CONSULT NOTE ADULT - TIME BILLING
Patient seen and examined. MRI and CT of C-spine reviewed.  Pt with several week history of B hand tingling dysesthesias and left arm weakness and gait instability. Patient also has history of depression and bipolar disorder and daughter reports increase in anxiety in last few weeks. Pt has history of B foot drops, s/p lumbar discectomies in 2008 and 2010.    PEx notable for Motor 5/5 except L triceps 4/5 and R EHL 2/5.  Sensation decreased to FT in B hands and feet.  DTR 1/2. B Mcqueen's reflexes absent  Gait NT.    MRI C-spine demonstrates cervical spondylotic changes with spinal cord compression most severe at the C3-4 level where there are signal changes consistent with myelomalacia.    Surgical and non-surgical treatment options discussed. Consideration may be given to C3, C4 laminectomy and C3-C5 stabilization and fusion. RBA discussed. I advised patient and daughter that pt may be at risk for SCI in the event of cervical spine trauma and may be at risk of progression of neurological  symptoms, which could become permanent.  The patient and family are not interested in surgery at present, the daughter would like the patient to go to Rehabilitation to see if she improves from a neurologic and cognitive standpoint. I advised against this due to risk of spinal cord injury.

## 2024-01-09 NOTE — ED PROVIDER NOTE - ATTENDING CONTRIBUTION TO CARE
MD Gunn:  patient seen and evaluated personally.   I agree with the History & Physical,  Impression & Plan other than what was detailed in my note.  MD Gunn  78-year-old female history of diabetes, hypertension, bilateral foot drop, remote hx of ovarian CA? presenting to ed w/ lower abd pain, difficulty urinating, no dysuria, no hematuria, no cp, sob, no palpitations, found to be retaining urine, mora placed and symptosm greatly alleviated, no recent falls, no saddle anesthesia, no unilat weakness (other than chronic foot drop/unchanged), no f/c n/v, afebrile vitals stable  non toxic well appearing, NC/AT,  conjunctiva non conjected, sclera anicteric, moist mucous membranes, neck supple, heart sounds, normal, no mrg, lungs cta b/l no wrr, abd soft non distended w/ no tenderness, no visual deformities of extremities, axox3, , normal mood and affect, plan to check labs, cbc, cmp, urinalysis, if no uti, consider ct screen for mass

## 2024-01-09 NOTE — PHYSICAL THERAPY INITIAL EVALUATION ADULT - PERTINENT HX OF CURRENT PROBLEM, REHAB EVAL
Pt is a 79 y/o female admitted with urinary retention PMH: diabetes, hypertension, bilateral foot drop walks with a walker, Ovarian cancer status post resection. Pt presented to the ED with inability to urinate x 3 days.     CT abdomen demonstrates No evidence of acute abnormality in the abdomen and pelvis. No hydronephrosis. No evidence of recurrent disease or suspicious adenopathy. Pt is a 77 y/o female admitted with urinary retention PMH: diabetes, hypertension, bilateral foot drop walks with a walker, Ovarian cancer status post resection. Pt presented to the ED with inability to urinate x 3 days.     CT abdomen demonstrates No evidence of acute abnormality in the abdomen and pelvis. No hydronephrosis. No evidence of recurrent disease or suspicious adenopathy.

## 2024-01-09 NOTE — ED ADULT NURSE REASSESSMENT NOTE - NS ED NURSE REASSESS COMMENT FT1
Received report from Estelle Peguero RN. Pt is awake, alert, and speaking in full coherent sentences. VS stable. NAD noted. Pt is resting comfortably in stretcher, side rails up, bed in lowest position. Approx. 450 cc of yellow clear urine noted. Pt is pending CT scan.

## 2024-01-09 NOTE — H&P ADULT - PROBLEM SELECTOR PLAN 5
ISTOP reviewed Reference #: [id number]  -Cont. ativan 0.5mg QHS PRN ISTOP reviewed Reference #: [id number]  -Cont. ativan 0.5mg QHS PRN

## 2024-01-09 NOTE — ED PROVIDER NOTE - OBJECTIVE STATEMENT
78-year-old female history of diabetes, hypertension, bilateral foot drop walks with a walker presenting to the ED with inability to urinate for the past 3 days.  Patient notes that she had a little bit of urine about 6 PM.  Denying any dysuria 78-year-old female history of diabetes, hypertension, bilateral foot drop walks with a walker, Ovarian cancer status post resection presenting to the ED with inability to urinate for the past 3 days.  Patient notes that she had a little bit of urine about 6 PM.  Denying any dysuria, Hematuria.  having mild lower abdominal pain.  No nausea, vomiting, fevers, chills

## 2024-01-09 NOTE — H&P ADULT - TIME BILLING
Need to interview and examine patient, discuss care with neurosurgery, provide counseling, coordinate care, place orders, document, personally review imaging, ekg and review prior medical records

## 2024-01-09 NOTE — H&P ADULT - ASSESSMENT
78F no ACAP Hx DM, Ovarian CA ~19 yrs ago s/p chemo, HTN, known b/l foot drop, walked with a walker at baseline. RLE radic s/p Right L4-5 lami/facet/discectomy w/ Dr. Alfonso 2010, Left microdisc w/ Dr. Echeverria 2008 p/w urinary retention x3 days now s/p mora also found to have hypokalemia and cervicalgia

## 2024-01-09 NOTE — ED ADULT NURSE NOTE - NSFALLRISKINTERV_ED_ALL_ED
Assistance OOB with selected safe patient handling equipment if applicable/Assistance with ambulation/Communicate fall risk and risk factors to all staff, patient, and family/Monitor gait and stability/Provide patient with walking aids/Provide visual cue: yellow wristband, yellow gown, etc/Reinforce activity limits and safety measures with patient and family/Call bell, personal items and telephone in reach/Instruct patient to call for assistance before getting out of bed/chair/stretcher/Non-slip footwear applied when patient is off stretcher/[place] to call system/Physically safe environment - no spills, clutter or unnecessary equipment/Purposeful Proactive Rounding/Room/bathroom lighting operational, light cord in reach Assistance OOB with selected safe patient handling equipment if applicable/Assistance with ambulation/Communicate fall risk and risk factors to all staff, patient, and family/Monitor gait and stability/Provide patient with walking aids/Provide visual cue: yellow wristband, yellow gown, etc/Reinforce activity limits and safety measures with patient and family/Call bell, personal items and telephone in reach/Instruct patient to call for assistance before getting out of bed/chair/stretcher/Non-slip footwear applied when patient is off stretcher/[place] to call system/Physically safe environment - no spills, clutter or unnecessary equipment/Purposeful Proactive Rounding/Room/bathroom lighting operational, light cord in reach

## 2024-01-09 NOTE — ED PROVIDER NOTE - PROGRESS NOTE DETAILS
Shiv Umana, PGY3  patient had 700 cc drained from Lamb.  currently pending CT Attending MD Pavon: Repeat BMP noted, will give K, still pending CTAP Attending MD Pavon: CT resulted, discussed with patient.  K still running. Repeat K WNL. Pt pending PT eval to evaluate gait steadiness given hx of b/l foot drop. Msg sent via Teams to expedite. - Keely Rincon PA-C Attending MD Pavon: PT in Emergency Department to see patient Pt evaluated by PT, unsteady with assistance of walker, and unsafe discharge. Pt re-assessed, reports back pain is intermittent and at baseline, and has chronic weakness of b/l LEs R > L. Pt reports recent increased difficulty ambulating and she attributed this to not going to physical therapy for last 1 month. Last BM yesterday. Pt has history of lumbar spine stenosis, last MRI in EMR reviewed from 2019. Pt with no midline spine ttp. Hip flexion 5/5 LLE, 4/5 RLE. Sensation intact. NSGY paged for spine consult given urinary retention and difficulty ambulating. - Keely Rincon PA-C D/w neurosurgery resident, reports no acute findings on CT imaging today upon review of spine, likely medicine admission and can get non-emergent MRIs as inpatient. Will consult. - Keely Rincon PA-C Per neurosurgery, no acute intervention. CT cspine ordered by NSGY, and recommending medicine admission, pt may get MRI as inpatient. Admission d/w Dr. Horne. - Keely Rincon PA-C Pt evaluated by PT, unsteady with assistance of walker, and unsafe discharge, recommending MARTHA. Pt re-assessed, reports back pain is intermittent and at baseline, and has chronic weakness of b/l LEs R > L. Pt reports recent increased difficulty ambulating and she attributed this to not going to physical therapy for last 1 month. Last BM yesterday. Pt has history of lumbar spine stenosis, last MRI in EMR reviewed from 2019. Pt with no midline spine ttp. Hip flexion 5/5 LLE, 4/5 RLE. Sensation intact. NSGY paged for spine consult given urinary retention and difficulty ambulating. - Keely Rincon PA-C

## 2024-01-09 NOTE — PHYSICAL THERAPY INITIAL EVALUATION ADULT - MANUAL MUSCLE TESTING RESULTS, REHAB EVAL
BUE 3/5, RLE 3-/5, LLE 3+/5 BUE 3/5, RLE 3-/5, except R dorsiflexion 1/5, LLE 3+/5 except dorsiflexion 1-/5

## 2024-01-09 NOTE — PHYSICAL THERAPY INITIAL EVALUATION ADULT - AMBULATION SKILLS, REHAB EVAL
Service to: IM ordered   created for [facility] damian/Jakub    Procedure date: 12/7/2022   Procedure time: 845AM   Arrival time: 6AM  Insurance confirmed as [facility] MEDICARE, will be the same at time of procedure: Yes        The following have been confirmed:     Sleep Apnea No   Phentermine/Weight Loss Med No   Defibrillator No   Pacemaker No   Nickel Allergy (only for totals) No   Need Cardiac Clearance No     NPO Midnight  No ASA/NSAID 5 days prior  No Phentermine/weight loss med 10 days prior  Tylenol products are OK for pain  (Only [facility]) Have Crutches? NA - If Yes, bring to day of Sx   If No, add to [employer]BERNARDO order if place - if not, send staff msg to RN pool  TSA (Total Shoulder Arthroscopy) or Reverse TSA? No - If yes, need copy of CT & email Vargas Pollock @ [facility]  TSA/Shoulder Scope (only if SX is at [facility])- send order to [employer] for donjoy ultra-sling fitting? = No    Pre-op date: will call and danielito     Post-op date: 12/19/2022 (date) at 130PM (time) @ [facility] (location)    COVID19 Testing Scheduled: No  Will call patient back in the Summer to schedule. Epic schedule does not go out that far. Would like to schedule at [phone] = [address]    Staff message sent to RN pool to place CV19 lab: Yes  (copy & paste staff message below)        Post op equipment Yes  Prehab ordered Yes - will call [facility]  Confirmation letter mailed  Yes         independent/needs device

## 2024-01-09 NOTE — H&P ADULT - NSHPADDITIONALINFOADULT_GEN_ALL_CORE
I was asked to see this patient by the hospitalist in charge. Dr. Horne to assume care for patient in AM and thereafter

## 2024-01-09 NOTE — H&P ADULT - PROBLEM SELECTOR PLAN 3
Appreciate neurosurgery recommendations  -F/u MRI C-spine  -Falls precautions  -PT consult  -Supportive care

## 2024-01-09 NOTE — ED PROVIDER NOTE - SHIFT CHANGE DETAILS
Attending MD Pavon: 78F w remote hx of ovarian ca, here with lower abd pain, +urinary retention of 700cc, no new meds, no previous episodes, no trauma/fall, pending CTAP to eval for mass given history of malignancy, has baseline foot drop with walker use

## 2024-01-09 NOTE — CONSULT NOTE ADULT - SUBJECTIVE AND OBJECTIVE BOX
p ([id number])     HPI:78F no ACAP Hx DM, known b/l foot drop, walked with a walker at baseline. RLE radic s/p Right L4-5 lami/facet/discectomy w/ Dr. Alfonso , Left microdisc w/ Dr. Echeverria . P/w BUE pain/numb from shoulders to fingers. Denies back pain, denies any saddle anesthesia, describes urinary frequency/dysuria x3d. CT Abd/Pelvis shows L spine stable c/t 2023. Pt states she has felt more weak for last few months related to not attending her scheduled PT. Exam: known b/l 1-2/5 DFs. New weak in triceps b/l. o/w  in all 4 ext. no hoffmans no clonus.     =====================  PAST MEDICAL HISTORY   Lumbar Disc Disease    Asthma    Pneumonia    Migraine Headache    Diabetes    Ovarian Cancer    Depression    Glaucoma    Sleep Apnea      PAST SURGICAL HISTORY   S/P KEVIN-BSO (Total Abdominal Hysterectomy and Bilateral Salpingo-Oophorectomy)    S/P  Section    S/P Lumbar Microdiscectomy    mediport insertion in       metoprolol (Short breath)  aspirin (Swelling)  fresh friuts cause swelling; carrots cause swelling a cough (Other)  Ceftin (Angioedema)  cephalexin (Hives; Rash)  Motrin (Swelling)  ciprofloxacin (Rash)      MEDICATIONS:  Antibiotics:    Neuro:    Other:      SOCIAL HISTORY:   Occupation:   Marital Status:     FAMILY HISTORY:  Family history of asthma        ROS: Negative except per HPI    LABS:  PT/INR - ( 2024 17:05 )   PT: 11.1 sec;   INR: 1.06 ratio         PTT - ( 2024 17:05 )  PTT:28.1 sec                        13.6   7.76  )-----------( 337      ( 2024 17:05 )             39.1     01-09    131<L>  |  91<L>  |  7   ----------------------------<  144<H>  4.3   |  24  |  0.48<L>    Ca    9.6      2024 10:47    TPro  8.6<H>  /  Alb  4.6  /  TBili  0.3  /  DBili  x   /  AST  29  /  ALT  28  /  AlkPhos  93

## 2024-01-09 NOTE — H&P ADULT - NSHPPHYSICALEXAM_GEN_ALL_CORE
Vital Signs Last 24 Hrs  T(C): 36.7 (01-09-24 @ 17:30), Max: 36.8 (01-09-24 @ 13:36)  T(F): 98 (01-09-24 @ 17:30), Max: 98.3 (01-09-24 @ 13:36)  HR: 80 (01-09-24 @ 17:30) (60 - 99)  BP: 150/62 (01-09-24 @ 17:30) (150/62 - 175/76)  BP(mean): 89 (01-09-24 @ 08:30) (89 - 109)  RR: 18 (01-09-24 @ 17:30) (18 - 20)  SpO2: 98% (01-09-24 @ 17:30) (95% - 99%)

## 2024-01-09 NOTE — H&P ADULT - PROBLEM SELECTOR PLAN 2
Suspect in setting of taking HCTZ. improved s/p repletion  -Trend BMP, Mg  -Holding HCTZ  -Consider resuming at lower dose if electrolytes stable in AM

## 2024-01-09 NOTE — ED PROVIDER NOTE - CLINICAL SUMMARY MEDICAL DECISION MAKING FREE TEXT BOX
78-year-old female history of diabetes, hypertension, bilateral foot drop walks with a walker, Ovarian cancer status post resection presenting to the ED with inability to urinate for the past 3 days.  Patient notes that she had a little bit of urine about 6 PM.  Denying any dysuria, Hematuria.  having mild lower abdominal pain.  No nausea, vomiting, fevers, chills.   will evaluate for urinary tract infection causing  decreased urination.  Will get Lamb.  Will also get CT to evaluate for any obstructive processes

## 2024-01-09 NOTE — H&P ADULT - PROBLEM SELECTOR PLAN 1
Unclear etiology of urinary retention. CT abd/pelvis w/o clear etiology, no Ovarian CA  -Cont. mora catheter  -F/u UCx  -TOV?

## 2024-01-09 NOTE — ED ADULT NURSE NOTE - OBJECTIVE STATEMENT
79 y/o female presents to ED from home c/o difficulty urinating, urinary retention x 2 days. Pt denying fevers, chills, n/v/d, chest pain, SOB. Pt is A&O x 3. Breathing even and unlabored. Gross motor and neuro intact. Safety and comfort provided.

## 2024-01-09 NOTE — ED ADULT NURSE REASSESSMENT NOTE - NS ED NURSE REASSESS COMMENT FT1
1500 cc of yellow clear urine noted in mora. Pt awake, alert, and speaking in full coherent sentences. NAD noted. Pt resting comfortably in stretcher, side rail up, and bed in lowest position. Pt is pending CT scan.

## 2024-01-09 NOTE — H&P ADULT - HISTORY OF PRESENT ILLNESS
78F no ACAP Hx DM, Ovarian CA ~19 yrs ago s/p chemo, HTN, known b/l foot drop, walked with a walker at baseline. RLE radic s/p Right L4-5 lami/facet/discectomy w/ Dr. Alfonso 2010, Left microdisc w/ Dr. Echeverria 2008 p/w urinary retention x3 days. Pt poor historian, reluctant to answer questions. Now also with BUE pain/numb from shoulders to fingers. Denies back pain, denies any saddle anesthesia, describes urinary frequency/dysuria x3d. Does endorse some neck pain recently. Denies trauma, fevers, chills.     In ER: Given KCL 20 PO, 40 PO, 30meq IV

## 2024-01-09 NOTE — PHYSICAL THERAPY INITIAL EVALUATION ADULT - NSPTDISCHREC_GEN_A_CORE
Subacute Rehab prior to d/c home to restore pts prior level of function. If pt d/c home will require assist/supervision with ALL functional mobility and home PT for general strengthening, fall prevention, and safety assessment. DME: RW, 3:1 commode, lightweight/polyfly wheelchair for community distances./Sub-acute Rehab

## 2024-01-10 LAB
A1C WITH ESTIMATED AVERAGE GLUCOSE RESULT: 7 % — HIGH (ref 4–5.6)
A1C WITH ESTIMATED AVERAGE GLUCOSE RESULT: 7 % — HIGH (ref 4–5.6)
ALBUMIN SERPL ELPH-MCNC: 3.8 G/DL — SIGNIFICANT CHANGE UP (ref 3.3–5)
ALBUMIN SERPL ELPH-MCNC: 3.8 G/DL — SIGNIFICANT CHANGE UP (ref 3.3–5)
ALP SERPL-CCNC: 79 U/L — SIGNIFICANT CHANGE UP (ref 40–120)
ALP SERPL-CCNC: 79 U/L — SIGNIFICANT CHANGE UP (ref 40–120)
ALT FLD-CCNC: 21 U/L — SIGNIFICANT CHANGE UP (ref 10–45)
ALT FLD-CCNC: 21 U/L — SIGNIFICANT CHANGE UP (ref 10–45)
ANION GAP SERPL CALC-SCNC: 14 MMOL/L — SIGNIFICANT CHANGE UP (ref 5–17)
ANION GAP SERPL CALC-SCNC: 14 MMOL/L — SIGNIFICANT CHANGE UP (ref 5–17)
AST SERPL-CCNC: 24 U/L — SIGNIFICANT CHANGE UP (ref 10–40)
AST SERPL-CCNC: 24 U/L — SIGNIFICANT CHANGE UP (ref 10–40)
BASOPHILS # BLD AUTO: 0.01 K/UL — SIGNIFICANT CHANGE UP (ref 0–0.2)
BASOPHILS # BLD AUTO: 0.01 K/UL — SIGNIFICANT CHANGE UP (ref 0–0.2)
BASOPHILS NFR BLD AUTO: 0.1 % — SIGNIFICANT CHANGE UP (ref 0–2)
BASOPHILS NFR BLD AUTO: 0.1 % — SIGNIFICANT CHANGE UP (ref 0–2)
BILIRUB SERPL-MCNC: 0.4 MG/DL — SIGNIFICANT CHANGE UP (ref 0.2–1.2)
BILIRUB SERPL-MCNC: 0.4 MG/DL — SIGNIFICANT CHANGE UP (ref 0.2–1.2)
BUN SERPL-MCNC: 10 MG/DL — SIGNIFICANT CHANGE UP (ref 7–23)
BUN SERPL-MCNC: 10 MG/DL — SIGNIFICANT CHANGE UP (ref 7–23)
CALCIUM SERPL-MCNC: 9.7 MG/DL — SIGNIFICANT CHANGE UP (ref 8.4–10.5)
CALCIUM SERPL-MCNC: 9.7 MG/DL — SIGNIFICANT CHANGE UP (ref 8.4–10.5)
CHLORIDE SERPL-SCNC: 94 MMOL/L — LOW (ref 96–108)
CHLORIDE SERPL-SCNC: 94 MMOL/L — LOW (ref 96–108)
CO2 SERPL-SCNC: 21 MMOL/L — LOW (ref 22–31)
CO2 SERPL-SCNC: 21 MMOL/L — LOW (ref 22–31)
CREAT SERPL-MCNC: 0.57 MG/DL — SIGNIFICANT CHANGE UP (ref 0.5–1.3)
CREAT SERPL-MCNC: 0.57 MG/DL — SIGNIFICANT CHANGE UP (ref 0.5–1.3)
CULTURE RESULTS: NO GROWTH — SIGNIFICANT CHANGE UP
CULTURE RESULTS: NO GROWTH — SIGNIFICANT CHANGE UP
EGFR: 94 ML/MIN/1.73M2 — SIGNIFICANT CHANGE UP
EGFR: 94 ML/MIN/1.73M2 — SIGNIFICANT CHANGE UP
EOSINOPHIL # BLD AUTO: 0 K/UL — SIGNIFICANT CHANGE UP (ref 0–0.5)
EOSINOPHIL # BLD AUTO: 0 K/UL — SIGNIFICANT CHANGE UP (ref 0–0.5)
EOSINOPHIL NFR BLD AUTO: 0 % — SIGNIFICANT CHANGE UP (ref 0–6)
EOSINOPHIL NFR BLD AUTO: 0 % — SIGNIFICANT CHANGE UP (ref 0–6)
ESTIMATED AVERAGE GLUCOSE: 154 MG/DL — HIGH (ref 68–114)
ESTIMATED AVERAGE GLUCOSE: 154 MG/DL — HIGH (ref 68–114)
GLUCOSE BLDC GLUCOMTR-MCNC: 116 MG/DL — HIGH (ref 70–99)
GLUCOSE BLDC GLUCOMTR-MCNC: 116 MG/DL — HIGH (ref 70–99)
GLUCOSE BLDC GLUCOMTR-MCNC: 126 MG/DL — HIGH (ref 70–99)
GLUCOSE BLDC GLUCOMTR-MCNC: 126 MG/DL — HIGH (ref 70–99)
GLUCOSE BLDC GLUCOMTR-MCNC: 138 MG/DL — HIGH (ref 70–99)
GLUCOSE BLDC GLUCOMTR-MCNC: 138 MG/DL — HIGH (ref 70–99)
GLUCOSE BLDC GLUCOMTR-MCNC: 147 MG/DL — HIGH (ref 70–99)
GLUCOSE BLDC GLUCOMTR-MCNC: 147 MG/DL — HIGH (ref 70–99)
GLUCOSE SERPL-MCNC: 142 MG/DL — HIGH (ref 70–99)
GLUCOSE SERPL-MCNC: 142 MG/DL — HIGH (ref 70–99)
HCT VFR BLD CALC: 34.4 % — LOW (ref 34.5–45)
HCT VFR BLD CALC: 34.4 % — LOW (ref 34.5–45)
HGB BLD-MCNC: 12.2 G/DL — SIGNIFICANT CHANGE UP (ref 11.5–15.5)
HGB BLD-MCNC: 12.2 G/DL — SIGNIFICANT CHANGE UP (ref 11.5–15.5)
IMM GRANULOCYTES NFR BLD AUTO: 0.3 % — SIGNIFICANT CHANGE UP (ref 0–0.9)
IMM GRANULOCYTES NFR BLD AUTO: 0.3 % — SIGNIFICANT CHANGE UP (ref 0–0.9)
LYMPHOCYTES # BLD AUTO: 1.7 K/UL — SIGNIFICANT CHANGE UP (ref 1–3.3)
LYMPHOCYTES # BLD AUTO: 1.7 K/UL — SIGNIFICANT CHANGE UP (ref 1–3.3)
LYMPHOCYTES # BLD AUTO: 23.9 % — SIGNIFICANT CHANGE UP (ref 13–44)
LYMPHOCYTES # BLD AUTO: 23.9 % — SIGNIFICANT CHANGE UP (ref 13–44)
MAGNESIUM SERPL-MCNC: 1.7 MG/DL — SIGNIFICANT CHANGE UP (ref 1.6–2.6)
MAGNESIUM SERPL-MCNC: 1.7 MG/DL — SIGNIFICANT CHANGE UP (ref 1.6–2.6)
MCHC RBC-ENTMCNC: 30.3 PG — SIGNIFICANT CHANGE UP (ref 27–34)
MCHC RBC-ENTMCNC: 30.3 PG — SIGNIFICANT CHANGE UP (ref 27–34)
MCHC RBC-ENTMCNC: 35.5 GM/DL — SIGNIFICANT CHANGE UP (ref 32–36)
MCHC RBC-ENTMCNC: 35.5 GM/DL — SIGNIFICANT CHANGE UP (ref 32–36)
MCV RBC AUTO: 85.6 FL — SIGNIFICANT CHANGE UP (ref 80–100)
MCV RBC AUTO: 85.6 FL — SIGNIFICANT CHANGE UP (ref 80–100)
MONOCYTES # BLD AUTO: 0.62 K/UL — SIGNIFICANT CHANGE UP (ref 0–0.9)
MONOCYTES # BLD AUTO: 0.62 K/UL — SIGNIFICANT CHANGE UP (ref 0–0.9)
MONOCYTES NFR BLD AUTO: 8.7 % — SIGNIFICANT CHANGE UP (ref 2–14)
MONOCYTES NFR BLD AUTO: 8.7 % — SIGNIFICANT CHANGE UP (ref 2–14)
NEUTROPHILS # BLD AUTO: 4.75 K/UL — SIGNIFICANT CHANGE UP (ref 1.8–7.4)
NEUTROPHILS # BLD AUTO: 4.75 K/UL — SIGNIFICANT CHANGE UP (ref 1.8–7.4)
NEUTROPHILS NFR BLD AUTO: 67 % — SIGNIFICANT CHANGE UP (ref 43–77)
NEUTROPHILS NFR BLD AUTO: 67 % — SIGNIFICANT CHANGE UP (ref 43–77)
NRBC # BLD: 0 /100 WBCS — SIGNIFICANT CHANGE UP (ref 0–0)
NRBC # BLD: 0 /100 WBCS — SIGNIFICANT CHANGE UP (ref 0–0)
PLATELET # BLD AUTO: 274 K/UL — SIGNIFICANT CHANGE UP (ref 150–400)
PLATELET # BLD AUTO: 274 K/UL — SIGNIFICANT CHANGE UP (ref 150–400)
POTASSIUM SERPL-MCNC: 4 MMOL/L — SIGNIFICANT CHANGE UP (ref 3.5–5.3)
POTASSIUM SERPL-MCNC: 4 MMOL/L — SIGNIFICANT CHANGE UP (ref 3.5–5.3)
POTASSIUM SERPL-SCNC: 4 MMOL/L — SIGNIFICANT CHANGE UP (ref 3.5–5.3)
POTASSIUM SERPL-SCNC: 4 MMOL/L — SIGNIFICANT CHANGE UP (ref 3.5–5.3)
PROT SERPL-MCNC: 7.2 G/DL — SIGNIFICANT CHANGE UP (ref 6–8.3)
PROT SERPL-MCNC: 7.2 G/DL — SIGNIFICANT CHANGE UP (ref 6–8.3)
RBC # BLD: 4.02 M/UL — SIGNIFICANT CHANGE UP (ref 3.8–5.2)
RBC # BLD: 4.02 M/UL — SIGNIFICANT CHANGE UP (ref 3.8–5.2)
RBC # FLD: 12.6 % — SIGNIFICANT CHANGE UP (ref 10.3–14.5)
RBC # FLD: 12.6 % — SIGNIFICANT CHANGE UP (ref 10.3–14.5)
SODIUM SERPL-SCNC: 129 MMOL/L — LOW (ref 135–145)
SODIUM SERPL-SCNC: 129 MMOL/L — LOW (ref 135–145)
SPECIMEN SOURCE: SIGNIFICANT CHANGE UP
SPECIMEN SOURCE: SIGNIFICANT CHANGE UP
WBC # BLD: 7.1 K/UL — SIGNIFICANT CHANGE UP (ref 3.8–10.5)
WBC # BLD: 7.1 K/UL — SIGNIFICANT CHANGE UP (ref 3.8–10.5)
WBC # FLD AUTO: 7.1 K/UL — SIGNIFICANT CHANGE UP (ref 3.8–10.5)
WBC # FLD AUTO: 7.1 K/UL — SIGNIFICANT CHANGE UP (ref 3.8–10.5)

## 2024-01-10 RX ORDER — SODIUM CHLORIDE 9 MG/ML
1000 INJECTION, SOLUTION INTRAVENOUS
Refills: 0 | Status: DISCONTINUED | OUTPATIENT
Start: 2024-01-10 | End: 2024-01-11

## 2024-01-10 RX ADMIN — MONTELUKAST 10 MILLIGRAM(S): 4 TABLET, CHEWABLE ORAL at 21:48

## 2024-01-10 RX ADMIN — BUDESONIDE AND FORMOTEROL FUMARATE DIHYDRATE 2 PUFF(S): 160; 4.5 AEROSOL RESPIRATORY (INHALATION) at 17:57

## 2024-01-10 RX ADMIN — PANTOPRAZOLE SODIUM 40 MILLIGRAM(S): 20 TABLET, DELAYED RELEASE ORAL at 05:44

## 2024-01-10 RX ADMIN — Medication 25 MILLIGRAM(S): at 05:44

## 2024-01-10 RX ADMIN — Medication 25 MILLIGRAM(S): at 17:57

## 2024-01-10 RX ADMIN — HEPARIN SODIUM 5000 UNIT(S): 5000 INJECTION INTRAVENOUS; SUBCUTANEOUS at 05:44

## 2024-01-10 RX ADMIN — BUDESONIDE AND FORMOTEROL FUMARATE DIHYDRATE 2 PUFF(S): 160; 4.5 AEROSOL RESPIRATORY (INHALATION) at 05:46

## 2024-01-10 RX ADMIN — LATANOPROST 1 DROP(S): 0.05 SOLUTION/ DROPS OPHTHALMIC; TOPICAL at 21:48

## 2024-01-10 NOTE — CONSULT NOTE ADULT - SUBJECTIVE AND OBJECTIVE BOX
CARDIOLOGY CONSULT - Dr. Abel         HPI:  78F no ACAP Hx DM, Ovarian CA ~19 yrs ago s/p chemo, HTN, known b/l foot drop, walked with a walker at baseline. RLE radic s/p Right L4-5 lami/facet/discectomy w/ Dr. Alfonso , Left microdisc w/ Dr. Echeverria  p/w urinary retention x3 days. Pt poor historian, reluctant to answer questions. Now also with BUE pain/numb from shoulders to fingers. Denies back pain, denies any saddle anesthesia, describes urinary frequency/dysuria x3d. Does endorse some neck pain recently. Denies trauma, fevers, chills.         PAST MEDICAL & SURGICAL HISTORY:  Lumbar Disc Disease  lumbar disc displacement      Asthma  h/o asthma in the past , not on meds      Pneumonia  h/o pneumonia in the past      Migraine Headache      Diabetes  diet controlled, not on meds      Ovarian Cancer  in  had chemo      Depression      Glaucoma      Sleep Apnea  uses CPAP      S/P KEVIN-BSO (Total Abdominal Hysterectomy and Bilateral Salpingo-Oophorectomy)  in  for ovarian cancer      S/P  Section  in ,       S/P Lumbar Microdiscectomy  left side in       mediport insertion in               PREVIOUS DIAGNOSTIC TESTING:    Echo 20: Hyperdynamic lv fxn EF 75%  Echo 23: Hyperdynamic LV fxn ef 79%      MEDICATIONS:  Home Medications:  hydrALAZINE 25 mg oral tablet: 1 tab(s) orally 2 times a day (2024 19:16)  hydroCHLOROthiazide 25 mg oral tablet: 1 tab(s) orally once a day (2024 19:16)  LORazepam 0.5 mg oral tablet: 1 tab(s) orally once a day (at bedtime) (2024 19:16)  pantoprazole 20 mg oral delayed release tablet: 1 tab(s) orally once a day (2024 19:16)  ramelteon 8 mg oral tablet: 1 tab(s) orally once a day (at bedtime) (2024 19:16)  semaglutide 0.5 mg/0.5 mL (0.5 mg dose) subcutaneous solution: 0.5 milligram(s) subcutaneously once a week (2024 19:16)  Singulair 10 mg oral tablet: 1 tab(s) orally once a day (2024 19:16)  Symbicort 160 mcg-4.5 mcg/inh inhalation aerosol: 2 puff(s) inhaled 2 times a day (2024 19:16)  Uzedy 75 mg/0.21 mL subcutaneous suspension, extended release: 75 milligram(s) subcutaneously every 4 weeks (2024 19:14)  Xalatan 0.005% ophthalmic solution: 1 drop(s) to each affected eye once a day (in the evening) (2024 19:16)      MEDICATIONS  (STANDING):  budesonide 160 MICROgram(s)/formoterol 4.5 MICROgram(s) Inhaler 2 Puff(s) Inhalation two times a day  dextrose 5%. 1000 milliLiter(s) (50 mL/Hr) IV Continuous <Continuous>  dextrose 5%. 1000 milliLiter(s) (100 mL/Hr) IV Continuous <Continuous>  dextrose 50% Injectable 25 Gram(s) IV Push once  dextrose 50% Injectable 25 Gram(s) IV Push once  dextrose 50% Injectable 12.5 Gram(s) IV Push once  glucagon  Injectable 1 milliGRAM(s) IntraMuscular once  hydrALAZINE 25 milliGRAM(s) Oral two times a day  insulin lispro (ADMELOG) corrective regimen sliding scale   SubCutaneous at bedtime  insulin lispro (ADMELOG) corrective regimen sliding scale   SubCutaneous three times a day before meals  lactated ringers. 1000 milliLiter(s) (75 mL/Hr) IV Continuous <Continuous>  latanoprost 0.005% Ophthalmic Solution 1 Drop(s) Both EYES at bedtime  montelukast 10 milliGRAM(s) Oral at bedtime  pantoprazole    Tablet 40 milliGRAM(s) Oral before breakfast      FAMILY HISTORY:  Family history of asthma        SOCIAL HISTORY:    [ x] Non-smoker  [ ] Smoker  [ ] Alcohol    Allergies    metoprolol (Short breath)  aspirin (Swelling)  fresh friuts cause swelling; carrots cause swelling a cough (Other)  Ceftin (Angioedema)  cephalexin (Hives; Rash)  Motrin (Swelling)  ciprofloxacin (Rash)    Intolerances    	    REVIEW OF SYSTEMS:  CONSTITUTIONAL: No fever, weight loss, or fatigue  EYES: No eye pain, visual disturbances, or discharge  ENMT:  No difficulty hearing, tinnitus, vertigo; No sinus or throat pain  NECK: No pain or stiffness  RESPIRATORY: No cough, wheezing, chills or hemoptysis; No Shortness of Breath  CARDIOVASCULAR: No chest pain, palpitations, passing out, dizziness, or leg swelling  GASTROINTESTINAL: No abdominal or epigastric pain. No nausea, vomiting, or hematemesis; No diarrhea or constipation. No melena or hematochezia.  GENITOURINARY: see hpi   NEUROLOGICAL: No headaches, memory loss, loss of strength, numbness, or tremors  SKIN: No itching, burning, rashes, or lesions   	    [ x] All others negative	  [ ] Unable to obtain    PHYSICAL EXAM:  T(C): 36.2 (01-10-24 @ 10:04), Max: 36.8 (24 @ 13:36)  HR: 82 (01-10-24 @ 10:04) (68 - 99)  BP: 162/72 (01-10-24 @ 10:04) (113/69 - 165/91)  RR: 18 (01-10-24 @ 10:04) (18 - 19)  SpO2: 97% (01-10-24 @ 10:04) (97% - 99%)  Wt(kg): --  I&O's Summary    2024 07:01  -  10 Johnson 2024 07:00  --------------------------------------------------------  IN: 0 mL / OUT: 850 mL / NET: -850 mL        Appearance: Normal	  Psychiatry: A & O x 3, Mood & affect appropriate  HEENT:   Normal oral mucosa, PERRL, EOMI	  Lymphatic: No lymphadenopathy  Cardiovascular: Normal S1 S2,RRR, No JVD, No murmurs  Respiratory: Lungs clear to auscultation	  Gastrointestinal:  Soft, Non-tender, + BS	  Skin: No rashes, No ecchymoses, No cyanosis	  Neurologic: Non-focal  Extremities: Normal range of motion, No clubbing, cyanosis or edema  Vascular: Peripheral pulses palpable 2+ bilaterally    TELEMETRY: 	    ECG:  	  RADIOLOGY:  < from: CT Abdomen and Pelvis w/ IV Cont (24 @ 09:43) >  IMPRESSION:  No evidence of acute abnormality in the abdomen and pelvis.    No hydronephrosis.    No evidence of recurrent disease or suspicious adenopathy.      < end of copied text >    < from: CT Cervical Spine No Cont (24 @ 18:38) >  IMPRESSION:  1. No evidence of acute fracture or traumatic subluxation. Straightening   of lordosis may reflect muscle spasm.  2. C3-C4 through C6-C7 degenerative disc disease, as above. Given   history, recommend noncontrast MRI of the cervical spine for further   characterization, provided there are no medical contraindications.  3. Additional findings described in detail above.    --- End of Report ---      < end of copied text >    OTHER: 	  	  LABS:	 	    CARDIAC MARKERS:                                  12.2   7.10  )-----------( 274      ( 10 Johnson 2024 05:53 )             34.4     01-10    129<L>  |  94<L>  |  10  ----------------------------<  142<H>  4.0   |  21<L>  |  0.57    Ca    9.7      10 Johnson 2024 05:53  Mg     1.7     01-10    TPro  7.2  /  Alb  3.8  /  TBili  0.4  /  DBili  x   /  AST  24  /  ALT  21  /  AlkPhos  79  01-10    PT/INR - ( 2024 17:05 )   PT: 11.1 sec;   INR: 1.06 ratio         PTT - ( 2024 17:05 )  PTT:28.1 sec  proBNP:   Lipid Profile:   HgA1c:   TSH:

## 2024-01-10 NOTE — CONSULT NOTE ADULT - ASSESSMENT
78F no ACAP Hx DM, known b/l foot drop, walked with a walker at baseline. RLE radic s/p Right L4-5 lami/facet/discectomy w/ Dr. Alfonso 2010, Left microdisc w/ Dr. Echeverria 2008. P/w BUE pain/numb from shoulders to fingers. Denies back pain, denies any saddle anesthesia, describes urinary frequency/dysuria x3d. CT Abd/Pelvis shows L spine stable c/t 06/2023. Pt states she has felt more weak for last few months related to not attending her scheduled PT. Exam: known b/l 1-2/5 DFs. New weak in triceps b/l. o/w 5/5 in all 4 ext. no hoffmans no clonus.     -no acute nsgy intervention    -Adm med vs CDU    -MRI C spine wwo    -CT C spine    -Rest of plan pending imaging
Echo 7/24/20: Hyperdynamic lv fxn EF 75%  Echo 2/6/23: Hyperdynamic LV fxn ef 79%  ctA CORS 2/22/23: w/o evidence of obstructive CAD    A/P  78F Hx DM, Ovarian CA s/p chemo, HTN, known b/l foot drop,  s/p Right L4-5 lami/facet/discectomy w/ Dr. Alfonso 2010, Left microdisc w/ Dr. Echeverria 2008 p/w urinary retention x3 days now s/p mora also found to have hypokalemia and cervicalgia      #HTN  -c/w hydral -- increase to 50 mg BID if needed     #urinary retention.   -Unclear etiology of urinary retention. CT abd/pelvis w/o clear etiology  -mora catheter  -Med fu     #Hypokalemia.   -likely  in setting of taking HCTZ  -K improved s/p repletion  -Holding HCTZ    #cervicalgia  -work up per neurosx

## 2024-01-10 NOTE — CONSULT NOTE ADULT - TIME BILLING
Patient seen and examined, agree with the above assessment and plan by MALLY Ma.  A/P  78F Hx DM, Ovarian CA s/p chemo, HTN, known b/l foot drop,  s/p Right L4-5 lami/facet/discectomy w/ Dr. Alfonso 2010, Left microdisc w/ Dr. Echeverria 2008 p/w urinary retention x3 days now s/p mora also found to have hypokalemia and cervicalgia      #HTN  -c/w hydral -- increase to 50 mg BID if needed     #urinary retention.   -Unclear etiology of urinary retention. CT abd/pelvis w/o clear etiology  -mroa catheter  -Med fu     #Hypokalemia.   -likely  in setting of taking HCTZ  -K improved s/p repletion  -Holding HCTZ    #cervicalgia  -work up per neurosx Patient seen and examined, agree with the above assessment and plan by MALLY Ma.  A/P  78F Hx DM, Ovarian CA s/p chemo, HTN, known b/l foot drop,  s/p Right L4-5 lami/facet/discectomy w/ Dr. Alfonso 2010, Left microdisc w/ Dr. Echeverria 2008 p/w urinary retention x3 days now s/p mora also found to have hypokalemia and cervicalgia      #HTN  -c/w hydral -- increase to 50 mg BID if needed     #urinary retention.   -Unclear etiology of urinary retention. CT abd/pelvis w/o clear etiology  -mora catheter  -Med fu     #Hypokalemia.   -likely  in setting of taking HCTZ  -K improved s/p repletion  -Holding HCTZ    #cervicalgia  -work up per neurosx

## 2024-01-10 NOTE — PROGRESS NOTE ADULT - ASSESSMENT
78F no ACAP Hx DM, Ovarian CA ~19 yrs ago s/p chemo, HTN, known b/l foot drop, walked with a walker at baseline. RLE radic s/p Right L4-5 lami/facet/discectomy w/ Dr. Alfonso 2010, Left microdisc w/ Dr. Echeverria 2008 p/w urinary retention x3 days now s/p mora also found to have hypokalemia and cervicalgia    Urinary retention.   - Unclear etiology of urinary retention. CT abd/pelvis w/o clear etiology, no Ovarian CA  - Cont. mora catheter  - F/u UCx  - TOV    Hypokalemia.   - in setting of taking HCTZ. improved s/p repletion  - Trend BMP, Mg  - Holding HCTZ    Cervicalgia.   - neurosurgery follow  - MRI C-spine  - Falls precautions  - PT consult  - possible C3-C4 laminectomy and C3-C5 stabilization and fusion.    Diabetes mellitus.   - On multiple diabetes meds, denies insulin  - Fingersticks and sliding scale  - A1c.  - ADA diet    Anxiety.   - ISTOP reviewed Reference #: [id number]  - Cont. ativan 0.5mg QHS PRN.    Essential hypertension.   - Trend BP  - Cont. Hydralazine BID  - Holding HCTZ for now given electrolyte disturbance.  - Cardiology follow    COPD  - Pulmonary evaluation    Prophylactic measure.   - DVT PPx  -Hep subq.    PT    No acute medical condition identified to postpone planned surgical intervention.     Cy Horne MD phone [phone]  78F no ACAP Hx DM, Ovarian CA ~19 yrs ago s/p chemo, HTN, known b/l foot drop, walked with a walker at baseline. RLE radic s/p Right L4-5 lami/facet/discectomy w/ Dr. Alfonso 2010, Left microdisc w/ Dr. Echeverria 2008 p/w urinary retention x3 days now s/p mora also found to have hypokalemia and cervicalgia    Urinary retention.   - Unclear etiology of urinary retention. CT abd/pelvis w/o clear etiology, no Ovarian CA  - Cont. mora catheter  - F/u UCx  - TOV    Hypokalemia.   - in setting of taking HCTZ. improved s/p repletion  - Trend BMP, Mg  - Holding HCTZ    Cervicalgia.   - neurosurgery follow  - MRI C-spine  - Falls precautions  - PT consult  - possible C3-C4 laminectomy and C3-C5 stabilization and fusion.    Diabetes mellitus.   - On multiple diabetes meds, denies insulin  - Fingersticks and sliding scale  - A1c.  - ADA diet    Anxiety.   - ISTOP reviewed Reference #: [id number]  - Cont. ativan 0.5mg QHS PRN.    Essential hypertension.   - Trend BP  - Cont. Hydralazine BID  - Holding HCTZ for now given electrolyte disturbance.  - Cardiology follow    COPD  - Pulmonary evaluation    Prophylactic measure.   - DVT PPx  -Hep subq.    PT    No acute medical condition identified to postpone planned surgical intervention.     Cy Horne MD phone [phone]

## 2024-01-11 LAB
ANION GAP SERPL CALC-SCNC: 14 MMOL/L — SIGNIFICANT CHANGE UP (ref 5–17)
ANION GAP SERPL CALC-SCNC: 14 MMOL/L — SIGNIFICANT CHANGE UP (ref 5–17)
BUN SERPL-MCNC: 16 MG/DL — SIGNIFICANT CHANGE UP (ref 7–23)
BUN SERPL-MCNC: 16 MG/DL — SIGNIFICANT CHANGE UP (ref 7–23)
CALCIUM SERPL-MCNC: 9.5 MG/DL — SIGNIFICANT CHANGE UP (ref 8.4–10.5)
CALCIUM SERPL-MCNC: 9.5 MG/DL — SIGNIFICANT CHANGE UP (ref 8.4–10.5)
CHLORIDE SERPL-SCNC: 95 MMOL/L — LOW (ref 96–108)
CHLORIDE SERPL-SCNC: 95 MMOL/L — LOW (ref 96–108)
CO2 SERPL-SCNC: 22 MMOL/L — SIGNIFICANT CHANGE UP (ref 22–31)
CO2 SERPL-SCNC: 22 MMOL/L — SIGNIFICANT CHANGE UP (ref 22–31)
CREAT SERPL-MCNC: 0.93 MG/DL — SIGNIFICANT CHANGE UP (ref 0.5–1.3)
CREAT SERPL-MCNC: 0.93 MG/DL — SIGNIFICANT CHANGE UP (ref 0.5–1.3)
EGFR: 64 ML/MIN/1.73M2 — SIGNIFICANT CHANGE UP
EGFR: 64 ML/MIN/1.73M2 — SIGNIFICANT CHANGE UP
GLUCOSE BLDC GLUCOMTR-MCNC: 108 MG/DL — HIGH (ref 70–99)
GLUCOSE BLDC GLUCOMTR-MCNC: 108 MG/DL — HIGH (ref 70–99)
GLUCOSE BLDC GLUCOMTR-MCNC: 117 MG/DL — HIGH (ref 70–99)
GLUCOSE BLDC GLUCOMTR-MCNC: 117 MG/DL — HIGH (ref 70–99)
GLUCOSE BLDC GLUCOMTR-MCNC: 160 MG/DL — HIGH (ref 70–99)
GLUCOSE BLDC GLUCOMTR-MCNC: 160 MG/DL — HIGH (ref 70–99)
GLUCOSE BLDC GLUCOMTR-MCNC: 217 MG/DL — HIGH (ref 70–99)
GLUCOSE BLDC GLUCOMTR-MCNC: 217 MG/DL — HIGH (ref 70–99)
GLUCOSE SERPL-MCNC: 119 MG/DL — HIGH (ref 70–99)
GLUCOSE SERPL-MCNC: 119 MG/DL — HIGH (ref 70–99)
HCT VFR BLD CALC: 34.7 % — SIGNIFICANT CHANGE UP (ref 34.5–45)
HCT VFR BLD CALC: 34.7 % — SIGNIFICANT CHANGE UP (ref 34.5–45)
HGB BLD-MCNC: 11.9 G/DL — SIGNIFICANT CHANGE UP (ref 11.5–15.5)
HGB BLD-MCNC: 11.9 G/DL — SIGNIFICANT CHANGE UP (ref 11.5–15.5)
INR BLD: 1.04 RATIO — SIGNIFICANT CHANGE UP (ref 0.85–1.18)
INR BLD: 1.04 RATIO — SIGNIFICANT CHANGE UP (ref 0.85–1.18)
MCHC RBC-ENTMCNC: 29.7 PG — SIGNIFICANT CHANGE UP (ref 27–34)
MCHC RBC-ENTMCNC: 29.7 PG — SIGNIFICANT CHANGE UP (ref 27–34)
MCHC RBC-ENTMCNC: 34.3 GM/DL — SIGNIFICANT CHANGE UP (ref 32–36)
MCHC RBC-ENTMCNC: 34.3 GM/DL — SIGNIFICANT CHANGE UP (ref 32–36)
MCV RBC AUTO: 86.5 FL — SIGNIFICANT CHANGE UP (ref 80–100)
MCV RBC AUTO: 86.5 FL — SIGNIFICANT CHANGE UP (ref 80–100)
NRBC # BLD: 0 /100 WBCS — SIGNIFICANT CHANGE UP (ref 0–0)
NRBC # BLD: 0 /100 WBCS — SIGNIFICANT CHANGE UP (ref 0–0)
PLATELET # BLD AUTO: 314 K/UL — SIGNIFICANT CHANGE UP (ref 150–400)
PLATELET # BLD AUTO: 314 K/UL — SIGNIFICANT CHANGE UP (ref 150–400)
POTASSIUM SERPL-MCNC: 4.2 MMOL/L — SIGNIFICANT CHANGE UP (ref 3.5–5.3)
POTASSIUM SERPL-MCNC: 4.2 MMOL/L — SIGNIFICANT CHANGE UP (ref 3.5–5.3)
POTASSIUM SERPL-SCNC: 4.2 MMOL/L — SIGNIFICANT CHANGE UP (ref 3.5–5.3)
POTASSIUM SERPL-SCNC: 4.2 MMOL/L — SIGNIFICANT CHANGE UP (ref 3.5–5.3)
PROTHROM AB SERPL-ACNC: 11.4 SEC — SIGNIFICANT CHANGE UP (ref 9.5–13)
PROTHROM AB SERPL-ACNC: 11.4 SEC — SIGNIFICANT CHANGE UP (ref 9.5–13)
RBC # BLD: 4.01 M/UL — SIGNIFICANT CHANGE UP (ref 3.8–5.2)
RBC # BLD: 4.01 M/UL — SIGNIFICANT CHANGE UP (ref 3.8–5.2)
RBC # FLD: 13.1 % — SIGNIFICANT CHANGE UP (ref 10.3–14.5)
RBC # FLD: 13.1 % — SIGNIFICANT CHANGE UP (ref 10.3–14.5)
SODIUM SERPL-SCNC: 131 MMOL/L — LOW (ref 135–145)
SODIUM SERPL-SCNC: 131 MMOL/L — LOW (ref 135–145)
WBC # BLD: 7.57 K/UL — SIGNIFICANT CHANGE UP (ref 3.8–10.5)
WBC # BLD: 7.57 K/UL — SIGNIFICANT CHANGE UP (ref 3.8–10.5)
WBC # FLD AUTO: 7.57 K/UL — SIGNIFICANT CHANGE UP (ref 3.8–10.5)
WBC # FLD AUTO: 7.57 K/UL — SIGNIFICANT CHANGE UP (ref 3.8–10.5)

## 2024-01-11 PROCEDURE — 72158 MRI LUMBAR SPINE W/O & W/DYE: CPT | Mod: 26

## 2024-01-11 PROCEDURE — 70553 MRI BRAIN STEM W/O & W/DYE: CPT | Mod: 26

## 2024-01-11 RX ADMIN — Medication 2: at 12:23

## 2024-01-11 RX ADMIN — Medication 25 MILLIGRAM(S): at 17:09

## 2024-01-11 RX ADMIN — SODIUM CHLORIDE 75 MILLILITER(S): 9 INJECTION, SOLUTION INTRAVENOUS at 00:02

## 2024-01-11 RX ADMIN — MONTELUKAST 10 MILLIGRAM(S): 4 TABLET, CHEWABLE ORAL at 21:17

## 2024-01-11 RX ADMIN — SODIUM CHLORIDE 75 MILLILITER(S): 9 INJECTION, SOLUTION INTRAVENOUS at 02:30

## 2024-01-11 RX ADMIN — Medication 25 MILLIGRAM(S): at 06:16

## 2024-01-11 RX ADMIN — Medication 1: at 08:38

## 2024-01-11 RX ADMIN — LATANOPROST 1 DROP(S): 0.05 SOLUTION/ DROPS OPHTHALMIC; TOPICAL at 21:18

## 2024-01-11 RX ADMIN — PANTOPRAZOLE SODIUM 40 MILLIGRAM(S): 20 TABLET, DELAYED RELEASE ORAL at 06:16

## 2024-01-11 RX ADMIN — BUDESONIDE AND FORMOTEROL FUMARATE DIHYDRATE 2 PUFF(S): 160; 4.5 AEROSOL RESPIRATORY (INHALATION) at 06:15

## 2024-01-11 RX ADMIN — BUDESONIDE AND FORMOTEROL FUMARATE DIHYDRATE 2 PUFF(S): 160; 4.5 AEROSOL RESPIRATORY (INHALATION) at 17:10

## 2024-01-11 NOTE — PROGRESS NOTE ADULT - ASSESSMENT
78F no ACAP Hx DM, Ovarian CA ~19 yrs ago s/p chemo, HTN, known b/l foot drop, walked with a walker at baseline. RLE radic s/p Right L4-5 lami/facet/discectomy w/ Dr. Alfonso 2010, Left microdisc w/ Dr. Echeverria 2008 p/w urinary retention x3 days now s/p mora also found to have hypokalemia and cervicalgia    Urinary retention.   - Unclear etiology of urinary retention. CT abd/pelvis w/o clear etiology, no Ovarian CA  - Cont. mora catheter  - F/u UCx  - TOV    Hypokalemia.   - in setting of taking HCTZ. improved s/p repletion  - Trend BMP, Mg  - Holding HCTZ    Cervicalgia.   - neurosurgery follow  - MRI C-spine noted  - Falls precautions  - PT consult  - refuses C3-C4 laminectomy and C3-C5 stabilization and fusion.    Diabetes mellitus.   - On multiple diabetes meds, denies insulin  - Fingersticks and sliding scale  - A1c.  - ADA diet    Anxiety.   - ISTOP reviewed Reference #: [id number]  - Cont. ativan 0.5mg QHS PRN.  - Psychiatry evaluation  - MRI brain r/o mets (hx of Ovarian Ca)  - MRI L spine     Essential hypertension.   - Trend BP  - Cont. Hydralazine BID  - Holding HCTZ for now given electrolyte disturbance.  - Cardiology follow    COPD  - Pulmonary evaluation    Prophylactic measure.   - DVT PPx  -Hep subq.    PT    DCP rehab    d/w patient, daughter.     Cy Horne MD phone [phone]  78F no ACAP Hx DM, Ovarian CA ~19 yrs ago s/p chemo, HTN, known b/l foot drop, walked with a walker at baseline. RLE radic s/p Right L4-5 lami/facet/discectomy w/ Dr. Alfonso 2010, Left microdisc w/ Dr. Echeverria 2008 p/w urinary retention x3 days now s/p mora also found to have hypokalemia and cervicalgia    Urinary retention.   - Unclear etiology of urinary retention. CT abd/pelvis w/o clear etiology, no Ovarian CA  - Cont. mora catheter  - F/u UCx  - TOV    Hypokalemia.   - in setting of taking HCTZ. improved s/p repletion  - Trend BMP, Mg  - Holding HCTZ    Cervicalgia.   - neurosurgery follow  - MRI C-spine noted  - Falls precautions  - PT consult  - refuses C3-C4 laminectomy and C3-C5 stabilization and fusion.    Diabetes mellitus.   - On multiple diabetes meds, denies insulin  - Fingersticks and sliding scale  - A1c.  - ADA diet    Anxiety.   - ISTOP reviewed Reference #: [id number]  - Cont. ativan 0.5mg QHS PRN.  - Psychiatry evaluation  - MRI brain r/o mets (hx of Ovarian Ca)  - MRI L spine     Essential hypertension.   - Trend BP  - Cont. Hydralazine BID  - Holding HCTZ for now given electrolyte disturbance.  - Cardiology follow    COPD  - Pulmonary evaluation    Prophylactic measure.   - DVT PPx  -Hep subq.    PT    DCP rehab    d/w patient, daughter.     Cy Horne MD phone [phone]

## 2024-01-11 NOTE — PROGRESS NOTE ADULT - ASSESSMENT
Exam:  known b/l 1-2/5 DFs. weak in triceps b/l. o/w 5/5 in all 4 ext. no hoffmans no clonus.     -Pt/family are uninterested in surgery at this time. They would like to attend rehab first and revisit after pt has had some time to improve with physical therapy.    -Given pt's cancer Hx and AMS, we would recommend:  -MRI L spine  -MRI brain wwo  before d/c Exam:  known b/l 1-2/5 DFs. weak in triceps b/l. o/w 5/5 in all 4 ext. no hoffmans no clonus.     -Pt/family are uninterested in surgery at this time. They would like to attend rehab first and revisit after pt has had some time to improve with physical therapy.    -Given pt's cancer Hx and AMS, we would recommend:  -MRI L spine wwo  -MRI brain wwo  before d/c

## 2024-01-11 NOTE — PROGRESS NOTE ADULT - ASSESSMENT
Echo 7/24/20: Hyperdynamic lv fxn EF 75%  Echo 2/6/23: Hyperdynamic LV fxn ef 79%  ctA CORS 2/22/23: w/o evidence of obstructive CAD    A/P  78F Hx DM, Ovarian CA s/p chemo, HTN, known b/l foot drop,  s/p Right L4-5 lami/facet/discectomy w/ Dr. Alfonso 2010, Left microdisc w/ Dr. Echeverria 2008 p/w urinary retention x3 days now s/p mora also found to have hypokalemia and cervicalgia      #HTN  -BP improved   -c/w hydral -- increase to 50 mg BID if needed     #urinary retention.   -Unclear etiology of urinary retention. CT abd/pelvis w/o clear etiology  -UCx NGTD  -mora catheter  -Med fu     #Hypokalemia.   -likely in setting of taking HCTZ  -K improved s/p repletion  -Holding HCTZ    #cervicalgia  -work up per neurosx     #AMS  -F/u MRI  -W/u per med, neuro

## 2024-01-11 NOTE — PATIENT PROFILE ADULT - FALL HARM RISK - HARM RISK INTERVENTIONS
Assistance with ambulation/Assistance OOB with selected safe patient handling equipment/Communicate Risk of Fall with Harm to all staff/Discuss with provider need for PT consult/Monitor gait and stability/Provide patient with walking aids - walker, cane, crutches/Reinforce activity limits and safety measures with patient and family/Tailored Fall Risk Interventions/Visual Cue: Yellow wristband and red socks/Bed in lowest position, wheels locked, appropriate side rails in place/Call bell, personal items and telephone in reach/Instruct patient to call for assistance before getting out of bed or chair/Non-slip footwear when patient is out of bed/[place] to call system/Physically safe environment - no spills, clutter or unnecessary equipment/Purposeful Proactive Rounding/Room/bathroom lighting operational, light cord in reach Assistance with ambulation/Assistance OOB with selected safe patient handling equipment/Communicate Risk of Fall with Harm to all staff/Discuss with provider need for PT consult/Monitor gait and stability/Provide patient with walking aids - walker, cane, crutches/Reinforce activity limits and safety measures with patient and family/Tailored Fall Risk Interventions/Visual Cue: Yellow wristband and red socks/Bed in lowest position, wheels locked, appropriate side rails in place/Call bell, personal items and telephone in reach/Instruct patient to call for assistance before getting out of bed or chair/Non-slip footwear when patient is out of bed/[place] to call system/Physically safe environment - no spills, clutter or unnecessary equipment/Purposeful Proactive Rounding/Room/bathroom lighting operational, light cord in reach

## 2024-01-12 DIAGNOSIS — F31.9 BIPOLAR DISORDER, UNSPECIFIED: ICD-10-CM

## 2024-01-12 LAB
GLUCOSE BLDC GLUCOMTR-MCNC: 112 MG/DL — HIGH (ref 70–99)
GLUCOSE BLDC GLUCOMTR-MCNC: 112 MG/DL — HIGH (ref 70–99)
GLUCOSE BLDC GLUCOMTR-MCNC: 122 MG/DL — HIGH (ref 70–99)
GLUCOSE BLDC GLUCOMTR-MCNC: 122 MG/DL — HIGH (ref 70–99)
GLUCOSE BLDC GLUCOMTR-MCNC: 132 MG/DL — HIGH (ref 70–99)
GLUCOSE BLDC GLUCOMTR-MCNC: 132 MG/DL — HIGH (ref 70–99)
GLUCOSE BLDC GLUCOMTR-MCNC: 161 MG/DL — HIGH (ref 70–99)
GLUCOSE BLDC GLUCOMTR-MCNC: 161 MG/DL — HIGH (ref 70–99)

## 2024-01-12 PROCEDURE — 99222 1ST HOSP IP/OBS MODERATE 55: CPT

## 2024-01-12 PROCEDURE — 99223 1ST HOSP IP/OBS HIGH 75: CPT

## 2024-01-12 RX ORDER — QUETIAPINE FUMARATE 200 MG/1
25 TABLET, FILM COATED ORAL AT BEDTIME
Refills: 0 | Status: DISCONTINUED | OUTPATIENT
Start: 2024-01-12 | End: 2024-01-18

## 2024-01-12 RX ORDER — ENOXAPARIN SODIUM 100 MG/ML
40 INJECTION SUBCUTANEOUS EVERY 24 HOURS
Refills: 0 | Status: DISCONTINUED | OUTPATIENT
Start: 2024-01-12 | End: 2024-01-18

## 2024-01-12 RX ADMIN — BUDESONIDE AND FORMOTEROL FUMARATE DIHYDRATE 2 PUFF(S): 160; 4.5 AEROSOL RESPIRATORY (INHALATION) at 05:00

## 2024-01-12 RX ADMIN — PANTOPRAZOLE SODIUM 40 MILLIGRAM(S): 20 TABLET, DELAYED RELEASE ORAL at 05:00

## 2024-01-12 RX ADMIN — BUDESONIDE AND FORMOTEROL FUMARATE DIHYDRATE 2 PUFF(S): 160; 4.5 AEROSOL RESPIRATORY (INHALATION) at 17:40

## 2024-01-12 RX ADMIN — QUETIAPINE FUMARATE 25 MILLIGRAM(S): 200 TABLET, FILM COATED ORAL at 21:26

## 2024-01-12 RX ADMIN — Medication 25 MILLIGRAM(S): at 17:40

## 2024-01-12 RX ADMIN — Medication 1: at 08:37

## 2024-01-12 RX ADMIN — Medication 25 MILLIGRAM(S): at 05:00

## 2024-01-12 RX ADMIN — Medication 0.5 MILLIGRAM(S): at 00:57

## 2024-01-12 RX ADMIN — MONTELUKAST 10 MILLIGRAM(S): 4 TABLET, CHEWABLE ORAL at 21:24

## 2024-01-12 NOTE — CONSULT NOTE ADULT - SUBJECTIVE AND OBJECTIVE BOX
78yF was admitted on       Patient is a 78y old  Female who presents with a chief complaint of Urinary Retention (2024 21:23)    HPI:  78F no ACAP Hx DM, Ovarian CA ~19 yrs ago s/p chemo, HTN, known b/l foot drop, walked with a walker at baseline. RLE radic s/p Right L4-5 lami/facet/discectomy w/ Dr. Alfonso , Left microdisc w/ Dr. Echeverria  p/w urinary retention x3 days. Pt poor historian, reluctant to answer questions. Now also with BUE pain/numb from shoulders to fingers. Denies back pain, denies any saddle anesthesia, describes urinary frequency/dysuria x3d. Does endorse some neck pain recently. Denies trauma, fevers, chills.     In ER: Given KCL 20 PO, 40 PO, 30meq IV (2024 19:56)      REVIEW OF SYSTEMS  Denies chest pain, SOB, N/V, F/C, abdominal pain     VITALS  T(C): 36.7 (24 @ 09:26), Max: 36.9 (24 @ 21:08)  HR: 81 (24 @ 09:26) (81 - 94)  BP: 149/80 (24 @ 09:26) (114/74 - 149/80)  RR: 18 (24 @ 09:26) (18 - 18)  SpO2: 96% (24 @ 09:26) (96% - 100%)  Wt(kg): --    PAST MEDICAL & SURGICAL HISTORY  Lumbar Disc Disease  Asthma  Pneumonia  Migraine Headache  Diabetes  Ovarian Cancer  Depression  Glaucoma  Sleep Apnea  S/P KEVIN-BSO (Total Abdominal Hysterectomy and Bilateral Salpingo-Oophorectomy)  S/P  Section  S/P Lumbar Microdiscectomy  mediport insertion in     FUNCTIONAL HISTORY  Lives w/  in private home. [address]. 1st floor set-up. + shower bench.  Independent w/ ambulation w/ RW. Assistance from  for bathing and LBD.    CURRENT FUNCTIONAL STATUS  PT    bed mobility: CG  transfers: CG  gait: Denise    OT   bed mobility: Denise   transfers: Denise   LBD: maxA    RECENT LABS/IMAGING  CBC Full  -  ( 2024 07:19 )  WBC Count : 7.57 K/uL  RBC Count : 4.01 M/uL  Hemoglobin : 11.9 g/dL  Hematocrit : 34.7 %  Platelet Count - Automated : 314 K/uL  Mean Cell Volume : 86.5 fl  Mean Cell Hemoglobin : 29.7 pg  Mean Cell Hemoglobin Concentration : 34.3 gm/dL        131<L>  |  95<L>  |  16  ----------------------------<  119<H>  4.2   |  22  |  0.93    Ca    9.5      2024 07:16    ALLERGIES  metoprolol (Short breath)  aspirin (Swelling)  fresh friuts cause swelling; carrots cause swelling a cough (Other)  Ceftin (Angioedema)  cephalexin (Hives; Rash)  Motrin (Swelling)  ciprofloxacin (Rash)    MEDICATIONS  (STANDING):  budesonide 160 MICROgram(s)/formoterol 4.5 MICROgram(s) Inhaler 2 Puff(s) Inhalation two times a day  dextrose 5%. 1000 milliLiter(s) (50 mL/Hr) IV Continuous <Continuous>  dextrose 5%. 1000 milliLiter(s) (100 mL/Hr) IV Continuous <Continuous>  dextrose 50% Injectable 12.5 Gram(s) IV Push once  dextrose 50% Injectable 25 Gram(s) IV Push once  dextrose 50% Injectable 25 Gram(s) IV Push once  glucagon  Injectable 1 milliGRAM(s) IntraMuscular once  hydrALAZINE 25 milliGRAM(s) Oral two times a day  insulin lispro (ADMELOG) corrective regimen sliding scale   SubCutaneous at bedtime  insulin lispro (ADMELOG) corrective regimen sliding scale   SubCutaneous three times a day before meals  latanoprost 0.005% Ophthalmic Solution 1 Drop(s) Both EYES at bedtime  montelukast 10 milliGRAM(s) Oral at bedtime  pantoprazole    Tablet 40 milliGRAM(s) Oral before breakfast    MEDICATIONS  (PRN):  acetaminophen     Tablet .. 650 milliGRAM(s) Oral every 6 hours PRN Temp greater or equal to 38C (100.4F), Mild Pain (1 - 3)  dextrose Oral Gel 15 Gram(s) Oral once PRN Blood Glucose LESS THAN 70 milliGRAM(s)/deciliter  LORazepam     Tablet 0.5 milliGRAM(s) Oral at bedtime PRN Anxiety  melatonin 3 milliGRAM(s) Oral at bedtime PRN Insomnia  ----------------------------------------------------------------------------------------  PHYSICAL EXAM  Constitutional - NAD, comfortable  HEENT - NCAT, anicteric  Neck - supple  Chest - breathing comfortably on room air  Cardiovascular - RRR  Abdomen - nondistended  Extremities - no edema, no calf tenderness   Neurologic -                Cognitive - awake, alert, oriented to self/place/date/situation, follows commands consistently     Communication - fluent, no dysarthria     Cranial Nerves - CN 2-12 intact     Motor -                     LEFT    UE - ShAB 5/5, EF 5/5, EE 5/5, WE 5/5,  5/5                    RIGHT UE - ShAB 5/5, EF 5/5, EE 5/5, WE 5/5,  5/5                    LEFT    LE - HF 5/5, KE 5/5, DF 5/5, PF 5/5                    RIGHT LE - HF 5/5, KE 5/5, DF 5/5, PF 5/5        Sensory - grossly intact to LT     Reflexes - neg b/l Mcqueen's, neg b/l clonus     Coordination - b/l FTN intact     Psychiatric - mood stable, affect WNL  ----------------------------------------------------------------------------------------  ASSESSMENT/PLAN  79yo F w/ PMH DM, ovarian CA (s/p chemo + KEVIN/BSO), HTN, MARA (on CPAP), mood disorder, b/l foot drop, and RLE radiculopathy (s/p  microdiscectomy +  R L4-5 lami/facet/discectomy), who presented to [facility]24 for urinary retention x3d and BUE pain/numbness. Lamb placed. Found to have mild-mod cervical DDD w/ C4-C5 L central + foraminal disc protrusion w/ ventral cord deformity w/o abnormal enhancement. Furthermore, grade 1 L4 on L5 anterolisthesis w/ mod-sev spinal stenosis and L3-4 central disc herniation w/ mod stenosis. MRI brain unremarkable. Declined neurosurgical intervention. Course also notable for hypoK (s/p repletion, HCTZ held). PM&R consulted  for rehab disposition.  Pain - acetaminophen PRN as per primary  DVT PPX - SCDs  Rehab - Will continue to follow for ongoing rehab needs and recommendations.    Recommend ACUTE inpatient rehabilitation for the functional deficits consisting of 3 hours of therapy/day & x 4 weeks, 24 hour RN/daily PMR physician for comorbid medical management. Patient will be able to tolerate 3 hours a day.   Recommend MARTHA, patient DOES NOT meet acute inpatient rehabilitation criteria   Expect patient to achieve functional goals for DC HOME with OUTPATIENT   Expect patient to achieve functional goals for DC HOME with HOME CARE    -- FINAL RECS TO BE DISCUSSED WITH ATTENDING --    Rehab recommendations are dependent on functional progress and participation with bedside therapy    78yF was admitted on       Patient is a 78y old  Female who presents with a chief complaint of Urinary Retention (2024 21:23)    HPI:  78F no ACAP Hx DM, Ovarian CA ~19 yrs ago s/p chemo, HTN, known b/l foot drop, walked with a walker at baseline. RLE radic s/p Right L4-5 lami/facet/discectomy w/ Dr. Alfonso , Left microdisc w/ Dr. Echeverria  p/w urinary retention x3 days. Pt poor historian, reluctant to answer questions. Now also with BUE pain/numb from shoulders to fingers. Denies back pain, denies any saddle anesthesia, describes urinary frequency/dysuria x3d. Does endorse some neck pain recently. Denies trauma, fevers, chills.     In ER: Given KCL 20 PO, 40 PO, 30meq IV (2024 19:56)      REVIEW OF SYSTEMS  Denies chest pain, SOB, N/V, F/C, abdominal pain     VITALS  T(C): 36.7 (24 @ 09:26), Max: 36.9 (24 @ 21:08)  HR: 81 (24 @ 09:26) (81 - 94)  BP: 149/80 (24 @ 09:26) (114/74 - 149/80)  RR: 18 (24 @ 09:26) (18 - 18)  SpO2: 96% (24 @ 09:26) (96% - 100%)  Wt(kg): --    PAST MEDICAL & SURGICAL HISTORY  Lumbar Disc Disease  Asthma  Pneumonia  Migraine Headache  Diabetes  Ovarian Cancer  Depression  Glaucoma  Sleep Apnea  S/P KEVIN-BSO (Total Abdominal Hysterectomy and Bilateral Salpingo-Oophorectomy)  S/P  Section  S/P Lumbar Microdiscectomy  mediport insertion in     FUNCTIONAL HISTORY  Lives w/  in private home. [address]. 1st floor set-up. + shower bench.  Independent w/ ambulation w/ RW. Assistance from  for bathing and LBD.    CURRENT FUNCTIONAL STATUS  PT    bed mobility: CG  transfers: CG  gait: Denise    OT   bed mobility: Denise   transfers: Denise   LBD: maxA    RECENT LABS/IMAGING  CBC Full  -  ( 2024 07:19 )  WBC Count : 7.57 K/uL  RBC Count : 4.01 M/uL  Hemoglobin : 11.9 g/dL  Hematocrit : 34.7 %  Platelet Count - Automated : 314 K/uL  Mean Cell Volume : 86.5 fl  Mean Cell Hemoglobin : 29.7 pg  Mean Cell Hemoglobin Concentration : 34.3 gm/dL        131<L>  |  95<L>  |  16  ----------------------------<  119<H>  4.2   |  22  |  0.93    Ca    9.5      2024 07:16    ALLERGIES  metoprolol (Short breath)  aspirin (Swelling)  fresh friuts cause swelling; carrots cause swelling a cough (Other)  Ceftin (Angioedema)  cephalexin (Hives; Rash)  Motrin (Swelling)  ciprofloxacin (Rash)    MEDICATIONS  (STANDING):  budesonide 160 MICROgram(s)/formoterol 4.5 MICROgram(s) Inhaler 2 Puff(s) Inhalation two times a day  dextrose 5%. 1000 milliLiter(s) (50 mL/Hr) IV Continuous <Continuous>  dextrose 5%. 1000 milliLiter(s) (100 mL/Hr) IV Continuous <Continuous>  dextrose 50% Injectable 12.5 Gram(s) IV Push once  dextrose 50% Injectable 25 Gram(s) IV Push once  dextrose 50% Injectable 25 Gram(s) IV Push once  glucagon  Injectable 1 milliGRAM(s) IntraMuscular once  hydrALAZINE 25 milliGRAM(s) Oral two times a day  insulin lispro (ADMELOG) corrective regimen sliding scale   SubCutaneous at bedtime  insulin lispro (ADMELOG) corrective regimen sliding scale   SubCutaneous three times a day before meals  latanoprost 0.005% Ophthalmic Solution 1 Drop(s) Both EYES at bedtime  montelukast 10 milliGRAM(s) Oral at bedtime  pantoprazole    Tablet 40 milliGRAM(s) Oral before breakfast    MEDICATIONS  (PRN):  acetaminophen     Tablet .. 650 milliGRAM(s) Oral every 6 hours PRN Temp greater or equal to 38C (100.4F), Mild Pain (1 - 3)  dextrose Oral Gel 15 Gram(s) Oral once PRN Blood Glucose LESS THAN 70 milliGRAM(s)/deciliter  LORazepam     Tablet 0.5 milliGRAM(s) Oral at bedtime PRN Anxiety  melatonin 3 milliGRAM(s) Oral at bedtime PRN Insomnia  ----------------------------------------------------------------------------------------  PHYSICAL EXAM  Constitutional - NAD, comfortable  HEENT - NCAT, anicteric  Neck - supple  Chest - breathing comfortably on room air  Cardiovascular - RRR  Abdomen - nondistended  Extremities - no edema, no calf tenderness   Neurologic -                Cognitive - awake, alert, oriented to self/place/date/situation, follows commands consistently     Communication - fluent, no dysarthria     Cranial Nerves - CN 2-12 intact     Motor -                     LEFT    UE - ShAB 5/5, EF 5/5, EE 5/5, WE 5/5,  5/5                    RIGHT UE - ShAB 5/5, EF 5/5, EE 5/5, WE 5/5,  5/5                    LEFT    LE - HF 5/5, KE 5/5, DF 5/5, PF 5/5                    RIGHT LE - HF 5/5, KE 5/5, DF 5/5, PF 5/5        Sensory - grossly intact to LT     Reflexes - neg b/l Mcqueen's, neg b/l clonus     Coordination - b/l FTN intact     Psychiatric - mood stable, affect WNL  ----------------------------------------------------------------------------------------  ASSESSMENT/PLAN  79yo F w/ PMH DM, ovarian CA (s/p chemo + KEVIN/BSO), HTN, MARA (on CPAP), mood disorder, b/l foot drop, and RLE radiculopathy (s/p  microdiscectomy +  R L4-5 lami/facet/discectomy), who presented to [facility]24 for urinary retention x3d and BUE pain/numbness. Lamb placed. Found to have mild-mod cervical DDD w/ C4-C5 L central + foraminal disc protrusion w/ ventral cord deformity w/o abnormal enhancement. Furthermore, grade 1 L4 on L5 anterolisthesis w/ mod-sev spinal stenosis and L3-4 central disc herniation w/ mod stenosis. MRI brain unremarkable. Declined neurosurgical intervention. Course also notable for hypoK (s/p repletion, HCTZ held). PM&R consulted  for rehab disposition.  Pain - acetaminophen PRN as per primary  DVT PPX - SCDs  Rehab - Will continue to follow for ongoing rehab needs and recommendations.    Recommend ACUTE inpatient rehabilitation for the functional deficits consisting of 3 hours of therapy/day & x 4 weeks, 24 hour RN/daily PMR physician for comorbid medical management. Patient will be able to tolerate 3 hours a day.   Recommend MARTHA, patient DOES NOT meet acute inpatient rehabilitation criteria   Expect patient to achieve functional goals for DC HOME with OUTPATIENT   Expect patient to achieve functional goals for DC HOME with HOME CARE    -- FINAL RECS TO BE DISCUSSED WITH ATTENDING --    Rehab recommendations are dependent on functional progress and participation with bedside therapy    78yF was admitted on       Patient is a 78y old  Female who presents with a chief complaint of Urinary Retention (2024 21:23)    HPI:  78F no ACAP Hx DM, Ovarian CA ~19 yrs ago s/p chemo, HTN, known b/l foot drop, walked with a walker at baseline. RLE radic s/p Right L4-5 lami/facet/discectomy w/ Dr. Alfonso , Left microdisc w/ Dr. Echeverria  p/w urinary retention x3 days. Pt poor historian, reluctant to answer questions. Now also with BUE pain/numb from shoulders to fingers. Denies back pain, denies any saddle anesthesia, describes urinary frequency/dysuria x3d. Does endorse some neck pain recently. Denies trauma, fevers, chills.     In ER: Given KCL 20 PO, 40 PO, 30meq IV (2024 19:56)    SUBJECTIVE  Seen in chair at bedside. Reports pain radiating down both legs upon standing and with activity, improved w/ rest. Has not been to rehab before.    REVIEW OF SYSTEMS  + intermittent numbness to all extremities    VITALS  T(C): 36.7 (24 @ 09:26), Max: 36.9 (24 @ 21:08)  HR: 81 (24 @ 09:26) (81 - 94)  BP: 149/80 (24 @ 09:26) (114/74 - 149/80)  RR: 18 (24 @ 09:26) (18 - 18)  SpO2: 96% (24 @ 09:26) (96% - 100%)  Wt(kg): --    PAST MEDICAL & SURGICAL HISTORY  Lumbar Disc Disease  Asthma  Pneumonia  Migraine Headache  Diabetes  Ovarian Cancer  Depression  Glaucoma  Sleep Apnea  S/P KEVIN-BSO (Total Abdominal Hysterectomy and Bilateral Salpingo-Oophorectomy)  S/P  Section  S/P Lumbar Microdiscectomy  mediport insertion in     FUNCTIONAL HISTORY  Lives w/  in private home in [place]. [address]. 1st floor set-up. + shower bench.  Independent w/ ambulation w/ RW + L AFO. R AFO to be fabricated. Assistance from  for bathing and LBD.    CURRENT FUNCTIONAL STATUS  PT    bed mobility: CG  transfers: CG  gait: Denise    OT   bed mobility: Denise   transfers: Denise   LBD: maxA    RECENT LABS/IMAGING  CBC Full  -  ( 2024 07:19 )  WBC Count : 7.57 K/uL  RBC Count : 4.01 M/uL  Hemoglobin : 11.9 g/dL  Hematocrit : 34.7 %  Platelet Count - Automated : 314 K/uL  Mean Cell Volume : 86.5 fl  Mean Cell Hemoglobin : 29.7 pg  Mean Cell Hemoglobin Concentration : 34.3 gm/dL        131<L>  |  95<L>  |  16  ----------------------------<  119<H>  4.2   |  22  |  0.93    Ca    9.5      2024 07:16    ALLERGIES  metoprolol (Short breath)  aspirin (Swelling)  fresh friuts cause swelling; carrots cause swelling a cough (Other)  Ceftin (Angioedema)  cephalexin (Hives; Rash)  Motrin (Swelling)  ciprofloxacin (Rash)    MEDICATIONS  (STANDING):  budesonide 160 MICROgram(s)/formoterol 4.5 MICROgram(s) Inhaler 2 Puff(s) Inhalation two times a day  dextrose 5%. 1000 milliLiter(s) (50 mL/Hr) IV Continuous <Continuous>  dextrose 5%. 1000 milliLiter(s) (100 mL/Hr) IV Continuous <Continuous>  dextrose 50% Injectable 12.5 Gram(s) IV Push once  dextrose 50% Injectable 25 Gram(s) IV Push once  dextrose 50% Injectable 25 Gram(s) IV Push once  glucagon  Injectable 1 milliGRAM(s) IntraMuscular once  hydrALAZINE 25 milliGRAM(s) Oral two times a day  insulin lispro (ADMELOG) corrective regimen sliding scale   SubCutaneous at bedtime  insulin lispro (ADMELOG) corrective regimen sliding scale   SubCutaneous three times a day before meals  latanoprost 0.005% Ophthalmic Solution 1 Drop(s) Both EYES at bedtime  montelukast 10 milliGRAM(s) Oral at bedtime  pantoprazole    Tablet 40 milliGRAM(s) Oral before breakfast    MEDICATIONS  (PRN):  acetaminophen     Tablet .. 650 milliGRAM(s) Oral every 6 hours PRN Temp greater or equal to 38C (100.4F), Mild Pain (1 - 3)  dextrose Oral Gel 15 Gram(s) Oral once PRN Blood Glucose LESS THAN 70 milliGRAM(s)/deciliter  LORazepam     Tablet 0.5 milliGRAM(s) Oral at bedtime PRN Anxiety  melatonin 3 milliGRAM(s) Oral at bedtime PRN Insomnia  ----------------------------------------------------------------------------------------  PHYSICAL EXAM  Constitutional - NAD, comfortable  HEENT - NCAT, anicteric  Chest - breathing comfortably on room air  Abdomen - nondistended  Extremities - no edema, no calf tenderness, R foot resting in equinovarus   Neurologic -                Cognitive - awake, alert, oriented to self/place/date/situation, follows commands w/ some repetition and visual cues     Communication - fluent, no dysarthria     Motor -                     LEFT    UE - ShAB 4/5, EF 4/5, EE 3/5, WE 4/5,  4/5                    RIGHT UE - ShAB 4/5, EF 4/5, EE 3/5, WE 4/5,  4/5                    LEFT    LE - HF 4+/5, KE 4/5, DF 1/5, PF 4/5                    RIGHT LE - HF 4/5, KE 4/5, DF 1/5, PF 4/5        Sensory - grossly intact to LT     Reflexes - neg b/l Mcqueen's, neg b/l clonus     Psychiatric - mood stable, flat affect  ----------------------------------------------------------------------------------------  ASSESSMENT/PLAN  79yo F w/ PMH DM, ovarian CA (s/p chemo + KEVIN/BSO), HTN, MARA (on CPAP), mood disorder, b/l foot drop, and RLE radiculopathy (s/p  microdiscectomy +  R L4-5 lami/facet/discectomy), who presented to [facility]24 for urinary retention x3d and BUE pain/numbness. Lamb placed. Found to have mild-mod cervical DDD w/ C4-C5 L central + foraminal disc protrusion w/ ventral cord deformity w/o abnormal enhancement. Furthermore, grade 1 L4 on L5 anterolisthesis w/ mod-sev spinal stenosis and L3-4 central disc herniation w/ mod stenosis. MRI brain unremarkable. Declined neurosurgical intervention. Course also notable for hypoK (s/p repletion, HCTZ held). PM&R consulted  for rehab disposition.  Pain - acetaminophen PRN as per primary, rec trial of lidocaine patch (up to 3) to neck and back and gabapentin 200mg qhs  DVT PPX - none ordered  Rehab - Will continue to follow for ongoing rehab needs and recommendations.    Recommend MARTHA, patient DOES NOT meet acute inpatient rehabilitation criteria    -- FINAL RECS TO BE DISCUSSED WITH ATTENDING --    Rehab recommendations are dependent on functional progress and participation with bedside therapy    78yF was admitted on       Patient is a 78y old  Female who presents with a chief complaint of Urinary Retention (2024 21:23)    HPI:  78F no ACAP Hx DM, Ovarian CA ~19 yrs ago s/p chemo, HTN, known b/l foot drop, walked with a walker at baseline. RLE radic s/p Right L4-5 lami/facet/discectomy w/ Dr. Alfonso , Left microdisc w/ Dr. Echeverria  p/w urinary retention x3 days. Pt poor historian, reluctant to answer questions. Now also with BUE pain/numb from shoulders to fingers. Denies back pain, denies any saddle anesthesia, describes urinary frequency/dysuria x3d. Does endorse some neck pain recently. Denies trauma, fevers, chills.     In ER: Given KCL 20 PO, 40 PO, 30meq IV (2024 19:56)    SUBJECTIVE  Seen in chair at bedside. Reports pain radiating down both legs upon standing and with activity, improved w/ rest. Has not been to rehab before.    REVIEW OF SYSTEMS  + intermittent numbness to all extremities    VITALS  T(C): 36.7 (24 @ 09:26), Max: 36.9 (24 @ 21:08)  HR: 81 (24 @ 09:26) (81 - 94)  BP: 149/80 (24 @ 09:26) (114/74 - 149/80)  RR: 18 (24 @ 09:26) (18 - 18)  SpO2: 96% (24 @ 09:26) (96% - 100%)  Wt(kg): --    PAST MEDICAL & SURGICAL HISTORY  Lumbar Disc Disease  Asthma  Pneumonia  Migraine Headache  Diabetes  Ovarian Cancer  Depression  Glaucoma  Sleep Apnea  S/P KEVIN-BSO (Total Abdominal Hysterectomy and Bilateral Salpingo-Oophorectomy)  S/P  Section  S/P Lumbar Microdiscectomy  mediport insertion in     FUNCTIONAL HISTORY  Lives w/  in private home in [place]. [address]. 1st floor set-up. + shower bench.  Independent w/ ambulation w/ RW + L AFO. R AFO to be fabricated. Assistance from  for bathing and LBD.    CURRENT FUNCTIONAL STATUS  PT    bed mobility: CG  transfers: CG  gait: Denise    OT   bed mobility: Denise   transfers: Denise   LBD: maxA    RECENT LABS/IMAGING  CBC Full  -  ( 2024 07:19 )  WBC Count : 7.57 K/uL  RBC Count : 4.01 M/uL  Hemoglobin : 11.9 g/dL  Hematocrit : 34.7 %  Platelet Count - Automated : 314 K/uL  Mean Cell Volume : 86.5 fl  Mean Cell Hemoglobin : 29.7 pg  Mean Cell Hemoglobin Concentration : 34.3 gm/dL        131<L>  |  95<L>  |  16  ----------------------------<  119<H>  4.2   |  22  |  0.93    Ca    9.5      2024 07:16    ALLERGIES  metoprolol (Short breath)  aspirin (Swelling)  fresh friuts cause swelling; carrots cause swelling a cough (Other)  Ceftin (Angioedema)  cephalexin (Hives; Rash)  Motrin (Swelling)  ciprofloxacin (Rash)    MEDICATIONS  (STANDING):  budesonide 160 MICROgram(s)/formoterol 4.5 MICROgram(s) Inhaler 2 Puff(s) Inhalation two times a day  dextrose 5%. 1000 milliLiter(s) (50 mL/Hr) IV Continuous <Continuous>  dextrose 5%. 1000 milliLiter(s) (100 mL/Hr) IV Continuous <Continuous>  dextrose 50% Injectable 12.5 Gram(s) IV Push once  dextrose 50% Injectable 25 Gram(s) IV Push once  dextrose 50% Injectable 25 Gram(s) IV Push once  glucagon  Injectable 1 milliGRAM(s) IntraMuscular once  hydrALAZINE 25 milliGRAM(s) Oral two times a day  insulin lispro (ADMELOG) corrective regimen sliding scale   SubCutaneous at bedtime  insulin lispro (ADMELOG) corrective regimen sliding scale   SubCutaneous three times a day before meals  latanoprost 0.005% Ophthalmic Solution 1 Drop(s) Both EYES at bedtime  montelukast 10 milliGRAM(s) Oral at bedtime  pantoprazole    Tablet 40 milliGRAM(s) Oral before breakfast    MEDICATIONS  (PRN):  acetaminophen     Tablet .. 650 milliGRAM(s) Oral every 6 hours PRN Temp greater or equal to 38C (100.4F), Mild Pain (1 - 3)  dextrose Oral Gel 15 Gram(s) Oral once PRN Blood Glucose LESS THAN 70 milliGRAM(s)/deciliter  LORazepam     Tablet 0.5 milliGRAM(s) Oral at bedtime PRN Anxiety  melatonin 3 milliGRAM(s) Oral at bedtime PRN Insomnia  ----------------------------------------------------------------------------------------  PHYSICAL EXAM  Constitutional - NAD, comfortable  HEENT - NCAT, anicteric  Chest - breathing comfortably on room air  Abdomen - nondistended  Extremities - no edema, no calf tenderness, R foot resting in equinovarus   Neurologic -                Cognitive - awake, alert, oriented to self/place/date/situation, follows commands w/ some repetition and visual cues     Communication - fluent, no dysarthria     Motor -                     LEFT    UE - ShAB 4/5, EF 4/5, EE 3/5, WE 4/5,  4/5                    RIGHT UE - ShAB 4/5, EF 4/5, EE 3/5, WE 4/5,  4/5                    LEFT    LE - HF 4+/5, KE 4/5, DF 1/5, PF 4/5                    RIGHT LE - HF 4/5, KE 4/5, DF 1/5, PF 4/5        Sensory - grossly intact to LT     Reflexes - neg b/l Mcqueen's, neg b/l clonus     Psychiatric - mood stable, flat affect  ----------------------------------------------------------------------------------------  ASSESSMENT/PLAN  79yo F w/ PMH DM, ovarian CA (s/p chemo + KEVIN/BSO), HTN, MARA (on CPAP), mood disorder, b/l foot drop, and RLE radiculopathy (s/p  microdiscectomy +  R L4-5 lami/facet/discectomy), who presented to [facility] 2024 for urinary retention x3d and BUE pain/numbness. Lamb placed. Found to have mild-mod cervical DDD w/ C4-C5 L central + foraminal disc protrusion w/ ventral cord deformity w/o abnormal enhancement. Furthermore, grade 1 L4 on L5 anterolisthesis w/ mod-sev spinal stenosis and L3-4 central disc herniation w/ mod stenosis. MRI brain unremarkable. Declined neurosurgical intervention. Course also notable for hypoK (s/p repletion, HCTZ held). PM&R consulted  for rehab disposition.  Pain - acetaminophen PRN as per primary, rec trial of lidocaine patch (up to 3) to neck and back and gabapentin 200mg qhs  DVT PPX - none ordered  Rehab - Will continue to follow for ongoing rehab needs and recommendations.    Recommend MARTHA, patient DOES NOT meet acute inpatient rehabilitation criteria    -- FINAL RECS TO BE DISCUSSED WITH ATTENDING --    Rehab recommendations are dependent on functional progress and participation with bedside therapy    78yF was admitted on       Patient is a 78y old  Female who presents with a chief complaint of Urinary Retention (2024 21:23)    HPI:  78F no ACAP Hx DM, Ovarian CA ~19 yrs ago s/p chemo, HTN, known b/l foot drop, walked with a walker at baseline. RLE radic s/p Right L4-5 lami/facet/discectomy w/ Dr. Alfonso , Left microdisc w/ Dr. Echeverria  p/w urinary retention x3 days. Pt poor historian, reluctant to answer questions. Now also with BUE pain/numb from shoulders to fingers. Denies back pain, denies any saddle anesthesia, describes urinary frequency/dysuria x3d. Does endorse some neck pain recently. Denies trauma, fevers, chills.     In ER: Given KCL 20 PO, 40 PO, 30meq IV (2024 19:56)    SUBJECTIVE  Seen in chair at bedside. Reports pain radiating down both legs upon standing and with activity, improved w/ rest. Has not been to rehab before.    REVIEW OF SYSTEMS  + intermittent numbness to all extremities    VITALS  T(C): 36.7 (24 @ 09:26), Max: 36.9 (24 @ 21:08)  HR: 81 (24 @ 09:26) (81 - 94)  BP: 149/80 (24 @ 09:26) (114/74 - 149/80)  RR: 18 (24 @ 09:26) (18 - 18)  SpO2: 96% (24 @ 09:26) (96% - 100%)  Wt(kg): --    PAST MEDICAL & SURGICAL HISTORY  Lumbar Disc Disease  Asthma  Pneumonia  Migraine Headache  Diabetes  Ovarian Cancer  Depression  Glaucoma  Sleep Apnea  S/P KEVIN-BSO (Total Abdominal Hysterectomy and Bilateral Salpingo-Oophorectomy)  S/P  Section  S/P Lumbar Microdiscectomy  mediport insertion in     FUNCTIONAL HISTORY  Lives w/  in private home in [place]. [address]. 1st floor set-up. + shower bench.  Independent w/ ambulation w/ RW + L AFO. R AFO to be fabricated. Assistance from  for bathing and LBD.    CURRENT FUNCTIONAL STATUS  PT    bed mobility: CG  transfers: CG  gait: Denise    OT   bed mobility: Denise   transfers: Denise   LBD: maxA    RECENT LABS/IMAGING  CBC Full  -  ( 2024 07:19 )  WBC Count : 7.57 K/uL  RBC Count : 4.01 M/uL  Hemoglobin : 11.9 g/dL  Hematocrit : 34.7 %  Platelet Count - Automated : 314 K/uL  Mean Cell Volume : 86.5 fl  Mean Cell Hemoglobin : 29.7 pg  Mean Cell Hemoglobin Concentration : 34.3 gm/dL        131<L>  |  95<L>  |  16  ----------------------------<  119<H>  4.2   |  22  |  0.93    Ca    9.5      2024 07:16    ALLERGIES  metoprolol (Short breath)  aspirin (Swelling)  fresh friuts cause swelling; carrots cause swelling a cough (Other)  Ceftin (Angioedema)  cephalexin (Hives; Rash)  Motrin (Swelling)  ciprofloxacin (Rash)    MEDICATIONS  (STANDING):  budesonide 160 MICROgram(s)/formoterol 4.5 MICROgram(s) Inhaler 2 Puff(s) Inhalation two times a day  dextrose 5%. 1000 milliLiter(s) (50 mL/Hr) IV Continuous <Continuous>  dextrose 5%. 1000 milliLiter(s) (100 mL/Hr) IV Continuous <Continuous>  dextrose 50% Injectable 12.5 Gram(s) IV Push once  dextrose 50% Injectable 25 Gram(s) IV Push once  dextrose 50% Injectable 25 Gram(s) IV Push once  glucagon  Injectable 1 milliGRAM(s) IntraMuscular once  hydrALAZINE 25 milliGRAM(s) Oral two times a day  insulin lispro (ADMELOG) corrective regimen sliding scale   SubCutaneous at bedtime  insulin lispro (ADMELOG) corrective regimen sliding scale   SubCutaneous three times a day before meals  latanoprost 0.005% Ophthalmic Solution 1 Drop(s) Both EYES at bedtime  montelukast 10 milliGRAM(s) Oral at bedtime  pantoprazole    Tablet 40 milliGRAM(s) Oral before breakfast    MEDICATIONS  (PRN):  acetaminophen     Tablet .. 650 milliGRAM(s) Oral every 6 hours PRN Temp greater or equal to 38C (100.4F), Mild Pain (1 - 3)  dextrose Oral Gel 15 Gram(s) Oral once PRN Blood Glucose LESS THAN 70 milliGRAM(s)/deciliter  LORazepam     Tablet 0.5 milliGRAM(s) Oral at bedtime PRN Anxiety  melatonin 3 milliGRAM(s) Oral at bedtime PRN Insomnia  ----------------------------------------------------------------------------------------  PHYSICAL EXAM  Constitutional - NAD, comfortable  HEENT - NCAT, anicteric  Chest - breathing comfortably on room air  Abdomen - nondistended  Extremities - no edema, no calf tenderness, R foot resting in equinovarus   Neurologic -                Cognitive - awake, alert, oriented to self/place/date/situation, follows commands w/ some repetition and visual cues     Communication - fluent, no dysarthria     Motor -                     LEFT    UE - ShAB 4/5, EF 4/5, EE 3/5, WE 4/5,  4/5                    RIGHT UE - ShAB 4/5, EF 4/5, EE 3/5, WE 4/5,  4/5                    LEFT    LE - HF 4+/5, KE 4/5, DF 1/5, PF 4/5                    RIGHT LE - HF 4/5, KE 4/5, DF 1/5, PF 4/5        Sensory - grossly intact to LT     Reflexes - neg b/l Mcqueen's, neg b/l clonus     Psychiatric - mood stable, flat affect  ----------------------------------------------------------------------------------------  ASSESSMENT/PLAN  79yo F w/ PMH DM, ovarian CA (s/p chemo + KEVIN/BSO), HTN, MARA (on CPAP), mood disorder, b/l foot drop, and RLE radiculopathy (s/p  microdiscectomy +  R L4-5 lami/facet/discectomy), who presented to [facility]24 for urinary retention x3d and BUE pain/numbness. Lamb placed. Found to have mild-mod cervical DDD w/ C4-C5 L central + foraminal disc protrusion w/ ventral cord deformity w/o abnormal enhancement. Furthermore, grade 1 L4 on L5 anterolisthesis w/ mod-sev spinal stenosis and L3-4 central disc herniation w/ mod stenosis. MRI brain unremarkable. Declined neurosurgical intervention. Course also notable for hypoK (s/p repletion, HCTZ held). PM&R consulted  for rehab disposition.  Pain - acetaminophen PRN as per primary, rec trial of lidocaine patch (up to 3) to neck and back and gabapentin 200mg qhs  DVT PPX - none ordered  Rehab - Will continue to follow for ongoing rehab needs and recommendations.    Recommend MARTHA, patient DOES NOT meet acute inpatient rehabilitation criteria. Discussed with daughter who is in agreement.  updated.    Rehab recommendations are dependent on functional progress and participation with bedside therapy    78yF was admitted on       Patient is a 78y old  Female who presents with a chief complaint of Urinary Retention (2024 21:23)    HPI:  78F no ACAP Hx DM, Ovarian CA ~19 yrs ago s/p chemo, HTN, known b/l foot drop, walked with a walker at baseline. RLE radic s/p Right L4-5 lami/facet/discectomy w/ Dr. Alfonso , Left microdisc w/ Dr. Echeverria  p/w urinary retention x3 days. Pt poor historian, reluctant to answer questions. Now also with BUE pain/numb from shoulders to fingers. Denies back pain, denies any saddle anesthesia, describes urinary frequency/dysuria x3d. Does endorse some neck pain recently. Denies trauma, fevers, chills.     In ER: Given KCL 20 PO, 40 PO, 30meq IV (2024 19:56)    SUBJECTIVE  Seen in chair at bedside. Reports pain radiating down both legs upon standing and with activity, improved w/ rest. Has not been to rehab before.    REVIEW OF SYSTEMS  + intermittent numbness to all extremities    VITALS  T(C): 36.7 (24 @ 09:26), Max: 36.9 (24 @ 21:08)  HR: 81 (24 @ 09:26) (81 - 94)  BP: 149/80 (24 @ 09:26) (114/74 - 149/80)  RR: 18 (24 @ 09:26) (18 - 18)  SpO2: 96% (24 @ 09:26) (96% - 100%)  Wt(kg): --    PAST MEDICAL & SURGICAL HISTORY  Lumbar Disc Disease  Asthma  Pneumonia  Migraine Headache  Diabetes  Ovarian Cancer  Depression  Glaucoma  Sleep Apnea  S/P KEVIN-BSO (Total Abdominal Hysterectomy and Bilateral Salpingo-Oophorectomy)  S/P  Section  S/P Lumbar Microdiscectomy  mediport insertion in     FUNCTIONAL HISTORY  Lives w/  in private home in [place]. [address]. 1st floor set-up. + shower bench.  Independent w/ ambulation w/ RW + L AFO. R AFO to be fabricated. Assistance from  for bathing and LBD.    CURRENT FUNCTIONAL STATUS  PT    bed mobility: CG  transfers: CG  gait: Denise    OT   bed mobility: Denise   transfers: Denise   LBD: maxA    RECENT LABS/IMAGING  CBC Full  -  ( 2024 07:19 )  WBC Count : 7.57 K/uL  RBC Count : 4.01 M/uL  Hemoglobin : 11.9 g/dL  Hematocrit : 34.7 %  Platelet Count - Automated : 314 K/uL  Mean Cell Volume : 86.5 fl  Mean Cell Hemoglobin : 29.7 pg  Mean Cell Hemoglobin Concentration : 34.3 gm/dL        131<L>  |  95<L>  |  16  ----------------------------<  119<H>  4.2   |  22  |  0.93    Ca    9.5      2024 07:16    ALLERGIES  metoprolol (Short breath)  aspirin (Swelling)  fresh friuts cause swelling; carrots cause swelling a cough (Other)  Ceftin (Angioedema)  cephalexin (Hives; Rash)  Motrin (Swelling)  ciprofloxacin (Rash)    MEDICATIONS  (STANDING):  budesonide 160 MICROgram(s)/formoterol 4.5 MICROgram(s) Inhaler 2 Puff(s) Inhalation two times a day  dextrose 5%. 1000 milliLiter(s) (50 mL/Hr) IV Continuous <Continuous>  dextrose 5%. 1000 milliLiter(s) (100 mL/Hr) IV Continuous <Continuous>  dextrose 50% Injectable 12.5 Gram(s) IV Push once  dextrose 50% Injectable 25 Gram(s) IV Push once  dextrose 50% Injectable 25 Gram(s) IV Push once  glucagon  Injectable 1 milliGRAM(s) IntraMuscular once  hydrALAZINE 25 milliGRAM(s) Oral two times a day  insulin lispro (ADMELOG) corrective regimen sliding scale   SubCutaneous at bedtime  insulin lispro (ADMELOG) corrective regimen sliding scale   SubCutaneous three times a day before meals  latanoprost 0.005% Ophthalmic Solution 1 Drop(s) Both EYES at bedtime  montelukast 10 milliGRAM(s) Oral at bedtime  pantoprazole    Tablet 40 milliGRAM(s) Oral before breakfast    MEDICATIONS  (PRN):  acetaminophen     Tablet .. 650 milliGRAM(s) Oral every 6 hours PRN Temp greater or equal to 38C (100.4F), Mild Pain (1 - 3)  dextrose Oral Gel 15 Gram(s) Oral once PRN Blood Glucose LESS THAN 70 milliGRAM(s)/deciliter  LORazepam     Tablet 0.5 milliGRAM(s) Oral at bedtime PRN Anxiety  melatonin 3 milliGRAM(s) Oral at bedtime PRN Insomnia  ----------------------------------------------------------------------------------------  PHYSICAL EXAM  Constitutional - NAD, comfortable  HEENT - NCAT, anicteric  Chest - breathing comfortably on room air  Abdomen - nondistended  Extremities - no edema, no calf tenderness, R foot resting in equinovarus   Neurologic -                Cognitive - awake, alert, oriented to self/place/date/situation, follows commands w/ some repetition and visual cues     Communication - fluent, no dysarthria     Motor -                     LEFT    UE - ShAB 4/5, EF 4/5, EE 3/5, WE 4/5,  4/5                    RIGHT UE - ShAB 4/5, EF 4/5, EE 3/5, WE 4/5,  4/5                    LEFT    LE - HF 4+/5, KE 4/5, DF 1/5, PF 4/5                    RIGHT LE - HF 4/5, KE 4/5, DF 1/5, PF 4/5        Sensory - grossly intact to LT     Reflexes - neg b/l Mcqueen's, neg b/l clonus     Psychiatric - mood stable, flat affect  ----------------------------------------------------------------------------------------  ASSESSMENT/PLAN  77yo F w/ PMH DM, ovarian CA (s/p chemo + KEVIN/BSO), HTN, MARA (on CPAP), mood disorder, b/l foot drop, and RLE radiculopathy (s/p  microdiscectomy +  R L4-5 lami/facet/discectomy), who presented to [facility]24 for urinary retention x3d and BUE pain/numbness. Lamb placed. Found to have mild-mod cervical DDD w/ C4-C5 L central + foraminal disc protrusion w/ ventral cord deformity w/o abnormal enhancement. Furthermore, grade 1 L4 on L5 anterolisthesis w/ mod-sev spinal stenosis and L3-4 central disc herniation w/ mod stenosis. MRI brain unremarkable. Declined neurosurgical intervention. Course also notable for hypoK (s/p repletion, HCTZ held). PM&R consulted  for rehab disposition.  Pain - acetaminophen PRN as per primary, rec trial of lidocaine patch (up to 3) to neck and back and gabapentin 200mg qhs  DVT PPX - none ordered  Rehab - Will continue to follow for ongoing rehab needs and recommendations.    Recommend MARTHA, patient DOES NOT meet acute inpatient rehabilitation criteria. Discussed with daughter who is in agreement.  updated.    Rehab recommendations are dependent on functional progress and participation with bedside therapy    78yF was admitted on       Patient is a 78y old  Female who presents with a chief complaint of Urinary Retention (2024 21:23)    HPI:  78F no ACAP Hx DM, Ovarian CA ~19 yrs ago s/p chemo, HTN, known b/l foot drop, walked with a walker at baseline. RLE radic s/p Right L4-5 lami/facet/discectomy w/ Dr. Alfonso , Left microdisc w/ Dr. Echeverria  p/w urinary retention x3 days. Pt poor historian, reluctant to answer questions. Now also with BUE pain/numb from shoulders to fingers. Denies back pain, denies any saddle anesthesia, describes urinary frequency/dysuria x3d. Does endorse some neck pain recently. Denies trauma, fevers, chills.     In ER: Given KCL 20 PO, 40 PO, 30meq IV (2024 19:56)    SUBJECTIVE  Seen in chair at bedside. Reports pain radiating down both legs upon standing and with activity, improved w/ rest. Has not been to rehab before.    REVIEW OF SYSTEMS  + intermittent numbness to all extremities    VITALS  T(C): 36.7 (24 @ 09:26), Max: 36.9 (24 @ 21:08)  HR: 81 (24 @ 09:26) (81 - 94)  BP: 149/80 (24 @ 09:26) (114/74 - 149/80)  RR: 18 (24 @ 09:26) (18 - 18)  SpO2: 96% (24 @ 09:26) (96% - 100%)  Wt(kg): --    PAST MEDICAL & SURGICAL HISTORY  Lumbar Disc Disease  Asthma  Pneumonia  Migraine Headache  Diabetes  Ovarian Cancer  Depression  Glaucoma  Sleep Apnea  S/P KEVIN-BSO (Total Abdominal Hysterectomy and Bilateral Salpingo-Oophorectomy)  S/P  Section  S/P Lumbar Microdiscectomy  mediport insertion in     FUNCTIONAL HISTORY  Lives w/  in private home in [place]. [address]. 1st floor set-up. + shower bench.  Independent w/ ambulation w/ RW + L AFO. R AFO to be fabricated. Assistance from  for bathing and LBD.    CURRENT FUNCTIONAL STATUS  PT    bed mobility: CG  transfers: CG  gait: Denise    OT   bed mobility: Denise   transfers: Denise   LBD: maxA    RECENT LABS/IMAGING  CBC Full  -  ( 2024 07:19 )  WBC Count : 7.57 K/uL  RBC Count : 4.01 M/uL  Hemoglobin : 11.9 g/dL  Hematocrit : 34.7 %  Platelet Count - Automated : 314 K/uL  Mean Cell Volume : 86.5 fl  Mean Cell Hemoglobin : 29.7 pg  Mean Cell Hemoglobin Concentration : 34.3 gm/dL        131<L>  |  95<L>  |  16  ----------------------------<  119<H>  4.2   |  22  |  0.93    Ca    9.5      2024 07:16    ALLERGIES  metoprolol (Short breath)  aspirin (Swelling)  fresh friuts cause swelling; carrots cause swelling a cough (Other)  Ceftin (Angioedema)  cephalexin (Hives; Rash)  Motrin (Swelling)  ciprofloxacin (Rash)    MEDICATIONS  (STANDING):  budesonide 160 MICROgram(s)/formoterol 4.5 MICROgram(s) Inhaler 2 Puff(s) Inhalation two times a day  dextrose 5%. 1000 milliLiter(s) (50 mL/Hr) IV Continuous <Continuous>  dextrose 5%. 1000 milliLiter(s) (100 mL/Hr) IV Continuous <Continuous>  dextrose 50% Injectable 12.5 Gram(s) IV Push once  dextrose 50% Injectable 25 Gram(s) IV Push once  dextrose 50% Injectable 25 Gram(s) IV Push once  glucagon  Injectable 1 milliGRAM(s) IntraMuscular once  hydrALAZINE 25 milliGRAM(s) Oral two times a day  insulin lispro (ADMELOG) corrective regimen sliding scale   SubCutaneous at bedtime  insulin lispro (ADMELOG) corrective regimen sliding scale   SubCutaneous three times a day before meals  latanoprost 0.005% Ophthalmic Solution 1 Drop(s) Both EYES at bedtime  montelukast 10 milliGRAM(s) Oral at bedtime  pantoprazole    Tablet 40 milliGRAM(s) Oral before breakfast    MEDICATIONS  (PRN):  acetaminophen     Tablet .. 650 milliGRAM(s) Oral every 6 hours PRN Temp greater or equal to 38C (100.4F), Mild Pain (1 - 3)  dextrose Oral Gel 15 Gram(s) Oral once PRN Blood Glucose LESS THAN 70 milliGRAM(s)/deciliter  LORazepam     Tablet 0.5 milliGRAM(s) Oral at bedtime PRN Anxiety  melatonin 3 milliGRAM(s) Oral at bedtime PRN Insomnia  ----------------------------------------------------------------------------------------  PHYSICAL EXAM  Constitutional - NAD, comfortable  HEENT - NCAT, anicteric  Chest - breathing comfortably on room air  Abdomen - nondistended  Extremities - no edema, no calf tenderness  Neurologic -                Cognitive - awake, alert, oriented to self/place/date/situation, follows commands w/ some repetition and visual cues     Communication - fluent, no dysarthria     Motor -                     LEFT    UE - ShAB 4/5, EF 4/5, EE 3/5, WE 4/5,  4/5                    RIGHT UE - ShAB 4/5, EF 4/5, EE 3/5, WE 4/5,  4/5                    LEFT    LE - HF 4+/5, KE 4/5, DF 1/5, PF 4/5                    RIGHT LE - HF 4/5, KE 4/5, DF 1/5, PF 4/5        Sensory - grossly intact to LT     Reflexes - neg b/l Mcqueen's, neg b/l clonus     Psychiatric - mood stable, flat affect  ----------------------------------------------------------------------------------------  ASSESSMENT/PLAN  77yo F w/ PMH DM, ovarian CA (s/p chemo + KEVIN/BSO), HTN, MARA (on CPAP), mood disorder, b/l foot drop, and RLE radiculopathy (s/p  microdiscectomy +  R L4-5 lami/facet/discectomy), who presented to [facility] 2024 for urinary retention x3d and BUE pain/numbness. Lamb placed. Found to have mild-mod cervical DDD w/ C4-C5 L central + foraminal disc protrusion w/ ventral cord deformity w/o abnormal enhancement. Furthermore, grade 1 L4 on L5 anterolisthesis w/ mod-sev spinal stenosis and L3-4 central disc herniation w/ mod stenosis. MRI brain unremarkable. Declined neurosurgical intervention. Course also notable for hypoK (s/p repletion, HCTZ held). PM&R consulted  for rehab disposition.  Pain - acetaminophen PRN as per primary, rec trial of lidocaine patch (up to 3) to neck and back and gabapentin 200mg qhs  DVT PPX - none ordered  Rehab - Will continue to follow for ongoing rehab needs and recommendations.    Recommend MARTHA, patient DOES NOT meet acute inpatient rehabilitation criteria. Discussed with daughter who is in agreement.  updated.    Rehab recommendations are dependent on functional progress and participation with bedside therapy    78yF was admitted on       Patient is a 78y old  Female who presents with a chief complaint of Urinary Retention (2024 21:23)    HPI:  78F no ACAP Hx DM, Ovarian CA ~19 yrs ago s/p chemo, HTN, known b/l foot drop, walked with a walker at baseline. RLE radic s/p Right L4-5 lami/facet/discectomy w/ Dr. Alfonso , Left microdisc w/ Dr. Echeverria  p/w urinary retention x3 days. Pt poor historian, reluctant to answer questions. Now also with BUE pain/numb from shoulders to fingers. Denies back pain, denies any saddle anesthesia, describes urinary frequency/dysuria x3d. Does endorse some neck pain recently. Denies trauma, fevers, chills.     In ER: Given KCL 20 PO, 40 PO, 30meq IV (2024 19:56)    SUBJECTIVE  Seen in chair at bedside. Reports pain radiating down both legs upon standing and with activity, improved w/ rest. Has not been to rehab before.    REVIEW OF SYSTEMS  + intermittent numbness to all extremities    VITALS  T(C): 36.7 (24 @ 09:26), Max: 36.9 (24 @ 21:08)  HR: 81 (24 @ 09:26) (81 - 94)  BP: 149/80 (24 @ 09:26) (114/74 - 149/80)  RR: 18 (24 @ 09:26) (18 - 18)  SpO2: 96% (24 @ 09:26) (96% - 100%)  Wt(kg): --    PAST MEDICAL & SURGICAL HISTORY  Lumbar Disc Disease  Asthma  Pneumonia  Migraine Headache  Diabetes  Ovarian Cancer  Depression  Glaucoma  Sleep Apnea  S/P KEVIN-BSO (Total Abdominal Hysterectomy and Bilateral Salpingo-Oophorectomy)  S/P  Section  S/P Lumbar Microdiscectomy  mediport insertion in     FUNCTIONAL HISTORY  Lives w/  in private home in [place]. [address]. 1st floor set-up. + shower bench.  Independent w/ ambulation w/ RW + L AFO. R AFO to be fabricated. Assistance from  for bathing and LBD.    CURRENT FUNCTIONAL STATUS  PT    bed mobility: CG  transfers: CG  gait: Denise    OT   bed mobility: Denise   transfers: Denise   LBD: maxA    RECENT LABS/IMAGING  CBC Full  -  ( 2024 07:19 )  WBC Count : 7.57 K/uL  RBC Count : 4.01 M/uL  Hemoglobin : 11.9 g/dL  Hematocrit : 34.7 %  Platelet Count - Automated : 314 K/uL  Mean Cell Volume : 86.5 fl  Mean Cell Hemoglobin : 29.7 pg  Mean Cell Hemoglobin Concentration : 34.3 gm/dL        131<L>  |  95<L>  |  16  ----------------------------<  119<H>  4.2   |  22  |  0.93    Ca    9.5      2024 07:16    ALLERGIES  metoprolol (Short breath)  aspirin (Swelling)  fresh friuts cause swelling; carrots cause swelling a cough (Other)  Ceftin (Angioedema)  cephalexin (Hives; Rash)  Motrin (Swelling)  ciprofloxacin (Rash)    MEDICATIONS  (STANDING):  budesonide 160 MICROgram(s)/formoterol 4.5 MICROgram(s) Inhaler 2 Puff(s) Inhalation two times a day  dextrose 5%. 1000 milliLiter(s) (50 mL/Hr) IV Continuous <Continuous>  dextrose 5%. 1000 milliLiter(s) (100 mL/Hr) IV Continuous <Continuous>  dextrose 50% Injectable 12.5 Gram(s) IV Push once  dextrose 50% Injectable 25 Gram(s) IV Push once  dextrose 50% Injectable 25 Gram(s) IV Push once  glucagon  Injectable 1 milliGRAM(s) IntraMuscular once  hydrALAZINE 25 milliGRAM(s) Oral two times a day  insulin lispro (ADMELOG) corrective regimen sliding scale   SubCutaneous at bedtime  insulin lispro (ADMELOG) corrective regimen sliding scale   SubCutaneous three times a day before meals  latanoprost 0.005% Ophthalmic Solution 1 Drop(s) Both EYES at bedtime  montelukast 10 milliGRAM(s) Oral at bedtime  pantoprazole    Tablet 40 milliGRAM(s) Oral before breakfast    MEDICATIONS  (PRN):  acetaminophen     Tablet .. 650 milliGRAM(s) Oral every 6 hours PRN Temp greater or equal to 38C (100.4F), Mild Pain (1 - 3)  dextrose Oral Gel 15 Gram(s) Oral once PRN Blood Glucose LESS THAN 70 milliGRAM(s)/deciliter  LORazepam     Tablet 0.5 milliGRAM(s) Oral at bedtime PRN Anxiety  melatonin 3 milliGRAM(s) Oral at bedtime PRN Insomnia  ----------------------------------------------------------------------------------------  PHYSICAL EXAM  Constitutional - NAD, comfortable  HEENT - NCAT, anicteric  Chest - breathing comfortably on room air  Abdomen - nondistended  Extremities - no edema, no calf tenderness  Neurologic -                Cognitive - awake, alert, oriented to self/place/date/situation, follows commands w/ some repetition and visual cues     Communication - fluent, no dysarthria     Motor -                     LEFT    UE - ShAB 4/5, EF 4/5, EE 3/5, WE 4/5,  4/5                    RIGHT UE - ShAB 4/5, EF 4/5, EE 3/5, WE 4/5,  4/5                    LEFT    LE - HF 4+/5, KE 4/5, DF 1/5, PF 4/5                    RIGHT LE - HF 4/5, KE 4/5, DF 1/5, PF 4/5        Sensory - grossly intact to LT     Reflexes - neg b/l Mcqueen's, neg b/l clonus     Psychiatric - mood stable, flat affect  ----------------------------------------------------------------------------------------  ASSESSMENT/PLAN  77yo F w/ PMH DM, ovarian CA (s/p chemo + KEVIN/BSO), HTN, MARA (on CPAP), mood disorder, b/l foot drop, and RLE radiculopathy (s/p  microdiscectomy +  R L4-5 lami/facet/discectomy), who presented to [facility]24 for urinary retention x3d and BUE pain/numbness. Lamb placed. Found to have mild-mod cervical DDD w/ C4-C5 L central + foraminal disc protrusion w/ ventral cord deformity w/o abnormal enhancement. Furthermore, grade 1 L4 on L5 anterolisthesis w/ mod-sev spinal stenosis and L3-4 central disc herniation w/ mod stenosis. MRI brain unremarkable. Declined neurosurgical intervention. Course also notable for hypoK (s/p repletion, HCTZ held). PM&R consulted  for rehab disposition.  Pain - acetaminophen PRN as per primary, rec trial of lidocaine patch (up to 3) to neck and back and gabapentin 200mg qhs  DVT PPX - none ordered  Rehab - Will continue to follow for ongoing rehab needs and recommendations.    Recommend MARTHA, patient DOES NOT meet acute inpatient rehabilitation criteria. Discussed with daughter who is in agreement.  updated.    Rehab recommendations are dependent on functional progress and participation with bedside therapy    78yF was admitted on       Patient is a 78y old  Female who presents with a chief complaint of Urinary Retention (2024 21:23)    HPI:  78F no ACAP Hx DM, Ovarian CA ~19 yrs ago s/p chemo, HTN, known b/l foot drop, walked with a walker at baseline. RLE radic s/p Right L4-5 lami/facet/discectomy w/ Dr. Alfonso , Left microdisc w/ Dr. Echeverria  p/w urinary retention x3 days. Pt poor historian, reluctant to answer questions. Now also with BUE pain/numb from shoulders to fingers. Denies back pain, denies any saddle anesthesia, describes urinary frequency/dysuria x3d. Does endorse some neck pain recently. Denies trauma, fevers, chills.     In ER: Given KCL 20 PO, 40 PO, 30meq IV (2024 19:56)    SUBJECTIVE  Seen in chair at bedside. Reports pain radiating down both legs upon standing and with activity, improved w/ rest. Has not been to rehab before.    REVIEW OF SYSTEMS  + intermittent numbness to all extremities    VITALS  T(C): 36.7 (24 @ 09:26), Max: 36.9 (24 @ 21:08)  HR: 81 (24 @ 09:26) (81 - 94)  BP: 149/80 (24 @ 09:26) (114/74 - 149/80)  RR: 18 (24 @ 09:26) (18 - 18)  SpO2: 96% (24 @ 09:26) (96% - 100%)  Wt(kg): --    PAST MEDICAL & SURGICAL HISTORY  Lumbar Disc Disease  Asthma  Pneumonia  Migraine Headache  Diabetes  Ovarian Cancer  Depression  Glaucoma  Sleep Apnea  S/P KEVIN-BSO (Total Abdominal Hysterectomy and Bilateral Salpingo-Oophorectomy)  S/P  Section  S/P Lumbar Microdiscectomy  mediport insertion in     FUNCTIONAL HISTORY  Lives w/  in private home in [place]. 3 JULIETTE. 1st floor set-up. + shower bench.  Independent w/ ambulation w/ RW + L AFO. R AFO to be fabricated. Assistance from  for bathing and LBD.    CURRENT FUNCTIONAL STATUS  PT    bed mobility: CG  transfers: CG  gait: Denise    OT   bed mobility: Denise   transfers: Denise   LBD: maxA    RECENT LABS/IMAGING  CBC Full  -  ( 2024 07:19 )  WBC Count : 7.57 K/uL  RBC Count : 4.01 M/uL  Hemoglobin : 11.9 g/dL  Hematocrit : 34.7 %  Platelet Count - Automated : 314 K/uL  Mean Cell Volume : 86.5 fl  Mean Cell Hemoglobin : 29.7 pg  Mean Cell Hemoglobin Concentration : 34.3 gm/dL        131<L>  |  95<L>  |  16  ----------------------------<  119<H>  4.2   |  22  |  0.93    Ca    9.5      2024 07:16    ALLERGIES  metoprolol (Short breath)  aspirin (Swelling)  fresh friuts cause swelling; carrots cause swelling a cough (Other)  Ceftin (Angioedema)  cephalexin (Hives; Rash)  Motrin (Swelling)  ciprofloxacin (Rash)    MEDICATIONS  (STANDING):  budesonide 160 MICROgram(s)/formoterol 4.5 MICROgram(s) Inhaler 2 Puff(s) Inhalation two times a day  dextrose 5%. 1000 milliLiter(s) (50 mL/Hr) IV Continuous <Continuous>  dextrose 5%. 1000 milliLiter(s) (100 mL/Hr) IV Continuous <Continuous>  dextrose 50% Injectable 12.5 Gram(s) IV Push once  dextrose 50% Injectable 25 Gram(s) IV Push once  dextrose 50% Injectable 25 Gram(s) IV Push once  glucagon  Injectable 1 milliGRAM(s) IntraMuscular once  hydrALAZINE 25 milliGRAM(s) Oral two times a day  insulin lispro (ADMELOG) corrective regimen sliding scale   SubCutaneous at bedtime  insulin lispro (ADMELOG) corrective regimen sliding scale   SubCutaneous three times a day before meals  latanoprost 0.005% Ophthalmic Solution 1 Drop(s) Both EYES at bedtime  montelukast 10 milliGRAM(s) Oral at bedtime  pantoprazole    Tablet 40 milliGRAM(s) Oral before breakfast    MEDICATIONS  (PRN):  acetaminophen     Tablet .. 650 milliGRAM(s) Oral every 6 hours PRN Temp greater or equal to 38C (100.4F), Mild Pain (1 - 3)  dextrose Oral Gel 15 Gram(s) Oral once PRN Blood Glucose LESS THAN 70 milliGRAM(s)/deciliter  LORazepam     Tablet 0.5 milliGRAM(s) Oral at bedtime PRN Anxiety  melatonin 3 milliGRAM(s) Oral at bedtime PRN Insomnia  ----------------------------------------------------------------------------------------  PHYSICAL EXAM  Constitutional - NAD, comfortable  HEENT - NCAT, anicteric  Chest - breathing comfortably on room air  Abdomen - nondistended  Extremities - no edema, no calf tenderness  Neurologic -                Cognitive - awake, alert, oriented to self/place/date/situation, follows commands w/ some repetition and visual cues     Communication - fluent, no dysarthria     Motor -                     LEFT    UE - ShAB 4/5, EF 4/5, EE 3/5, WE 4/5,  4/5                    RIGHT UE - ShAB 4/5, EF 4/5, EE 3/5, WE 4/5,  4/5                    LEFT    LE - HF 4+/5, KE 4/5, DF 1/5, PF 4/5                    RIGHT LE - HF 4/5, KE 4/5, DF 1/5, PF 4/5        Sensory - grossly intact to LT     Reflexes - neg b/l Mcqueen's, neg b/l clonus     Psychiatric - mood stable, flat affect  ----------------------------------------------------------------------------------------  ASSESSMENT/PLAN  77yo F w/ PMH DM, ovarian CA (s/p chemo + KEVIN/BSO), HTN, MARA (on CPAP), mood disorder, b/l foot drop, and RLE radiculopathy (s/p  microdiscectomy +  R L4-5 lami/facet/discectomy), who presented to [facility] 2024 for urinary retention x3d and BUE pain/numbness. Lamb placed. Found to have mild-mod cervical DDD w/ C4-C5 L central + foraminal disc protrusion w/ ventral cord deformity w/o abnormal enhancement. Furthermore, grade 1 L4 on L5 anterolisthesis w/ mod-sev spinal stenosis and L3-4 central disc herniation w/ mod stenosis. MRI brain unremarkable. Declined neurosurgical intervention. Course also notable for hypoK (s/p repletion, HCTZ held). PM&R consulted  for rehab disposition.  Pain - acetaminophen PRN as per primary, rec trial of lidocaine patch (up to 3) to neck and back and gabapentin 200mg qhs  DVT PPX - none ordered  Rehab - Will continue to follow for ongoing rehab needs and recommendations.    Recommend MARTHA, patient DOES NOT meet acute inpatient rehabilitation criteria. Discussed with daughter who is in agreement.  updated.    Rehab recommendations are dependent on functional progress and participation with bedside therapy    78yF was admitted on       Patient is a 78y old  Female who presents with a chief complaint of Urinary Retention (2024 21:23)    HPI:  78F no ACAP Hx DM, Ovarian CA ~19 yrs ago s/p chemo, HTN, known b/l foot drop, walked with a walker at baseline. RLE radic s/p Right L4-5 lami/facet/discectomy w/ Dr. Alfonso , Left microdisc w/ Dr. Echeverria  p/w urinary retention x3 days. Pt poor historian, reluctant to answer questions. Now also with BUE pain/numb from shoulders to fingers. Denies back pain, denies any saddle anesthesia, describes urinary frequency/dysuria x3d. Does endorse some neck pain recently. Denies trauma, fevers, chills.     In ER: Given KCL 20 PO, 40 PO, 30meq IV (2024 19:56)    SUBJECTIVE  Seen in chair at bedside. Reports pain radiating down both legs upon standing and with activity, improved w/ rest. Has not been to rehab before.    REVIEW OF SYSTEMS  + intermittent numbness to all extremities    VITALS  T(C): 36.7 (24 @ 09:26), Max: 36.9 (24 @ 21:08)  HR: 81 (24 @ 09:26) (81 - 94)  BP: 149/80 (24 @ 09:26) (114/74 - 149/80)  RR: 18 (24 @ 09:26) (18 - 18)  SpO2: 96% (24 @ 09:26) (96% - 100%)  Wt(kg): --    PAST MEDICAL & SURGICAL HISTORY  Lumbar Disc Disease  Asthma  Pneumonia  Migraine Headache  Diabetes  Ovarian Cancer  Depression  Glaucoma  Sleep Apnea  S/P KEVIN-BSO (Total Abdominal Hysterectomy and Bilateral Salpingo-Oophorectomy)  S/P  Section  S/P Lumbar Microdiscectomy  mediport insertion in     FUNCTIONAL HISTORY  Lives w/  in private home in [place]. 3 [address]. 1st floor set-up. + shower bench.  Independent w/ ambulation w/ RW + L AFO. R AFO to be fabricated. Assistance from  for bathing and LBD.    CURRENT FUNCTIONAL STATUS  PT    bed mobility: CG  transfers: CG  gait: Denise    OT   bed mobility: Denise   transfers: Denise   LBD: maxA    RECENT LABS/IMAGING  CBC Full  -  ( 2024 07:19 )  WBC Count : 7.57 K/uL  RBC Count : 4.01 M/uL  Hemoglobin : 11.9 g/dL  Hematocrit : 34.7 %  Platelet Count - Automated : 314 K/uL  Mean Cell Volume : 86.5 fl  Mean Cell Hemoglobin : 29.7 pg  Mean Cell Hemoglobin Concentration : 34.3 gm/dL        131<L>  |  95<L>  |  16  ----------------------------<  119<H>  4.2   |  22  |  0.93    Ca    9.5      2024 07:16    ALLERGIES  metoprolol (Short breath)  aspirin (Swelling)  fresh friuts cause swelling; carrots cause swelling a cough (Other)  Ceftin (Angioedema)  cephalexin (Hives; Rash)  Motrin (Swelling)  ciprofloxacin (Rash)    MEDICATIONS  (STANDING):  budesonide 160 MICROgram(s)/formoterol 4.5 MICROgram(s) Inhaler 2 Puff(s) Inhalation two times a day  dextrose 5%. 1000 milliLiter(s) (50 mL/Hr) IV Continuous <Continuous>  dextrose 5%. 1000 milliLiter(s) (100 mL/Hr) IV Continuous <Continuous>  dextrose 50% Injectable 12.5 Gram(s) IV Push once  dextrose 50% Injectable 25 Gram(s) IV Push once  dextrose 50% Injectable 25 Gram(s) IV Push once  glucagon  Injectable 1 milliGRAM(s) IntraMuscular once  hydrALAZINE 25 milliGRAM(s) Oral two times a day  insulin lispro (ADMELOG) corrective regimen sliding scale   SubCutaneous at bedtime  insulin lispro (ADMELOG) corrective regimen sliding scale   SubCutaneous three times a day before meals  latanoprost 0.005% Ophthalmic Solution 1 Drop(s) Both EYES at bedtime  montelukast 10 milliGRAM(s) Oral at bedtime  pantoprazole    Tablet 40 milliGRAM(s) Oral before breakfast    MEDICATIONS  (PRN):  acetaminophen     Tablet .. 650 milliGRAM(s) Oral every 6 hours PRN Temp greater or equal to 38C (100.4F), Mild Pain (1 - 3)  dextrose Oral Gel 15 Gram(s) Oral once PRN Blood Glucose LESS THAN 70 milliGRAM(s)/deciliter  LORazepam     Tablet 0.5 milliGRAM(s) Oral at bedtime PRN Anxiety  melatonin 3 milliGRAM(s) Oral at bedtime PRN Insomnia  ----------------------------------------------------------------------------------------  PHYSICAL EXAM  Constitutional - NAD, comfortable  HEENT - NCAT, anicteric  Chest - breathing comfortably on room air  Abdomen - nondistended  Extremities - no edema, no calf tenderness  Neurologic -                Cognitive - awake, alert, oriented to self/place/date/situation, follows commands w/ some repetition and visual cues     Communication - fluent, no dysarthria     Motor -                     LEFT    UE - ShAB 4/5, EF 4/5, EE 3/5, WE 4/5,  4/5                    RIGHT UE - ShAB 4/5, EF 4/5, EE 3/5, WE 4/5,  4/5                    LEFT    LE - HF 4+/5, KE 4/5, DF 1/5, PF 4/5                    RIGHT LE - HF 4/5, KE 4/5, DF 1/5, PF 4/5        Sensory - grossly intact to LT     Reflexes - neg b/l Mcqueen's, neg b/l clonus     Psychiatric - mood stable, flat affect  ----------------------------------------------------------------------------------------  ASSESSMENT/PLAN  79yo F w/ PMH DM, ovarian CA (s/p chemo + KEVIN/BSO), HTN, MARA (on CPAP), mood disorder, b/l foot drop, and RLE radiculopathy (s/p  microdiscectomy +  R L4-5 lami/facet/discectomy), who presented to [facility] 2024 for urinary retention x3d and BUE pain/numbness. Lamb placed. Found to have mild-mod cervical DDD w/ C4-C5 L central + foraminal disc protrusion w/ ventral cord deformity w/o abnormal enhancement. Furthermore, grade 1 L4 on L5 anterolisthesis w/ mod-sev spinal stenosis and L3-4 central disc herniation w/ mod stenosis. MRI brain unremarkable. Declined neurosurgical intervention. Course also notable for hypoK (s/p repletion, HCTZ held). PM&R consulted  for rehab disposition.  Pain - acetaminophen PRN as per primary, rec trial of lidocaine patch (up to 3) to neck and back and gabapentin 200mg qhs  DVT PPX - none ordered  Rehab - Will continue to follow for ongoing rehab needs and recommendations.    Recommend MARTHA, patient DOES NOT meet acute inpatient rehabilitation criteria. Discussed with daughter who is in agreement.  updated.    Rehab recommendations are dependent on functional progress and participation with bedside therapy

## 2024-01-12 NOTE — BH CONSULTATION LIAISON ASSESSMENT NOTE - HPI (INCLUDE ILLNESS QUALITY, SEVERITY, DURATION, TIMING, CONTEXT, MODIFYING FACTORS, ASSOCIATED SIGNS AND SYMPTOMS)
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 Her daughter, Gricelda, requested psychiatric consult due to concerns for worsening anxiety. She denies new stressors but patient can be noncompliant to medications.     On evaluaton, patient is A&O x 4, somnolent. Her daughter provided the rest of the information.     Her daughter explained that patient began to experience depressive symptoms on the first week of Dec 2023, with subsequent worsening anxiety. Patient follows with outpatient psychiatrist, Dr. Jermain Anders. Pt is taking Hydralazine, Ozempic, Ativan, Ramelteon, and started on monthly Uzedy (risperidone) injections in Oct 2023, last injection was on Dec 6, which has helped significantly for manic symptoms, however concern for causing confusion and altered mental status. Patient has been unable to sleep at night, calling her daughter multiple times at 2-3 am in the morning saying she is being taken to surgery, although her spinal surgery is currently being held off due to daughter's request. Denies that her mother has prior psychiatric admissions. Denies known suicidal attempts, however her mother has verbally expressed desire to die in Sept. No known HI. Denies history of substance abuse.      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 Her daughter, Gricelda, requested psychiatric consult due to concerns for worsening anxiety. She denies new stressors for her mother but admits that patient will be noncompliant and may at times self adjust dosage of medications.     On evaluaton, patient is A&O x 4, somnolent, impaired attention, and unable to answer all questions. She is upset that she is unable to stand due to mora. Denies SI or HI.    Her daughter provided the rest of the information. She explained that patient began to experience depressive symptoms on the first week of Dec 2023, with subsequent worsening anxiety. Patient follows with outpatient psychiatrist, Dr. Jermain Anders. She is taking hydralazine, Ozempic, Ativan, ramelteon, and started on monthly Uzedy (risperidone) injections in September 2023 for manic episodes with significant improvement. Her last injection was on December 6, due for injection last week but Dr Anders suggested holding due to depression. Daughter is concerned about new confusion and altered mental status. Patient has been unable to sleep at night, calling her daughter multiple times at 1-3 am in the morning saying she is being taken to surgery, although her spinal surgery is currently being held off due to daughter's request. Denies that her mother has prior psychiatric admissions. Denies known suicidal attempts, however her mother has verbally expressed desire to die in Sept. No known HI. Denies history of substance abuse.      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 Her daughter, Gricelda, requested psychiatric consult due to concerns for worsening anxiety and confusion. She denies new stressors for her mother but admits that patient will be noncompliant and may at times self adjust dosage of medications.     On evaluaton, patient is A&O x 4, somnolent, impaired attention, and unable to answer all questions. She is upset that she is unable to stand due to mora. Denies SI or HI.    Her daughter provided the rest of the information. She explained that patient began to experience depressive symptoms on the first week of Dec 2023, with subsequent worsening anxiety. Patient follows with outpatient psychiatrist, Dr. Jermain Anders. She is taking hydralazine, Ozempic, Ativan, ramelteon, and started on monthly Uzedy (risperidone) injections in September 2023 for manic episodes with significant improvement. Her last injection was on December 6, due for injection last week but Dr Anders suggested holding due to depression. Daughter is concerned about new confusion and altered mental status. Patient has been unable to sleep at night, calling her daughter multiple times at 1-3 am in the morning saying she is being taken to surgery, although her spinal surgery is currently being held off due to daughter's request. Denies that her mother has prior psychiatric admissions. Denies known suicidal attempts, however her mother has verbally expressed desire to die in Sept. No known HI. Denies history of substance abuse.      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 Her daughter, Gricelda, requested psychiatric consult due to concerns for worsening anxiety and confusion. She denies new stressors for her mother but admits that patient will be noncompliant and may at times self adjust dosage of medications.     On evaluaton, patient is A&O x 4, somnolent, impaired attention, and unable to answer all questions. She is upset that she is unable to stand due to mora. Denies SI or HI. pt has been feeling depressed, reports some anxiety, unable to explain details. denies rene. c/o poor sleep, appetite.     Her daughter provided the rest of the information. She explained that patient began to experience depressive symptoms on the first week of Dec 2023, with subsequent worsening anxiety. Patient follows with outpatient psychiatrist, Dr. Jermain Anders. She is taking hydralazine, Ozempic, Ativan, ramelteon, and started on monthly Uzedy (risperidone) injections in September 2023 for manic episodes with significant improvement. Her last injection was on December 6, due for injection last week but Dr Anders suggested holding due to depression. Daughter is concerned about new confusion and altered mental status. Patient has been unable to sleep at night, calling her daughter multiple times at 1-3 am in the morning saying she is being taken to surgery, although her spinal surgery is currently being held off due to daughter's request. Denies that her mother has prior psychiatric admissions. Denies known suicidal attempts, however her mother has verbally expressed desire to die in Sept. No known HI. Denies history of substance abuse.

## 2024-01-12 NOTE — DIETITIAN INITIAL EVALUATION ADULT - [id number]_GEN_A_CORE
Informed patient that she can go to the pharmacy and they will assist her in processing she verbalized understanding. No active nutrition diagnosis identified at this time

## 2024-01-12 NOTE — DIETITIAN INITIAL EVALUATION ADULT - REASON INDICATOR FOR ASSESSMENT
MST Score 2 or >  Information obtained from: Review of pt's current medical record, interview with pt in her assigned room on [facility]

## 2024-01-12 NOTE — DIETITIAN INITIAL EVALUATION ADULT - ORAL INTAKE PTA/DIET HISTORY
Pt confirms allergies to fresh fruits and carrots, which remain active in pt's menu profile. Reports having a good appetite and PO intakes at home, eating about 75% of meals.  Was following a low sugar, low salt diet at home. Pt denies nausea, vomiting, diarrhea, or constipation. Denies difficulty chewing/swallowing. Reports taking Vitamin C, Vitamin D3, and Calcium at home.

## 2024-01-12 NOTE — DIETITIAN INITIAL EVALUATION ADULT - PROBLEM SELECTOR PLAN 5
ISTOP reviewed Reference #: [id number]  -Cont. ativan 0.5mg QHS PRN ISTOP reviewed Reference #: [id number]  -Cont. ativan 0.5mg QHS PRN

## 2024-01-12 NOTE — OCCUPATIONAL THERAPY INITIAL EVALUATION ADULT - LIVES WITH, PROFILE
Pt states she lives with  in private home, 3 steps to enter, 1st floor setup, walk in shower with shower bench. Pt states she ambulates with RW and  assists as needed with ADLs

## 2024-01-12 NOTE — DIETITIAN INITIAL EVALUATION ADULT - PERTINENT MEDS FT
MEDICATIONS  (STANDING):  budesonide 160 MICROgram(s)/formoterol 4.5 MICROgram(s) Inhaler 2 Puff(s) Inhalation two times a day  dextrose 5%. 1000 milliLiter(s) (50 mL/Hr) IV Continuous <Continuous>  dextrose 5%. 1000 milliLiter(s) (100 mL/Hr) IV Continuous <Continuous>  dextrose 50% Injectable 25 Gram(s) IV Push once  dextrose 50% Injectable 12.5 Gram(s) IV Push once  dextrose 50% Injectable 25 Gram(s) IV Push once  glucagon  Injectable 1 milliGRAM(s) IntraMuscular once  hydrALAZINE 25 milliGRAM(s) Oral two times a day  insulin lispro (ADMELOG) corrective regimen sliding scale   SubCutaneous at bedtime  insulin lispro (ADMELOG) corrective regimen sliding scale   SubCutaneous three times a day before meals  latanoprost 0.005% Ophthalmic Solution 1 Drop(s) Both EYES at bedtime  montelukast 10 milliGRAM(s) Oral at bedtime  pantoprazole    Tablet 40 milliGRAM(s) Oral before breakfast    MEDICATIONS  (PRN):  acetaminophen     Tablet .. 650 milliGRAM(s) Oral every 6 hours PRN Temp greater or equal to 38C (100.4F), Mild Pain (1 - 3)  dextrose Oral Gel 15 Gram(s) Oral once PRN Blood Glucose LESS THAN 70 milliGRAM(s)/deciliter  LORazepam     Tablet 0.5 milliGRAM(s) Oral at bedtime PRN Anxiety  melatonin 3 milliGRAM(s) Oral at bedtime PRN Insomnia

## 2024-01-12 NOTE — DIETITIAN INITIAL EVALUATION ADULT - NS FNS DIET ORDER
Diet, Regular:   Consistent Carbohydrate {Evening Snack} (CSTCHOSN)  Low Sodium (01-09-24 @ 20:54) [Active]

## 2024-01-12 NOTE — DIETITIAN INITIAL EVALUATION ADULT - REASON
no overt signs of muscle/fat depletion upon visual observation;  pt with adequate PO intakes per flow sheets; nutrition focus physical examination not indicated at this time.

## 2024-01-12 NOTE — BH CONSULTATION LIAISON ASSESSMENT NOTE - NSBHATTESTCOMMENTATTENDFT_PSY_A_CORE
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 Her daughter, Gricelda, requested psychiatric consult due to concerns for worsening anxiety and confusion. She denies new stressors for her mother but admits that patient will be noncompliant and may at times self adjust dosage of medications.     On evaluaton, patient is A&O x 4, somnolent, impaired attention, and unable to answer all questions. She is upset that she is unable to stand due to mora. Denies SI or HI. pt has been feeling depressed, reports some anxiety, unable to explain details. denies rene. c/o poor sleep, appetite.     Her daughter provided the rest of the information. She explained that patient began to experience depressive symptoms on the first week of Dec 2023, with subsequent worsening anxiety. Patient follows with outpatient psychiatrist, Dr. Jermain Anders. She is taking hydralazine, Ozempic, Ativan, ramelteon, and started on monthly Uzedy (risperidone) injections in September 2023 for manic episodes with significant improvement. Her last injection was on December 6, due for injection last week but Dr Anders suggested holding due to depression.   rec seroquel 25mg po qhs, seroquel prn, d/c ativan prn

## 2024-01-12 NOTE — BH CONSULTATION LIAISON ASSESSMENT NOTE - NSBHHPIREASONCL_PSY_A_CORE
delirium/capacity/anxiety/depression/sleep disturbance delirium/anxiety/depression/sleep disturbance

## 2024-01-12 NOTE — PROGRESS NOTE ADULT - ASSESSMENT
78F no ACAP Hx DM, Ovarian CA ~19 yrs ago s/p chemo, HTN, known b/l foot drop, walked with a walker at baseline. RLE radic s/p Right L4-5 lami/facet/discectomy w/ Dr. Alfonso 2010, Left microdisc w/ Dr. Echeverria 2008 p/w urinary retention x3 days now s/p mora also found to have hypokalemia and cervicalgia    Urinary retention.   - CT abd/pelvis w/o clear etiology, no Ovarian CA  - Cont. mora catheter  - F/u UCx  - TOV    Hypokalemia.   - in setting of taking HCTZ. improved s/p repletion  - Trend BMP, Mg  - Holding HCTZ    Cervicalgia.   - neurosurgery follow  - MRI C-spine noted  - Falls precautions  - PT consult  - refuses C3-C4 laminectomy and C3-C5 stabilization and fusion.    Diabetes mellitus.   - On multiple diabetes meds, denies insulin  - Fingersticks and sliding scale  - A1c.  - ADA diet    Anxiety.   - ISTOP reviewed Reference #: [id number]  - Cont. ativan 0.5mg QHS PRN.  - Psychiatry follow    Essential hypertension.   - Trend BP  - Cont. Hydralazine BID  - Holding HCTZ for now given electrolyte disturbance.  - Cardiology follow    COPD  - Pulmonary evaluation    Prophylactic measure.   - DVT PPx  -Hep subq.    PT    DCP rehab    d/w patient, daughter.     Cy Horne MD phone [phone]  78F no ACAP Hx DM, Ovarian CA ~19 yrs ago s/p chemo, HTN, known b/l foot drop, walked with a walker at baseline. RLE radic s/p Right L4-5 lami/facet/discectomy w/ Dr. Alfonso 2010, Left microdisc w/ Dr. Echeverria 2008 p/w urinary retention x3 days now s/p mora also found to have hypokalemia and cervicalgia    Urinary retention.   - CT abd/pelvis w/o clear etiology, no Ovarian CA  - Cont. mora catheter  - F/u UCx  - TOV    Hypokalemia.   - in setting of taking HCTZ. improved s/p repletion  - Trend BMP, Mg  - Holding HCTZ    Cervicalgia.   - neurosurgery follow  - MRI C-spine noted  - Falls precautions  - PT consult  - refuses C3-C4 laminectomy and C3-C5 stabilization and fusion.    Diabetes mellitus.   - On multiple diabetes meds, denies insulin  - Fingersticks and sliding scale  - A1c.  - ADA diet    Anxiety.   - ISTOP reviewed Reference #: [id number]  - Cont. ativan 0.5mg QHS PRN.  - Psychiatry follow    Essential hypertension.   - Trend BP  - Cont. Hydralazine BID  - Holding HCTZ for now given electrolyte disturbance.  - Cardiology follow    COPD  - Pulmonary evaluation    Prophylactic measure.   - DVT PPx  -Hep subq.    PT    DCP rehab    d/w patient, daughter.     Cy Horne MD phone [phone]

## 2024-01-12 NOTE — DIETITIAN INITIAL EVALUATION ADULT - PERTINENT LABORATORY DATA
01-11    131<L>  |  95<L>  |  16  ----------------------------<  119<H>  4.2   |  22  |  0.93    Ca    9.5      11 Jan 2024 07:16    POCT Blood Glucose.: 161 mg/dL (01-12-24 @ 08:12)  POCT Blood Glucose.: 117 mg/dL (01-11-24 @ 21:19)  POCT Blood Glucose.: 108 mg/dL (01-11-24 @ 17:02)  POCT Blood Glucose.: 217 mg/dL (01-11-24 @ 11:59)    A1C with Estimated Average Glucose Result: 7.0 % (01-10-24 @ 05:53)  A1C with Estimated Average Glucose Result: 7.3 % (02-22-23 @ 07:29)

## 2024-01-12 NOTE — CONSULT NOTE ADULT - ATTENDING COMMENTS
Seen and examined with resident. Agree with note.   Patient with gait dysfunction due to spinal stenosis, neuropathic pain.  Patient will need subacute rehabilitation when stable.

## 2024-01-12 NOTE — OCCUPATIONAL THERAPY INITIAL EVALUATION ADULT - PERTINENT HX OF CURRENT PROBLEM, REHAB EVAL
77 yo F no ACAP Hx DM, Ovarian CA ~19 yrs ago s/p chemo, HTN, known b/l foot drop, walked with a walker at baseline. RLE radic s/p Right L4-5 lami/facet/discectomy w/ Dr. Alfonso 2010, Left microdisc w/ Dr. Echeverria 2008 p/w urinary retention x3 days. Pt poor historian, reluctant to answer questions. Now also with BUE pain/numb from shoulders to fingers. Denies back pain, denies any saddle anesthesia, describes urinary frequency/dysuria x3d. Does endorse some neck pain recently. Denies trauma, fevers, chills    MRI Head: Age-appropriate involutional and ischemic gliotic changes. No other infarcts or hemorrhage. No masses or abnormal enhancement.  MRI Lumbar Spine: Degenerative spondylosis. Grade 1 anterolisthesis of L4 on L5 with moderate to severe spinal stenosis. Central disc herniation with moderate spinal stenosis L3-4.  MRI Cervical Spine:  Mild to moderate mid cervical degenerative disc disease includes C4-C5 left central and foraminal disc protrusion (disc herniation) that causes ventral cord deformity. No abnormal enhancement

## 2024-01-12 NOTE — BH CONSULTATION LIAISON ASSESSMENT NOTE - NSBHCHARTREVIEWLAB_PSY_A_CORE FT
11.9   7.57  )-----------( 314      ( 11 Jan 2024 07:19 )             34.7     01-11    131<L>  |  95<L>  |  16  ----------------------------<  119<H>  4.2   |  22  |  0.93    Ca    9.5      11 Jan 2024 07:16

## 2024-01-12 NOTE — PROGRESS NOTE ADULT - ASSESSMENT
Echo 7/24/20: Hyperdynamic lv fxn EF 75%  Echo 2/6/23: Hyperdynamic LV fxn ef 79%  ctA CORS 2/22/23: w/o evidence of obstructive CAD    A/P  78F Hx DM, Ovarian CA s/p chemo, HTN, known b/l foot drop,  s/p Right L4-5 lami/facet/discectomy w/ Dr. Alfonso 2010, Left microdisc w/ Dr. Echeverria 2008 p/w urinary retention x3 days now s/p mora also found to have hypokalemia and cervicalgia      #HTN  -BP improved   -c/w hydral -- increase to 50 mg BID if needed     #urinary retention.   -Unclear etiology of urinary retention. CT abd/pelvis w/o clear etiology  -UCx NGTD  -mora catheter  -Med fu     #Hypokalemia.   -likely in setting of taking HCTZ  -K improved s/p repletion  -Holding HCTZ    #cervicalgia  -work up per neurosx     #AMS  -MRI no acute findings   -W/u per med, neuro

## 2024-01-12 NOTE — DIETITIAN INITIAL EVALUATION ADULT - ADD RECOMMEND
1. Continue consistent carbohydrate low sodium. Defer diet/texture modification to medical team/SLP as indicated   2. Encourage adequate PO intakes. Honor food preferences as appropriate and available. Staff to provide assistance with meals as warranted  3. Monitor PO intake, GI tolerance, skin integrity and labs. RD remains available if needed, pt is aware.

## 2024-01-12 NOTE — OCCUPATIONAL THERAPY INITIAL EVALUATION ADULT - PHYSICAL ASSIST/NONPHYSICAL ASSIST: SUPINE/SIT, REHAB EVAL
The patient is a 40y Female complaining of fever. verbal cues/nonverbal cues (demo/gestures)/1 person assist

## 2024-01-12 NOTE — BH CONSULTATION LIAISON ASSESSMENT NOTE - SUMMARY
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 Psychiatric consult requested by daughter due to new confusion and concern for worsening anxiety. She has been unable to sleep at night, displayed impaired attention and unable to answer all questions.   Discussed with daughter, to hold on Ativan given potential contribution to confusion and start on Seroquel 25 mg po qhs

## 2024-01-12 NOTE — DIETITIAN INITIAL EVALUATION ADULT - PROBLEM SELECTOR PLAN 3
Stable, based on history, physical exam and review of pertinent labs, studies and medications; meds reconciled; continue current treatment plan. Key COPD Medications     Patient is on no COPD/Asthma meds.         Lab Results   Component Value Date/Time    WBC 7.1 11/04/2016 10:00 AM    HGB 12.4 11/04/2016 10:00 AM    HCT 37.5 11/04/2016 10:00 AM    PLATELET 856 73/72/4315 10:00 AM
Appreciate neurosurgery recommendations  -F/u MRI C-spine  -Falls precautions  -PT consult  -Supportive care

## 2024-01-12 NOTE — DIETITIAN INITIAL EVALUATION ADULT - PHYSCIAL ASSESSMENT
Weights:    Current Admission Weights:  - Dosing weight: 80 kg/ 176.4 pounds(1/11/24)  - Daily weight: 80 kg/ 176.4 pounds  (1/11/24)    [facility]  -77.1 kg/170.0 pounds (12/7/23), 77.1 kg/ 170.0 (10/7/23), 75.3 kg/ 166 pounds (7/14/23)    Weight Change:  - No significant weight changes noted X 1, 3 or 6 months    - Weight status appears to be trending, suspected in setting of adequate PO intakes during current admission    IBW:  96 pounds   %IBW: 184% Weights:    Current Admission Weights:  - Dosing weight: 80 kg/ 176.4 pounds(1/11/24)  - Daily weight: 80 kg/ 176.4 pounds  (1/11/24)    [facility]  -77.1 kg/170.0 pounds (12/7/23), 77.1 kg/ 170.0 (10/7/23), 75.3 kg/ 166 pounds (7/14/23)    Weight Change:  - No significant weight changes noted X 1, 3 or 6 months    - Weight status appears to be trending, suspected in setting of adequate PO intakes during current admission    IBW:  96 pounds   %IBW: 184%

## 2024-01-12 NOTE — DIETITIAN INITIAL EVALUATION ADULT - CALCULATED TO (G/KG)
Pt arrives [facility] from home after a fall sustaining hit to back of head - no obvious injury noted, but pt does not remember event and complains of pain to back of head   
Slipped on ice while going into work and hit the back of his head.  States that he did have position LOC and a terrible headache.    Pt identifiers checked and correct  LOC: The patient is awake, alert, aware of environment with an appropriate affect. Oriented x3, speaking appropriately  APPEARANCE: Pt resting comfortably, in no acute distress, pt is clean and well groomed, clothing properly fastened  SKIN: Skin warm, dry and intact, normal skin turgor, moist mucus membranes  RESPIRATORY: Airway is open and patent, respirations are spontaneous, even and unlabored, normal effort and rate  MUSCULOSKELETAL: No obvious deformities.                                             
88

## 2024-01-12 NOTE — DIETITIAN INITIAL EVALUATION ADULT - NSFNSADHERENCEPTAFT_GEN_A_CORE
-- Pt with history of diabetes, Current A1c=7.0 % (01-10-24), indicates adequate glycemic control for age, and improvement in previous level of  A1C: 7.3 % (02-22-23). Per H&P, pt took semaglutide at home.

## 2024-01-12 NOTE — DIETITIAN INITIAL EVALUATION ADULT - REASON FOR ADMISSION
Chart Reviewed, Events Noted  "Patient is a 78y old  Female who presents with a chief complaint of Urinary Retention."

## 2024-01-12 NOTE — BH CONSULTATION LIAISON ASSESSMENT NOTE - NSBHCHARTREVIEWVS_PSY_A_CORE FT
Vital Signs Last 24 Hrs  T(C): 36.7 (12 Jan 2024 09:26), Max: 36.9 (11 Jan 2024 21:08)  T(F): 98 (12 Jan 2024 09:26), Max: 98.5 (11 Jan 2024 21:08)  HR: 94 (12 Jan 2024 10:40) (81 - 94)  BP: 147/87 (12 Jan 2024 10:40) (114/74 - 149/80)  BP(mean): --  RR: 18 (12 Jan 2024 09:26) (18 - 18)  SpO2: 99% (12 Jan 2024 10:40) (96% - 99%)    Parameters below as of 12 Jan 2024 10:40  Patient On (Oxygen Delivery Method): room air

## 2024-01-12 NOTE — DIETITIAN INITIAL EVALUATION ADULT - OTHER INFO
Nutrition-Related Concerns:    Renal:  -- Hyponatremia and Hypokalemia   -- Pt was taking HCTZ at home. Potassium level improving s/p repletion

## 2024-01-12 NOTE — BH CONSULTATION LIAISON ASSESSMENT NOTE - CURRENT MEDICATION
MEDICATIONS  (STANDING):  budesonide 160 MICROgram(s)/formoterol 4.5 MICROgram(s) Inhaler 2 Puff(s) Inhalation two times a day  dextrose 5%. 1000 milliLiter(s) (50 mL/Hr) IV Continuous <Continuous>  dextrose 5%. 1000 milliLiter(s) (100 mL/Hr) IV Continuous <Continuous>  dextrose 50% Injectable 12.5 Gram(s) IV Push once  dextrose 50% Injectable 25 Gram(s) IV Push once  dextrose 50% Injectable 25 Gram(s) IV Push once  glucagon  Injectable 1 milliGRAM(s) IntraMuscular once  hydrALAZINE 25 milliGRAM(s) Oral two times a day  insulin lispro (ADMELOG) corrective regimen sliding scale   SubCutaneous at bedtime  insulin lispro (ADMELOG) corrective regimen sliding scale   SubCutaneous three times a day before meals  latanoprost 0.005% Ophthalmic Solution 1 Drop(s) Both EYES at bedtime  montelukast 10 milliGRAM(s) Oral at bedtime  pantoprazole    Tablet 40 milliGRAM(s) Oral before breakfast    MEDICATIONS  (PRN):  acetaminophen     Tablet .. 650 milliGRAM(s) Oral every 6 hours PRN Temp greater or equal to 38C (100.4F), Mild Pain (1 - 3)  dextrose Oral Gel 15 Gram(s) Oral once PRN Blood Glucose LESS THAN 70 milliGRAM(s)/deciliter  LORazepam     Tablet 0.5 milliGRAM(s) Oral at bedtime PRN Anxiety  melatonin 3 milliGRAM(s) Oral at bedtime PRN Insomnia

## 2024-01-13 LAB
GLUCOSE BLDC GLUCOMTR-MCNC: 110 MG/DL — HIGH (ref 70–99)
GLUCOSE BLDC GLUCOMTR-MCNC: 110 MG/DL — HIGH (ref 70–99)
GLUCOSE BLDC GLUCOMTR-MCNC: 129 MG/DL — HIGH (ref 70–99)
GLUCOSE BLDC GLUCOMTR-MCNC: 129 MG/DL — HIGH (ref 70–99)
GLUCOSE BLDC GLUCOMTR-MCNC: 134 MG/DL — HIGH (ref 70–99)
GLUCOSE BLDC GLUCOMTR-MCNC: 134 MG/DL — HIGH (ref 70–99)
GLUCOSE BLDC GLUCOMTR-MCNC: 140 MG/DL — HIGH (ref 70–99)
GLUCOSE BLDC GLUCOMTR-MCNC: 140 MG/DL — HIGH (ref 70–99)

## 2024-01-13 RX ORDER — SENNA PLUS 8.6 MG/1
2 TABLET ORAL AT BEDTIME
Refills: 0 | Status: DISCONTINUED | OUTPATIENT
Start: 2024-01-13 | End: 2024-01-18

## 2024-01-13 RX ADMIN — LATANOPROST 1 DROP(S): 0.05 SOLUTION/ DROPS OPHTHALMIC; TOPICAL at 21:03

## 2024-01-13 RX ADMIN — QUETIAPINE FUMARATE 25 MILLIGRAM(S): 200 TABLET, FILM COATED ORAL at 21:03

## 2024-01-13 RX ADMIN — Medication 25 MILLIGRAM(S): at 17:53

## 2024-01-13 RX ADMIN — ENOXAPARIN SODIUM 40 MILLIGRAM(S): 100 INJECTION SUBCUTANEOUS at 17:57

## 2024-01-13 RX ADMIN — BUDESONIDE AND FORMOTEROL FUMARATE DIHYDRATE 2 PUFF(S): 160; 4.5 AEROSOL RESPIRATORY (INHALATION) at 17:57

## 2024-01-13 RX ADMIN — SENNA PLUS 2 TABLET(S): 8.6 TABLET ORAL at 21:03

## 2024-01-13 RX ADMIN — MONTELUKAST 10 MILLIGRAM(S): 4 TABLET, CHEWABLE ORAL at 21:03

## 2024-01-13 RX ADMIN — PANTOPRAZOLE SODIUM 40 MILLIGRAM(S): 20 TABLET, DELAYED RELEASE ORAL at 05:18

## 2024-01-13 RX ADMIN — Medication 25 MILLIGRAM(S): at 05:17

## 2024-01-13 NOTE — PROGRESS NOTE ADULT - ASSESSMENT
Echo 7/24/20: Hyperdynamic lv fxn EF 75%  Echo 2/6/23: Hyperdynamic LV fxn ef 79%  ctA CORS 2/22/23: w/o evidence of obstructive CAD    A/P  78F Hx DM, Ovarian CA s/p chemo, HTN, known b/l foot drop,  s/p Right L4-5 lami/facet/discectomy w/ Dr. Alfonso 2010, Left microdisc w/ Dr. Echeverria 2008 p/w urinary retention x3 days now s/p mora also found to have hypokalemia and cervicalgia      #HTN  -BP improved   -c/w hydral    #urinary retention.   -Unclear etiology of urinary retention. CT abd/pelvis w/o clear etiology  -UCx NGTD  -mora catheter  -Med fu     #Hypokalemia.   -likely in setting of taking HCTZ  -K improved s/p repletion  -Holding HCTZ    #cervicalgia  -work up per neurosx     #AMS  -MRI no acute findings   -W/u per med, neuro

## 2024-01-13 NOTE — PROGRESS NOTE ADULT - ASSESSMENT
78F no ACAP Hx DM, Ovarian CA ~19 yrs ago s/p chemo, HTN, known b/l foot drop, walked with a walker at baseline. RLE radic s/p Right L4-5 lami/facet/discectomy w/ Dr. Alfonso 2010, Left microdisc w/ Dr. Echeverria 2008 p/w urinary retention x3 days now s/p mora also found to have hypokalemia and cervicalgia    Urinary retention.   - CT abd/pelvis w/o clear etiology, no Ovarian CA  - Cont. mora catheter  - F/u UCx  - TOV    Hypokalemia.   - in setting of taking HCTZ. improved s/p repletion  - Trend BMP, Mg  - Holding HCTZ    Cervicalgia.   - neurosurgery follow  - MRI C-spine noted  - Falls precautions  - PT consult  - refuses C3-C4 laminectomy and C3-C5 stabilization and fusion.    Diabetes mellitus.   - On multiple diabetes meds, denies insulin  - Fingersticks and sliding scale  - A1c.  - ADA diet    Anxiety.   - ISTOP reviewed Reference #: [id number]  - Cont. ativan 0.5mg QHS PRN.  - Psychiatry follow    Essential hypertension.   - Trend BP  - Cont. Hydralazine BID  - Holding HCTZ for now given electrolyte disturbance.  - Cardiology follow    COPD  - Pulmonary evaluation    Prophylactic measure.   - DVT PPx  -Hep subq.    PT    DCP rehab    d/w patient     Cy Horne MD phone [phone]  78F no ACAP Hx DM, Ovarian CA ~19 yrs ago s/p chemo, HTN, known b/l foot drop, walked with a walker at baseline. RLE radic s/p Right L4-5 lami/facet/discectomy w/ Dr. Alfonso 2010, Left microdisc w/ Dr. Echeverria 2008 p/w urinary retention x3 days now s/p mora also found to have hypokalemia and cervicalgia    Urinary retention.   - CT abd/pelvis w/o clear etiology, no Ovarian CA  - Cont. mora catheter  - F/u UCx  - TOV    Hypokalemia.   - in setting of taking HCTZ. improved s/p repletion  - Trend BMP, Mg  - Holding HCTZ    Cervicalgia.   - neurosurgery follow  - MRI C-spine noted  - Falls precautions  - PT consult  - refuses C3-C4 laminectomy and C3-C5 stabilization and fusion.    Diabetes mellitus.   - On multiple diabetes meds, denies insulin  - Fingersticks and sliding scale  - A1c.  - ADA diet    Anxiety.   - ISTOP reviewed Reference #: [id number]  - Cont. ativan 0.5mg QHS PRN.  - Psychiatry follow    Essential hypertension.   - Trend BP  - Cont. Hydralazine BID  - Holding HCTZ for now given electrolyte disturbance.  - Cardiology follow    COPD  - Pulmonary evaluation    Prophylactic measure.   - DVT PPx  -Hep subq.    PT    DCP rehab    d/w patient     Cy Horne MD phone [phone]

## 2024-01-14 LAB
ANION GAP SERPL CALC-SCNC: 13 MMOL/L — SIGNIFICANT CHANGE UP (ref 5–17)
ANION GAP SERPL CALC-SCNC: 13 MMOL/L — SIGNIFICANT CHANGE UP (ref 5–17)
BUN SERPL-MCNC: 23 MG/DL — SIGNIFICANT CHANGE UP (ref 7–23)
BUN SERPL-MCNC: 23 MG/DL — SIGNIFICANT CHANGE UP (ref 7–23)
CALCIUM SERPL-MCNC: 9.8 MG/DL — SIGNIFICANT CHANGE UP (ref 8.4–10.5)
CALCIUM SERPL-MCNC: 9.8 MG/DL — SIGNIFICANT CHANGE UP (ref 8.4–10.5)
CHLORIDE SERPL-SCNC: 94 MMOL/L — LOW (ref 96–108)
CHLORIDE SERPL-SCNC: 94 MMOL/L — LOW (ref 96–108)
CO2 SERPL-SCNC: 23 MMOL/L — SIGNIFICANT CHANGE UP (ref 22–31)
CO2 SERPL-SCNC: 23 MMOL/L — SIGNIFICANT CHANGE UP (ref 22–31)
CREAT SERPL-MCNC: 0.76 MG/DL — SIGNIFICANT CHANGE UP (ref 0.5–1.3)
CREAT SERPL-MCNC: 0.76 MG/DL — SIGNIFICANT CHANGE UP (ref 0.5–1.3)
EGFR: 81 ML/MIN/1.73M2 — SIGNIFICANT CHANGE UP
EGFR: 81 ML/MIN/1.73M2 — SIGNIFICANT CHANGE UP
GLUCOSE BLDC GLUCOMTR-MCNC: 126 MG/DL — HIGH (ref 70–99)
GLUCOSE BLDC GLUCOMTR-MCNC: 126 MG/DL — HIGH (ref 70–99)
GLUCOSE BLDC GLUCOMTR-MCNC: 128 MG/DL — HIGH (ref 70–99)
GLUCOSE BLDC GLUCOMTR-MCNC: 128 MG/DL — HIGH (ref 70–99)
GLUCOSE BLDC GLUCOMTR-MCNC: 144 MG/DL — HIGH (ref 70–99)
GLUCOSE BLDC GLUCOMTR-MCNC: 144 MG/DL — HIGH (ref 70–99)
GLUCOSE BLDC GLUCOMTR-MCNC: 145 MG/DL — HIGH (ref 70–99)
GLUCOSE BLDC GLUCOMTR-MCNC: 145 MG/DL — HIGH (ref 70–99)
GLUCOSE SERPL-MCNC: 135 MG/DL — HIGH (ref 70–99)
GLUCOSE SERPL-MCNC: 135 MG/DL — HIGH (ref 70–99)
HCT VFR BLD CALC: 38.5 % — SIGNIFICANT CHANGE UP (ref 34.5–45)
HCT VFR BLD CALC: 38.5 % — SIGNIFICANT CHANGE UP (ref 34.5–45)
HGB BLD-MCNC: 13 G/DL — SIGNIFICANT CHANGE UP (ref 11.5–15.5)
HGB BLD-MCNC: 13 G/DL — SIGNIFICANT CHANGE UP (ref 11.5–15.5)
MCHC RBC-ENTMCNC: 29.5 PG — SIGNIFICANT CHANGE UP (ref 27–34)
MCHC RBC-ENTMCNC: 29.5 PG — SIGNIFICANT CHANGE UP (ref 27–34)
MCHC RBC-ENTMCNC: 33.8 GM/DL — SIGNIFICANT CHANGE UP (ref 32–36)
MCHC RBC-ENTMCNC: 33.8 GM/DL — SIGNIFICANT CHANGE UP (ref 32–36)
MCV RBC AUTO: 87.5 FL — SIGNIFICANT CHANGE UP (ref 80–100)
MCV RBC AUTO: 87.5 FL — SIGNIFICANT CHANGE UP (ref 80–100)
NRBC # BLD: 0 /100 WBCS — SIGNIFICANT CHANGE UP (ref 0–0)
NRBC # BLD: 0 /100 WBCS — SIGNIFICANT CHANGE UP (ref 0–0)
PLATELET # BLD AUTO: 277 K/UL — SIGNIFICANT CHANGE UP (ref 150–400)
PLATELET # BLD AUTO: 277 K/UL — SIGNIFICANT CHANGE UP (ref 150–400)
POTASSIUM SERPL-MCNC: 4.1 MMOL/L — SIGNIFICANT CHANGE UP (ref 3.5–5.3)
POTASSIUM SERPL-MCNC: 4.1 MMOL/L — SIGNIFICANT CHANGE UP (ref 3.5–5.3)
POTASSIUM SERPL-SCNC: 4.1 MMOL/L — SIGNIFICANT CHANGE UP (ref 3.5–5.3)
POTASSIUM SERPL-SCNC: 4.1 MMOL/L — SIGNIFICANT CHANGE UP (ref 3.5–5.3)
RBC # BLD: 4.4 M/UL — SIGNIFICANT CHANGE UP (ref 3.8–5.2)
RBC # BLD: 4.4 M/UL — SIGNIFICANT CHANGE UP (ref 3.8–5.2)
RBC # FLD: 12.9 % — SIGNIFICANT CHANGE UP (ref 10.3–14.5)
RBC # FLD: 12.9 % — SIGNIFICANT CHANGE UP (ref 10.3–14.5)
SODIUM SERPL-SCNC: 130 MMOL/L — LOW (ref 135–145)
SODIUM SERPL-SCNC: 130 MMOL/L — LOW (ref 135–145)
WBC # BLD: 9.97 K/UL — SIGNIFICANT CHANGE UP (ref 3.8–10.5)
WBC # BLD: 9.97 K/UL — SIGNIFICANT CHANGE UP (ref 3.8–10.5)
WBC # FLD AUTO: 9.97 K/UL — SIGNIFICANT CHANGE UP (ref 3.8–10.5)
WBC # FLD AUTO: 9.97 K/UL — SIGNIFICANT CHANGE UP (ref 3.8–10.5)

## 2024-01-14 RX ORDER — HYDRALAZINE HCL 50 MG
50 TABLET ORAL THREE TIMES A DAY
Refills: 0 | Status: DISCONTINUED | OUTPATIENT
Start: 2024-01-14 | End: 2024-01-18

## 2024-01-14 RX ADMIN — Medication 50 MILLIGRAM(S): at 22:40

## 2024-01-14 RX ADMIN — BUDESONIDE AND FORMOTEROL FUMARATE DIHYDRATE 2 PUFF(S): 160; 4.5 AEROSOL RESPIRATORY (INHALATION) at 05:33

## 2024-01-14 RX ADMIN — BUDESONIDE AND FORMOTEROL FUMARATE DIHYDRATE 2 PUFF(S): 160; 4.5 AEROSOL RESPIRATORY (INHALATION) at 17:38

## 2024-01-14 RX ADMIN — LATANOPROST 1 DROP(S): 0.05 SOLUTION/ DROPS OPHTHALMIC; TOPICAL at 22:40

## 2024-01-14 RX ADMIN — PANTOPRAZOLE SODIUM 40 MILLIGRAM(S): 20 TABLET, DELAYED RELEASE ORAL at 05:34

## 2024-01-14 RX ADMIN — Medication 3 MILLIGRAM(S): at 22:42

## 2024-01-14 RX ADMIN — Medication 50 MILLIGRAM(S): at 14:30

## 2024-01-14 RX ADMIN — Medication 25 MILLIGRAM(S): at 05:34

## 2024-01-14 RX ADMIN — SENNA PLUS 2 TABLET(S): 8.6 TABLET ORAL at 22:40

## 2024-01-14 RX ADMIN — ENOXAPARIN SODIUM 40 MILLIGRAM(S): 100 INJECTION SUBCUTANEOUS at 17:38

## 2024-01-14 RX ADMIN — QUETIAPINE FUMARATE 25 MILLIGRAM(S): 200 TABLET, FILM COATED ORAL at 22:40

## 2024-01-14 RX ADMIN — MONTELUKAST 10 MILLIGRAM(S): 4 TABLET, CHEWABLE ORAL at 22:40

## 2024-01-14 NOTE — PROGRESS NOTE ADULT - ASSESSMENT
Echo 7/24/20: Hyperdynamic lv fxn EF 75%  Echo 2/6/23: Hyperdynamic LV fxn ef 79%  ctA CORS 2/22/23: w/o evidence of obstructive CAD    A/P  78F Hx DM, Ovarian CA s/p chemo, HTN, known b/l foot drop,  s/p Right L4-5 lami/facet/discectomy w/ Dr. Alfonso 2010, Left microdisc w/ Dr. Echeverria 2008 p/w urinary retention x3 days now s/p mora also found to have hypokalemia and cervicalgia      #HTN  -BP elevated - increase hydral to 50 mg TID   -BB listed as allx noted       #urinary retention.   -Unclear etiology of urinary retention. CT abd/pelvis w/o clear etiology  -UCx NGTD  -mora catheter  -Med fu     #Hypokalemia.   -likely in setting of taking HCTZ  -K improved s/p repletion  -Holding HCTZ    #cervicalgia  -work up per neurosx     #AMS  -MRI no acute findings   -W/u per med, neuro

## 2024-01-14 NOTE — PROGRESS NOTE ADULT - ASSESSMENT
78F no ACAP Hx DM, Ovarian CA ~19 yrs ago s/p chemo, HTN, known b/l foot drop, walked with a walker at baseline. RLE radic s/p Right L4-5 lami/facet/discectomy w/ Dr. Alfonso 2010, Left microdisc w/ Dr. Echeverria 2008 p/w urinary retention x3 days now s/p mora also found to have hypokalemia and cervicalgia    Urinary retention.   - CT abd/pelvis w/o clear etiology, no Ovarian CA  - Cont. mora catheter  - F/u UCx  - TOV    Hypokalemia.   - in setting of taking HCTZ. improved s/p repletion  - Trend BMP, Mg  - Holding HCTZ    Cervicalgia.   - neurosurgery follow  - MRI C-spine noted  - Falls precautions  - PT consult  - refuses C3-C4 laminectomy and C3-C5 stabilization and fusion.    Diabetes mellitus.   - On multiple diabetes meds, denies insulin  - Fingersticks and sliding scale  - A1c.  - ADA diet    Anxiety.   - ISTOP reviewed Reference #: [id number]  - Cont. ativan 0.5mg QHS PRN.  - Psychiatry follow    Essential hypertension.   - Trend BP  - Cont. Hydralazine BID  - Holding HCTZ for now given electrolyte disturbance.  - Cardiology follow    COPD  - Pulmonary evaluation    Prophylactic measure.   - DVT PPx  -Hep subq.    PT    DCP rehab in progress     d/w patient     Cy Horne MD phone [phone]  78F no ACAP Hx DM, Ovarian CA ~19 yrs ago s/p chemo, HTN, known b/l foot drop, walked with a walker at baseline. RLE radic s/p Right L4-5 lami/facet/discectomy w/ Dr. Alfonso 2010, Left microdisc w/ Dr. Echeverria 2008 p/w urinary retention x3 days now s/p mora also found to have hypokalemia and cervicalgia    Urinary retention.   - CT abd/pelvis w/o clear etiology, no Ovarian CA  - Cont. mora catheter  - F/u UCx  - TOV    Hypokalemia.   - in setting of taking HCTZ. improved s/p repletion  - Trend BMP, Mg  - Holding HCTZ    Cervicalgia.   - neurosurgery follow  - MRI C-spine noted  - Falls precautions  - PT consult  - refuses C3-C4 laminectomy and C3-C5 stabilization and fusion.    Diabetes mellitus.   - On multiple diabetes meds, denies insulin  - Fingersticks and sliding scale  - A1c.  - ADA diet    Anxiety.   - ISTOP reviewed Reference #: [id number]  - Cont. ativan 0.5mg QHS PRN.  - Psychiatry follow    Essential hypertension.   - Trend BP  - Cont. Hydralazine BID  - Holding HCTZ for now given electrolyte disturbance.  - Cardiology follow    COPD  - Pulmonary evaluation    Prophylactic measure.   - DVT PPx  -Hep subq.    PT    DCP rehab in progress     d/w patient     Cy Horne MD phone [phone]

## 2024-01-15 ENCOUNTER — TRANSCRIPTION ENCOUNTER (OUTPATIENT)
Age: 79
End: 2024-01-15

## 2024-01-15 LAB
GLUCOSE BLDC GLUCOMTR-MCNC: 110 MG/DL — HIGH (ref 70–99)
GLUCOSE BLDC GLUCOMTR-MCNC: 154 MG/DL — HIGH (ref 70–99)
GLUCOSE BLDC GLUCOMTR-MCNC: 154 MG/DL — HIGH (ref 70–99)
GLUCOSE BLDC GLUCOMTR-MCNC: 158 MG/DL — HIGH (ref 70–99)
GLUCOSE BLDC GLUCOMTR-MCNC: 173 MG/DL — HIGH (ref 70–99)
GLUCOSE BLDC GLUCOMTR-MCNC: 173 MG/DL — HIGH (ref 70–99)

## 2024-01-15 RX ADMIN — PANTOPRAZOLE SODIUM 40 MILLIGRAM(S): 20 TABLET, DELAYED RELEASE ORAL at 05:39

## 2024-01-15 RX ADMIN — MONTELUKAST 10 MILLIGRAM(S): 4 TABLET, CHEWABLE ORAL at 21:29

## 2024-01-15 RX ADMIN — LATANOPROST 1 DROP(S): 0.05 SOLUTION/ DROPS OPHTHALMIC; TOPICAL at 21:29

## 2024-01-15 RX ADMIN — ENOXAPARIN SODIUM 40 MILLIGRAM(S): 100 INJECTION SUBCUTANEOUS at 18:48

## 2024-01-15 RX ADMIN — Medication 50 MILLIGRAM(S): at 05:39

## 2024-01-15 RX ADMIN — Medication 3 MILLIGRAM(S): at 21:29

## 2024-01-15 RX ADMIN — Medication 1: at 08:35

## 2024-01-15 RX ADMIN — BUDESONIDE AND FORMOTEROL FUMARATE DIHYDRATE 2 PUFF(S): 160; 4.5 AEROSOL RESPIRATORY (INHALATION) at 18:48

## 2024-01-15 RX ADMIN — Medication 50 MILLIGRAM(S): at 21:29

## 2024-01-15 RX ADMIN — BUDESONIDE AND FORMOTEROL FUMARATE DIHYDRATE 2 PUFF(S): 160; 4.5 AEROSOL RESPIRATORY (INHALATION) at 05:38

## 2024-01-15 RX ADMIN — Medication 50 MILLIGRAM(S): at 13:49

## 2024-01-15 RX ADMIN — SENNA PLUS 2 TABLET(S): 8.6 TABLET ORAL at 21:29

## 2024-01-15 RX ADMIN — QUETIAPINE FUMARATE 25 MILLIGRAM(S): 200 TABLET, FILM COATED ORAL at 21:29

## 2024-01-15 NOTE — PROGRESS NOTE ADULT - ASSESSMENT
Echo 7/24/20: Hyperdynamic lv fxn EF 75%  Echo 2/6/23: Hyperdynamic LV fxn ef 79%  ctA CORS 2/22/23: w/o evidence of obstructive CAD    A/P  78F Hx DM, Ovarian CA s/p chemo, HTN, known b/l foot drop,  s/p Right L4-5 lami/facet/discectomy w/ Dr. Alfonso 2010, Left microdisc w/ Dr. Echeverria 2008 p/w urinary retention x3 days now s/p mora also found to have hypokalemia and cervicalgia    #HTN  -Continue  hydral to 50 mg TID     #urinary retention.   -Unclear etiology of urinary retention. CT abd/pelvis w/o clear etiology  -UCx NGTD  -mora catheter  -Med fu     #Hypokalemia.   -likely in setting of taking HCTZ  -K improved s/p repletion  -Continue to hold HCTZ    #cervicalgia  -work up per neurosx     #AMS  -MRI no acute findings   -W/u per med, neuro             Echo 7/24/20: Hyperdynamic lv fxn EF 75%  Echo 2/6/23: Hyperdynamic LV fxn ef 79%  ctA CORS 2/22/23: w/o evidence of obstructive CAD    A/P  78F Hx DM, Ovarian CA s/p chemo, HTN, known b/l foot drop,  s/p Right L4-5 lami/facet/discectomy w/ Dr. Alfonso 2010, Left microdisc w/ Dr. Echeverria 2008 p/w urinary retention x3 days now s/p mora also found to have hypokalemia and cervicalgia    #HTN  -Continue  hydral 50 mg TID     #urinary retention.   -Unclear etiology of urinary retention. CT abd/pelvis w/o clear etiology  -UCx NGTD  -mora catheter  -Med fu     #Hypokalemia.   -likely in setting of taking HCTZ  -K improved s/p repletion  -Continue to hold HCTZ    #cervicalgia  -work up per neurosx     #AMS  -MRI no acute findings   -W/u per med, neuro

## 2024-01-15 NOTE — PROGRESS NOTE ADULT - ASSESSMENT
78F no ACAP Hx DM, Ovarian CA ~19 yrs ago s/p chemo, HTN, known b/l foot drop, walked with a walker at baseline. RLE radic s/p Right L4-5 lami/facet/discectomy w/ Dr. Alfonso 2010, Left microdisc w/ Dr. Echeverria 2008 p/w urinary retention x3 days now s/p mora also found to have hypokalemia and cervicalgia    Urinary retention.   - CT abd/pelvis w/o clear etiology, no Ovarian CA  - Cont. mora catheter  - F/u UCx  - TOV    Hypokalemia.   - in setting of taking HCTZ. improved s/p repletion  - Trend BMP, Mg  - Holding HCTZ    Cervicalgia.   - neurosurgery follow  - MRI C-spine noted  - Falls precautions  - PT consult  - refuses C3-C4 laminectomy and C3-C5 stabilization and fusion.    Diabetes mellitus.   - On multiple diabetes meds, denies insulin  - Fingersticks and sliding scale  - A1c.  - ADA diet    Anxiety.   - ISTOP reviewed Reference #: [id number]  - Cont. ativan 0.5mg QHS PRN.  - Psychiatry follow    Essential hypertension.   - Trend BP  - Cont. Hydralazine BID  - Holding HCTZ for now given electrolyte disturbance.  - Cardiology follow    COPD  - Pulmonary evaluation    Prophylactic measure.   - DVT PPx  -Hep subq.    PT    DCP rehab in progress     d/w patient and      Cy Horne MD phone [phone]

## 2024-01-16 LAB
ANION GAP SERPL CALC-SCNC: 13 MMOL/L — SIGNIFICANT CHANGE UP (ref 5–17)
BUN SERPL-MCNC: 32 MG/DL — HIGH (ref 7–23)
CALCIUM SERPL-MCNC: 9.7 MG/DL — SIGNIFICANT CHANGE UP (ref 8.4–10.5)
CHLORIDE SERPL-SCNC: 93 MMOL/L — LOW (ref 96–108)
CO2 SERPL-SCNC: 21 MMOL/L — LOW (ref 22–31)
CREAT SERPL-MCNC: 0.9 MG/DL — SIGNIFICANT CHANGE UP (ref 0.5–1.3)
EGFR: 66 ML/MIN/1.73M2 — SIGNIFICANT CHANGE UP
GLUCOSE BLDC GLUCOMTR-MCNC: 127 MG/DL — HIGH (ref 70–99)
GLUCOSE BLDC GLUCOMTR-MCNC: 135 MG/DL — HIGH (ref 70–99)
GLUCOSE BLDC GLUCOMTR-MCNC: 147 MG/DL — HIGH (ref 70–99)
GLUCOSE BLDC GLUCOMTR-MCNC: 158 MG/DL — HIGH (ref 70–99)
GLUCOSE SERPL-MCNC: 135 MG/DL — HIGH (ref 70–99)
POTASSIUM SERPL-MCNC: 4 MMOL/L — SIGNIFICANT CHANGE UP (ref 3.5–5.3)
POTASSIUM SERPL-SCNC: 4 MMOL/L — SIGNIFICANT CHANGE UP (ref 3.5–5.3)
SODIUM SERPL-SCNC: 127 MMOL/L — LOW (ref 135–145)

## 2024-01-16 PROCEDURE — 99231 SBSQ HOSP IP/OBS SF/LOW 25: CPT

## 2024-01-16 RX ADMIN — Medication 1: at 12:28

## 2024-01-16 RX ADMIN — QUETIAPINE FUMARATE 25 MILLIGRAM(S): 200 TABLET, FILM COATED ORAL at 21:04

## 2024-01-16 RX ADMIN — SENNA PLUS 2 TABLET(S): 8.6 TABLET ORAL at 21:04

## 2024-01-16 RX ADMIN — Medication 50 MILLIGRAM(S): at 21:04

## 2024-01-16 RX ADMIN — LATANOPROST 1 DROP(S): 0.05 SOLUTION/ DROPS OPHTHALMIC; TOPICAL at 21:08

## 2024-01-16 RX ADMIN — BUDESONIDE AND FORMOTEROL FUMARATE DIHYDRATE 2 PUFF(S): 160; 4.5 AEROSOL RESPIRATORY (INHALATION) at 05:48

## 2024-01-16 RX ADMIN — Medication 50 MILLIGRAM(S): at 14:11

## 2024-01-16 RX ADMIN — Medication 3 MILLIGRAM(S): at 21:08

## 2024-01-16 RX ADMIN — Medication 50 MILLIGRAM(S): at 05:48

## 2024-01-16 RX ADMIN — PANTOPRAZOLE SODIUM 40 MILLIGRAM(S): 20 TABLET, DELAYED RELEASE ORAL at 05:48

## 2024-01-16 RX ADMIN — BUDESONIDE AND FORMOTEROL FUMARATE DIHYDRATE 2 PUFF(S): 160; 4.5 AEROSOL RESPIRATORY (INHALATION) at 17:31

## 2024-01-16 RX ADMIN — MONTELUKAST 10 MILLIGRAM(S): 4 TABLET, CHEWABLE ORAL at 21:04

## 2024-01-16 RX ADMIN — ENOXAPARIN SODIUM 40 MILLIGRAM(S): 100 INJECTION SUBCUTANEOUS at 17:30

## 2024-01-16 NOTE — PROGRESS NOTE ADULT - ASSESSMENT
78F no ACAP Hx DM, Ovarian CA ~19 yrs ago s/p chemo, HTN, known b/l foot drop, walked with a walker at baseline. RLE radic s/p Right L4-5 lami/facet/discectomy w/ Dr. Alfonso 2010, Left microdisc w/ Dr. Echeverria 2008 p/w urinary retention x3 days now s/p mora also found to have hypokalemia and cervicalgia    Urinary retention.   - CT abd/pelvis w/o clear etiology, no Ovarian CA  - Cont. mora catheter  - F/u UCx  - TOV    Hypokalemia.   - in setting of taking HCTZ. improved s/p repletion  - Trend BMP, Mg  - Holding HCTZ    Cervicalgia.   - neurosurgery follow  - MRI C-spine noted  - Falls precautions  - PT consult  - refuses C3-C4 laminectomy and C3-C5 stabilization and fusion.    Diabetes mellitus.   - On multiple diabetes meds, denies insulin  - Fingersticks and sliding scale  - A1c.  - ADA diet    Anxiety.   - ISTOP reviewed Reference #: [id number]  - Cont. ativan 0.5mg QHS PRN.  - Psychiatry follow    Essential hypertension.   - Trend BP  - Cont. Hydralazine BID  - Holding HCTZ for now given electrolyte disturbance.  - Cardiology follow    COPD  - Pulmonary evaluation    Prophylactic measure.   - DVT PPx  -Hep subq.    PT    DCP rehab in progress     d/w patient and ACP    Cy Horne MD phone [phone]

## 2024-01-16 NOTE — BH CONSULTATION LIAISON PROGRESS NOTE - NSBHFUPINTERVALHXFT_PSY_A_CORE
pt poor historian, denies complaints. pt denies depression, anxiety, psychosis. denies si/hi. pt currently calm, eating pudding. no prns given.

## 2024-01-16 NOTE — BH CONSULTATION LIAISON PROGRESS NOTE - NSBHCHARTREVIEWLAB_PSY_A_CORE FT
01-16    127<L>  |  93<L>  |  32<H>  ----------------------------<  135<H>  4.0   |  21<L>  |  0.90    Ca    9.7      16 Jan 2024 07:24

## 2024-01-16 NOTE — BH CONSULTATION LIAISON PROGRESS NOTE - NSBHCHARTREVIEWVS_PSY_A_CORE FT
Vital Signs Last 24 Hrs  T(C): 36.8 (16 Jan 2024 07:20), Max: 37.3 (16 Jan 2024 04:03)  T(F): 98.3 (16 Jan 2024 07:20), Max: 99.2 (16 Jan 2024 04:03)  HR: 92 (16 Jan 2024 07:20) (75 - 92)  BP: 149/76 (16 Jan 2024 07:20) (107/67 - 149/76)  BP(mean): --  RR: 18 (16 Jan 2024 07:20) (18 - 18)  SpO2: 95% (16 Jan 2024 07:20) (95% - 96%)    Parameters below as of 16 Jan 2024 07:20  Patient On (Oxygen Delivery Method): room air

## 2024-01-16 NOTE — BH CONSULTATION LIAISON PROGRESS NOTE - NSBHASSESSMENTFT_PSY_ALL_CORE
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 Psychiatric consult requested by daughter due to new confusion and concern for worsening anxiety. She has been unable to sleep at night, displayed impaired attention and unable to answer all questions.   Discussed with daughter, to hold on Ativan given potential contribution to confusion and start on Seroquel 25 mg po qhs  deliirum dementia bipolar d/o

## 2024-01-16 NOTE — PROGRESS NOTE ADULT - ASSESSMENT
Echo 7/24/20: Hyperdynamic lv fxn EF 75%  Echo 2/6/23: Hyperdynamic LV fxn ef 79%  ctA CORS 2/22/23: w/o evidence of obstructive CAD    A/P  78F Hx DM, Ovarian CA s/p chemo, HTN, known b/l foot drop,  s/p Right L4-5 lami/facet/discectomy w/ Dr. Alfonso 2010, Left microdisc w/ Dr. Echeverria 2008 p/w urinary retention x3 days now s/p mora also found to have hypokalemia and cervicalgia      #HTN  -BP elevated - increase hydral to 50 mg TID   -BB listed as allx noted       #urinary retention.   -Unclear etiology of urinary retention. CT abd/pelvis w/o clear etiology  -UCx NGTD  -mora catheter  -Med fu     #Hypokalemia.   -likely in setting of taking HCTZ  -K improved s/p repletion  -Holding HCTZ    #cervicalgia  -work up per neurosx     #AMS  -MRI no acute findings   -W/u per med, neuro        35 minutes spent on total encounter; more than 50% of the visit was spent counseling and/or coordinating care by the attending physician.

## 2024-01-16 NOTE — BH CONSULTATION LIAISON PROGRESS NOTE - CURRENT MEDICATION
MEDICATIONS  (STANDING):  budesonide 160 MICROgram(s)/formoterol 4.5 MICROgram(s) Inhaler 2 Puff(s) Inhalation two times a day  dextrose 5%. 1000 milliLiter(s) (50 mL/Hr) IV Continuous <Continuous>  dextrose 5%. 1000 milliLiter(s) (100 mL/Hr) IV Continuous <Continuous>  dextrose 50% Injectable 25 Gram(s) IV Push once  dextrose 50% Injectable 12.5 Gram(s) IV Push once  dextrose 50% Injectable 25 Gram(s) IV Push once  enoxaparin Injectable 40 milliGRAM(s) SubCutaneous every 24 hours  glucagon  Injectable 1 milliGRAM(s) IntraMuscular once  hydrALAZINE 50 milliGRAM(s) Oral three times a day  insulin lispro (ADMELOG) corrective regimen sliding scale   SubCutaneous at bedtime  insulin lispro (ADMELOG) corrective regimen sliding scale   SubCutaneous three times a day before meals  latanoprost 0.005% Ophthalmic Solution 1 Drop(s) Both EYES at bedtime  montelukast 10 milliGRAM(s) Oral at bedtime  pantoprazole    Tablet 40 milliGRAM(s) Oral before breakfast  QUEtiapine 25 milliGRAM(s) Oral at bedtime  senna 2 Tablet(s) Oral at bedtime    MEDICATIONS  (PRN):  acetaminophen     Tablet .. 650 milliGRAM(s) Oral every 6 hours PRN Temp greater or equal to 38C (100.4F), Mild Pain (1 - 3)  bisacodyl 5 milliGRAM(s) Oral once PRN Constipation  dextrose Oral Gel 15 Gram(s) Oral once PRN Blood Glucose LESS THAN 70 milliGRAM(s)/deciliter  melatonin 3 milliGRAM(s) Oral at bedtime PRN Insomnia

## 2024-01-17 LAB
ANION GAP SERPL CALC-SCNC: 13 MMOL/L — SIGNIFICANT CHANGE UP (ref 5–17)
BUN SERPL-MCNC: 25 MG/DL — HIGH (ref 7–23)
CALCIUM SERPL-MCNC: 9.6 MG/DL — SIGNIFICANT CHANGE UP (ref 8.4–10.5)
CHLORIDE SERPL-SCNC: 93 MMOL/L — LOW (ref 96–108)
CO2 SERPL-SCNC: 23 MMOL/L — SIGNIFICANT CHANGE UP (ref 22–31)
CREAT SERPL-MCNC: 0.71 MG/DL — SIGNIFICANT CHANGE UP (ref 0.5–1.3)
EGFR: 88 ML/MIN/1.73M2 — SIGNIFICANT CHANGE UP
GLUCOSE BLDC GLUCOMTR-MCNC: 122 MG/DL — HIGH (ref 70–99)
GLUCOSE BLDC GLUCOMTR-MCNC: 143 MG/DL — HIGH (ref 70–99)
GLUCOSE BLDC GLUCOMTR-MCNC: 144 MG/DL — HIGH (ref 70–99)
GLUCOSE BLDC GLUCOMTR-MCNC: 156 MG/DL — HIGH (ref 70–99)
GLUCOSE SERPL-MCNC: 133 MG/DL — HIGH (ref 70–99)
HCT VFR BLD CALC: 34 % — LOW (ref 34.5–45)
HGB BLD-MCNC: 11.5 G/DL — SIGNIFICANT CHANGE UP (ref 11.5–15.5)
MCHC RBC-ENTMCNC: 29.6 PG — SIGNIFICANT CHANGE UP (ref 27–34)
MCHC RBC-ENTMCNC: 33.8 GM/DL — SIGNIFICANT CHANGE UP (ref 32–36)
MCV RBC AUTO: 87.4 FL — SIGNIFICANT CHANGE UP (ref 80–100)
NRBC # BLD: 0 /100 WBCS — SIGNIFICANT CHANGE UP (ref 0–0)
PLATELET # BLD AUTO: 276 K/UL — SIGNIFICANT CHANGE UP (ref 150–400)
POTASSIUM SERPL-MCNC: 4 MMOL/L — SIGNIFICANT CHANGE UP (ref 3.5–5.3)
POTASSIUM SERPL-SCNC: 4 MMOL/L — SIGNIFICANT CHANGE UP (ref 3.5–5.3)
RBC # BLD: 3.89 M/UL — SIGNIFICANT CHANGE UP (ref 3.8–5.2)
RBC # FLD: 13.1 % — SIGNIFICANT CHANGE UP (ref 10.3–14.5)
SARS-COV-2 RNA SPEC QL NAA+PROBE: SIGNIFICANT CHANGE UP
SODIUM SERPL-SCNC: 129 MMOL/L — LOW (ref 135–145)
WBC # BLD: 7.12 K/UL — SIGNIFICANT CHANGE UP (ref 3.8–10.5)
WBC # FLD AUTO: 7.12 K/UL — SIGNIFICANT CHANGE UP (ref 3.8–10.5)

## 2024-01-17 RX ORDER — POLYETHYLENE GLYCOL 3350 17 G/17G
17 POWDER, FOR SOLUTION ORAL DAILY
Refills: 0 | Status: DISCONTINUED | OUTPATIENT
Start: 2024-01-17 | End: 2024-01-18

## 2024-01-17 RX ADMIN — MONTELUKAST 10 MILLIGRAM(S): 4 TABLET, CHEWABLE ORAL at 21:50

## 2024-01-17 RX ADMIN — QUETIAPINE FUMARATE 25 MILLIGRAM(S): 200 TABLET, FILM COATED ORAL at 21:49

## 2024-01-17 RX ADMIN — Medication 50 MILLIGRAM(S): at 21:49

## 2024-01-17 RX ADMIN — BUDESONIDE AND FORMOTEROL FUMARATE DIHYDRATE 2 PUFF(S): 160; 4.5 AEROSOL RESPIRATORY (INHALATION) at 05:24

## 2024-01-17 RX ADMIN — Medication 5 MILLIGRAM(S): at 17:28

## 2024-01-17 RX ADMIN — Medication 50 MILLIGRAM(S): at 13:49

## 2024-01-17 RX ADMIN — Medication 1: at 11:57

## 2024-01-17 RX ADMIN — PANTOPRAZOLE SODIUM 40 MILLIGRAM(S): 20 TABLET, DELAYED RELEASE ORAL at 05:24

## 2024-01-17 RX ADMIN — ENOXAPARIN SODIUM 40 MILLIGRAM(S): 100 INJECTION SUBCUTANEOUS at 17:26

## 2024-01-17 RX ADMIN — BUDESONIDE AND FORMOTEROL FUMARATE DIHYDRATE 2 PUFF(S): 160; 4.5 AEROSOL RESPIRATORY (INHALATION) at 17:26

## 2024-01-17 RX ADMIN — LATANOPROST 1 DROP(S): 0.05 SOLUTION/ DROPS OPHTHALMIC; TOPICAL at 21:50

## 2024-01-17 RX ADMIN — Medication 50 MILLIGRAM(S): at 05:24

## 2024-01-17 RX ADMIN — SENNA PLUS 2 TABLET(S): 8.6 TABLET ORAL at 23:53

## 2024-01-17 RX ADMIN — Medication 3 MILLIGRAM(S): at 21:50

## 2024-01-17 NOTE — PROGRESS NOTE ADULT - ASSESSMENT
78F no ACAP Hx DM, Ovarian CA ~19 yrs ago s/p chemo, HTN, known b/l foot drop, walked with a walker at baseline. RLE radic s/p Right L4-5 lami/facet/discectomy w/ Dr. Alfonso 2010, Left microdisc w/ Dr. Echeverria 2008 p/w urinary retention x3 days now s/p mora also found to have hypokalemia and cervicalgia    Urinary retention.   - CT abd/pelvis w/o clear etiology, no Ovarian CA  - Cont. mora catheter  - F/u UCx  - TOV    Hypokalemia.   - in setting of taking HCTZ. improved s/p repletion  - Trend BMP, Mg  - Holding HCTZ    Cervicalgia.   - neurosurgery follow  - MRI C-spine noted  - Falls precautions  - PT consult  - refuses C3-C4 laminectomy and C3-C5 stabilization and fusion.    Diabetes mellitus.   - On multiple diabetes meds, denies insulin  - Fingersticks and sliding scale  - A1c.  - ADA diet    Anxiety.   - ISTOP reviewed Reference #: [id number]  - Cont. ativan 0.5mg QHS PRN.  - Psychiatry follow    Essential hypertension.   - Trend BP  - Cont. Hydralazine BID  - Holding HCTZ for now given electrolyte disturbance.  - Cardiology follow    COPD  - Pulmonary evaluation    Constipation  - bowel regimen  - enema     Prophylactic measure.   - DVT PPx  -Hep subq.    PT    DCP rehab in progress Awaiting auth.     d/w patient and ACP    Cy Horne MD phone [phone]

## 2024-01-17 NOTE — PROGRESS NOTE ADULT - ASSESSMENT
Echo 7/24/20: Hyperdynamic lv fxn EF 75%  Echo 2/6/23: Hyperdynamic LV fxn ef 79%  ctA CORS 2/22/23: w/o evidence of obstructive CAD    A/P  78F Hx DM, Ovarian CA s/p chemo, HTN, known b/l foot drop,  s/p Right L4-5 lami/facet/discectomy w/ Dr. Alfonso 2010, Left microdisc w/ Dr. Echeverria 2008 p/w urinary retention x3 days now s/p mora also found to have hypokalemia and cervicalgia    #HTN  -Continue hydralazine    #urinary retention.   -Unclear etiology of urinary retention. CT abd/pelvis w/o clear etiology  -UCx NGTD  -mora catheter  -Med fu     #Hypokalemia.   -likely in setting of taking HCTZ  -K improved s/p repletion  -Holding HCTZ    #cervicalgia  -work up per neurosx     #AMS  -Now improved   -MRI no acute findings   -W/u per med, neuro

## 2024-01-17 NOTE — PROGRESS NOTE ADULT - TIME BILLING
agree with above ACP note.  cv stable   mri noted  cont current tx
Agree with above ACP note.  cv stable  cont bp control   MRI noted
Patient seen and examined.  Agree with above ACP note.  cv stable  cont bp control   await mri  d/w dtr at bedside
Agree with above ACP note.  cv stable  cont bp control   MRI noted
Agree with above ACP note.  cv stable  cont bp control   MRI noted
Patient seen and examined.  Agree with plan for MRI brain and L-spine.  Patient and family are not interested in surgery for cervical spine at present.  Recommend PT evaluation.
agree with above ACP note.  cv stable   mri noted  cont current tx

## 2024-01-18 ENCOUNTER — TRANSCRIPTION ENCOUNTER (OUTPATIENT)
Age: 79
End: 2024-01-18

## 2024-01-18 VITALS
DIASTOLIC BLOOD PRESSURE: 77 MMHG | OXYGEN SATURATION: 97 % | HEART RATE: 92 BPM | SYSTOLIC BLOOD PRESSURE: 144 MMHG | TEMPERATURE: 98 F | RESPIRATION RATE: 18 BRPM

## 2024-01-18 LAB
GLUCOSE BLDC GLUCOMTR-MCNC: 139 MG/DL — HIGH (ref 70–99)
GLUCOSE BLDC GLUCOMTR-MCNC: 171 MG/DL — HIGH (ref 70–99)

## 2024-01-18 PROCEDURE — 97535 SELF CARE MNGMENT TRAINING: CPT

## 2024-01-18 PROCEDURE — 85610 PROTHROMBIN TIME: CPT

## 2024-01-18 PROCEDURE — 72125 CT NECK SPINE W/O DYE: CPT | Mod: MA

## 2024-01-18 PROCEDURE — 87635 SARS-COV-2 COVID-19 AMP PRB: CPT

## 2024-01-18 PROCEDURE — 97165 OT EVAL LOW COMPLEX 30 MIN: CPT

## 2024-01-18 PROCEDURE — 82962 GLUCOSE BLOOD TEST: CPT

## 2024-01-18 PROCEDURE — 97116 GAIT TRAINING THERAPY: CPT

## 2024-01-18 PROCEDURE — 85027 COMPLETE CBC AUTOMATED: CPT

## 2024-01-18 PROCEDURE — 86901 BLOOD TYPING SEROLOGIC RH(D): CPT

## 2024-01-18 PROCEDURE — 36415 COLL VENOUS BLD VENIPUNCTURE: CPT

## 2024-01-18 PROCEDURE — 86900 BLOOD TYPING SEROLOGIC ABO: CPT

## 2024-01-18 PROCEDURE — 86850 RBC ANTIBODY SCREEN: CPT

## 2024-01-18 PROCEDURE — 87086 URINE CULTURE/COLONY COUNT: CPT

## 2024-01-18 PROCEDURE — 99285 EMERGENCY DEPT VISIT HI MDM: CPT | Mod: 25

## 2024-01-18 PROCEDURE — 81003 URINALYSIS AUTO W/O SCOPE: CPT

## 2024-01-18 PROCEDURE — 80048 BASIC METABOLIC PNL TOTAL CA: CPT

## 2024-01-18 PROCEDURE — A9585: CPT

## 2024-01-18 PROCEDURE — 80053 COMPREHEN METABOLIC PANEL: CPT

## 2024-01-18 PROCEDURE — 85730 THROMBOPLASTIN TIME PARTIAL: CPT

## 2024-01-18 PROCEDURE — 72158 MRI LUMBAR SPINE W/O & W/DYE: CPT

## 2024-01-18 PROCEDURE — 97110 THERAPEUTIC EXERCISES: CPT

## 2024-01-18 PROCEDURE — 83735 ASSAY OF MAGNESIUM: CPT

## 2024-01-18 PROCEDURE — 96374 THER/PROPH/DIAG INJ IV PUSH: CPT

## 2024-01-18 PROCEDURE — 97161 PT EVAL LOW COMPLEX 20 MIN: CPT

## 2024-01-18 PROCEDURE — 85025 COMPLETE CBC W/AUTO DIFF WBC: CPT

## 2024-01-18 PROCEDURE — 94640 AIRWAY INHALATION TREATMENT: CPT

## 2024-01-18 PROCEDURE — 97530 THERAPEUTIC ACTIVITIES: CPT

## 2024-01-18 PROCEDURE — 70553 MRI BRAIN STEM W/O & W/DYE: CPT

## 2024-01-18 PROCEDURE — 83036 HEMOGLOBIN GLYCOSYLATED A1C: CPT

## 2024-01-18 PROCEDURE — 74177 CT ABD & PELVIS W/CONTRAST: CPT | Mod: MA

## 2024-01-18 PROCEDURE — 72156 MRI NECK SPINE W/O & W/DYE: CPT | Mod: MA

## 2024-01-18 RX ORDER — HYDRALAZINE HCL 50 MG
1 TABLET ORAL
Refills: 0 | DISCHARGE

## 2024-01-18 RX ORDER — PANTOPRAZOLE SODIUM 20 MG/1
1 TABLET, DELAYED RELEASE ORAL
Qty: 0 | Refills: 0 | DISCHARGE
Start: 2024-01-18

## 2024-01-18 RX ORDER — ACETAMINOPHEN 500 MG
2 TABLET ORAL
Qty: 0 | Refills: 0 | DISCHARGE
Start: 2024-01-18

## 2024-01-18 RX ORDER — HYDROCHLOROTHIAZIDE 25 MG
1 TABLET ORAL
Refills: 0 | DISCHARGE

## 2024-01-18 RX ORDER — PANTOPRAZOLE SODIUM 20 MG/1
1 TABLET, DELAYED RELEASE ORAL
Refills: 0 | DISCHARGE

## 2024-01-18 RX ORDER — HYDRALAZINE HCL 50 MG
1 TABLET ORAL
Qty: 0 | Refills: 0 | DISCHARGE
Start: 2024-01-18

## 2024-01-18 RX ORDER — POLYETHYLENE GLYCOL 3350 17 G/17G
17 POWDER, FOR SOLUTION ORAL
Qty: 0 | Refills: 0 | DISCHARGE
Start: 2024-01-18

## 2024-01-18 RX ORDER — QUETIAPINE FUMARATE 200 MG/1
1 TABLET, FILM COATED ORAL
Qty: 0 | Refills: 0 | DISCHARGE
Start: 2024-01-18

## 2024-01-18 RX ADMIN — POLYETHYLENE GLYCOL 3350 17 GRAM(S): 17 POWDER, FOR SOLUTION ORAL at 11:30

## 2024-01-18 RX ADMIN — Medication 50 MILLIGRAM(S): at 05:09

## 2024-01-18 RX ADMIN — Medication 50 MILLIGRAM(S): at 14:01

## 2024-01-18 RX ADMIN — BUDESONIDE AND FORMOTEROL FUMARATE DIHYDRATE 2 PUFF(S): 160; 4.5 AEROSOL RESPIRATORY (INHALATION) at 05:09

## 2024-01-18 RX ADMIN — PANTOPRAZOLE SODIUM 40 MILLIGRAM(S): 20 TABLET, DELAYED RELEASE ORAL at 05:09

## 2024-01-18 RX ADMIN — Medication 1: at 12:44

## 2024-01-18 RX ADMIN — Medication 1 ENEMA: at 11:32

## 2024-01-18 NOTE — PROGRESS NOTE ADULT - ASSESSMENT
78F no ACAP Hx DM, Ovarian CA ~19 yrs ago s/p chemo, HTN, known b/l foot drop, walked with a walker at baseline. RLE radic s/p Right L4-5 lami/facet/discectomy w/ Dr. Alfonso 2010, Left microdisc w/ Dr. Echeverria 2008 p/w urinary retention x3 days now s/p mora also found to have hypokalemia and cervicalgia    Urinary retention.   - CT abd/pelvis w/o clear etiology, no Ovarian CA  - TOV    Hypokalemia.   - in setting of taking HCTZ. improved s/p repletion  - Trend BMP, Mg  - Holding HCTZ    Cervicalgia.   - neurosurgery follow  - MRI C-spine noted  - Falls precautions  - PT consult  - refuses C3-C4 laminectomy and C3-C5 stabilization and fusion.    Diabetes mellitus.   - On multiple diabetes meds, denies insulin  - Fingersticks and sliding scale  - A1c.  - ADA diet    Anxiety.   - ISTOP reviewed Reference #: [id number]  - Cont. ativan 0.5mg QHS PRN.  - Psychiatry follow    Essential hypertension.   - Trend BP  - Cont. Hydralazine BID  - Holding HCTZ for now given electrolyte disturbance.  - Cardiology follow    COPD  - Pulmonary evaluation    Constipation  - bowel regimen  - enema prn    Prophylactic measure.   - DVT PPx  -Hep subq.    PT    DCP rehab in progress Awaiting auth.     d/w patient and nursing     Cy Horne MD phone [phone]  78F no ACAP Hx DM, Ovarian CA ~19 yrs ago s/p chemo, HTN, known b/l foot drop, walked with a walker at baseline. RLE radic s/p Right L4-5 lami/facet/discectomy w/ Dr. Alfonso 2010, Left microdisc w/ Dr. Echeverria 2008 p/w urinary retention x3 days now s/p mora also found to have hypokalemia and cervicalgia    Urinary retention.   - CT abd/pelvis w/o clear etiology, no Ovarian CA  - TOV    Hypokalemia.   - in setting of taking HCTZ. improved s/p repletion  - Trend BMP, Mg  - Holding HCTZ    Cervicalgia.   - neurosurgery follow  - MRI C-spine noted  - Falls precautions  - PT consult  - refuses C3-C4 laminectomy and C3-C5 stabilization and fusion.    Diabetes mellitus.   - On multiple diabetes meds, denies insulin  - Fingersticks and sliding scale  - A1c.  - ADA diet    Anxiety.   - ISTOP reviewed Reference #: [id number]  - Cont. ativan 0.5mg QHS PRN.  - Psychiatry follow    Essential hypertension.   - Trend BP  - Cont. Hydralazine BID  - Holding HCTZ for now given electrolyte disturbance.  - Cardiology follow    COPD  - Pulmonary evaluation    Constipation  - bowel regimen  - enema prn    Prophylactic measure.   - DVT PPx  -Hep subq.    PT    DCP rehab in progress Awaiting auth.     d/w patient and nursing     I will be away 1/19-1/21/ 2024. Coverage by Dr. Josemanuel Glass [phone]    Cy Horne MD phone [phone]

## 2024-01-18 NOTE — DISCHARGE NOTE PROVIDER - CARE PROVIDER_API CALL
Ketan Alfonso  Neurosurgery  [address], [address]  [place] [address]  Phone: [phone]  Fax: [phone]  Follow Up Time:     Aldo Gonzales [place]ahkon  Pulmonary Disease  [address]  [place] [address]  Phone: [phone]  Fax: [phone]  Follow Up Time:

## 2024-01-18 NOTE — DISCHARGE NOTE PROVIDER - CARE PROVIDERS DIRECT ADDRESSES
[email or web address]

## 2024-01-18 NOTE — DISCHARGE NOTE PROVIDER - NSDCCPCAREPLAN_GEN_ALL_CORE_FT
PRINCIPAL DISCHARGE DIAGNOSIS  Diagnosis: Urinary retention  Assessment and Plan of Treatment: CT abd/pelvis with  No evidence of acute abnormality in the abdomen and pelvis. No hydronephrosis. No evidence of recurrent disease or suspicious adenopathy. Attempted to perform TOV, but failed, mora was reinserted on 1/12. Please follow up with Physican at rehab for trial of void.      SECONDARY DISCHARGE DIAGNOSES  Diagnosis: Cervicalgia  Assessment and Plan of Treatment: You were evaluated by Neurosx for cervicalgia (neck pain )->MRI C-spine demonstrates cervical spondylotic changes with spinal cord compression most severe at the C3-4 level where there are signal changes consistent with myelomalacia. Surgical and non-surgical treatment options discussed. Per neurosx consideration may be given to C3, C4 laminectomy and C3-C5 stabilization and fusion. RBA discussed. Pt  and family were  advised that she  may be at risk for SCI in the event of cervical spine trauma and may be at risk of progression of neurological  symptoms, which could become permanent.  The patient and family are not interested in surgery at present, the daughter would like the patient to go to Rehabilitation to see if she improves from a neurologic and cognitive standpoint. NeuroSx  advised against this due to risk of spinal cord injury. Pt and family perfers to get discharge to [facility].    Diagnosis: Hypokalemia  Assessment and Plan of Treatment: supplemented    Diagnosis: Anxiety  Assessment and Plan of Treatment: You were evaluated by [facility], who recommended to stop ativan given potential contribution to confusion  and start seroquel.

## 2024-01-18 NOTE — PROGRESS NOTE ADULT - ASSESSMENT
Echo 7/24/20: Hyperdynamic lv fxn EF 75%  Echo 2/6/23: Hyperdynamic LV fxn ef 79%  ctA CORS 2/22/23: w/o evidence of obstructive CAD    A/P  78F Hx DM, Ovarian CA s/p chemo, HTN, known b/l foot drop,  s/p Right L4-5 lami/facet/discectomy w/ Dr. Alfonso 2010, Left microdisc w/ Dr. Echeverria 2008 p/w urinary retention x3 days now s/p mora also found to have hypokalemia and cervicalgia    #HTN  -Continue hydralazine    #urinary retention.   -Unclear etiology of urinary retention. CT abd/pelvis w/o clear etiology  -UCx NGTD  -mora catheter  -Med fu     #Hypokalemia.   -likely in setting of taking HCTZ  -K improved s/p repletion  -Holding HCTZ    #cervicalgia  -work up per neurosx     #AMS  -Now improved   -MRI no acute findings   -W/u per med, neuro      dcp per med      35 minutes spent on total encounter; more than 50% of the visit was spent counseling and/or coordinating care by the attending physician.

## 2024-01-18 NOTE — DISCHARGE NOTE NURSING/CASE MANAGEMENT/SOCIAL WORK - PATIENT PORTAL LINK FT
You can access the FollowMyHealth Patient Portal offered by [facility] by registering at the following website: [email or web address]. By joining [facility]’s FollowMyHealth portal, you will also be able to view your health information using other applications (apps) compatible with our system.

## 2024-01-18 NOTE — PROGRESS NOTE ADULT - PROVIDER SPECIALTY LIST ADULT
Cardiology
Internal Medicine
Cardiology
Cardiology
Internal Medicine
Cardiology
Cardiology
Internal Medicine
Cardiology
Neurosurgery

## 2024-01-18 NOTE — DISCHARGE NOTE PROVIDER - NSDCMRMEDTOKEN_GEN_ALL_CORE_FT
acetaminophen 325 mg oral tablet: 2 tab(s) orally every 6 hours As needed Temp greater or equal to 38C (100.4F), Mild Pain (1 - 3)  hydrALAZINE 50 mg oral tablet: 1 tab(s) orally 3 times a day  pantoprazole 40 mg oral delayed release tablet: 1 tab(s) orally once a day (before a meal)  polyethylene glycol 3350 oral powder for reconstitution: 17 gram(s) orally once a day  QUEtiapine 25 mg oral tablet: 1 tab(s) orally once a day (at bedtime)  ramelteon 8 mg oral tablet: 1 tab(s) orally once a day (at bedtime)  semaglutide 0.5 mg/0.5 mL (0.5 mg dose) subcutaneous solution: 0.5 milligram(s) subcutaneously once a week  Singulair 10 mg oral tablet: 1 tab(s) orally once a day  Symbicort 160 mcg-4.5 mcg/inh inhalation aerosol: 2 puff(s) inhaled 2 times a day  Uzedy 75 mg/0.21 mL subcutaneous suspension, extended release: 75 milligram(s) subcutaneously every 4 weeks  Xalatan 0.005% ophthalmic solution: 1 drop(s) to each affected eye once a day (in the evening)

## 2024-01-18 NOTE — PROGRESS NOTE ADULT - REASON FOR ADMISSION
Urinary Retention

## 2024-01-18 NOTE — PROGRESS NOTE ADULT - SUBJECTIVE AND OBJECTIVE BOX
CARDIOLOGY FOLLOW UP - Dr. Abel  DATE OF SERVICE: 1/15/24    CC  No CV complaints     REVIEW OF SYSTEMS:  CONSTITUTIONAL: No fever, weight loss, or fatigue  RESPIRATORY: No cough, wheezing, chills or hemoptysis; No Shortness of Breath  CARDIOVASCULAR: No chest pain, palpitations, passing out, dizziness, or leg swelling  GASTROINTESTINAL: No abdominal or epigastric pain. No nausea, vomiting, or hematemesis; No diarrhea or constipation. No melena or hematochezia.  VASCULAR: No edema     PHYSICAL EXAM:  T(C): 37.6 (01-15-24 @ 07:30), Max: 37.6 (01-15-24 @ 07:30)  HR: 93 (01-15-24 @ 07:30) (93 - 102)  BP: 128/73 (01-15-24 @ 07:30) (112/76 - 171/93)  RR: 18 (01-15-24 @ 07:30) (18 - 18)  SpO2: 95% (01-15-24 @ 07:30) (95% - 96%)  Wt(kg): --  I&O's Summary    14 Jan 2024 07:01  -  15 Johnson 2024 07:00  --------------------------------------------------------  IN: 237 mL / OUT: 1450 mL / NET: -1213 mL        Appearance: Normal	  Cardiovascular: Normal S1 S2,RRR, No JVD, No murmurs  Respiratory: Lungs clear to auscultation b/l   Gastrointestinal:  Soft, Non-tender, + BS	  Extremities: Normal range of motion, No clubbing, cyanosis or edema      Home Medications:  hydrALAZINE 25 mg oral tablet: 1 tab(s) orally 2 times a day (09 Jan 2024 19:16)  hydroCHLOROthiazide 25 mg oral tablet: 1 tab(s) orally once a day (09 Jan 2024 19:16)  LORazepam 0.5 mg oral tablet: 1 tab(s) orally once a day (at bedtime) (09 Jan 2024 19:16)  pantoprazole 20 mg oral delayed release tablet: 1 tab(s) orally once a day (09 Jan 2024 19:16)  ramelteon 8 mg oral tablet: 1 tab(s) orally once a day (at bedtime) (09 Jan 2024 19:16)  semaglutide 0.5 mg/0.5 mL (0.5 mg dose) subcutaneous solution: 0.5 milligram(s) subcutaneously once a week (09 Jan 2024 19:16)  Singulair 10 mg oral tablet: 1 tab(s) orally once a day (09 Jan 2024 19:16)  Symbicort 160 mcg-4.5 mcg/inh inhalation aerosol: 2 puff(s) inhaled 2 times a day (09 Jan 2024 19:16)  Uzedy 75 mg/0.21 mL subcutaneous suspension, extended release: 75 milligram(s) subcutaneously every 4 weeks (09 Jan 2024 19:14)  Xalatan 0.005% ophthalmic solution: 1 drop(s) to each affected eye once a day (in the evening) (09 Jan 2024 19:16)      MEDICATIONS  (STANDING):  budesonide 160 MICROgram(s)/formoterol 4.5 MICROgram(s) Inhaler 2 Puff(s) Inhalation two times a day  dextrose 5%. 1000 milliLiter(s) (50 mL/Hr) IV Continuous <Continuous>  dextrose 5%. 1000 milliLiter(s) (100 mL/Hr) IV Continuous <Continuous>  dextrose 50% Injectable 12.5 Gram(s) IV Push once  dextrose 50% Injectable 25 Gram(s) IV Push once  dextrose 50% Injectable 25 Gram(s) IV Push once  enoxaparin Injectable 40 milliGRAM(s) SubCutaneous every 24 hours  glucagon  Injectable 1 milliGRAM(s) IntraMuscular once  hydrALAZINE 50 milliGRAM(s) Oral three times a day  insulin lispro (ADMELOG) corrective regimen sliding scale   SubCutaneous three times a day before meals  insulin lispro (ADMELOG) corrective regimen sliding scale   SubCutaneous at bedtime  latanoprost 0.005% Ophthalmic Solution 1 Drop(s) Both EYES at bedtime  montelukast 10 milliGRAM(s) Oral at bedtime  pantoprazole    Tablet 40 milliGRAM(s) Oral before breakfast  QUEtiapine 25 milliGRAM(s) Oral at bedtime  senna 2 Tablet(s) Oral at bedtime      TELEMETRY: 	    ECG:  	  RADIOLOGY:   DIAGNOSTIC TESTING:  [ ] Echocardiogram:  [ ]  Catheterization:  [ ] Stress Test:    OTHER: 	    LABS:	 	                            13.0   9.97  )-----------( 277      ( 14 Jan 2024 07:04 )             38.5     01-14    130<L>  |  94<L>  |  23  ----------------------------<  135<H>  4.1   |  23  |  0.76    Ca    9.8      14 Jan 2024 07:06              
CARDIOLOGY FOLLOW UP - Dr. Abel  Date of Service: 1/16/2024  CC: no events    Review of Systems:  Constitutional: No fever, weight loss, or fatigue  Respiratory: No cough, wheezing, or hemoptysis, no shortness of breath  Cardiovascular: No chest pain, palpitations, passing out, dizziness, or leg swelling  Gastrointestinal: No abd or epigastric pain. No nausea, vomiting, or hematemesis; no diarrhea or consiptaiton, no melena or hematochezia  Vascular: No edema     TELEMETRY:    PHYSICAL EXAM:  T(C): 36.8 (01-16-24 @ 07:20), Max: 37.3 (01-16-24 @ 04:03)  HR: 92 (01-16-24 @ 07:20) (75 - 92)  BP: 149/76 (01-16-24 @ 07:20) (107/67 - 149/76)  RR: 18 (01-16-24 @ 07:20) (18 - 18)  SpO2: 95% (01-16-24 @ 07:20) (95% - 96%)  Wt(kg): --  I&O's Summary    15 Johnson 2024 07:01  -  16 Jan 2024 07:00  --------------------------------------------------------  IN: 0 mL / OUT: 800 mL / NET: -800 mL        Appearance: Normal	  Cardiovascular: Normal S1 S2,RRR, No JVD, No murmurs  Respiratory: Lungs clear to auscultation	  Gastrointestinal:  Soft, Non-tender, + BS	  Extremities: Normal range of motion, No clubbing, cyanosis or edema  Vascular: Peripheral pulses palpable 2+ bilaterally       Home Medications:  hydrALAZINE 25 mg oral tablet: 1 tab(s) orally 2 times a day (09 Jan 2024 19:16)  hydroCHLOROthiazide 25 mg oral tablet: 1 tab(s) orally once a day (09 Jan 2024 19:16)  LORazepam 0.5 mg oral tablet: 1 tab(s) orally once a day (at bedtime) (09 Jan 2024 19:16)  pantoprazole 20 mg oral delayed release tablet: 1 tab(s) orally once a day (09 Jan 2024 19:16)  ramelteon 8 mg oral tablet: 1 tab(s) orally once a day (at bedtime) (09 Jan 2024 19:16)  semaglutide 0.5 mg/0.5 mL (0.5 mg dose) subcutaneous solution: 0.5 milligram(s) subcutaneously once a week (09 Jan 2024 19:16)  Singulair 10 mg oral tablet: 1 tab(s) orally once a day (09 Jan 2024 19:16)  Symbicort 160 mcg-4.5 mcg/inh inhalation aerosol: 2 puff(s) inhaled 2 times a day (09 Jan 2024 19:16)  Uzedy 75 mg/0.21 mL subcutaneous suspension, extended release: 75 milligram(s) subcutaneously every 4 weeks (09 Jan 2024 19:14)  Xalatan 0.005% ophthalmic solution: 1 drop(s) to each affected eye once a day (in the evening) (09 Jan 2024 19:16)        MEDICATIONS  (STANDING):  budesonide 160 MICROgram(s)/formoterol 4.5 MICROgram(s) Inhaler 2 Puff(s) Inhalation two times a day  dextrose 5%. 1000 milliLiter(s) (50 mL/Hr) IV Continuous <Continuous>  dextrose 5%. 1000 milliLiter(s) (100 mL/Hr) IV Continuous <Continuous>  dextrose 50% Injectable 12.5 Gram(s) IV Push once  dextrose 50% Injectable 25 Gram(s) IV Push once  dextrose 50% Injectable 25 Gram(s) IV Push once  enoxaparin Injectable 40 milliGRAM(s) SubCutaneous every 24 hours  glucagon  Injectable 1 milliGRAM(s) IntraMuscular once  hydrALAZINE 50 milliGRAM(s) Oral three times a day  insulin lispro (ADMELOG) corrective regimen sliding scale   SubCutaneous three times a day before meals  insulin lispro (ADMELOG) corrective regimen sliding scale   SubCutaneous at bedtime  latanoprost 0.005% Ophthalmic Solution 1 Drop(s) Both EYES at bedtime  montelukast 10 milliGRAM(s) Oral at bedtime  pantoprazole    Tablet 40 milliGRAM(s) Oral before breakfast  QUEtiapine 25 milliGRAM(s) Oral at bedtime  senna 2 Tablet(s) Oral at bedtime        EKG:  RADIOLOGY:  DIAGNOSTIC TESTING:  [ ] Echocardiogram:  [ ] Catherterization:  [ ] Stress Test:  OTHER:     LABS:	 	      01-16    127<L>  |  93<L>  |  32<H>  ----------------------------<  135<H>  4.0   |  21<L>  |  0.90    Ca    9.7      16 Jan 2024 07:24            CARDIAC MARKERS:                  
CARDIOLOGY FOLLOW UP NOTE - DR. HUIZAR    Patient Name: MATILDE CHENG    Date of Service: 01-18-24 @ 10:45    Patient seen and examined  no new complaints      Subjective:    cv: denies chest pain, dyspnea, palpitations, dizziness  pulmonary: denies cough  GI: denies abdominal pain, nausea, vomiting  vascular/legs: no edema   skin: no rash  ROS: otherwise negative   overnight events:      PHYSICAL EXAM:  T(C): 36.8 (01-18-24 @ 08:47), Max: 37.3 (01-17-24 @ 16:26)  HR: 92 (01-18-24 @ 08:47) (77 - 97)  BP: 144/77 (01-18-24 @ 08:47) (110/70 - 154/74)  RR: 18 (01-18-24 @ 08:47) (18 - 18)  SpO2: 97% (01-18-24 @ 08:47) (95% - 97%)  Wt(kg): --  I&O's Summary    17 Jan 2024 07:01  -  18 Jan 2024 07:00  --------------------------------------------------------  IN: 0 mL / OUT: 1375 mL / NET: -1375 mL      Daily     Daily     Appearance: Normal	  Cardiovascular: Normal S1 S2,RRR, No JVD, No murmurs  Respiratory: Lungs clear to auscultation	  Gastrointestinal:  Soft, Non-tender, + BS	  Extremities: Normal range of motion, No clubbing, cyanosis or edema      Home Medications:  hydrALAZINE 25 mg oral tablet: 1 tab(s) orally 2 times a day (09 Jan 2024 19:16)  hydroCHLOROthiazide 25 mg oral tablet: 1 tab(s) orally once a day (09 Jan 2024 19:16)  LORazepam 0.5 mg oral tablet: 1 tab(s) orally once a day (at bedtime) (09 Jan 2024 19:16)  pantoprazole 20 mg oral delayed release tablet: 1 tab(s) orally once a day (09 Jan 2024 19:16)  ramelteon 8 mg oral tablet: 1 tab(s) orally once a day (at bedtime) (09 Jan 2024 19:16)  semaglutide 0.5 mg/0.5 mL (0.5 mg dose) subcutaneous solution: 0.5 milligram(s) subcutaneously once a week (09 Jan 2024 19:16)  Singulair 10 mg oral tablet: 1 tab(s) orally once a day (09 Jan 2024 19:16)  Symbicort 160 mcg-4.5 mcg/inh inhalation aerosol: 2 puff(s) inhaled 2 times a day (09 Jan 2024 19:16)  Uzedy 75 mg/0.21 mL subcutaneous suspension, extended release: 75 milligram(s) subcutaneously every 4 weeks (09 Jan 2024 19:14)  Xalatan 0.005% ophthalmic solution: 1 drop(s) to each affected eye once a day (in the evening) (09 Jan 2024 19:16)      MEDICATIONS  (STANDING):  budesonide 160 MICROgram(s)/formoterol 4.5 MICROgram(s) Inhaler 2 Puff(s) Inhalation two times a day  dextrose 5%. 1000 milliLiter(s) (50 mL/Hr) IV Continuous <Continuous>  dextrose 5%. 1000 milliLiter(s) (100 mL/Hr) IV Continuous <Continuous>  dextrose 50% Injectable 25 Gram(s) IV Push once  dextrose 50% Injectable 12.5 Gram(s) IV Push once  dextrose 50% Injectable 25 Gram(s) IV Push once  enoxaparin Injectable 40 milliGRAM(s) SubCutaneous every 24 hours  glucagon  Injectable 1 milliGRAM(s) IntraMuscular once  hydrALAZINE 50 milliGRAM(s) Oral three times a day  insulin lispro (ADMELOG) corrective regimen sliding scale   SubCutaneous at bedtime  insulin lispro (ADMELOG) corrective regimen sliding scale   SubCutaneous three times a day before meals  latanoprost 0.005% Ophthalmic Solution 1 Drop(s) Both EYES at bedtime  montelukast 10 milliGRAM(s) Oral at bedtime  pantoprazole    Tablet 40 milliGRAM(s) Oral before breakfast  polyethylene glycol 3350 17 Gram(s) Oral daily  QUEtiapine 25 milliGRAM(s) Oral at bedtime  senna 2 Tablet(s) Oral at bedtime      TELEMETRY: 	    ECG:  	  RADIOLOGY:   DIAGNOSTIC TESTING:  [ ] Echocardiogram:  [ ] Catheterization:  [ ] Stress Test:    OTHER: 	    LABS:	 	    CARDIAC MARKERS:                                      11.5   7.12  )-----------( 276      ( 17 Jan 2024 06:55 )             34.0     01-17    129<L>  |  93<L>  |  25<H>  ----------------------------<  133<H>  4.0   |  23  |  0.71    Ca    9.6      17 Jan 2024 06:52      proBNP:     Lipid Profile:   HgA1c:     Creatinine: 0.71 mg/dL (01-17-24 @ 06:52)  Creatinine: 0.90 mg/dL (01-16-24 @ 07:24)            
Patient is a 78y old  Female who presents with a chief complaint of Urinary Retention (11 Jan 2024 13:37)      SUBJECTIVE / OVERNIGHT EVENTS: No new complaints. Daughter at bedside   Review of Systems  chest pain no  palpitations no  sob no  nausea no  headache no    MEDICATIONS  (STANDING):  budesonide 160 MICROgram(s)/formoterol 4.5 MICROgram(s) Inhaler 2 Puff(s) Inhalation two times a day  dextrose 5%. 1000 milliLiter(s) (50 mL/Hr) IV Continuous <Continuous>  dextrose 5%. 1000 milliLiter(s) (100 mL/Hr) IV Continuous <Continuous>  dextrose 50% Injectable 25 Gram(s) IV Push once  dextrose 50% Injectable 12.5 Gram(s) IV Push once  dextrose 50% Injectable 25 Gram(s) IV Push once  glucagon  Injectable 1 milliGRAM(s) IntraMuscular once  hydrALAZINE 25 milliGRAM(s) Oral two times a day  insulin lispro (ADMELOG) corrective regimen sliding scale   SubCutaneous at bedtime  insulin lispro (ADMELOG) corrective regimen sliding scale   SubCutaneous three times a day before meals  latanoprost 0.005% Ophthalmic Solution 1 Drop(s) Both EYES at bedtime  montelukast 10 milliGRAM(s) Oral at bedtime  pantoprazole    Tablet 40 milliGRAM(s) Oral before breakfast    MEDICATIONS  (PRN):  acetaminophen     Tablet .. 650 milliGRAM(s) Oral every 6 hours PRN Temp greater or equal to 38C (100.4F), Mild Pain (1 - 3)  dextrose Oral Gel 15 Gram(s) Oral once PRN Blood Glucose LESS THAN 70 milliGRAM(s)/deciliter  LORazepam     Tablet 0.5 milliGRAM(s) Oral at bedtime PRN Anxiety  melatonin 3 milliGRAM(s) Oral at bedtime PRN Insomnia      Vital Signs Last 24 Hrs  T(C): 36.9 (11 Jan 2024 21:08), Max: 36.9 (11 Jan 2024 21:08)  T(F): 98.5 (11 Jan 2024 21:08), Max: 98.5 (11 Jan 2024 21:08)  HR: 82 (11 Jan 2024 21:08) (82 - 94)  BP: 114/74 (11 Jan 2024 21:08) (111/67 - 137/66)  BP(mean): --  RR: 18 (11 Jan 2024 21:08) (18 - 18)  SpO2: 96% (11 Jan 2024 21:08) (94% - 100%)    Parameters below as of 11 Jan 2024 21:08  Patient On (Oxygen Delivery Method): room air        PHYSICAL EXAM:  GENERAL: NAD  HEAD:  Atraumatic, Normocephalic  EYES: EOMI, PERRLA, conjunctiva and sclera clear  NECK: Supple, No JVD  CHEST/LUNG: Clear to auscultation bilaterally; No wheeze  HEART: Regular rate and rhythm; No murmurs, rubs, or gallops  ABDOMEN: Soft, Nontender, Nondistended; Bowel sounds present  EXTREMITIES:  2+ Peripheral Pulses, No clubbing, cyanosis, or edema  PSYCH: AAOx3, anxious   NEUROLOGY: legs weakness R>L  SKIN: No rashes or lesions    LABS:                        11.9   7.57  )-----------( 314      ( 11 Jan 2024 07:19 )             34.7     01-11    131<L>  |  95<L>  |  16  ----------------------------<  119<H>  4.2   |  22  |  0.93    Ca    9.5      11 Jan 2024 07:16  Mg     1.7     01-10    TPro  7.2  /  Alb  3.8  /  TBili  0.4  /  DBili  x   /  AST  24  /  ALT  21  /  AlkPhos  79  01-10    PT/INR - ( 11 Jan 2024 07:18 )   PT: 11.4 sec;   INR: 1.04 ratio               Urinalysis Basic - ( 11 Jan 2024 07:16 )    Color: x / Appearance: x / SG: x / pH: x  Gluc: 119 mg/dL / Ketone: x  / Bili: x / Urobili: x   Blood: x / Protein: x / Nitrite: x   Leuk Esterase: x / RBC: x / WBC x   Sq Epi: x / Non Sq Epi: x / Bacteria: x        Culture - Urine (collected 09 Jan 2024 01:31)  Source: Clean Catch Clean Catch (Midstream)  Final Report (10 Johnson 2024 15:54):    No growth        RADIOLOGY & ADDITIONAL TESTS:    Imaging Personally Reviewed:    Consultant(s) Notes Reviewed:      Care Discussed with Consultants/Other Providers:  
Patient is a 78y old  Female who presents with a chief complaint of Urinary Retention (12 Jan 2024 13:17)      SUBJECTIVE / OVERNIGHT EVENTS: No new complaints. Daughter at bedside.   Review of Systems  chest pain no  palpitations no  sob no  nausea no  headache no    MEDICATIONS  (STANDING):  budesonide 160 MICROgram(s)/formoterol 4.5 MICROgram(s) Inhaler 2 Puff(s) Inhalation two times a day  dextrose 5%. 1000 milliLiter(s) (50 mL/Hr) IV Continuous <Continuous>  dextrose 5%. 1000 milliLiter(s) (100 mL/Hr) IV Continuous <Continuous>  dextrose 50% Injectable 12.5 Gram(s) IV Push once  dextrose 50% Injectable 25 Gram(s) IV Push once  dextrose 50% Injectable 25 Gram(s) IV Push once  glucagon  Injectable 1 milliGRAM(s) IntraMuscular once  hydrALAZINE 25 milliGRAM(s) Oral two times a day  insulin lispro (ADMELOG) corrective regimen sliding scale   SubCutaneous at bedtime  insulin lispro (ADMELOG) corrective regimen sliding scale   SubCutaneous three times a day before meals  latanoprost 0.005% Ophthalmic Solution 1 Drop(s) Both EYES at bedtime  montelukast 10 milliGRAM(s) Oral at bedtime  pantoprazole    Tablet 40 milliGRAM(s) Oral before breakfast  QUEtiapine 25 milliGRAM(s) Oral at bedtime    MEDICATIONS  (PRN):  acetaminophen     Tablet .. 650 milliGRAM(s) Oral every 6 hours PRN Temp greater or equal to 38C (100.4F), Mild Pain (1 - 3)  dextrose Oral Gel 15 Gram(s) Oral once PRN Blood Glucose LESS THAN 70 milliGRAM(s)/deciliter  melatonin 3 milliGRAM(s) Oral at bedtime PRN Insomnia      Vital Signs Last 24 Hrs  T(C): 36.8 (12 Jan 2024 16:30), Max: 36.8 (12 Jan 2024 16:30)  T(F): 98.2 (12 Jan 2024 16:30), Max: 98.2 (12 Jan 2024 16:30)  HR: 82 (12 Jan 2024 16:30) (81 - 94)  BP: 138/67 (12 Jan 2024 16:30) (138/67 - 149/80)  BP(mean): --  RR: 18 (12 Jan 2024 16:30) (18 - 18)  SpO2: 98% (12 Jan 2024 16:30) (96% - 99%)    Parameters below as of 12 Jan 2024 16:30  Patient On (Oxygen Delivery Method): room air        PHYSICAL EXAM:  GENERAL: NAD  HEAD:  Atraumatic, Normocephalic  EYES: EOMI, PERRLA, conjunctiva and sclera clear  NECK: Supple, No JVD  CHEST/LUNG: Clear to auscultation bilaterally; No wheeze  HEART: Regular rate and rhythm; No murmurs, rubs, or gallops  ABDOMEN: Soft, Nontender, Nondistended; Bowel sounds present  EXTREMITIES:  2+ Peripheral Pulses, No clubbing, cyanosis, or edema  PSYCH: AAOx2, anxious   NEUROLOGY: legs weakness R>L  SKIN: No rashes or lesions    LABS:                        11.9   7.57  )-----------( 314      ( 11 Jan 2024 07:19 )             34.7     01-11    131<L>  |  95<L>  |  16  ----------------------------<  119<H>  4.2   |  22  |  0.93    Ca    9.5      11 Jan 2024 07:16      PT/INR - ( 11 Jan 2024 07:18 )   PT: 11.4 sec;   INR: 1.04 ratio               Urinalysis Basic - ( 11 Jan 2024 07:16 )    Color: x / Appearance: x / SG: x / pH: x  Gluc: 119 mg/dL / Ketone: x  / Bili: x / Urobili: x   Blood: x / Protein: x / Nitrite: x   Leuk Esterase: x / RBC: x / WBC x   Sq Epi: x / Non Sq Epi: x / Bacteria: x          RADIOLOGY & ADDITIONAL TESTS:    Imaging Personally Reviewed:  < from: MR Head w/wo IV Cont (01.11.24 @ 16:52) >    IMPRESSION: Age-appropriate involutional and ischemic gliotic changes. No   other infarcts or hemorrhage. No masses or abnormal enhancement.    < end of copied text >  < from: MR Lumbar Spine w/wo IV Cont (01.11.24 @ 16:31) >  IMPRESSION: Degenerative spondylosis. Grade 1 anterolisthesis of L4 on L5   withmoderate to severe spinal stenosis. 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    < end of copied text >    Consultant(s) Notes Reviewed:      Care Discussed with Consultants/Other Providers:  
Patient seen and examined at bedside.    --Anticoagulation--    T(C): 36.7 (01-11-24 @ 04:30), Max: 36.8 (01-10-24 @ 23:29)  HR: 83 (01-11-24 @ 04:30) (83 - 88)  BP: 125/78 (01-11-24 @ 04:30) (111/67 - 135/71)  RR: 18 (01-11-24 @ 04:30) (18 - 18)  SpO2: 97% (01-11-24 @ 04:30) (94% - 97%)  Wt(kg): --    Exam:  known b/l 1-2/5 DFs. weak in triceps b/l. o/w 5/5 in all 4 ext. no hoffmans no clonus. 
CARDIOLOGY FOLLOW UP - Dr. Abel  DATE OF SERVICE: 1/12/24    CC  No CP, No SOB    REVIEW OF SYSTEMS:  CONSTITUTIONAL: No fever, weight loss, or fatigue  RESPIRATORY: No cough, wheezing, chills or hemoptysis; No Shortness of Breath  CARDIOVASCULAR: No chest pain, palpitations, passing out, dizziness, or leg swelling  GASTROINTESTINAL: No abdominal or epigastric pain. No nausea, vomiting, or hematemesis; No diarrhea or constipation. No melena or hematochezia.  VASCULAR: No edema     PHYSICAL EXAM:  T(C): 36.7 (01-12-24 @ 09:26), Max: 36.9 (01-11-24 @ 21:08)  HR: 94 (01-12-24 @ 10:40) (81 - 94)  BP: 147/87 (01-12-24 @ 10:40) (114/74 - 149/80)  RR: 18 (01-12-24 @ 09:26) (18 - 18)  SpO2: 99% (01-12-24 @ 10:40) (96% - 99%)  Wt(kg): --  I&O's Summary    11 Jan 2024 07:01  -  12 Jan 2024 07:00  --------------------------------------------------------  IN: 360 mL / OUT: 650 mL / NET: -290 mL        Appearance: Normal	  Cardiovascular: Normal S1 S2,RRR, No JVD, No murmurs  Respiratory: Lungs clear to auscultation b/l   Gastrointestinal:  Soft, Non-tender, + BS	  Extremities: Normal range of motion, No clubbing, cyanosis or edema      Home Medications:  hydrALAZINE 25 mg oral tablet: 1 tab(s) orally 2 times a day (09 Jan 2024 19:16)  hydroCHLOROthiazide 25 mg oral tablet: 1 tab(s) orally once a day (09 Jan 2024 19:16)  LORazepam 0.5 mg oral tablet: 1 tab(s) orally once a day (at bedtime) (09 Jan 2024 19:16)  pantoprazole 20 mg oral delayed release tablet: 1 tab(s) orally once a day (09 Jan 2024 19:16)  ramelteon 8 mg oral tablet: 1 tab(s) orally once a day (at bedtime) (09 Jan 2024 19:16)  semaglutide 0.5 mg/0.5 mL (0.5 mg dose) subcutaneous solution: 0.5 milligram(s) subcutaneously once a week (09 Jan 2024 19:16)  Singulair 10 mg oral tablet: 1 tab(s) orally once a day (09 Jan 2024 19:16)  Symbicort 160 mcg-4.5 mcg/inh inhalation aerosol: 2 puff(s) inhaled 2 times a day (09 Jan 2024 19:16)  Uzedy 75 mg/0.21 mL subcutaneous suspension, extended release: 75 milligram(s) subcutaneously every 4 weeks (09 Jan 2024 19:14)  Xalatan 0.005% ophthalmic solution: 1 drop(s) to each affected eye once a day (in the evening) (09 Jan 2024 19:16)      MEDICATIONS  (STANDING):  budesonide 160 MICROgram(s)/formoterol 4.5 MICROgram(s) Inhaler 2 Puff(s) Inhalation two times a day  dextrose 5%. 1000 milliLiter(s) (50 mL/Hr) IV Continuous <Continuous>  dextrose 5%. 1000 milliLiter(s) (100 mL/Hr) IV Continuous <Continuous>  dextrose 50% Injectable 25 Gram(s) IV Push once  dextrose 50% Injectable 25 Gram(s) IV Push once  dextrose 50% Injectable 12.5 Gram(s) IV Push once  glucagon  Injectable 1 milliGRAM(s) IntraMuscular once  hydrALAZINE 25 milliGRAM(s) Oral two times a day  insulin lispro (ADMELOG) corrective regimen sliding scale   SubCutaneous at bedtime  insulin lispro (ADMELOG) corrective regimen sliding scale   SubCutaneous three times a day before meals  latanoprost 0.005% Ophthalmic Solution 1 Drop(s) Both EYES at bedtime  montelukast 10 milliGRAM(s) Oral at bedtime  pantoprazole    Tablet 40 milliGRAM(s) Oral before breakfast      TELEMETRY: 	    ECG:  	  RADIOLOGY:   < from: MR Head w/wo IV Cont (01.11.24 @ 16:52) >  IMPRESSION: Age-appropriate involutional and ischemic gliotic changes. No   other infarcts or hemorrhage. No masses or abnormal enhancement.    --- End of Report ---    < end of copied text >    DIAGNOSTIC TESTING:  [ ] Echocardiogram:  [ ]  Catheterization:  [ ] Stress Test:    OTHER: 	    LABS:	 	                            11.9   7.57  )-----------( 314      ( 11 Jan 2024 07:19 )             34.7     01-11    131<L>  |  95<L>  |  16  ----------------------------<  119<H>  4.2   |  22  |  0.93    Ca    9.5      11 Jan 2024 07:16      PT/INR - ( 11 Jan 2024 07:18 )   PT: 11.4 sec;   INR: 1.04 ratio                 
CARDIOLOGY FOLLOW UP - Dr. Abel  DATE OF SERVICE: 1/17/24    CC  No CV complaints     REVIEW OF SYSTEMS:  CONSTITUTIONAL: No fever, weight loss, or fatigue  RESPIRATORY: No cough, wheezing, chills or hemoptysis; No Shortness of Breath  CARDIOVASCULAR: No chest pain, palpitations, passing out, dizziness, or leg swelling  GASTROINTESTINAL: No abdominal or epigastric pain. No nausea, vomiting, or hematemesis; No diarrhea or constipation. No melena or hematochezia.  VASCULAR: No edema     PHYSICAL EXAM:  T(C): 36.6 (01-17-24 @ 08:10), Max: 37.3 (01-17-24 @ 00:55)  HR: 71 (01-17-24 @ 08:10) (71 - 92)  BP: 99/57 (01-17-24 @ 08:10) (99/57 - 155/68)  RR: 18 (01-17-24 @ 08:10) (18 - 18)  SpO2: 97% (01-17-24 @ 08:10) (96% - 97%)  Wt(kg): --  I&O's Summary    16 Jan 2024 07:01  -  17 Jan 2024 07:00  --------------------------------------------------------  IN: 0 mL / OUT: 1050 mL / NET: -1050 mL        Appearance: Normal	  Cardiovascular: Normal S1 S2,RRR, No JVD, No murmurs  Respiratory: Lungs clear to auscultation b/l   Gastrointestinal:  Soft, Non-tender, + BS	  Extremities: Normal range of motion, No clubbing, cyanosis or edema      Home Medications:  hydrALAZINE 25 mg oral tablet: 1 tab(s) orally 2 times a day (09 Jan 2024 19:16)  hydroCHLOROthiazide 25 mg oral tablet: 1 tab(s) orally once a day (09 Jan 2024 19:16)  LORazepam 0.5 mg oral tablet: 1 tab(s) orally once a day (at bedtime) (09 Jan 2024 19:16)  pantoprazole 20 mg oral delayed release tablet: 1 tab(s) orally once a day (09 Jan 2024 19:16)  ramelteon 8 mg oral tablet: 1 tab(s) orally once a day (at bedtime) (09 Jan 2024 19:16)  semaglutide 0.5 mg/0.5 mL (0.5 mg dose) subcutaneous solution: 0.5 milligram(s) subcutaneously once a week (09 Jan 2024 19:16)  Singulair 10 mg oral tablet: 1 tab(s) orally once a day (09 Jan 2024 19:16)  Symbicort 160 mcg-4.5 mcg/inh inhalation aerosol: 2 puff(s) inhaled 2 times a day (09 Jan 2024 19:16)  Uzedy 75 mg/0.21 mL subcutaneous suspension, extended release: 75 milligram(s) subcutaneously every 4 weeks (09 Jan 2024 19:14)  Xalatan 0.005% ophthalmic solution: 1 drop(s) to each affected eye once a day (in the evening) (09 Jan 2024 19:16)      MEDICATIONS  (STANDING):  budesonide 160 MICROgram(s)/formoterol 4.5 MICROgram(s) Inhaler 2 Puff(s) Inhalation two times a day  dextrose 5%. 1000 milliLiter(s) (50 mL/Hr) IV Continuous <Continuous>  dextrose 5%. 1000 milliLiter(s) (100 mL/Hr) IV Continuous <Continuous>  dextrose 50% Injectable 25 Gram(s) IV Push once  dextrose 50% Injectable 12.5 Gram(s) IV Push once  dextrose 50% Injectable 25 Gram(s) IV Push once  enoxaparin Injectable 40 milliGRAM(s) SubCutaneous every 24 hours  glucagon  Injectable 1 milliGRAM(s) IntraMuscular once  hydrALAZINE 50 milliGRAM(s) Oral three times a day  insulin lispro (ADMELOG) corrective regimen sliding scale   SubCutaneous at bedtime  insulin lispro (ADMELOG) corrective regimen sliding scale   SubCutaneous three times a day before meals  latanoprost 0.005% Ophthalmic Solution 1 Drop(s) Both EYES at bedtime  montelukast 10 milliGRAM(s) Oral at bedtime  pantoprazole    Tablet 40 milliGRAM(s) Oral before breakfast  QUEtiapine 25 milliGRAM(s) Oral at bedtime  senna 2 Tablet(s) Oral at bedtime      TELEMETRY: 	    ECG:  	  RADIOLOGY:   DIAGNOSTIC TESTING:  [ ] Echocardiogram:  [ ]  Catheterization:  [ ] Stress Test:    OTHER: 	    LABS:	 	                            11.5   7.12  )-----------( 276      ( 17 Jan 2024 06:55 )             34.0     01-17    129<L>  |  93<L>  |  25<H>  ----------------------------<  133<H>  4.0   |  23  |  0.71    Ca    9.6      17 Jan 2024 06:52              
CARDIOLOGY FOLLOW UP - Dr. Abel  DATE OF SERVICE:1/14/24    CC no cp or sob       REVIEW OF SYSTEMS:  CONSTITUTIONAL: No fever, weight loss, or fatigue  RESPIRATORY: No cough, wheezing, chills or hemoptysis; No Shortness of Breath  CARDIOVASCULAR: No chest pain, palpitations, passing out, dizziness, or leg swelling  GASTROINTESTINAL: No abdominal or epigastric pain. No nausea, vomiting, or hematemesis; No diarrhea or constipation. No melena or hematochezia.  VASCULAR: No edema     PHYSICAL EXAM:  T(C): 36.6 (01-14-24 @ 07:46), Max: 37.1 (01-14-24 @ 01:02)  HR: 96 (01-14-24 @ 07:46) (87 - 100)  BP: 169/86 (01-14-24 @ 07:46) (131/75 - 169/86)  RR: 18 (01-14-24 @ 07:46) (18 - 18)  SpO2: 96% (01-14-24 @ 07:46) (95% - 96%)  Wt(kg): --  I&O's Summary    13 Jan 2024 07:01  -  14 Jan 2024 07:00  --------------------------------------------------------  IN: 0 mL / OUT: 2600 mL / NET: -2600 mL        Appearance: Normal	  Cardiovascular: Normal S1 S2,RRR, No JVD, No murmurs  Respiratory: Lungs clear to auscultation	  Gastrointestinal:  Soft, Non-tender, + BS	  Extremities: Normal range of motion, No clubbing, cyanosis or edema      Home Medications:  hydrALAZINE 25 mg oral tablet: 1 tab(s) orally 2 times a day (09 Jan 2024 19:16)  hydroCHLOROthiazide 25 mg oral tablet: 1 tab(s) orally once a day (09 Jan 2024 19:16)  LORazepam 0.5 mg oral tablet: 1 tab(s) orally once a day (at bedtime) (09 Jan 2024 19:16)  pantoprazole 20 mg oral delayed release tablet: 1 tab(s) orally once a day (09 Jan 2024 19:16)  ramelteon 8 mg oral tablet: 1 tab(s) orally once a day (at bedtime) (09 Jan 2024 19:16)  semaglutide 0.5 mg/0.5 mL (0.5 mg dose) subcutaneous solution: 0.5 milligram(s) subcutaneously once a week (09 Jan 2024 19:16)  Singulair 10 mg oral tablet: 1 tab(s) orally once a day (09 Jan 2024 19:16)  Symbicort 160 mcg-4.5 mcg/inh inhalation aerosol: 2 puff(s) inhaled 2 times a day (09 Jan 2024 19:16)  Uzedy 75 mg/0.21 mL subcutaneous suspension, extended release: 75 milligram(s) subcutaneously every 4 weeks (09 Jan 2024 19:14)  Xalatan 0.005% ophthalmic solution: 1 drop(s) to each affected eye once a day (in the evening) (09 Jan 2024 19:16)      MEDICATIONS  (STANDING):  budesonide 160 MICROgram(s)/formoterol 4.5 MICROgram(s) Inhaler 2 Puff(s) Inhalation two times a day  dextrose 5%. 1000 milliLiter(s) (50 mL/Hr) IV Continuous <Continuous>  dextrose 5%. 1000 milliLiter(s) (100 mL/Hr) IV Continuous <Continuous>  dextrose 50% Injectable 25 Gram(s) IV Push once  dextrose 50% Injectable 12.5 Gram(s) IV Push once  dextrose 50% Injectable 25 Gram(s) IV Push once  enoxaparin Injectable 40 milliGRAM(s) SubCutaneous every 24 hours  glucagon  Injectable 1 milliGRAM(s) IntraMuscular once  hydrALAZINE 25 milliGRAM(s) Oral two times a day  insulin lispro (ADMELOG) corrective regimen sliding scale   SubCutaneous at bedtime  insulin lispro (ADMELOG) corrective regimen sliding scale   SubCutaneous three times a day before meals  latanoprost 0.005% Ophthalmic Solution 1 Drop(s) Both EYES at bedtime  montelukast 10 milliGRAM(s) Oral at bedtime  pantoprazole    Tablet 40 milliGRAM(s) Oral before breakfast  QUEtiapine 25 milliGRAM(s) Oral at bedtime  senna 2 Tablet(s) Oral at bedtime      TELEMETRY: 	    ECG:  	  RADIOLOGY:   DIAGNOSTIC TESTING:  [ ] Echocardiogram:  [ ]  Catheterization:  [ ] Stress Test:    OTHER: 	    LABS:	 	                            13.0   9.97  )-----------( 277      ( 14 Jan 2024 07:04 )             38.5     01-14    130<L>  |  94<L>  |  23  ----------------------------<  135<H>  4.1   |  23  |  0.76    Ca    9.8      14 Jan 2024 07:06              
Patient is a 78y old  Female who presents with a chief complaint of Urinary Retention (10 Johnson 2024 12:03)      SUBJECTIVE / OVERNIGHT EVENTS: c/o legs weakness.   Review of Systems  chest pain no  palpitations no  sob no  nausea no  headache no    MEDICATIONS  (STANDING):  budesonide 160 MICROgram(s)/formoterol 4.5 MICROgram(s) Inhaler 2 Puff(s) Inhalation two times a day  dextrose 5%. 1000 milliLiter(s) (50 mL/Hr) IV Continuous <Continuous>  dextrose 5%. 1000 milliLiter(s) (100 mL/Hr) IV Continuous <Continuous>  dextrose 50% Injectable 25 Gram(s) IV Push once  dextrose 50% Injectable 12.5 Gram(s) IV Push once  dextrose 50% Injectable 25 Gram(s) IV Push once  glucagon  Injectable 1 milliGRAM(s) IntraMuscular once  hydrALAZINE 25 milliGRAM(s) Oral two times a day  insulin lispro (ADMELOG) corrective regimen sliding scale   SubCutaneous at bedtime  insulin lispro (ADMELOG) corrective regimen sliding scale   SubCutaneous three times a day before meals  lactated ringers. 1000 milliLiter(s) (75 mL/Hr) IV Continuous <Continuous>  latanoprost 0.005% Ophthalmic Solution 1 Drop(s) Both EYES at bedtime  montelukast 10 milliGRAM(s) Oral at bedtime  pantoprazole    Tablet 40 milliGRAM(s) Oral before breakfast    MEDICATIONS  (PRN):  acetaminophen     Tablet .. 650 milliGRAM(s) Oral every 6 hours PRN Temp greater or equal to 38C (100.4F), Mild Pain (1 - 3)  dextrose Oral Gel 15 Gram(s) Oral once PRN Blood Glucose LESS THAN 70 milliGRAM(s)/deciliter  LORazepam     Tablet 0.5 milliGRAM(s) Oral at bedtime PRN Anxiety  melatonin 3 milliGRAM(s) Oral at bedtime PRN Insomnia      Vital Signs Last 24 Hrs  T(C): 36.2 (10 Johnson 2024 10:04), Max: 36.7 (10 Johnson 2024 03:18)  T(F): 97.2 (10 Johnson 2024 10:04), Max: 98.1 (10 Johnson 2024 03:18)  HR: 82 (10 Johnson 2024 10:04) (68 - 82)  BP: 162/72 (10 Johnson 2024 10:04) (113/69 - 165/91)  BP(mean): --  RR: 18 (10 Johnson 2024 10:04) (18 - 18)  SpO2: 97% (10 Johnson 2024 10:04) (97% - 98%)    Parameters below as of 10 Johnson 2024 10:04  Patient On (Oxygen Delivery Method): room air        PHYSICAL EXAM:  GENERAL: NAD   HEAD:  Atraumatic, Normocephalic  EYES: EOMI, PERRLA, conjunctiva and sclera clear  NECK: Supple, No JVD  CHEST/LUNG: Clear to auscultation bilaterally; No wheeze  HEART: Regular rate and rhythm; No murmurs, rubs, or gallops  ABDOMEN: Soft, Nontender, Nondistended; Bowel sounds present   EXTREMITIES:  2+ Peripheral Pulses, No clubbing, cyanosis, or edema  PSYCH: AAOx3, anxious   NEUROLOGY: non-focal, legs weakness   SKIN: No rashes or lesions    LABS:                        12.2   7.10  )-----------( 274      ( 10 Johnson 2024 05:53 )             34.4     01-10    129<L>  |  94<L>  |  10  ----------------------------<  142<H>  4.0   |  21<L>  |  0.57    Ca    9.7      10 Johnson 2024 05:53  Mg     1.7     01-10    TPro  7.2  /  Alb  3.8  /  TBili  0.4  /  DBili  x   /  AST  24  /  ALT  21  /  AlkPhos  79  01-10          Urinalysis Basic - ( 10 Johnson 2024 05:53 )    Color: x / Appearance: x / SG: x / pH: x  Gluc: 142 mg/dL / Ketone: x  / Bili: x / Urobili: x   Blood: x / Protein: x / Nitrite: x   Leuk Esterase: x / RBC: x / WBC x   Sq Epi: x / Non Sq Epi: x / Bacteria: x        Culture - Urine (collected 09 Jan 2024 01:31)  Source: Clean Catch Clean Catch (Midstream)  Final Report (10 Johnson 2024 15:54):    No growth        RADIOLOGY & ADDITIONAL TESTS:    Imaging Personally Reviewed:    Consultant(s) Notes Reviewed:      Care Discussed with Consultants/Other Providers:  
Patient is a 78y old  Female who presents with a chief complaint of Urinary Retention (13 Jan 2024 08:29)      SUBJECTIVE / OVERNIGHT EVENTS: Comfortable without new complaints.   Review of Systems  chest pain no  palpitations no  sob no  nausea no  headache no    MEDICATIONS  (STANDING):  budesonide 160 MICROgram(s)/formoterol 4.5 MICROgram(s) Inhaler 2 Puff(s) Inhalation two times a day  dextrose 5%. 1000 milliLiter(s) (50 mL/Hr) IV Continuous <Continuous>  dextrose 5%. 1000 milliLiter(s) (100 mL/Hr) IV Continuous <Continuous>  dextrose 50% Injectable 25 Gram(s) IV Push once  dextrose 50% Injectable 12.5 Gram(s) IV Push once  dextrose 50% Injectable 25 Gram(s) IV Push once  enoxaparin Injectable 40 milliGRAM(s) SubCutaneous every 24 hours  glucagon  Injectable 1 milliGRAM(s) IntraMuscular once  hydrALAZINE 25 milliGRAM(s) Oral two times a day  insulin lispro (ADMELOG) corrective regimen sliding scale   SubCutaneous at bedtime  insulin lispro (ADMELOG) corrective regimen sliding scale   SubCutaneous three times a day before meals  latanoprost 0.005% Ophthalmic Solution 1 Drop(s) Both EYES at bedtime  montelukast 10 milliGRAM(s) Oral at bedtime  pantoprazole    Tablet 40 milliGRAM(s) Oral before breakfast  QUEtiapine 25 milliGRAM(s) Oral at bedtime    MEDICATIONS  (PRN):  acetaminophen     Tablet .. 650 milliGRAM(s) Oral every 6 hours PRN Temp greater or equal to 38C (100.4F), Mild Pain (1 - 3)  dextrose Oral Gel 15 Gram(s) Oral once PRN Blood Glucose LESS THAN 70 milliGRAM(s)/deciliter  melatonin 3 milliGRAM(s) Oral at bedtime PRN Insomnia      Vital Signs Last 24 Hrs  T(C): 37 (13 Jan 2024 15:38), Max: 37.3 (13 Jan 2024 00:30)  T(F): 98.6 (13 Jan 2024 15:38), Max: 99.2 (13 Jan 2024 00:30)  HR: 97 (13 Jan 2024 15:38) (74 - 100)  BP: 152/81 (13 Jan 2024 15:38) (125/76 - 152/81)  BP(mean): --  RR: 18 (13 Jan 2024 15:38) (18 - 18)  SpO2: 96% (13 Jan 2024 15:38) (95% - 96%)    Parameters below as of 13 Jan 2024 15:38  Patient On (Oxygen Delivery Method): room air        PHYSICAL EXAM:  GENERAL: NAD  HEAD:  Atraumatic, Normocephalic  EYES: EOMI, PERRLA, conjunctiva and sclera clear  NECK: Supple, No JVD  CHEST/LUNG: Clear to auscultation bilaterally; No wheeze  HEART: Regular rate and rhythm; No murmurs, rubs, or gallops  ABDOMEN: Soft, Nontender, Nondistended; Bowel sounds present  EXTREMITIES:  2+ Peripheral Pulses, No clubbing, cyanosis, or edema  PSYCH: AAOx3, anxious  NEUROLOGY: legs weakness  SKIN: No rashes or lesions    LABS:                      RADIOLOGY & ADDITIONAL TESTS:    Imaging Personally Reviewed:    Consultant(s) Notes Reviewed:      Care Discussed with Consultants/Other Providers:  
Patient is a 78y old  Female who presents with a chief complaint of Urinary Retention (14 Jan 2024 08:07)      SUBJECTIVE / OVERNIGHT EVENTS: Comfortable without new complaints.   Review of Systems  chest pain no  palpitations no  sob no  nausea no  headache no    MEDICATIONS  (STANDING):  budesonide 160 MICROgram(s)/formoterol 4.5 MICROgram(s) Inhaler 2 Puff(s) Inhalation two times a day  dextrose 5%. 1000 milliLiter(s) (50 mL/Hr) IV Continuous <Continuous>  dextrose 5%. 1000 milliLiter(s) (100 mL/Hr) IV Continuous <Continuous>  dextrose 50% Injectable 25 Gram(s) IV Push once  dextrose 50% Injectable 12.5 Gram(s) IV Push once  dextrose 50% Injectable 25 Gram(s) IV Push once  enoxaparin Injectable 40 milliGRAM(s) SubCutaneous every 24 hours  glucagon  Injectable 1 milliGRAM(s) IntraMuscular once  hydrALAZINE 50 milliGRAM(s) Oral three times a day  insulin lispro (ADMELOG) corrective regimen sliding scale   SubCutaneous at bedtime  insulin lispro (ADMELOG) corrective regimen sliding scale   SubCutaneous three times a day before meals  latanoprost 0.005% Ophthalmic Solution 1 Drop(s) Both EYES at bedtime  montelukast 10 milliGRAM(s) Oral at bedtime  pantoprazole    Tablet 40 milliGRAM(s) Oral before breakfast  QUEtiapine 25 milliGRAM(s) Oral at bedtime  senna 2 Tablet(s) Oral at bedtime    MEDICATIONS  (PRN):  acetaminophen     Tablet .. 650 milliGRAM(s) Oral every 6 hours PRN Temp greater or equal to 38C (100.4F), Mild Pain (1 - 3)  bisacodyl 5 milliGRAM(s) Oral once PRN Constipation  dextrose Oral Gel 15 Gram(s) Oral once PRN Blood Glucose LESS THAN 70 milliGRAM(s)/deciliter  melatonin 3 milliGRAM(s) Oral at bedtime PRN Insomnia      Vital Signs Last 24 Hrs  T(C): 36.6 (14 Jan 2024 07:46), Max: 37.1 (14 Jan 2024 01:02)  T(F): 97.9 (14 Jan 2024 07:46), Max: 98.7 (14 Jan 2024 01:02)  HR: 94 (14 Jan 2024 14:25) (87 - 96)  BP: 171/93 (14 Jan 2024 14:25) (131/75 - 171/93)  BP(mean): --  RR: 18 (14 Jan 2024 07:46) (18 - 18)  SpO2: 96% (14 Jan 2024 07:46) (95% - 96%)    Parameters below as of 14 Jan 2024 07:46  Patient On (Oxygen Delivery Method): room air        PHYSICAL EXAM:  GENERAL: NAD  HEAD:  Atraumatic, Normocephalic  EYES: EOMI, PERRLA, conjunctiva and sclera clear  NECK: Supple, No JVD  CHEST/LUNG: Clear to auscultation bilaterally; No wheeze  HEART: Regular rate and rhythm; No murmurs, rubs, or gallops  ABDOMEN: Soft, Nontender, Nondistended; Bowel sounds present  EXTREMITIES:  2+ Peripheral Pulses, No clubbing, cyanosis, or edema  PSYCH: AAOx3, anxious   NEUROLOGY: non-focal, legs weakness   SKIN: No rashes or lesions    LABS:                        13.0   9.97  )-----------( 277      ( 14 Jan 2024 07:04 )             38.5     01-14    130<L>  |  94<L>  |  23  ----------------------------<  135<H>  4.1   |  23  |  0.76    Ca    9.8      14 Jan 2024 07:06            Urinalysis Basic - ( 14 Jan 2024 07:06 )    Color: x / Appearance: x / SG: x / pH: x  Gluc: 135 mg/dL / Ketone: x  / Bili: x / Urobili: x   Blood: x / Protein: x / Nitrite: x   Leuk Esterase: x / RBC: x / WBC x   Sq Epi: x / Non Sq Epi: x / Bacteria: x          RADIOLOGY & ADDITIONAL TESTS:    Imaging Personally Reviewed:    Consultant(s) Notes Reviewed:      Care Discussed with Consultants/Other Providers:  
Patient is a 78y old  Female who presents with a chief complaint of Urinary Retention (17 Jan 2024 09:02)      SUBJECTIVE / OVERNIGHT EVENTS: c/o constipation   Review of Systems  chest pain no  palpitations no  sob no  nausea no  headache no    MEDICATIONS  (STANDING):  budesonide 160 MICROgram(s)/formoterol 4.5 MICROgram(s) Inhaler 2 Puff(s) Inhalation two times a day  dextrose 5%. 1000 milliLiter(s) (50 mL/Hr) IV Continuous <Continuous>  dextrose 5%. 1000 milliLiter(s) (100 mL/Hr) IV Continuous <Continuous>  dextrose 50% Injectable 25 Gram(s) IV Push once  dextrose 50% Injectable 12.5 Gram(s) IV Push once  dextrose 50% Injectable 25 Gram(s) IV Push once  enoxaparin Injectable 40 milliGRAM(s) SubCutaneous every 24 hours  glucagon  Injectable 1 milliGRAM(s) IntraMuscular once  hydrALAZINE 50 milliGRAM(s) Oral three times a day  insulin lispro (ADMELOG) corrective regimen sliding scale   SubCutaneous three times a day before meals  insulin lispro (ADMELOG) corrective regimen sliding scale   SubCutaneous at bedtime  latanoprost 0.005% Ophthalmic Solution 1 Drop(s) Both EYES at bedtime  montelukast 10 milliGRAM(s) Oral at bedtime  pantoprazole    Tablet 40 milliGRAM(s) Oral before breakfast  QUEtiapine 25 milliGRAM(s) Oral at bedtime  senna 2 Tablet(s) Oral at bedtime    MEDICATIONS  (PRN):  acetaminophen     Tablet .. 650 milliGRAM(s) Oral every 6 hours PRN Temp greater or equal to 38C (100.4F), Mild Pain (1 - 3)  dextrose Oral Gel 15 Gram(s) Oral once PRN Blood Glucose LESS THAN 70 milliGRAM(s)/deciliter  melatonin 3 milliGRAM(s) Oral at bedtime PRN Insomnia      Vital Signs Last 24 Hrs  T(C): 37.3 (17 Jan 2024 16:26), Max: 37.3 (17 Jan 2024 00:55)  T(F): 99.1 (17 Jan 2024 16:26), Max: 99.1 (17 Jan 2024 00:55)  HR: 97 (17 Jan 2024 16:26) (71 - 97)  BP: 117/62 (17 Jan 2024 16:26) (99/57 - 156/86)  BP(mean): --  RR: 18 (17 Jan 2024 16:26) (18 - 18)  SpO2: 96% (17 Jan 2024 16:26) (96% - 97%)    Parameters below as of 17 Jan 2024 16:26  Patient On (Oxygen Delivery Method): room air        PHYSICAL EXAM:  GENERAL: NAD  HEAD:  Atraumatic, Normocephalic  EYES: EOMI, PERRLA, conjunctiva and sclera clear  NECK: Supple, No JVD  CHEST/LUNG: Clear to auscultation bilaterally; No wheeze  HEART: Regular rate and rhythm; No murmurs, rubs, or gallops  ABDOMEN: Soft, Nontender, Nondistended; Bowel sounds present  EXTREMITIES:  2+ Peripheral Pulses, No clubbing, cyanosis, or edema  PSYCH: AAOx3, anxious   NEUROLOGY: legs weakness   SKIN: No rashes or lesions    LABS:                        11.5   7.12  )-----------( 276      ( 17 Jan 2024 06:55 )             34.0     01-17    129<L>  |  93<L>  |  25<H>  ----------------------------<  133<H>  4.0   |  23  |  0.71    Ca    9.6      17 Jan 2024 06:52            Urinalysis Basic - ( 17 Jan 2024 06:52 )    Color: x / Appearance: x / SG: x / pH: x  Gluc: 133 mg/dL / Ketone: x  / Bili: x / Urobili: x   Blood: x / Protein: x / Nitrite: x   Leuk Esterase: x / RBC: x / WBC x   Sq Epi: x / Non Sq Epi: x / Bacteria: x          RADIOLOGY & ADDITIONAL TESTS:    Imaging Personally Reviewed:    Consultant(s) Notes Reviewed:      Care Discussed with Consultants/Other Providers:  
Patient is a 78y old  Female who presents with a chief complaint of Urinary Retention (15 Johnson 2024 13:09)      SUBJECTIVE / OVERNIGHT EVENTS: Comfortable without new complaints. Family at bedside.   Review of Systems  chest pain no  palpitations no  sob no  nausea no  headache no    MEDICATIONS  (STANDING):  budesonide 160 MICROgram(s)/formoterol 4.5 MICROgram(s) Inhaler 2 Puff(s) Inhalation two times a day  dextrose 5%. 1000 milliLiter(s) (50 mL/Hr) IV Continuous <Continuous>  dextrose 5%. 1000 milliLiter(s) (100 mL/Hr) IV Continuous <Continuous>  dextrose 50% Injectable 12.5 Gram(s) IV Push once  dextrose 50% Injectable 25 Gram(s) IV Push once  dextrose 50% Injectable 25 Gram(s) IV Push once  enoxaparin Injectable 40 milliGRAM(s) SubCutaneous every 24 hours  glucagon  Injectable 1 milliGRAM(s) IntraMuscular once  hydrALAZINE 50 milliGRAM(s) Oral three times a day  insulin lispro (ADMELOG) corrective regimen sliding scale   SubCutaneous three times a day before meals  insulin lispro (ADMELOG) corrective regimen sliding scale   SubCutaneous at bedtime  latanoprost 0.005% Ophthalmic Solution 1 Drop(s) Both EYES at bedtime  montelukast 10 milliGRAM(s) Oral at bedtime  pantoprazole    Tablet 40 milliGRAM(s) Oral before breakfast  QUEtiapine 25 milliGRAM(s) Oral at bedtime  senna 2 Tablet(s) Oral at bedtime    MEDICATIONS  (PRN):  acetaminophen     Tablet .. 650 milliGRAM(s) Oral every 6 hours PRN Temp greater or equal to 38C (100.4F), Mild Pain (1 - 3)  bisacodyl 5 milliGRAM(s) Oral once PRN Constipation  dextrose Oral Gel 15 Gram(s) Oral once PRN Blood Glucose LESS THAN 70 milliGRAM(s)/deciliter  melatonin 3 milliGRAM(s) Oral at bedtime PRN Insomnia      Vital Signs Last 24 Hrs  T(C): 36.9 (15 Johnson 2024 13:45), Max: 37.6 (15 Johnson 2024 07:30)  T(F): 98.4 (15 Johnson 2024 13:45), Max: 99.6 (15 Johnson 2024 07:30)  HR: 91 (15 Johnson 2024 13:45) (91 - 102)  BP: 129/65 (15 Johnson 2024 13:45) (112/76 - 130/76)  BP(mean): --  RR: 18 (15 Johnson 2024 07:30) (18 - 18)  SpO2: 95% (15 Johnson 2024 07:30) (95% - 96%)    Parameters below as of 15 Johnson 2024 07:30  Patient On (Oxygen Delivery Method): room air        PHYSICAL EXAM:  GENERAL: NAD, well-developed  HEAD:  Atraumatic, Normocephalic  EYES: EOMI, PERRLA, conjunctiva and sclera clear  NECK: Supple, No JVD  CHEST/LUNG: Clear to auscultation bilaterally; No wheeze  HEART: Regular rate and rhythm; No murmurs, rubs, or gallops  ABDOMEN: Soft, Nontender, Nondistended; Bowel sounds present   EXTREMITIES:  2+ Peripheral Pulses, No clubbing, cyanosis, or edema  PSYCH: AAOx3, less anxious   NEUROLOGY: non-focal, legs weakness  SKIN: No rashes or lesions    LABS:                        13.0   9.97  )-----------( 277      ( 14 Jan 2024 07:04 )             38.5     01-14    130<L>  |  94<L>  |  23  ----------------------------<  135<H>  4.1   |  23  |  0.76    Ca    9.8      14 Jan 2024 07:06            Urinalysis Basic - ( 14 Jan 2024 07:06 )    Color: x / Appearance: x / SG: x / pH: x  Gluc: 135 mg/dL / Ketone: x  / Bili: x / Urobili: x   Blood: x / Protein: x / Nitrite: x   Leuk Esterase: x / RBC: x / WBC x   Sq Epi: x / Non Sq Epi: x / Bacteria: x          RADIOLOGY & ADDITIONAL TESTS:    Imaging Personally Reviewed:    Consultant(s) Notes Reviewed:      Care Discussed with Consultants/Other Providers:  
Patient is a 78y old  Female who presents with a chief complaint of Urinary Retention (17 Jan 2024 20:45)      SUBJECTIVE / OVERNIGHT EVENTS: Comfortable without new complaints.   Review of Systems  chest pain no  palpitations no  sob no  nausea no  headache no    MEDICATIONS  (STANDING):  budesonide 160 MICROgram(s)/formoterol 4.5 MICROgram(s) Inhaler 2 Puff(s) Inhalation two times a day  dextrose 5%. 1000 milliLiter(s) (50 mL/Hr) IV Continuous <Continuous>  dextrose 5%. 1000 milliLiter(s) (100 mL/Hr) IV Continuous <Continuous>  dextrose 50% Injectable 25 Gram(s) IV Push once  dextrose 50% Injectable 12.5 Gram(s) IV Push once  dextrose 50% Injectable 25 Gram(s) IV Push once  enoxaparin Injectable 40 milliGRAM(s) SubCutaneous every 24 hours  glucagon  Injectable 1 milliGRAM(s) IntraMuscular once  hydrALAZINE 50 milliGRAM(s) Oral three times a day  insulin lispro (ADMELOG) corrective regimen sliding scale   SubCutaneous at bedtime  insulin lispro (ADMELOG) corrective regimen sliding scale   SubCutaneous three times a day before meals  latanoprost 0.005% Ophthalmic Solution 1 Drop(s) Both EYES at bedtime  montelukast 10 milliGRAM(s) Oral at bedtime  pantoprazole    Tablet 40 milliGRAM(s) Oral before breakfast  polyethylene glycol 3350 17 Gram(s) Oral daily  QUEtiapine 25 milliGRAM(s) Oral at bedtime  senna 2 Tablet(s) Oral at bedtime    MEDICATIONS  (PRN):  acetaminophen     Tablet .. 650 milliGRAM(s) Oral every 6 hours PRN Temp greater or equal to 38C (100.4F), Mild Pain (1 - 3)  dextrose Oral Gel 15 Gram(s) Oral once PRN Blood Glucose LESS THAN 70 milliGRAM(s)/deciliter  melatonin 3 milliGRAM(s) Oral at bedtime PRN Insomnia      Vital Signs Last 24 Hrs  T(C): 36.8 (18 Jan 2024 08:47), Max: 37.3 (17 Jan 2024 16:26)  T(F): 98.3 (18 Jan 2024 08:47), Max: 99.1 (17 Jan 2024 16:26)  HR: 92 (18 Jan 2024 08:47) (77 - 97)  BP: 144/77 (18 Jan 2024 08:47) (110/70 - 154/74)  BP(mean): --  RR: 18 (18 Jan 2024 08:47) (18 - 18)  SpO2: 97% (18 Jan 2024 08:47) (95% - 97%)    Parameters below as of 18 Jan 2024 08:47  Patient On (Oxygen Delivery Method): room air        PHYSICAL EXAM:  GENERAL: NAD, well-developed  HEAD:  Atraumatic, Normocephalic  EYES: EOMI, PERRLA, conjunctiva and sclera clear  NECK: Supple, No JVD  CHEST/LUNG: Clear to auscultation bilaterally; No wheeze  HEART: Regular rate and rhythm; No murmurs, rubs, or gallops  ABDOMEN: Soft, Nontender, Nondistended; Bowel sounds present  EXTREMITIES:  2+ Peripheral Pulses, No clubbing, cyanosis, or edema  PSYCH: AAOx3, anxious  NEUROLOGY: legs weakness  SKIN: No rashes or lesions    LABS:                        11.5   7.12  )-----------( 276      ( 17 Jan 2024 06:55 )             34.0     01-17    129<L>  |  93<L>  |  25<H>  ----------------------------<  133<H>  4.0   |  23  |  0.71    Ca    9.6      17 Jan 2024 06:52            Urinalysis Basic - ( 17 Jan 2024 06:52 )    Color: x / Appearance: x / SG: x / pH: x  Gluc: 133 mg/dL / Ketone: x  / Bili: x / Urobili: x   Blood: x / Protein: x / Nitrite: x   Leuk Esterase: x / RBC: x / WBC x   Sq Epi: x / Non Sq Epi: x / Bacteria: x          RADIOLOGY & ADDITIONAL TESTS:    Imaging Personally Reviewed:    Consultant(s) Notes Reviewed:      Care Discussed with Consultants/Other Providers:  
Patient is a 78y old  Female who presents with a chief complaint of Urinary Retention (16 Jan 2024 13:58)      SUBJECTIVE / OVERNIGHT EVENTS: Comfortable without new complaints.   Review of Systems  chest pain no  palpitations no  sob no  nausea no  headache no    MEDICATIONS  (STANDING):  budesonide 160 MICROgram(s)/formoterol 4.5 MICROgram(s) Inhaler 2 Puff(s) Inhalation two times a day  dextrose 5%. 1000 milliLiter(s) (100 mL/Hr) IV Continuous <Continuous>  dextrose 5%. 1000 milliLiter(s) (50 mL/Hr) IV Continuous <Continuous>  dextrose 50% Injectable 25 Gram(s) IV Push once  dextrose 50% Injectable 25 Gram(s) IV Push once  dextrose 50% Injectable 12.5 Gram(s) IV Push once  enoxaparin Injectable 40 milliGRAM(s) SubCutaneous every 24 hours  glucagon  Injectable 1 milliGRAM(s) IntraMuscular once  hydrALAZINE 50 milliGRAM(s) Oral three times a day  insulin lispro (ADMELOG) corrective regimen sliding scale   SubCutaneous at bedtime  insulin lispro (ADMELOG) corrective regimen sliding scale   SubCutaneous three times a day before meals  latanoprost 0.005% Ophthalmic Solution 1 Drop(s) Both EYES at bedtime  montelukast 10 milliGRAM(s) Oral at bedtime  pantoprazole    Tablet 40 milliGRAM(s) Oral before breakfast  QUEtiapine 25 milliGRAM(s) Oral at bedtime  senna 2 Tablet(s) Oral at bedtime    MEDICATIONS  (PRN):  acetaminophen     Tablet .. 650 milliGRAM(s) Oral every 6 hours PRN Temp greater or equal to 38C (100.4F), Mild Pain (1 - 3)  bisacodyl 5 milliGRAM(s) Oral once PRN Constipation  dextrose Oral Gel 15 Gram(s) Oral once PRN Blood Glucose LESS THAN 70 milliGRAM(s)/deciliter  melatonin 3 milliGRAM(s) Oral at bedtime PRN Insomnia      Vital Signs Last 24 Hrs  T(C): 37 (16 Jan 2024 16:18), Max: 37.3 (16 Jan 2024 04:03)  T(F): 98.6 (16 Jan 2024 16:18), Max: 99.2 (16 Jan 2024 04:03)  HR: 73 (16 Jan 2024 16:18) (73 - 92)  BP: 138/76 (16 Jan 2024 16:18) (107/67 - 149/76)  BP(mean): --  RR: 18 (16 Jan 2024 16:18) (18 - 18)  SpO2: 96% (16 Jan 2024 16:18) (95% - 96%)    Parameters below as of 16 Jan 2024 16:18  Patient On (Oxygen Delivery Method): room air        PHYSICAL EXAM:  GENERAL: NAD   HEAD:  Atraumatic, Normocephalic  EYES: EOMI, PERRLA, conjunctiva and sclera clear  NECK: Supple, No JVD  CHEST/LUNG: Clear to auscultation bilaterally; No wheeze  HEART: Regular rate and rhythm; No murmurs, rubs, or gallops  ABDOMEN: Soft, Nontender, Nondistended; Bowel sounds present  EXTREMITIES:  2+ Peripheral Pulses, No clubbing, cyanosis, or edema  PSYCH: AAOx3, anxious   NEUROLOGY: non-focal, legs weakness   SKIN: No rashes or lesions    LABS:    01-16    127<L>  |  93<L>  |  32<H>  ----------------------------<  135<H>  4.0   |  21<L>  |  0.90    Ca    9.7      16 Jan 2024 07:24            Urinalysis Basic - ( 16 Jan 2024 07:24 )    Color: x / Appearance: x / SG: x / pH: x  Gluc: 135 mg/dL / Ketone: x  / Bili: x / Urobili: x   Blood: x / Protein: x / Nitrite: x   Leuk Esterase: x / RBC: x / WBC x   Sq Epi: x / Non Sq Epi: x / Bacteria: x          RADIOLOGY & ADDITIONAL TESTS:    Imaging Personally Reviewed:    Consultant(s) Notes Reviewed:      Care Discussed with Consultants/Other Providers:  
CARDIOLOGY FOLLOW UP - Dr. Abel  DATE OF SERVICE: 1/13/24    CC no cp or sob       REVIEW OF SYSTEMS:  CONSTITUTIONAL: No fever, weight loss, or fatigue  RESPIRATORY: No cough, wheezing, chills or hemoptysis; No Shortness of Breath  CARDIOVASCULAR: No chest pain, palpitations, passing out, dizziness, or leg swelling  GASTROINTESTINAL: No abdominal or epigastric pain. No nausea, vomiting, or hematemesis; No diarrhea or constipation. No melena or hematochezia.  VASCULAR: No edema     PHYSICAL EXAM:  T(C): 37.3 (01-13-24 @ 00:30), Max: 37.3 (01-13-24 @ 00:30)  HR: 74 (01-13-24 @ 00:30) (74 - 94)  BP: 125/76 (01-13-24 @ 00:30) (125/76 - 149/80)  RR: 18 (01-13-24 @ 00:30) (18 - 18)  SpO2: 96% (01-13-24 @ 00:30) (96% - 99%)  Wt(kg): --  I&O's Summary    12 Jan 2024 07:01  -  13 Jan 2024 07:00  --------------------------------------------------------  IN: 0 mL / OUT: 700 mL / NET: -700 mL        Appearance: Normal	  Cardiovascular: Normal S1 S2,RRR, No JVD, No murmurs  Respiratory: Lungs clear to auscultation	  Gastrointestinal:  Soft, Non-tender, + BS	  Extremities: Normal range of motion, No clubbing, cyanosis or edema      Home Medications:  hydrALAZINE 25 mg oral tablet: 1 tab(s) orally 2 times a day (09 Jan 2024 19:16)  hydroCHLOROthiazide 25 mg oral tablet: 1 tab(s) orally once a day (09 Jan 2024 19:16)  LORazepam 0.5 mg oral tablet: 1 tab(s) orally once a day (at bedtime) (09 Jan 2024 19:16)  pantoprazole 20 mg oral delayed release tablet: 1 tab(s) orally once a day (09 Jan 2024 19:16)  ramelteon 8 mg oral tablet: 1 tab(s) orally once a day (at bedtime) (09 Jan 2024 19:16)  semaglutide 0.5 mg/0.5 mL (0.5 mg dose) subcutaneous solution: 0.5 milligram(s) subcutaneously once a week (09 Jan 2024 19:16)  Singulair 10 mg oral tablet: 1 tab(s) orally once a day (09 Jan 2024 19:16)  Symbicort 160 mcg-4.5 mcg/inh inhalation aerosol: 2 puff(s) inhaled 2 times a day (09 Jan 2024 19:16)  Uzedy 75 mg/0.21 mL subcutaneous suspension, extended release: 75 milligram(s) subcutaneously every 4 weeks (09 Jan 2024 19:14)  Xalatan 0.005% ophthalmic solution: 1 drop(s) to each affected eye once a day (in the evening) (09 Jan 2024 19:16)      MEDICATIONS  (STANDING):  budesonide 160 MICROgram(s)/formoterol 4.5 MICROgram(s) Inhaler 2 Puff(s) Inhalation two times a day  dextrose 5%. 1000 milliLiter(s) (50 mL/Hr) IV Continuous <Continuous>  dextrose 5%. 1000 milliLiter(s) (100 mL/Hr) IV Continuous <Continuous>  dextrose 50% Injectable 12.5 Gram(s) IV Push once  dextrose 50% Injectable 25 Gram(s) IV Push once  dextrose 50% Injectable 25 Gram(s) IV Push once  enoxaparin Injectable 40 milliGRAM(s) SubCutaneous every 24 hours  glucagon  Injectable 1 milliGRAM(s) IntraMuscular once  hydrALAZINE 25 milliGRAM(s) Oral two times a day  insulin lispro (ADMELOG) corrective regimen sliding scale   SubCutaneous at bedtime  insulin lispro (ADMELOG) corrective regimen sliding scale   SubCutaneous three times a day before meals  latanoprost 0.005% Ophthalmic Solution 1 Drop(s) Both EYES at bedtime  montelukast 10 milliGRAM(s) Oral at bedtime  pantoprazole    Tablet 40 milliGRAM(s) Oral before breakfast  QUEtiapine 25 milliGRAM(s) Oral at bedtime      TELEMETRY: 	    ECG:  	  RADIOLOGY:   DIAGNOSTIC TESTING:  [ ] Echocardiogram:  [ ]  Catheterization:  [ ] Stress Test:    OTHER: 	    LABS:	 	                      
CARDIOLOGY FOLLOW UP - Dr. Abel  DATE OF SERVICE: 1/11/24    CC  No acute cv events     REVIEW OF SYSTEMS:  Unable to obtain    PHYSICAL EXAM:  T(C): 36.8 (01-11-24 @ 12:19), Max: 36.8 (01-10-24 @ 23:29)  HR: 94 (01-11-24 @ 12:19) (83 - 94)  BP: 137/66 (01-11-24 @ 12:19) (111/67 - 137/66)  RR: 18 (01-11-24 @ 12:19) (18 - 18)  SpO2: 100% (01-11-24 @ 12:19) (94% - 100%)  Wt(kg): --  I&O's Summary    10 Johnson 2024 07:01  -  11 Jan 2024 07:00  --------------------------------------------------------  IN: 0 mL / OUT: 1400 mL / NET: -1400 mL        Appearance: Normal	  Cardiovascular: Normal S1 S2,RRR, No JVD, No murmurs  Respiratory: Lungs clear to auscultation b/l   Gastrointestinal:  Soft, Non-tender, + BS	  Extremities: Normal range of motion, No clubbing, cyanosis or edema  Neuro: AOx1 (self)      Home Medications:  hydrALAZINE 25 mg oral tablet: 1 tab(s) orally 2 times a day (09 Jan 2024 19:16)  hydroCHLOROthiazide 25 mg oral tablet: 1 tab(s) orally once a day (09 Jan 2024 19:16)  LORazepam 0.5 mg oral tablet: 1 tab(s) orally once a day (at bedtime) (09 Jan 2024 19:16)  pantoprazole 20 mg oral delayed release tablet: 1 tab(s) orally once a day (09 Jan 2024 19:16)  ramelteon 8 mg oral tablet: 1 tab(s) orally once a day (at bedtime) (09 Jan 2024 19:16)  semaglutide 0.5 mg/0.5 mL (0.5 mg dose) subcutaneous solution: 0.5 milligram(s) subcutaneously once a week (09 Jan 2024 19:16)  Singulair 10 mg oral tablet: 1 tab(s) orally once a day (09 Jan 2024 19:16)  Symbicort 160 mcg-4.5 mcg/inh inhalation aerosol: 2 puff(s) inhaled 2 times a day (09 Jan 2024 19:16)  Uzedy 75 mg/0.21 mL subcutaneous suspension, extended release: 75 milligram(s) subcutaneously every 4 weeks (09 Jan 2024 19:14)  Xalatan 0.005% ophthalmic solution: 1 drop(s) to each affected eye once a day (in the evening) (09 Jan 2024 19:16)      MEDICATIONS  (STANDING):  budesonide 160 MICROgram(s)/formoterol 4.5 MICROgram(s) Inhaler 2 Puff(s) Inhalation two times a day  dextrose 5%. 1000 milliLiter(s) (50 mL/Hr) IV Continuous <Continuous>  dextrose 5%. 1000 milliLiter(s) (100 mL/Hr) IV Continuous <Continuous>  dextrose 50% Injectable 12.5 Gram(s) IV Push once  dextrose 50% Injectable 25 Gram(s) IV Push once  dextrose 50% Injectable 25 Gram(s) IV Push once  glucagon  Injectable 1 milliGRAM(s) IntraMuscular once  hydrALAZINE 25 milliGRAM(s) Oral two times a day  insulin lispro (ADMELOG) corrective regimen sliding scale   SubCutaneous at bedtime  insulin lispro (ADMELOG) corrective regimen sliding scale   SubCutaneous three times a day before meals  latanoprost 0.005% Ophthalmic Solution 1 Drop(s) Both EYES at bedtime  montelukast 10 milliGRAM(s) Oral at bedtime  pantoprazole    Tablet 40 milliGRAM(s) Oral before breakfast      TELEMETRY: 	    ECG:  	  RADIOLOGY:   DIAGNOSTIC TESTING:  [ ] Echocardiogram:  [ ]  Catheterization:  [ ] Stress Test:    OTHER: 	    LABS:	 	                            11.9   7.57  )-----------( 314      ( 11 Jan 2024 07:19 )             34.7     01-11    131<L>  |  95<L>  |  16  ----------------------------<  119<H>  4.2   |  22  |  0.93    Ca    9.5      11 Jan 2024 07:16  Mg     1.7     01-10    TPro  7.2  /  Alb  3.8  /  TBili  0.4  /  DBili  x   /  AST  24  /  ALT  21  /  AlkPhos  79  01-10    PT/INR - ( 11 Jan 2024 07:18 )   PT: 11.4 sec;   INR: 1.04 ratio

## 2024-01-18 NOTE — DISCHARGE NOTE PROVIDER - NSDCFUSCHEDAPPT_GEN_ALL_CORE_FT
Armando Pineda  [facility]  CARDIOLOGY [address]  Scheduled Appointment: 01/24/2024    Asya Mathur  [facility]  [address]NG [address] [facility]  Scheduled Appointment: 02/21/2024

## 2024-01-18 NOTE — DISCHARGE NOTE PROVIDER - HOSPITAL COURSE
HPI:  78F no ACAP Hx DM, Ovarian CA ~19 yrs ago s/p chemo, HTN, known b/l foot drop, walked with a walker at baseline. RLE radic s/p Right L4-5 lami/facet/discectomy w/ Dr. Alfonso 2010, Left microdisc w/ Dr. Echeverria 2008 p/w urinary retention x3 days. Pt poor historian, reluctant to answer questions. Now also with BUE pain/numb from shoulders to fingers. Denies back pain, denies any saddle anesthesia, describes urinary frequency/dysuria x3d. Does endorse some neck pain recently. Denies trauma, fevers, chills.     In ER: Given KCL 20 PO, 40 PO, 30meq IV (09 Jan 2024 19:56)    Hospital Course: Pt presenting with urinary retention x3 days now s/p mora also found to have hypokalemia and cervicalgia. CT abd/pelvis with  No evidence of acute abnormality in the abdomen and pelvis. No hydronephrosis. No evidence of recurrent disease or suspicious adenopathy. Attempted to perform TOV, but failed, mora was reinserted on 1/12. Pt also evaluated by Neurosx for cervicalgia->MRI C-spine demonstrates cervical spondylotic changes with spinal cord compression most severe at the C3-4 level where there are signal changes consistent with myelomalacia. Surgical and non-surgical treatment options discussed. Per neurosx consideration may be given to C3, C4 laminectomy and C3-C5 stabilization and fusion. RBA discussed. Pt  and family were  advised that she  may be at risk for SCI in the event of cervical spine trauma and may be at risk of progression of neurological  symptoms, which could become permanent.  The patient and family are not interested in surgery at present, the daughter would like the patient to go to Rehabilitation to see if she improves from a neurologic and cognitive standpoint. NeuroSx advised  against this due to risk of spinal cord injury. Pt seen by [facility] , as pt daughter was concern due to new confusion and concern for worsening anxiety. MRI brain -> Age-appropriate involutional and ischemic gliotic changes. No other infarcts or hemorrhage. No masses or abnormal enhancement. Recommend to  hold on Ativan given potential contribution to confusion and started Seroquel, mental status improved.  For electrolyte imbalance , hyperkalemia-- > hold HCTZ, now improved. Pt is now medically stable for discharge to [facility].         Important Medication Changes and Reason: Increased Hydralazine to 50 mg 3x/day   and stop HCTZ     Active or Pending Issues Requiring Follow-up: Follow up with NeuroSurgery     Advanced Directives:   [X] Full code  [ ] DNR  [ ] Hospice    Discharge Diagnoses:  Urinary retention   cervicalgia   DM Ovarian CA   HTN  known b/l foot drop  RLE radic s/p Right L4-5 lami/facet/discectomy

## 2024-01-29 ENCOUNTER — APPOINTMENT (OUTPATIENT)
Dept: CARDIOLOGY | Facility: CLINIC | Age: 79
End: 2024-01-29
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
## 2024-02-15 NOTE — ED PROVIDER NOTE - DISPOSITION TYPE
Patient with 8 beats of Vtach on monitor notified by telemetry tech that patient was having multiple episodes of 5 beats of tach. patient went into rapid afib to the 150s while in the bathroom. patient brought back to bed, returned to baseline controlled a fib. pt having frequent triplets/PVCS digoxin levels high in AM 12pm digoxin dose ordered pt. goint to 160s HR non-sustaining, pt. HR in the 120s -130s LLE leg pain. increased tenderness noted to lateral calf. no increased warmth/redness. BL LE edema +2 noted, not a new finding pt. HR to the 150s, 4 beats of v-tach DISCHARGE

## 2024-04-10 ENCOUNTER — APPOINTMENT (OUTPATIENT)
Dept: SURGERY | Facility: CLINIC | Age: 79
End: 2024-04-10
Payer: MEDICARE

## 2024-04-10 VITALS
TEMPERATURE: 96.4 F | DIASTOLIC BLOOD PRESSURE: 82 MMHG | BODY MASS INDEX: 35.68 KG/M2 | OXYGEN SATURATION: 100 % | WEIGHT: 170 LBS | SYSTOLIC BLOOD PRESSURE: 136 MMHG | HEART RATE: 88 BPM | HEIGHT: 58 IN | RESPIRATION RATE: 18 BRPM

## 2024-04-10 DIAGNOSIS — R15.9 FULL INCONTINENCE OF FECES: ICD-10-CM

## 2024-04-10 PROCEDURE — 46600 DIAGNOSTIC ANOSCOPY SPX: CPT

## 2024-04-10 PROCEDURE — 99204 OFFICE O/P NEW MOD 45 MIN: CPT | Mod: 25

## 2024-04-10 RX ORDER — ALPRAZOLAM 0.25 MG/1
0.25 TABLET ORAL
Qty: 1 | Refills: 0 | Status: DISCONTINUED | COMMUNITY
Start: 2023-06-22 | End: 2024-04-10

## 2024-04-10 RX ORDER — ALPRAZOLAM 0.25 MG/1
0.25 TABLET ORAL
Qty: 1 | Refills: 0 | Status: DISCONTINUED | COMMUNITY
Start: 2023-05-28 | End: 2024-04-10

## 2024-04-10 RX ORDER — ALPRAZOLAM 0.25 MG/1
0.25 TABLET ORAL
Qty: 1 | Refills: 0 | Status: DISCONTINUED | COMMUNITY
Start: 2023-06-26 | End: 2024-04-10

## 2024-04-10 RX ORDER — FLUOXETINE HCL 10 MG
TABLET ORAL
Refills: 0 | Status: DISCONTINUED | COMMUNITY
End: 2024-04-10

## 2024-04-11 ENCOUNTER — APPOINTMENT (OUTPATIENT)
Dept: PULMONOLOGY | Facility: CLINIC | Age: 79
End: 2024-04-11
Payer: MEDICARE

## 2024-04-11 VITALS
OXYGEN SATURATION: 98 % | HEIGHT: 58 IN | TEMPERATURE: 96.4 F | BODY MASS INDEX: 31.49 KG/M2 | RESPIRATION RATE: 16 BRPM | WEIGHT: 150 LBS | SYSTOLIC BLOOD PRESSURE: 130 MMHG | HEART RATE: 76 BPM | DIASTOLIC BLOOD PRESSURE: 72 MMHG

## 2024-04-11 DIAGNOSIS — R06.02 SHORTNESS OF BREATH: ICD-10-CM

## 2024-04-11 DIAGNOSIS — J30.9 ALLERGIC RHINITIS, UNSPECIFIED: ICD-10-CM

## 2024-04-11 DIAGNOSIS — K21.9 GASTRO-ESOPHAGEAL REFLUX DISEASE W/OUT ESOPHAGITIS: ICD-10-CM

## 2024-04-11 DIAGNOSIS — G47.33 OBSTRUCTIVE SLEEP APNEA (ADULT) (PEDIATRIC): ICD-10-CM

## 2024-04-11 DIAGNOSIS — E66.9 OBESITY, UNSPECIFIED: ICD-10-CM

## 2024-04-11 DIAGNOSIS — J82.83 EOSINOPHILIC ASTHMA: ICD-10-CM

## 2024-04-11 DIAGNOSIS — J45.50 SEVERE PERSISTENT ASTHMA, UNCOMPLICATED: ICD-10-CM

## 2024-04-11 PROCEDURE — 95012 NITRIC OXIDE EXP GAS DETER: CPT

## 2024-04-11 PROCEDURE — 94729 DIFFUSING CAPACITY: CPT

## 2024-04-11 PROCEDURE — 94727 GAS DIL/WSHOT DETER LNG VOL: CPT

## 2024-04-11 PROCEDURE — 99214 OFFICE O/P EST MOD 30 MIN: CPT | Mod: 25

## 2024-04-11 PROCEDURE — 94010 BREATHING CAPACITY TEST: CPT

## 2024-04-11 RX ORDER — BENRALIZUMAB 30 MG/ML
30 INJECTION, SOLUTION SUBCUTANEOUS
Qty: 1 | Refills: 7 | Status: ACTIVE | COMMUNITY
Start: 2020-08-25 | End: 1900-01-01

## 2024-04-11 RX ORDER — MONTELUKAST 10 MG/1
10 TABLET, FILM COATED ORAL
Qty: 90 | Refills: 1 | Status: ACTIVE | COMMUNITY
Start: 2020-08-18 | End: 1900-01-01

## 2024-04-11 NOTE — ADDENDUM
[FreeTextEntry1] :  Documented by Dodie Carrasquillo acting as a scribe for Dr. Aldo Gonzales on 04/11/2024 .   All medical record entries made by the Scribe were at my, Dr. Aldo Gonzales's direction and personally dictated by me on 04/11/2024 . I have reviewed the chart and agree that the record accurately reflects my personal performance of the history, Physical exam, assessment, and plan. I have also personally directed, reviewed, and agree with the discharge instructions.

## 2024-04-11 NOTE — PHYSICAL EXAM
[No Acute Distress] : no acute distress [Normal Oropharynx] : normal oropharynx [III] : Mallampati Class: III [Normal Appearance] : normal appearance [No Neck Mass] : no neck mass [Normal Rate/Rhythm] : normal rate/rhythm [Normal S1, S2] : normal s1, s2 [No Murmurs] : no murmurs [No Resp Distress] : no resp distress [No Abnormalities] : no abnormalities [Benign] : benign [Normal Gait] : normal gait [No Clubbing] : no clubbing [No Cyanosis] : no cyanosis [FROM] : FROM [Normal Color/ Pigmentation] : normal color/ pigmentation [No Focal Deficits] : no focal deficits [Oriented x3] : oriented x3 [Normal Affect] : normal affect [Clear to Auscultation Bilaterally] : clear to auscultation bilaterally [TextBox_2] : OW,  [TextBox_68] : I:E 1:3; clear [TextBox_105] : 1+ LE edema

## 2024-04-11 NOTE — ASSESSMENT
[FreeTextEntry1] : Ms. CHENG is a 78 year old female with a history of eosinophilic asthma, severe persistent asthma, allergies, DM, HTN, OSAS, GERD, who comes into the office today for follow-up pulmonary evaluation on Fasenra 9/2020 (controlled) s/p by R chest pain s/p fall 3/2022 c/w rib fx- s/p covid-19 8/2022; s/p w/ human metapneumovirus induced asthma; gastritis- improved with weight loss on Ozempic- non-compliant with asthma Rx/ CPAP Rx- diagnosed over all  Problem 1: severe persistent asthma (?controlled) - Fasenra is started since 9/2020- began since 12/2023- restart now -continue Albuterol nebulizer at least BID up to QiD (gargle and spit after use) -continue to use Singulair 10 mg before bed - continue Symbicort (160) 2 inhalations BID / Tudorza at 1 inhalation BID or Spiriva at 2 inhalations QAM -Asthma is believed to be caused by inherited (genetic) and environmental factor, but its exact cause is unknown. Asthma may be triggered by allergens, lung infections, or irritants in the air. Asthma triggers are different for each person -Inhaler technique reviewed as well as oral hygiene techniques reviewed with patient. Avoidance of cold air, extremes of temperature, rescue inhaler should be used before exercise. Order of medication reviewed with patient. Recommended use of a cool mist humidifier in the bedroom.  Problem 1A: Eosinophilic asthma - on Fasenra (2) since 9/2020 (Nucala/ Dupixent) -The safety and efficacy of Nucala was established in three double-blind, randomized, placebo-controlled trials in patients with severe asthma. Compared to a placebo, patients with severe asthma receiving Nucala had fewer exacerbation requiring hospitalization and/or emergency department visits, and a longer time to first exacerbation. In addition, patients with severe asthma receiving Nucala or Fasenra experienced greater reductions in their daily maintenance oral corticosteroid dose, while maintaining asthma control compared with patients receiving placebo. Treatment with Nucala did not result in a significant improvement in lung function, as measured by the volume of air exhaled by patients in one second. The most common side effects include: headache, injection site reactions, back pain, weakness, and fatigue; hypersensitivity reactions can occur within hours or days including swelling of the face, mouth, and tongue, fainting, dizziness, hives, breathing problems, and rash; herpes zoster infections have occurred. The drug is a monoclonal antibody that inhibits interleukin-5 which helps regular eosinophils, a type of white blood cell that contributes to asthma. The over-production of eosinophils can cause inflammation in the lungs, increasing the frequency of asthma attacks. Patients must also take other medications, including high dose inhaled corticosteroids and at least one additional asthma drug.  Problem 1B: human metapneumovirus- 2023 -s/p Prednisone 20 mg x 7 days, 10 mg x 7 days 1/2022  problem 2: allergic rhinitis -continue to use Astelin 1 sniff/nostril BID (.15) -continue to use Xyzal 5 mg before bed -continue to use Claritin 10 mg QAM -continue Pataday eye drops -Environmental measures for allergies were encouraged including mattress and pillow cover, air purifier, and environmental controls.  problem 3: GERD -continue to use omeprazole 40 mg before breakfast -continue to use Pepcid 40 mg QHS -Rule of 2s: avoid eating too much, eating too late, eating too spicy, eating two hours before bed -Things to avoid including overeating, spicy foods, tight clothing, eating within three hours of bed, this list is not all inclusive. -For treatment of reflux, possible options discussed including diet control, H2 blockers, PPIs, as well as coating motility agents discussed as treatment options. Timing of meals and proximity of last meal to sleep were discussed. If symptoms persist, a formal gastrointestinal evaluation is needed.  Problem 3A: Cardiac Disease -continue to follow-up with a cardiologist (Dr. Armando Pineda et al.)  problem 4: OSAS (NC) -Repeat HSS- ?DD -continue to use the CPAP machine, tolerating it well, and seeing the benefits- Williams Alanis/ Myrna if wt loss -Sleep apnea is associated with adverse clinical consequences which an affect most organ systems. Cardiovascular disease risk includes arrhythmias, atrial fibrillation, hypertension, coronary artery disease, and stroke. Metabolic disorders include diabetes type 2, non-alcoholic fatty liver disease. Mood disorder especially depression; and cognitive decline especially in the elderly. Associations with chronic reflux/Vicente's esophagus some but not all inclusive. -Reasons to assess this include arousal consistent with hypopnea; respiratory events most prominent in REM sleep or supine position; therefore sleep staging and body position are important for accurate diagnosis and estimation of AHI.  problem 5: obesity (Zach)- on Ozempic -Recommend "Muniq" OTC Supplement -Weight loss, exercise, and diet control were discussed and are highly encouraged. Treatment options were given such as, aqua therapy, and contacting a nutritionist. Recommended to use the elliptical, stationary bike, less use of treadmill. Obesity is associated with worsening asthma, shortness of breath, and potential for cardiac disease, diabetes, and other underlying medical conditions.  problem 6: low immunity -recommended early intervention  problem 7: muscle cramps/ back pain -recommended to use Myocalm PM at bed -Topricin cream PRN  Problem 7a: lymphedema -recommended lymphedema clinic @ [facility]  Problem 8: Health Maintenance/COVID19 Precautions: -Covid-19 8/2022 -s/p Pfizer COVID 19 vaccine x 3 - Clean your hands often. Wash your hands often with soap and water for at least 20 seconds, especially after blowing your nose, coughing, or sneezing, or having been in a public place. - If soap and water are not available, use a hand  that contains at least 60% alcohol. - To the extent possible, avoid touching high-touch surfaces in public places - elevator buttons, door handles, handrails, handshaking with people, etc. Use a tissue or your sleeve to cover your hand or finger if you must touch something. - Wash your hands after touching surfaces in public places. - Avoid touching your face, nose, eyes, etc. - Clean and disinfect your home to remove germs: practice routine cleaning of frequently touched surfaces (for example: tables, doorknobs, light switches, handles, desks, toilets, faucets, sinks & cell phones) - Avoid crowds, especially in poorly ventilated spaces. Your risk of exposure to respiratory viruses like COVID-19 may increase in crowded, closed-in settings with little air circulation if there are people in the crowd who are sick. All patients are recommended to practice social distancing and stay at least 6 feet away from others. - Avoid all non-essential travel including plane trips, and especially avoid embarking on cruise ships. -If COVID-19 is spreading in your community, take extra measures to put distance between yourself and other people to further reduce your risk of being exposed to this new virus. -Stay home as much as possible. - Consider ways of getting food brought to your house through family, social, or commercial networks -Be aware that the virus has been known to live in the air up to 3 hours post exposure, cardboard up to 24 hours post exposure, copper up to 4 hours post exposure, steel and plastic up to 2-3 days post exposure. Risk of transmission from these surfaces are affected by many variables.  Immune Support Recommendations: -OTC Vitamin C 500mg BID -OTC Quercetin 250-500mg BID -OTC Zinc 75-100mg per day -OTC Melatonin 1 or 2 mg a night -OTC Vitamin D 1-4000mg per day -OTC Tonic Water 8oz per day  Asthma and COVID19: You need to make sure your asthma is under control. This often requires the use of inhaled corticosteroids (and sometimes oral corticosteroids). Inhaled corticosteroids do not likely reduce your immune system's ability to fight infections, but oral corticosteroids may. It is important to use the steps above to protect yourself to limit your exposure to any respiratory virus.  Problem 9 : health maintenance -s/p influenza vaccine - 2023 -recommended strep pneumonia vaccines after age 65: Prevnar-20 vaccine (given in office 03/17/2023), followed by Pneumo vaccine 23 one year following -recommended early intervention for URIs -recommended regular osteoporosis evaluations -recommended early dermatological evaluations -recommended after the age of 50 to the age of 70, colonoscopy every 5 years  F/P in 4 months She is encouraged to call with any changes, concerns, or questions.

## 2024-04-11 NOTE — REASON FOR VISIT
[Follow-Up] : a follow-up visit [Spouse] : spouse [FreeTextEntry1] : allergic rhinitis,Eosinophilic asthma, severe persistent asthma, chronic cough, GERD, overweight, MARA and shortness of breath

## 2024-04-11 NOTE — PROCEDURE
[FreeTextEntry1] :  Six Minute Walk test reveals a low saturation of 95 % with no evidence of Dyspnea or Fatigue; walked 130.4  meters. Pt was tired and stopped before 6 minutes.    Full PFT reveals mild restrictive and severe obstructive dysfunction flows; FEV1 was 0.42 L which is 24 % of predicted, mild lung volumes, mild diffusions, at 22.3 L which is 135 % predicted, flat inspiratory limb. PFT's for performed to evaluate for SOB.

## 2024-04-11 NOTE — HISTORY OF PRESENT ILLNESS
[FreeTextEntry1] : Ms. Lemus is a 78 year old female presenting to the office for a follow up pulmonary evaluation for acute CHF, allergic rhinitis, asthma, chronic cough, GERD, overweight, MARA and shortness of breath. Her chief complaint is   -she notes audible noise when she breaths -she notes NC with her CPAP and notes that she can no longer find the machine  -she notes her energy levels are poor, she notes feeling depressed (had it for years) -she notes she cannot walk which causes her depression to worsen -she notes heightened anxiety -she notes she lost 20 lbs -she notes neck pain flare up occasionally  -she notes she is on Ozempic but has not taken it in the last month due to causing bad constipation -she notes a lapse in using her Fasenra -she notes using Symbicort    -Patient denies any headaches, nausea, vomiting, fever, chills, sweats, chest pain, chest pressure, palpitations, coughing, wheezing, fatigue, diarrhea, dysphagia, arthralgias, myalgias, dizziness, leg swelling, leg pain, itchy eyes, itchy ears, heartburn, reflux or sour taste in mouth.

## 2024-04-30 PROBLEM — R15.9 FECAL INCONTINENCE: Status: ACTIVE | Noted: 2024-04-10

## 2024-04-30 NOTE — PHYSICAL EXAM
[Normal Breath Sounds] : Normal breath sounds [Normal Heart Sounds] : normal heart sounds [No Rash or Lesion] : No rash or lesion [Alert] : alert [Oriented to Person] : oriented to person [Oriented to Place] : oriented to place [Oriented to Time] : oriented to time [Calm] : calm [de-identified] : WNL [de-identified] : WNL [de-identified] : DIANELYSL [de-identified] : WNL [FreeTextEntry1] : This is a 78 -year-old well-developed female in no apparent distress.  Abdomen soft, nontender, nondistended, no masses. No hepatosplenomegaly.  Examination of the perineum reveals no external hemorrhoids. Digital rectal examination reveals slightly decreased sphincter tone.  Anoscopy reveals small, non-inflamed internal hemorrhoids.

## 2024-04-30 NOTE — HISTORY OF PRESENT ILLNESS
[FreeTextEntry1] : Allan is a 79 y/o female being seen for consultation, fecal incontinence  3 years ago she had colonoscopy pt's daughter reports it was normal.    Today pt reports intermittent incontinence of stool since Oct of 2023, sometimes able to control gas but often not.  Pt will pass liquid stool without knowing it.  Ovarian cancer s/p ChemoRT 2006.  Has not done pelvic floor therapy.  BMs once weekly, pt reports constipation started after coming back home from [facility] [place] (5 weeks) in January for trouble ambulating and voiding, [facility] was admitted for 3 days.  Took Miralax for a month but didn't help, tried Milk of Magnesia and takes PRN.  A month ago was last   Has had 2 children (C sections). Constipation started January while in Rehab. pt says 1 BM every week. Started Ozempic march 2023, started 0.5, then 1.0 by June, then went to [facility] in September and stopped it while there, then she restarted it when she came back. Noticed loose stool in the underwear for few weeks, then it stopped, saw it every day.  Tried Miralax daily x 1 month, it did not feel like she was emptying her bowels Last dose of Ozempic was one month ago.

## 2024-04-30 NOTE — ASSESSMENT
[FreeTextEntry1] : 79 yo female with complex GI issues, significant constipation, on Ozempic, now on hold, recent onset of FI.  It is possible that the FI is d/t overflow diarrhea. I feel that the constipation needs to be addressed first and I advised for a GI consult.  I gave her a referral for anal biofeedback for "retraining" of her pelvic floor muscle coordination. If the constipation improves and she still has FI despite biofeedback, she may be a candidate for sacral nerve stimulation (Interstim). I briefly educated the pt on this.  RTO as needed.

## 2024-04-30 NOTE — CONSULT LETTER
[Dear  ___] : Dear  [unfilled], [Consult Letter:] : I had the pleasure of evaluating your patient, [unfilled]. [Please see my note below.] : Please see my note below. [Consult Closing:] : Thank you very much for allowing me to participate in the care of this patient.  If you have any questions, please do not hesitate to contact me. [Sincerely,] : Sincerely, [FreeTextEntry2] : Dr Marcie Baca [FreeTextEntry3] : Christy Malhotra M.D., F.A.C.S., F.A.S.C.R.S. Assistant Professor of Surgery [employer] [employer] at [facility]

## 2024-05-01 NOTE — ED PROVIDER NOTE - PRINCIPAL DIAGNOSIS
Patient would like communication of their results via:    Cell Phone:   Telephone Information:   Mobile [phone]     Okay to leave a message containing results? Yes     Health Maintenance Due   Topic Date Due    Shingles Vaccine (1 of 2) Never done    Hepatitis B Vaccine (1 of 3 - 19+ 3-dose series) Never done    Colorectal Cancer Screen  Never done    Lung Cancer Screening  Never done    Breast Cancer Screening  02/08/2024     Patient is up to date, no discussion needed..                 Shortness of breath

## 2024-05-08 ENCOUNTER — APPOINTMENT (OUTPATIENT)
Dept: OTOLARYNGOLOGY | Facility: CLINIC | Age: 79
End: 2024-05-08

## 2024-05-09 ENCOUNTER — APPOINTMENT (OUTPATIENT)
Dept: SLEEP CENTER | Facility: CLINIC | Age: 79
End: 2024-05-09
Payer: MEDICARE

## 2024-05-09 ENCOUNTER — OUTPATIENT (OUTPATIENT)
Dept: OUTPATIENT SERVICES | Facility: HOSPITAL | Age: 79
LOS: 1 days | End: 2024-05-09
Payer: MEDICARE

## 2024-05-09 PROCEDURE — 95800 SLP STDY UNATTENDED: CPT | Mod: 26

## 2024-05-09 PROCEDURE — 95800 SLP STDY UNATTENDED: CPT

## 2024-05-14 NOTE — DISCHARGE NOTE PROVIDER - NSDCDCMDCOMP_GEN_ALL_CORE
This document is complete and the patient is ready for discharge.
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  Patient states she recently completed radiation, was unable to follow-up with her oncologist secondary to insurance issues and she previously only had emergency insurance.  Patient reports left lateral neck pain and chest pain.  Denies shortness of breath.  No fevers or chills.

## 2024-05-15 ENCOUNTER — APPOINTMENT (OUTPATIENT)
Dept: VASCULAR SURGERY | Facility: CLINIC | Age: 79
End: 2024-05-15

## 2024-05-16 DIAGNOSIS — G47.33 OBSTRUCTIVE SLEEP APNEA (ADULT) (PEDIATRIC): ICD-10-CM

## 2024-05-24 ENCOUNTER — APPOINTMENT (OUTPATIENT)
Dept: PULMONOLOGY | Facility: CLINIC | Age: 79
End: 2024-05-24

## 2024-10-07 NOTE — PHYSICAL THERAPY INITIAL EVALUATION ADULT - IMPAIRMENTS FOUND, PT EVAL
Discussed during office visit
aerobic capacity/endurance/gait, locomotion, and balance/muscle strength

## 2024-10-14 NOTE — ED PROVIDER NOTE - ATTENDING CONTRIBUTION TO CARE
Spoke with 9s, chart ready for review.    77yr F survivor of ovarian ca w multiple abd surgeries and resultant SBOs, DM asthma htn p/w abd pain in the mid abd area since yesterday. reports feeling similar to prior SBOs. no vomiting, but had nausea, had two bowel movements but reports different from usual habits. no infectious sx, no cp, sob.   exam well appearing, no distress, clear lungs, S1-2, mild ttp over epi gastric area, no CVAT.   diff include SBO GERD vs less likely ACS. will do labs, ekg, cxr, CT and symptomatic treatment.   signed out pending CT results.

## 2024-12-04 ENCOUNTER — APPOINTMENT (OUTPATIENT)
Dept: PULMONOLOGY | Facility: CLINIC | Age: 79
End: 2024-12-04

## 2025-01-07 ENCOUNTER — APPOINTMENT (OUTPATIENT)
Dept: NEUROLOGY | Facility: CLINIC | Age: 80
End: 2025-01-07
Payer: MEDICARE

## 2025-01-07 VITALS
BODY MASS INDEX: 29.81 KG/M2 | DIASTOLIC BLOOD PRESSURE: 77 MMHG | HEIGHT: 58 IN | WEIGHT: 142 LBS | SYSTOLIC BLOOD PRESSURE: 153 MMHG | HEART RATE: 69 BPM

## 2025-01-07 DIAGNOSIS — M21.372 FOOT DROP, LEFT FOOT: ICD-10-CM

## 2025-01-07 DIAGNOSIS — M21.371 FOOT DROP, RIGHT FOOT: ICD-10-CM

## 2025-01-07 PROCEDURE — 99205 OFFICE O/P NEW HI 60 MIN: CPT

## 2025-01-09 NOTE — ED ADULT NURSE NOTE - HOW OFTEN DO YOU HAVE A DRINK CONTAINING ALCOHOL?
Follow up in December 2025 after imaging is completed.  See attached paperwork to schedule imaging.  
Never

## 2025-03-16 NOTE — CONSULT NOTE ADULT - CONSULT REQUESTED DATE/TIME
Pt reevaluated by er physician. Pt informed of her test reports and plan of care. Verbalizing understanding  
24-Jul-2020 06:10
24-Jul-2020 11:16

## 2025-04-29 NOTE — BH CONSULTATION LIAISON ASSESSMENT NOTE - NSBHMSEKNOW_PSY_A_CORE
Patient scheduled at 6/5/2025 for CPE with Dr. Martinez, lab scheduled at [place] 5/29/2025 for fasting.    Normal

## (undated) DEVICE — TUBING SUCTION 20FT

## (undated) DEVICE — TUBING SUCTION CONN 6FT STERILE

## (undated) DEVICE — PACK IV START WITH CHG

## (undated) DEVICE — SYR ALLIANCE II INFLATION 60ML

## (undated) DEVICE — TUBING IV SET GRAVITY 3Y 100" MACRO

## (undated) DEVICE — CATH IV SAFE BC 22G X 1" (BLUE)

## (undated) DEVICE — BIOPSY FORCEP RADIAL JAW 4 STANDARD WITH NEEDLE

## (undated) DEVICE — CATH IV SAFE BC 20G X 1.16" (PINK)

## (undated) DEVICE — BALLOON US ENDO

## (undated) DEVICE — BITE BLOCK ADULT 20 X 27MM (GREEN)

## (undated) DEVICE — SOL INJ NS 0.9% 500ML 2 PORT

## (undated) DEVICE — SUCTION YANKAUER NO CONTROL VENT

## (undated) DEVICE — FOLEY HOLDER STATLOCK 2 WAY ADULT

## (undated) DEVICE — SENSOR O2 FINGER ADULT